# Patient Record
Sex: FEMALE | Race: WHITE | URBAN - METROPOLITAN AREA
[De-identification: names, ages, dates, MRNs, and addresses within clinical notes are randomized per-mention and may not be internally consistent; named-entity substitution may affect disease eponyms.]

---

## 2018-08-04 ENCOUNTER — EMERGENCY (EMERGENCY)
Facility: HOSPITAL | Age: 73
LOS: 0 days | Discharge: HOME | End: 2018-08-04
Attending: EMERGENCY MEDICINE | Admitting: EMERGENCY MEDICINE
Payer: MEDICARE

## 2018-08-04 VITALS — HEIGHT: 64 IN | WEIGHT: 199.96 LBS

## 2018-08-04 VITALS
OXYGEN SATURATION: 100 % | RESPIRATION RATE: 17 BRPM | DIASTOLIC BLOOD PRESSURE: 76 MMHG | SYSTOLIC BLOOD PRESSURE: 149 MMHG | HEART RATE: 82 BPM

## 2018-08-04 DIAGNOSIS — M17.0 BILATERAL PRIMARY OSTEOARTHRITIS OF KNEE: ICD-10-CM

## 2018-08-04 DIAGNOSIS — Z79.899 OTHER LONG TERM (CURRENT) DRUG THERAPY: ICD-10-CM

## 2018-08-04 DIAGNOSIS — R53.1 WEAKNESS: ICD-10-CM

## 2018-08-04 DIAGNOSIS — I10 ESSENTIAL (PRIMARY) HYPERTENSION: ICD-10-CM

## 2018-08-04 DIAGNOSIS — Z79.84 LONG TERM (CURRENT) USE OF ORAL HYPOGLYCEMIC DRUGS: ICD-10-CM

## 2018-08-04 DIAGNOSIS — E11.9 TYPE 2 DIABETES MELLITUS WITHOUT COMPLICATIONS: ICD-10-CM

## 2018-08-04 DIAGNOSIS — R29.6 REPEATED FALLS: ICD-10-CM

## 2018-08-04 DIAGNOSIS — R42 DIZZINESS AND GIDDINESS: ICD-10-CM

## 2018-08-04 LAB
ALBUMIN SERPL ELPH-MCNC: 3.3 G/DL — SIGNIFICANT CHANGE UP (ref 3.3–5)
ALP SERPL-CCNC: 103 U/L — SIGNIFICANT CHANGE UP (ref 40–120)
ALT FLD-CCNC: 38 U/L — SIGNIFICANT CHANGE UP (ref 12–78)
ANION GAP SERPL CALC-SCNC: 11 MMOL/L — SIGNIFICANT CHANGE UP (ref 5–17)
AST SERPL-CCNC: 24 U/L — SIGNIFICANT CHANGE UP (ref 15–37)
BASOPHILS # BLD AUTO: 0.05 K/UL — SIGNIFICANT CHANGE UP (ref 0–0.2)
BASOPHILS NFR BLD AUTO: 0.3 % — SIGNIFICANT CHANGE UP (ref 0–2)
BILIRUB SERPL-MCNC: 0.3 MG/DL — SIGNIFICANT CHANGE UP (ref 0.2–1.2)
BUN SERPL-MCNC: 34 MG/DL — HIGH (ref 7–23)
CALCIUM SERPL-MCNC: 9.5 MG/DL — SIGNIFICANT CHANGE UP (ref 8.5–10.1)
CHLORIDE SERPL-SCNC: 99 MMOL/L — SIGNIFICANT CHANGE UP (ref 96–108)
CO2 SERPL-SCNC: 23 MMOL/L — SIGNIFICANT CHANGE UP (ref 22–31)
CREAT SERPL-MCNC: 1.56 MG/DL — HIGH (ref 0.5–1.3)
EOSINOPHIL # BLD AUTO: 0.06 K/UL — SIGNIFICANT CHANGE UP (ref 0–0.5)
EOSINOPHIL NFR BLD AUTO: 0.4 % — SIGNIFICANT CHANGE UP (ref 0–6)
GLUCOSE SERPL-MCNC: 448 MG/DL — HIGH (ref 70–99)
HCT VFR BLD CALC: 41 % — SIGNIFICANT CHANGE UP (ref 34.5–45)
HGB BLD-MCNC: 13.3 G/DL — SIGNIFICANT CHANGE UP (ref 11.5–15.5)
IMM GRANULOCYTES NFR BLD AUTO: 0.5 % — SIGNIFICANT CHANGE UP (ref 0–1.5)
LYMPHOCYTES # BLD AUTO: 1.58 K/UL — SIGNIFICANT CHANGE UP (ref 1–3.3)
LYMPHOCYTES # BLD AUTO: 10.6 % — LOW (ref 13–44)
MCHC RBC-ENTMCNC: 28.5 PG — SIGNIFICANT CHANGE UP (ref 27–34)
MCHC RBC-ENTMCNC: 32.4 GM/DL — SIGNIFICANT CHANGE UP (ref 32–36)
MCV RBC AUTO: 87.8 FL — SIGNIFICANT CHANGE UP (ref 80–100)
MONOCYTES # BLD AUTO: 0.58 K/UL — SIGNIFICANT CHANGE UP (ref 0–0.9)
MONOCYTES NFR BLD AUTO: 3.9 % — SIGNIFICANT CHANGE UP (ref 2–14)
NEUTROPHILS # BLD AUTO: 12.53 K/UL — HIGH (ref 1.8–7.4)
NEUTROPHILS NFR BLD AUTO: 84.3 % — HIGH (ref 43–77)
NRBC # BLD: 0 /100 WBCS — SIGNIFICANT CHANGE UP (ref 0–0)
PLATELET # BLD AUTO: 354 K/UL — SIGNIFICANT CHANGE UP (ref 150–400)
POTASSIUM SERPL-MCNC: 3.9 MMOL/L — SIGNIFICANT CHANGE UP (ref 3.5–5.3)
POTASSIUM SERPL-SCNC: 3.9 MMOL/L — SIGNIFICANT CHANGE UP (ref 3.5–5.3)
PROT SERPL-MCNC: 8.2 GM/DL — SIGNIFICANT CHANGE UP (ref 6–8.3)
RBC # BLD: 4.67 M/UL — SIGNIFICANT CHANGE UP (ref 3.8–5.2)
RBC # FLD: 13.8 % — SIGNIFICANT CHANGE UP (ref 10.3–14.5)
SODIUM SERPL-SCNC: 133 MMOL/L — LOW (ref 135–145)
TROPONIN I SERPL-MCNC: <0.015 NG/ML — SIGNIFICANT CHANGE UP (ref 0.01–0.04)
WBC # BLD: 14.87 K/UL — HIGH (ref 3.8–10.5)
WBC # FLD AUTO: 14.87 K/UL — HIGH (ref 3.8–10.5)

## 2018-08-04 PROCEDURE — 93010 ELECTROCARDIOGRAM REPORT: CPT

## 2018-08-04 PROCEDURE — 99285 EMERGENCY DEPT VISIT HI MDM: CPT | Mod: 25

## 2018-08-04 PROCEDURE — 73564 X-RAY EXAM KNEE 4 OR MORE: CPT | Mod: 26,RT

## 2018-08-04 PROCEDURE — 71045 X-RAY EXAM CHEST 1 VIEW: CPT | Mod: 26

## 2018-08-04 PROCEDURE — 70450 CT HEAD/BRAIN W/O DYE: CPT | Mod: 26

## 2018-08-04 RX ORDER — MECLIZINE HCL 12.5 MG
25 TABLET ORAL ONCE
Qty: 0 | Refills: 0 | Status: COMPLETED | OUTPATIENT
Start: 2018-08-04 | End: 2018-08-04

## 2018-08-04 RX ORDER — SODIUM CHLORIDE 9 MG/ML
1000 INJECTION INTRAMUSCULAR; INTRAVENOUS; SUBCUTANEOUS ONCE
Qty: 0 | Refills: 0 | Status: COMPLETED | OUTPATIENT
Start: 2018-08-04 | End: 2018-08-04

## 2018-08-04 RX ADMIN — Medication 25 MILLIGRAM(S): at 17:43

## 2018-08-04 RX ADMIN — SODIUM CHLORIDE 1000 MILLILITER(S): 9 INJECTION INTRAMUSCULAR; INTRAVENOUS; SUBCUTANEOUS at 17:42

## 2018-08-04 NOTE — ED PROVIDER NOTE - PROGRESS NOTE DETAILS
JW PT now asymptomatic, feels better, has no symptoms.  No sign of Knee fx.  Repeat Physical Exam: General: alert and oriented x3 in no acute distress.  Cardiovascular: Regular rate and rhythm, S1 and S2 normal.  No murmurs, rubs, or gallops.  Pulses equal bilaterally.  No carotid bruits.  No peripheral edema or JVD.  Respiratory: No respiratory distress.  Lungs clear to auscultation bilaterally without adventitial breath sounds.  Abdominal: Non-distended, normal bowel sounds in all four quadrants, non tender to percussion and palpation in all four quadrants.  No mass, rigidity, or guarding.  Extremities: No sign of trauma, inflammation, or effusion.  Full range of motion in the cervical, lumbar, and thoracic spine and all four extremities without limitation.  No peripheral edema.  Neurological: AOx3 NAD.  Cranial nerves I-XII intact.  Normal gross motor and sensory function. Pt ambulatory. 5/5 muscular strength in all four extremities with normal bulk and tone.  DTRs 2+ bilaterally.  No dysmetria or dysdiadochokinesia.  No focal neurological deficit.  Mild JASPREET and non-specific leukocytosis.  Pt referred to [facility]IA clinic for 24 hoour follow up and orthopedic surgery for repeat evaluation of her knee, MRI and possible replacement. I reviewed the alarm symptoms of this patient's diagnosis and discussed criteria for their return to the emergency department.  I instructed the patient to return to the emergency department with any alarm symptoms for their specific diagnosis including dizziness, nausea, vomiting, any worsening symptoms, and any other concerns.  I instructed this patient to call their primary doctor today, to inform them of their visit to the emergency department, and to obtain a repeat evaluation in the next 24 hours.  This patient understood and agreed with our plan for follow up.  At the time of discharge this patient remained in stable condition, in no acute distress, with stable vital signs. JW PT now asymptomatic, feels better, has no symptoms off dizziness.  No sign of Knee fx.  Repeat Physical Exam: General: alert and oriented x3 in no acute distress.  Cardiovascular: Regular rate and rhythm, S1 and S2 normal.  No murmurs, rubs, or gallops.  Pulses equal bilaterally.  No carotid bruits.  No peripheral edema or JVD.  Respiratory: No respiratory distress.  Lungs clear to auscultation bilaterally without adventitial breath sounds.  Abdominal: Non-distended, normal bowel sounds in all four quadrants, non tender to percussion and palpation in all four quadrants.  No mass, rigidity, or guarding.  Extremities: No sign of trauma, inflammation, or effusion.  Full range of motion in the cervical, lumbar, and thoracic spine and all four extremities without limitation.  No peripheral edema.  Neurological: AOx3 NAD.  Cranial nerves I-XII intact.  Normal gross motor and sensory function. Pt ambulatory. 5/5 muscular strength in all four extremities with normal bulk and tone.  DTRs 2+ bilaterally.  No dysmetria or dysdiadochokinesia.  No focal neurological deficit.  Mild JASPREET and non-specific leukocytosis.  Pt referred to TIA clinic for 24 hoour follow up and orthopedic surgery for repeat evaluation of her knee, MRI and possible replacement. JW PT now asymptomatic, feels better, has no symptoms off dizziness.  No sign of Knee fx.  XR reveals arthritis. Repeat Physical Exam: General: alert and oriented x3 in no acute distress.  Cardiovascular: Regular rate and rhythm, S1 and S2 normal.  No murmurs, rubs, or gallops.  Pulses equal bilaterally.  No carotid bruits.  No peripheral edema or JVD.  Respiratory: No respiratory distress.  Lungs clear to auscultation bilaterally without adventitial breath sounds.  Abdominal: Non-distended, normal bowel sounds in all four quadrants, non tender to percussion and palpation in all four quadrants.  No mass, rigidity, or guarding.  Extremities: No sign of trauma, inflammation, or effusion.  Full range of motion in the cervical, lumbar, and thoracic spine and all four extremities without limitation.  No peripheral edema.  Neurological: AOx3 NAD.  Cranial nerves I-XII intact.  Normal gross motor and sensory function. Pt ambulatory. 5/5 muscular strength in all four extremities with normal bulk and tone.  DTRs 2+ bilaterally.  No dysmetria or dysdiadochokinesia.  No focal neurological deficit.  Mild JASPREET and non-specific leukocytosis.  Pt referred to [facility]IA clinic for 24 hoour follow up and orthopedic surgery for repeat evaluation of her knee, MRI and possible replacement.

## 2018-08-04 NOTE — ED ADULT NURSE NOTE - NSIMPLEMENTINTERV_GEN_ALL_ED
Implemented All Fall Risk Interventions:  [place] to call system. Call bell, personal items and telephone within reach. Instruct patient to call for assistance. Room bathroom lighting operational. Non-slip footwear when patient is off stretcher. Physically safe environment: no spills, clutter or unnecessary equipment. Stretcher in lowest position, wheels locked, appropriate side rails in place. Provide visual cue, wrist band, yellow gown, etc. Monitor gait and stability. Monitor for mental status changes and reorient to person, place, and time. Review medications for side effects contributing to fall risk. Reinforce activity limits and safety measures with patient and family.

## 2018-08-04 NOTE — ED PROVIDER NOTE - NS_ ATTENDINGSCRIBEDETAILS _ED_A_ED_FT
The scribe's documentation has been prepared under my direction and personally reviewed by me in its entirety.  I confirm that the note above accurately reflects all my work, treatment, procedures, and decision making except where otherwise noted or amended by me.  Tonio Mejia M.D.

## 2018-08-04 NOTE — ED STATDOCS - PROGRESS NOTE DETAILS
Diandra BARKER for ED attending, Dr. Orozco: 71 y/o F with PMHx of Vertigo and HTN presenting to the ED c/o weakness and difficulty with ambulation. Reports that she has not had an episode of vertigo in a long time and when she woke up yesterday, she thought she experienced an episode when she felt weak and could not walk. However, it has not fully resolved since yesterday and pt now believes that sx may be secondary to right knee weakness. +right knee swelling. Denies room-spinning sensation. No N/V, CP, SOB, or numbness. Notes hx of bilateral knee issues. In compliance with Losartan. No anticoagulants. PMD: Dr. Franco. Due to unilateral weakness without pain, will send patient to main ED for further evaluation.

## 2018-08-04 NOTE — ED PROVIDER NOTE - MEDICAL DECISION MAKING DETAILS
73 y/o female with a PMHx of chronic Vertigo, Arthritidis, HTN, DM presents to the ED c/o has a problem with walking that started yesterday.  VSS WNL. 71 y/o female with a PMHx of chronic Vertigo, arthritis HTN, DM presents to the ED  with difficulty walking since yesterday.  Pt states her knee gave out.  No dizziness or vertigo today.  No leg weakness 5/5 strength bilaterally.  VSS WNL.  HTN.  Given findings CT head screen for intracranial abnormality.  Screen for ACS, EKG, troponin.  CBC to screen for anemia, hematologic dyscrasia assess platelet count, evaluate for signs of infection, and screen for signs of hematologic malignancy. CMP to screen for electrolyte abnormality, assess renal function, liver function, biliary function, acid base status, fluid balance, and blood glucose. Treat symptomatically and reassess.

## 2018-08-04 NOTE — ED PROVIDER NOTE - OBJECTIVE STATEMENT
73 y/o female with a PMHx of Vertigo, Arthritidis, HTN, DM presents to the ED c/o has a problem with walking that started yesterday. Pt thinks this was an episode of vertigo. Last episode happened a year ago. This episode was similar to the No room spinning this time.  no cp, sob, dizziness, N/V. PCP gave pt anti vertigo medication. Feels fine now. No ETOH abuse or smoking. Sx of cataracts' and bovartum sx. 71 y/o female with a PMHx of Vertigo, Arthritidis, HTN, DM presents to the ED c/o has a problem with walking that started yesterday. Pt thinks this was an episode of vertigo. Last episode happened a year ago. This episode was similar to the last on except that the room was not spinning this time. Denies CP, SOB, dizziness, N/V. PCP gave pt anti vertigo medication. Feels fine now. No ETOH abuse or smoking. Sx of cataracts' and bovartum sx. 71 y/o female with a PMHx of Vertigo, Arthritidis, HTN, DM presents to the ED c/o has a problem with walking that started yesterday. Pt thinks this was an episode of vertigo. Last episode happened a year ago. This episode was similar to the last on except that the room was not spinning this time. Denies CP, SOB, dizziness, N/V. PCP gave pt anti vertigo medication. Feels fine now. No ETOH abuse or smoking. 73 y/o female with a PMHx of chronic Vertigo, Arthritidis, HTN, DM presents to the ED c/o has a problem with walking that started yesterday. Pt states that when she walks her knee gives out.  Pt states she was dizzy yesterday and it felt like a past episode of vertigo. Last episode happened a year ago. This episode was similar to the last on except that the room was not spinning this time. Denies CP, SOB, N/V. PCP gave pt anti vertigo medication. Feels fine now. No ETOH abuse or smoking. 73 y/o female with a PMHx of chronic Vertigo, arthritis, HTN, DM presents to the ED c/o has a problem with walking that started yesterday. Pt states that when she walks her knee gives out.  No leg weakness as described previously. Pt states she was dizzy yesterday and it felt like a past episode of vertigo. Last episode happened a year ago. This episode was similar to the last on except that the room was not spinning this time. Denies CP, SOB, N/V.   Pt asymptomatic at this time.  No leg weakness, no dizziness, no vertigo.  No ETOH abuse or smoking.  Pt prescribed meclizine by PCP. 73 y/o female with a PMHx of chronic vertigo on meclizine,, arthritis, HTN, DM states that her knee gave out more than usual yesterday.  PT has chronic bilateral arthritis of the knees and has received multiple injections including stem cell injections with little relief..  Yesterday her right knee "gave out" 3 times while ambulating.  No fall, head strike, LOC.  Associated knee swelling.  No fevers, chills, sweats, weight loss, fatigue, or malaise. No redness, discoloration or traumatic injury.  No leg weakness as described previously. Pt states she was also dizzy yesterday and it felt like a past episode of her chronic vertigo.  Associated nausea and vomiting.  Sx sudden onset worse with head movement and position change.  Last episode happened a year ago. She took meclizine prescribed by her PCP and her symptoms resolved yesterday.  She presented to the ED today without dizziness, nausea, vomiting.  No diplopia, dysarthria, dysphagia, dysmetria, dysdiadochokinesia.  No apraxia, aphasia, agnosia, dysphonia, dysarthria, dysphagia, paresthesia, paralysis, unilateral weakness, sensory deficits in the face or extremities, vertigo, headache, nausea, vomiting, or loss of consciousness today.  No chest pain or SOB.  Pt asymptomatic in the ED at this time. No ETOH abuse or smoking. 73 y/o female with a PMHx of chronic vertigo on meclizine,, arthritis, HTN, DM states that her knee gave out more than usual yesterday.  PT has chronic bilateral arthritis of the knees and has received multiple injections including stem cell injections with little relief..  Yesterday her right knee "gave out" 3 times while ambulating.  No fall, head strike, LOC.  Associated knee swelling.  No fevers, chills, sweats, weight loss, fatigue, or malaise. No redness, discoloration or traumatic injury.  No leg weakness as described previously. Pt states she was also dizzy yesterday and it felt like a past episode of her chronic vertigo.  Associated nausea and vomiting.  Sx sudden onset worse with head movement and position change.  Last episode happened a year ago. She took meclizine prescribed by her PCP and her symptoms resolved yesterday.  She presented to the ED today without dizziness, nausea, vomiting.  No diplopia, dysarthria, dysphagia, dysmetria, dysdiadochokinesia.  No apraxia, aphasia, agnosia, dysphonia, dysarthria, dysphagia, paresthesia, paralysis, unilateral weakness, sensory deficits in the face or extremities, vertigo, headache, nausea, vomiting, or loss of consciousness today.  No chest pain or SOB.  Pt asymptomatic in the ED at this time. No ETOH abuse or smoking.  No urinary urgency, urinary retention, urinary frequency, hematuria, dysuria, dark urine, flank pain, groin pain, or urethral pain.

## 2018-08-04 NOTE — ED ADULT NURSE NOTE - OBJECTIVE STATEMENT
Patient comes to ED for right leg weakness. pt reports since yesterday having right leg give out. pt denies any chest pain, SOB, dizziness or slurred speech. pt reports needing a knee replacement in right knee. Pt denies any swelling, redness or discomfort.

## 2018-08-04 NOTE — ED PROVIDER NOTE - PHYSICAL EXAMINATION
Cranial nerves I-XII intact.  Normal gross motor and sensory function. Pt ambulatory. 5/5 muscular strength in all four extremities with normal bulk and tone.  DTRs 2+ bilaterally.  No dysmetria or dysdiadochokinesia.  No focal neurological deficit.  No neck stiffness, photophobia, rash, or meningismus. No diplopia, dysarthria, dysphagia, dysmetria, dysdiadochokinesia or other findings consistent with vertebral/basilar insufficiency.  HINTS Exam: No skew, no vertical nystagmus, horizontal head jolt test reveals corrective saccades: exam suggestive of peripheral vertigo.

## 2018-08-05 NOTE — ED POST DISCHARGE NOTE - ADDITIONAL DOCUMENTATION
Wellness check.  Pt states she has no neurological symptoms. No apraxia, aphasia, agnosia, dysphonia, dysarthria, dysphagia, dizziness, paresthesia, paralysis, unilateral weakness, sensory deficits in the face or extremities, vertigo, headache, nausea, vomiting, or loss of consciousness.  Pt continues to complain of her knee giving out.  Will follow up with Dr. Maldonado in the AM.  Discussed same day follow up at the [facility] neurology clinic, discussed contact information and reiterated return precautions. Pt understands feels better will follow up Monday.

## 2019-02-04 ENCOUNTER — EMERGENCY (EMERGENCY)
Facility: HOSPITAL | Age: 74
LOS: 0 days | Discharge: HOME | End: 2019-02-04
Attending: EMERGENCY MEDICINE | Admitting: EMERGENCY MEDICINE
Payer: MEDICARE

## 2019-02-04 VITALS
DIASTOLIC BLOOD PRESSURE: 65 MMHG | OXYGEN SATURATION: 100 % | RESPIRATION RATE: 18 BRPM | HEART RATE: 78 BPM | SYSTOLIC BLOOD PRESSURE: 147 MMHG

## 2019-02-04 VITALS — WEIGHT: 177.91 LBS | HEIGHT: 63 IN

## 2019-02-04 DIAGNOSIS — E78.5 HYPERLIPIDEMIA, UNSPECIFIED: ICD-10-CM

## 2019-02-04 DIAGNOSIS — R53.1 WEAKNESS: ICD-10-CM

## 2019-02-04 DIAGNOSIS — E11.65 TYPE 2 DIABETES MELLITUS WITH HYPERGLYCEMIA: ICD-10-CM

## 2019-02-04 DIAGNOSIS — I10 ESSENTIAL (PRIMARY) HYPERTENSION: ICD-10-CM

## 2019-02-04 PROBLEM — R42 DIZZINESS AND GIDDINESS: Chronic | Status: ACTIVE | Noted: 2018-08-05

## 2019-02-04 LAB
ALBUMIN SERPL ELPH-MCNC: 3.2 G/DL — LOW (ref 3.3–5)
ALP SERPL-CCNC: 96 U/L — SIGNIFICANT CHANGE UP (ref 40–120)
ALT FLD-CCNC: 31 U/L — SIGNIFICANT CHANGE UP (ref 12–78)
ANION GAP SERPL CALC-SCNC: 11 MMOL/L — SIGNIFICANT CHANGE UP (ref 5–17)
APPEARANCE UR: ABNORMAL
AST SERPL-CCNC: 35 U/L — SIGNIFICANT CHANGE UP (ref 15–37)
BACTERIA # UR AUTO: ABNORMAL
BASOPHILS # BLD AUTO: 0.03 K/UL — SIGNIFICANT CHANGE UP (ref 0–0.2)
BASOPHILS NFR BLD AUTO: 0.3 % — SIGNIFICANT CHANGE UP (ref 0–2)
BILIRUB SERPL-MCNC: 0.7 MG/DL — SIGNIFICANT CHANGE UP (ref 0.2–1.2)
BILIRUB UR-MCNC: NEGATIVE — SIGNIFICANT CHANGE UP
BUN SERPL-MCNC: 44 MG/DL — HIGH (ref 7–23)
CALCIUM SERPL-MCNC: 8.9 MG/DL — SIGNIFICANT CHANGE UP (ref 8.5–10.1)
CHLORIDE SERPL-SCNC: 92 MMOL/L — LOW (ref 96–108)
CO2 SERPL-SCNC: 26 MMOL/L — SIGNIFICANT CHANGE UP (ref 22–31)
COLOR SPEC: YELLOW — SIGNIFICANT CHANGE UP
CREAT SERPL-MCNC: 1.87 MG/DL — HIGH (ref 0.5–1.3)
DIFF PNL FLD: ABNORMAL
EOSINOPHIL # BLD AUTO: 0.08 K/UL — SIGNIFICANT CHANGE UP (ref 0–0.5)
EOSINOPHIL NFR BLD AUTO: 0.8 % — SIGNIFICANT CHANGE UP (ref 0–6)
EPI CELLS # UR: SIGNIFICANT CHANGE UP
GLUCOSE BLDC GLUCOMTR-MCNC: 248 MG/DL — HIGH (ref 70–99)
GLUCOSE SERPL-MCNC: 329 MG/DL — HIGH (ref 70–99)
GLUCOSE UR QL: 250 MG/DL
HCT VFR BLD CALC: 36.3 % — SIGNIFICANT CHANGE UP (ref 34.5–45)
HGB BLD-MCNC: 12.2 G/DL — SIGNIFICANT CHANGE UP (ref 11.5–15.5)
IMM GRANULOCYTES NFR BLD AUTO: 0.9 % — SIGNIFICANT CHANGE UP (ref 0–1.5)
KETONES UR-MCNC: ABNORMAL
LEUKOCYTE ESTERASE UR-ACNC: ABNORMAL
LYMPHOCYTES # BLD AUTO: 2.13 K/UL — SIGNIFICANT CHANGE UP (ref 1–3.3)
LYMPHOCYTES # BLD AUTO: 21.6 % — SIGNIFICANT CHANGE UP (ref 13–44)
MCHC RBC-ENTMCNC: 29 PG — SIGNIFICANT CHANGE UP (ref 27–34)
MCHC RBC-ENTMCNC: 33.6 GM/DL — SIGNIFICANT CHANGE UP (ref 32–36)
MCV RBC AUTO: 86.2 FL — SIGNIFICANT CHANGE UP (ref 80–100)
MONOCYTES # BLD AUTO: 1.12 K/UL — HIGH (ref 0–0.9)
MONOCYTES NFR BLD AUTO: 11.4 % — SIGNIFICANT CHANGE UP (ref 2–14)
NEUTROPHILS # BLD AUTO: 6.39 K/UL — SIGNIFICANT CHANGE UP (ref 1.8–7.4)
NEUTROPHILS NFR BLD AUTO: 65 % — SIGNIFICANT CHANGE UP (ref 43–77)
NITRITE UR-MCNC: NEGATIVE — SIGNIFICANT CHANGE UP
NRBC # BLD: 0 /100 WBCS — SIGNIFICANT CHANGE UP (ref 0–0)
PH UR: 5 — SIGNIFICANT CHANGE UP (ref 5–8)
PLATELET # BLD AUTO: 258 K/UL — SIGNIFICANT CHANGE UP (ref 150–400)
POTASSIUM SERPL-MCNC: 3.6 MMOL/L — SIGNIFICANT CHANGE UP (ref 3.5–5.3)
POTASSIUM SERPL-SCNC: 3.6 MMOL/L — SIGNIFICANT CHANGE UP (ref 3.5–5.3)
PROT SERPL-MCNC: 7.9 GM/DL — SIGNIFICANT CHANGE UP (ref 6–8.3)
PROT UR-MCNC: 30 MG/DL
RBC # BLD: 4.21 M/UL — SIGNIFICANT CHANGE UP (ref 3.8–5.2)
RBC # FLD: 13.2 % — SIGNIFICANT CHANGE UP (ref 10.3–14.5)
RBC CASTS # UR COMP ASSIST: ABNORMAL /HPF (ref 0–4)
SODIUM SERPL-SCNC: 129 MMOL/L — LOW (ref 135–145)
SP GR SPEC: 1.02 — SIGNIFICANT CHANGE UP (ref 1.01–1.02)
UROBILINOGEN FLD QL: NEGATIVE MG/DL — SIGNIFICANT CHANGE UP
WBC # BLD: 9.84 K/UL — SIGNIFICANT CHANGE UP (ref 3.8–10.5)
WBC # FLD AUTO: 9.84 K/UL — SIGNIFICANT CHANGE UP (ref 3.8–10.5)
WBC UR QL: >50

## 2019-02-04 PROCEDURE — 99285 EMERGENCY DEPT VISIT HI MDM: CPT | Mod: 25

## 2019-02-04 RX ORDER — INSULIN GLARGINE 100 [IU]/ML
10 INJECTION, SOLUTION SUBCUTANEOUS
Qty: 5 | Refills: 0
Start: 2019-02-04

## 2019-02-04 RX ORDER — SODIUM CHLORIDE 9 MG/ML
1000 INJECTION INTRAMUSCULAR; INTRAVENOUS; SUBCUTANEOUS ONCE
Qty: 0 | Refills: 0 | Status: COMPLETED | OUTPATIENT
Start: 2019-02-04 | End: 2019-02-04

## 2019-02-04 RX ORDER — INSULIN GLARGINE 100 [IU]/ML
10 INJECTION, SOLUTION SUBCUTANEOUS ONCE
Qty: 0 | Refills: 0 | Status: COMPLETED | OUTPATIENT
Start: 2019-02-04 | End: 2019-02-04

## 2019-02-04 RX ORDER — SODIUM CHLORIDE 9 MG/ML
3 INJECTION INTRAMUSCULAR; INTRAVENOUS; SUBCUTANEOUS ONCE
Qty: 0 | Refills: 0 | Status: COMPLETED | OUTPATIENT
Start: 2019-02-04 | End: 2019-02-04

## 2019-02-04 RX ADMIN — SODIUM CHLORIDE 1000 MILLILITER(S): 9 INJECTION INTRAMUSCULAR; INTRAVENOUS; SUBCUTANEOUS at 17:34

## 2019-02-04 RX ADMIN — SODIUM CHLORIDE 1000 MILLILITER(S): 9 INJECTION INTRAMUSCULAR; INTRAVENOUS; SUBCUTANEOUS at 15:30

## 2019-02-04 RX ADMIN — SODIUM CHLORIDE 1000 MILLILITER(S): 9 INJECTION INTRAMUSCULAR; INTRAVENOUS; SUBCUTANEOUS at 16:30

## 2019-02-04 RX ADMIN — SODIUM CHLORIDE 3 MILLILITER(S): 9 INJECTION INTRAMUSCULAR; INTRAVENOUS; SUBCUTANEOUS at 16:15

## 2019-02-04 RX ADMIN — INSULIN GLARGINE 10 UNIT(S): 100 INJECTION, SOLUTION SUBCUTANEOUS at 19:13

## 2019-02-04 NOTE — ED ADULT NURSE NOTE - OBJECTIVE STATEMENT
patient sitting up in stretcher in no acute distress. states she was pre-diabetic and felt more weak and had mouth dryness. patient hyperglycemic on arrival. color good. skin warm and dry. respirations even and unlabored. patient was told by MD that she has the new diagnosis of diabetes. education to be provided to patient prior to discharge. follow up visit with patient's primary doctor already arranged.

## 2019-02-04 NOTE — ED ADULT TRIAGE NOTE - CHIEF COMPLAINT QUOTE
patient sent by dr kellogg for blood glucose on venous draw greater than 500.  patient has been taking OHA patient sent by dr kellogg for blood glucose on venous draw greater than 500.  patient has been taking OHA.  bgm in triage [id number]

## 2019-02-04 NOTE — ED STATDOCS - PROGRESS NOTE DETAILS
72 y/o F with PMH of HTN, HLD, DM presents with hyperglycemia, generalized weakness x 2 days.  notes pt had recent FS of >500 today. Seen by PCP, Dr. Franco today and advised to go to ED. FS in . Pt believes she is on metformin, but is not certain. Denies associated fever, chills, nausea, vomiting, abdominal pain. PE: Well appearing. Cardiac: s1/s2, RRR. Lungs: CTAB. Abdomen: NBS x4, soft, nontender. A/P: Hyperglycemia, R/o DKA. Plan for labs, insulin, IVF. Reassess. - Jhonny Almaguer PA-C Labs reviewed, will provide additional litre of fluids while in ED. Will administer 10 units of lantus now. Pt has glucometer, will educate on use prior to disposition. Pt has outpatient follow up with PCP Dr. Franco 2/7/19 at 10:30AM with visiting nurse service coming tomorrow. DM educating performed in ED. Pt to be discharged at this time with VNS tomorrow at home. PCP follow up on 2/7/19. Advised to follow up with PCP or return to ED as needed. - Jhonny Almaguer PA-C Discharge instructions discussed with patient. Plan for d/c at this time. - Jhonny Almaguer PA-C

## 2019-02-04 NOTE — ED ADULT NURSE NOTE - CHIEF COMPLAINT QUOTE
patient sent by dr kellogg for blood glucose on venous draw greater than 500.  patient has been taking OHA.  bgm in triage [id number]

## 2019-02-04 NOTE — ED ADULT NURSE NOTE - NSIMPLEMENTINTERV_GEN_ALL_ED
Implemented All Universal Safety Interventions:  [place] to call system. Call bell, personal items and telephone within reach. Instruct patient to call for assistance. Room bathroom lighting operational. Non-slip footwear when patient is off stretcher. Physically safe environment: no spills, clutter or unnecessary equipment. Stretcher in lowest position, wheels locked, appropriate side rails in place.

## 2019-02-04 NOTE — ED ADULT NURSE NOTE - PAIN: PRESENCE, MLM
Quality 110: Preventive Care And Screening: Influenza Immunization: Influenza Immunization previously received during influenza season Detail Level: Detailed Quality 111:Pneumonia Vaccination Status For Older Adults: Pneumococcal Vaccination Previously Received Quality 431: Preventive Care And Screening: Unhealthy Alcohol Use - Screening: Patient screened for unhealthy alcohol use using a single question and scores less than 2 times per year Quality 47: Advance Care Plan: Advance Care Planning discussed and documented in the medical record; patient did not wish or was not able to name a surrogate decision maker or provide an advance care plan. Quality 130: Documentation Of Current Medications In The Medical Record: Current Medications Documented Quality 226: Preventive Care And Screening: Tobacco Use: Screening And Cessation Intervention: Patient screened for tobacco and never smoked denies pain/discomfort

## 2019-02-04 NOTE — ED STATDOCS - MEDICAL DECISION MAKING DETAILS
72 y/o F with hyperglycemia, generalized weakness. Plan: labs, insulin, contact diabetic teaching, reassess.

## 2019-02-04 NOTE — ED STATDOCS - NSFOLLOWUPINSTRUCTIONS_ED_ALL_ED_FT
Hyperglycemia    Hyperglycemia occurs when the glucose (sugar) level in your blood is too high. Symptoms include increased urination, increased thirst, a dry mouth, or changes in appetite. If started on a medication, take exactly as prescribed by your health care professional. Maintain a healthy lifestyle and follow up with your primary care physician.    SEEK IMMEDIATE MEDICAL CARE IF YOU HAVE ANY OF THE FOLLOWING SYMPTOMS: shortness of breath, change in mental status, nausea or vomiting, fruity smell to your breath, or any signs of dehydration.    Use insulin as instructed. Home nurse to come tomorrow. Follow up with PCP on 2/7/19. return to ED with any complications.

## 2019-02-04 NOTE — ED ADULT NURSE REASSESSMENT NOTE - NS ED NURSE REASSESS COMMENT FT1
Diabetic teaching preformed with patient and her . Pt taught about glucometer and how to preform a finger stick and check her blood sugar. Pt taught about diet, foods to eat, foods to avoid, eating out with diabetes. Pt provided written information on foods to eat and eating out with diabetes. Pt preformed her own finger stick. Pt and  provided answers to all questions. Pt to  lancets, insulin, needles and syringes on way home from ED. Visiting nurse will be arriving at their home tomorrow am to preform further education and teaching in regards to diabetes and insulin administration. Pt has a follow up appointment with PMD on Thursday. Pt informed that if she or her  have any further questions tonight, they can call the ED. Pt aware that she will be able to ask questions tomorrow of visiting nurse and PMD as well, as they will be following her.  Pt comfortable with teaching provided tonight and will follow up with visiting nurse tomorrow.

## 2019-02-04 NOTE — ED ADULT NURSE REASSESSMENT NOTE - NS ED NURSE REASSESS COMMENT FT1
Pt is currently set up with visiting nurse to come tomorrow am. Pt has follow up appointment at PMD on Thursday. Spoke with nursing education who will come to assist with diabetic teaching. IVF infusing as per orders. Comfort and safety maintained.

## 2019-02-04 NOTE — ED STATDOCS - OBJECTIVE STATEMENT
72 y/o F with PMHx of DM, HLD, and HTN presenting to the ED with  c/o hyperglycemia, generalized weakness. Pt has been experiencing generalized weakness x2 days. Seen by PMD Dr. Franco today.  at this time, sent into ED for evaluation  in triage. Denies any CP, SOB, abd pain, N/V/D, fevers, or chills. Allergic to Cipro and Barbiturates.

## 2019-02-04 NOTE — ED STATDOCS - CARE PROVIDER_API CALL
Tonio Franco (MD)  Medicine  [address] [place] [address]  Phone: [phone]  Fax: [phone]

## 2020-01-01 ENCOUNTER — APPOINTMENT (OUTPATIENT)
Dept: NUCLEAR MEDICINE | Facility: CLINIC | Age: 75
End: 2020-01-01
Payer: MEDICARE

## 2020-01-01 ENCOUNTER — INPATIENT (INPATIENT)
Facility: HOSPITAL | Age: 75
LOS: 4 days | Discharge: HOME | DRG: 375 | End: 2020-11-06
Attending: INTERNAL MEDICINE | Admitting: STUDENT IN AN ORGANIZED HEALTH CARE EDUCATION/TRAINING PROGRAM
Payer: MEDICARE

## 2020-01-01 ENCOUNTER — RESULT REVIEW (OUTPATIENT)
Age: 75
End: 2020-01-01

## 2020-01-01 ENCOUNTER — TRANSCRIPTION ENCOUNTER (OUTPATIENT)
Age: 75
End: 2020-01-01

## 2020-01-01 ENCOUNTER — OUTPATIENT (OUTPATIENT)
Dept: OUTPATIENT SERVICES | Facility: HOSPITAL | Age: 75
LOS: 1 days | End: 2020-01-01
Payer: MEDICARE

## 2020-01-01 ENCOUNTER — APPOINTMENT (OUTPATIENT)
Dept: COLORECTAL SURGERY | Facility: CLINIC | Age: 75
End: 2020-01-01
Payer: MEDICARE

## 2020-01-01 ENCOUNTER — APPOINTMENT (OUTPATIENT)
Dept: COLORECTAL SURGERY | Facility: HOSPITAL | Age: 75
End: 2020-01-01

## 2020-01-01 VITALS — WEIGHT: 169.98 LBS | HEIGHT: 63 IN

## 2020-01-01 VITALS
TEMPERATURE: 99 F | DIASTOLIC BLOOD PRESSURE: 63 MMHG | SYSTOLIC BLOOD PRESSURE: 142 MMHG | OXYGEN SATURATION: 100 % | RESPIRATION RATE: 18 BRPM | HEART RATE: 90 BPM

## 2020-01-01 DIAGNOSIS — C18.0 MALIGNANT NEOPLASM OF CECUM: ICD-10-CM

## 2020-01-01 DIAGNOSIS — Z86.39 PERSONAL HISTORY OF OTHER ENDOCRINE, NUTRITIONAL AND METABOLIC DISEASE: ICD-10-CM

## 2020-01-01 DIAGNOSIS — D62 ACUTE POSTHEMORRHAGIC ANEMIA: ICD-10-CM

## 2020-01-01 DIAGNOSIS — Z80.0 FAMILY HISTORY OF MALIGNANT NEOPLASM OF DIGESTIVE ORGANS: ICD-10-CM

## 2020-01-01 DIAGNOSIS — I12.9 HYPERTENSIVE CHRONIC KIDNEY DISEASE WITH STAGE 1 THROUGH STAGE 4 CHRONIC KIDNEY DISEASE, OR UNSPECIFIED CHRONIC KIDNEY DISEASE: ICD-10-CM

## 2020-01-01 DIAGNOSIS — E87.6 HYPOKALEMIA: ICD-10-CM

## 2020-01-01 DIAGNOSIS — Z79.4 LONG TERM (CURRENT) USE OF INSULIN: ICD-10-CM

## 2020-01-01 DIAGNOSIS — E11.22 TYPE 2 DIABETES MELLITUS WITH DIABETIC CHRONIC KIDNEY DISEASE: ICD-10-CM

## 2020-01-01 DIAGNOSIS — N39.0 URINARY TRACT INFECTION, SITE NOT SPECIFIED: ICD-10-CM

## 2020-01-01 DIAGNOSIS — B96.4 PROTEUS (MIRABILIS) (MORGANII) AS THE CAUSE OF DISEASES CLASSIFIED ELSEWHERE: ICD-10-CM

## 2020-01-01 DIAGNOSIS — Z86.2 PERSONAL HISTORY OF DISEASES OF THE BLOOD AND BLOOD-FORMING ORGANS AND CERTAIN DISORDERS INVOLVING THE IMMUNE MECHANISM: ICD-10-CM

## 2020-01-01 DIAGNOSIS — Z00.8 ENCOUNTER FOR OTHER GENERAL EXAMINATION: ICD-10-CM

## 2020-01-01 DIAGNOSIS — K57.30 DIVERTICULOSIS OF LARGE INTESTINE WITHOUT PERFORATION OR ABSCESS WITHOUT BLEEDING: ICD-10-CM

## 2020-01-01 DIAGNOSIS — Z78.9 OTHER SPECIFIED HEALTH STATUS: ICD-10-CM

## 2020-01-01 DIAGNOSIS — N18.30 CHRONIC KIDNEY DISEASE, STAGE 3 UNSPECIFIED: ICD-10-CM

## 2020-01-01 DIAGNOSIS — N17.9 ACUTE KIDNEY FAILURE, UNSPECIFIED: ICD-10-CM

## 2020-01-01 DIAGNOSIS — R55 SYNCOPE AND COLLAPSE: ICD-10-CM

## 2020-01-01 DIAGNOSIS — Z86.79 PERSONAL HISTORY OF OTHER DISEASES OF THE CIRCULATORY SYSTEM: ICD-10-CM

## 2020-01-01 DIAGNOSIS — Z86.69 PERSONAL HISTORY OF OTHER DISEASES OF THE NERVOUS SYSTEM AND SENSE ORGANS: ICD-10-CM

## 2020-01-01 DIAGNOSIS — C21.0 MALIGNANT NEOPLASM OF ANUS, UNSPECIFIED: ICD-10-CM

## 2020-01-01 DIAGNOSIS — Z88.9 ALLERGY STATUS TO UNSPECIFIED DRUGS, MEDICAMENTS AND BIOLOGICAL SUBSTANCES: ICD-10-CM

## 2020-01-01 DIAGNOSIS — Z79.899 OTHER LONG TERM (CURRENT) DRUG THERAPY: ICD-10-CM

## 2020-01-01 DIAGNOSIS — I95.1 ORTHOSTATIC HYPOTENSION: ICD-10-CM

## 2020-01-01 DIAGNOSIS — E86.0 DEHYDRATION: ICD-10-CM

## 2020-01-01 DIAGNOSIS — Z88.1 ALLERGY STATUS TO OTHER ANTIBIOTIC AGENTS STATUS: ICD-10-CM

## 2020-01-01 DIAGNOSIS — D72.829 ELEVATED WHITE BLOOD CELL COUNT, UNSPECIFIED: ICD-10-CM

## 2020-01-01 LAB
-  AMIKACIN: SIGNIFICANT CHANGE UP
-  AMOXICILLIN/CLAVULANIC ACID: SIGNIFICANT CHANGE UP
-  AMPICILLIN/SULBACTAM: SIGNIFICANT CHANGE UP
-  AMPICILLIN: SIGNIFICANT CHANGE UP
-  AZTREONAM: SIGNIFICANT CHANGE UP
-  CEFAZOLIN: SIGNIFICANT CHANGE UP
-  CEFEPIME: SIGNIFICANT CHANGE UP
-  CEFOXITIN: SIGNIFICANT CHANGE UP
-  CEFTRIAXONE: SIGNIFICANT CHANGE UP
-  CIPROFLOXACIN: SIGNIFICANT CHANGE UP
-  ERTAPENEM: SIGNIFICANT CHANGE UP
-  GENTAMICIN: SIGNIFICANT CHANGE UP
-  LEVOFLOXACIN: SIGNIFICANT CHANGE UP
-  MEROPENEM: SIGNIFICANT CHANGE UP
-  NITROFURANTOIN: SIGNIFICANT CHANGE UP
-  PIPERACILLIN/TAZOBACTAM: SIGNIFICANT CHANGE UP
-  TOBRAMYCIN: SIGNIFICANT CHANGE UP
-  TRIMETHOPRIM/SULFAMETHOXAZOLE: SIGNIFICANT CHANGE UP
A1C WITH ESTIMATED AVERAGE GLUCOSE RESULT: 6.9 % — HIGH (ref 4–5.6)
ABO RH CONFIRMATION: SIGNIFICANT CHANGE UP
ADD ON TEST-SPECIMEN IN LAB: SIGNIFICANT CHANGE UP
ALBUMIN SERPL ELPH-MCNC: 2.8 G/DL — LOW (ref 3.3–5)
ALBUMIN SERPL ELPH-MCNC: 3.3 G/DL — SIGNIFICANT CHANGE UP (ref 3.3–5)
ALP SERPL-CCNC: 74 U/L — SIGNIFICANT CHANGE UP (ref 40–120)
ALP SERPL-CCNC: 84 U/L — SIGNIFICANT CHANGE UP (ref 40–120)
ALT FLD-CCNC: 14 U/L — SIGNIFICANT CHANGE UP (ref 12–78)
ALT FLD-CCNC: 15 U/L — SIGNIFICANT CHANGE UP (ref 12–78)
ANION GAP SERPL CALC-SCNC: 10 MMOL/L — SIGNIFICANT CHANGE UP (ref 5–17)
ANION GAP SERPL CALC-SCNC: 10 MMOL/L — SIGNIFICANT CHANGE UP (ref 5–17)
ANION GAP SERPL CALC-SCNC: 6 MMOL/L — SIGNIFICANT CHANGE UP (ref 5–17)
ANION GAP SERPL CALC-SCNC: 7 MMOL/L — SIGNIFICANT CHANGE UP (ref 5–17)
ANION GAP SERPL CALC-SCNC: 8 MMOL/L — SIGNIFICANT CHANGE UP (ref 5–17)
APPEARANCE UR: ABNORMAL
APTT BLD: 35.2 SEC — SIGNIFICANT CHANGE UP (ref 27.5–35.5)
AST SERPL-CCNC: 11 U/L — LOW (ref 15–37)
AST SERPL-CCNC: 12 U/L — LOW (ref 15–37)
BASOPHILS # BLD AUTO: 0.05 K/UL — SIGNIFICANT CHANGE UP (ref 0–0.2)
BASOPHILS # BLD AUTO: 0.07 K/UL — SIGNIFICANT CHANGE UP (ref 0–0.2)
BASOPHILS NFR BLD AUTO: 0.4 % — SIGNIFICANT CHANGE UP (ref 0–2)
BASOPHILS NFR BLD AUTO: 0.6 % — SIGNIFICANT CHANGE UP (ref 0–2)
BILIRUB SERPL-MCNC: 0.2 MG/DL — SIGNIFICANT CHANGE UP (ref 0.2–1.2)
BILIRUB SERPL-MCNC: 0.3 MG/DL — SIGNIFICANT CHANGE UP (ref 0.2–1.2)
BILIRUB UR-MCNC: NEGATIVE — SIGNIFICANT CHANGE UP
BLD GP AB SCN SERPL QL: SIGNIFICANT CHANGE UP
BUN SERPL-MCNC: 10 MG/DL — SIGNIFICANT CHANGE UP (ref 7–23)
BUN SERPL-MCNC: 17 MG/DL — SIGNIFICANT CHANGE UP (ref 7–23)
BUN SERPL-MCNC: 26 MG/DL — HIGH (ref 7–23)
BUN SERPL-MCNC: 29 MG/DL — HIGH (ref 7–23)
BUN SERPL-MCNC: 32 MG/DL — HIGH (ref 7–23)
CALCIUM SERPL-MCNC: 8.2 MG/DL — LOW (ref 8.5–10.1)
CALCIUM SERPL-MCNC: 8.5 MG/DL — SIGNIFICANT CHANGE UP (ref 8.5–10.1)
CALCIUM SERPL-MCNC: 8.6 MG/DL — SIGNIFICANT CHANGE UP (ref 8.5–10.1)
CALCIUM SERPL-MCNC: 8.8 MG/DL — SIGNIFICANT CHANGE UP (ref 8.5–10.1)
CALCIUM SERPL-MCNC: 9.3 MG/DL — SIGNIFICANT CHANGE UP (ref 8.5–10.1)
CHLORIDE SERPL-SCNC: 103 MMOL/L — SIGNIFICANT CHANGE UP (ref 96–108)
CHLORIDE SERPL-SCNC: 111 MMOL/L — HIGH (ref 96–108)
CHLORIDE SERPL-SCNC: 112 MMOL/L — HIGH (ref 96–108)
CHLORIDE SERPL-SCNC: 116 MMOL/L — HIGH (ref 96–108)
CHLORIDE SERPL-SCNC: 116 MMOL/L — HIGH (ref 96–108)
CO2 SERPL-SCNC: 21 MMOL/L — LOW (ref 22–31)
CO2 SERPL-SCNC: 21 MMOL/L — LOW (ref 22–31)
CO2 SERPL-SCNC: 22 MMOL/L — SIGNIFICANT CHANGE UP (ref 22–31)
CO2 SERPL-SCNC: 22 MMOL/L — SIGNIFICANT CHANGE UP (ref 22–31)
CO2 SERPL-SCNC: 25 MMOL/L — SIGNIFICANT CHANGE UP (ref 22–31)
COLOR SPEC: YELLOW — SIGNIFICANT CHANGE UP
CREAT SERPL-MCNC: 1.17 MG/DL — SIGNIFICANT CHANGE UP (ref 0.5–1.3)
CREAT SERPL-MCNC: 1.23 MG/DL — SIGNIFICANT CHANGE UP (ref 0.5–1.3)
CREAT SERPL-MCNC: 1.33 MG/DL — HIGH (ref 0.5–1.3)
CREAT SERPL-MCNC: 1.49 MG/DL — HIGH (ref 0.5–1.3)
CREAT SERPL-MCNC: 1.6 MG/DL — HIGH (ref 0.5–1.3)
CULTURE RESULTS: SIGNIFICANT CHANGE UP
DIFF PNL FLD: ABNORMAL
EOSINOPHIL # BLD AUTO: 0.07 K/UL — SIGNIFICANT CHANGE UP (ref 0–0.5)
EOSINOPHIL # BLD AUTO: 0.32 K/UL — SIGNIFICANT CHANGE UP (ref 0–0.5)
EOSINOPHIL NFR BLD AUTO: 0.4 % — SIGNIFICANT CHANGE UP (ref 0–6)
EOSINOPHIL NFR BLD AUTO: 3.7 % — SIGNIFICANT CHANGE UP (ref 0–6)
ESTIMATED AVERAGE GLUCOSE: 151 MG/DL — HIGH (ref 68–114)
FERRITIN SERPL-MCNC: 38 NG/ML — SIGNIFICANT CHANGE UP (ref 15–150)
FOLATE SERPL-MCNC: 7.7 NG/ML — SIGNIFICANT CHANGE UP
GLUCOSE BLDC GLUCOMTR-MCNC: 127 MG/DL — HIGH (ref 70–99)
GLUCOSE BLDC GLUCOMTR-MCNC: 162 MG/DL — HIGH (ref 70–99)
GLUCOSE SERPL-MCNC: 106 MG/DL — HIGH (ref 70–99)
GLUCOSE SERPL-MCNC: 137 MG/DL — HIGH (ref 70–99)
GLUCOSE SERPL-MCNC: 151 MG/DL — HIGH (ref 70–99)
GLUCOSE SERPL-MCNC: 86 MG/DL — SIGNIFICANT CHANGE UP (ref 70–99)
GLUCOSE SERPL-MCNC: 93 MG/DL — SIGNIFICANT CHANGE UP (ref 70–99)
GLUCOSE UR QL: NEGATIVE MG/DL — SIGNIFICANT CHANGE UP
HCT VFR BLD CALC: 25.5 % — LOW (ref 34.5–45)
HCT VFR BLD CALC: 26.1 % — LOW (ref 34.5–45)
HCT VFR BLD CALC: 27.3 % — LOW (ref 34.5–45)
HCT VFR BLD CALC: 28.2 % — LOW (ref 34.5–45)
HCT VFR BLD CALC: 29.3 % — LOW (ref 34.5–45)
HGB BLD-MCNC: 7.6 G/DL — LOW (ref 11.5–15.5)
HGB BLD-MCNC: 7.9 G/DL — LOW (ref 11.5–15.5)
HGB BLD-MCNC: 8.6 G/DL — LOW (ref 11.5–15.5)
HGB BLD-MCNC: 8.8 G/DL — LOW (ref 11.5–15.5)
HGB BLD-MCNC: 9 G/DL — LOW (ref 11.5–15.5)
IMM GRANULOCYTES NFR BLD AUTO: 0.1 % — SIGNIFICANT CHANGE UP (ref 0–1.5)
IMM GRANULOCYTES NFR BLD AUTO: 0.6 % — SIGNIFICANT CHANGE UP (ref 0–1.5)
INR BLD: 1.14 RATIO — SIGNIFICANT CHANGE UP (ref 0.88–1.16)
INR BLD: 1.23 RATIO — HIGH (ref 0.88–1.16)
IRON SATN MFR SERPL: 21 UG/DL — LOW (ref 30–160)
IRON SATN MFR SERPL: 27 UG/DL — LOW (ref 30–160)
IRON SATN MFR SERPL: 9 % — LOW (ref 14–50)
KETONES UR-MCNC: NEGATIVE — SIGNIFICANT CHANGE UP
LACTATE SERPL-SCNC: 1.8 MMOL/L — SIGNIFICANT CHANGE UP (ref 0.7–2)
LEUKOCYTE ESTERASE UR-ACNC: ABNORMAL
LYMPHOCYTES # BLD AUTO: 1.72 K/UL — SIGNIFICANT CHANGE UP (ref 1–3.3)
LYMPHOCYTES # BLD AUTO: 1.85 K/UL — SIGNIFICANT CHANGE UP (ref 1–3.3)
LYMPHOCYTES # BLD AUTO: 10 % — LOW (ref 13–44)
LYMPHOCYTES # BLD AUTO: 21.5 % — SIGNIFICANT CHANGE UP (ref 13–44)
MAGNESIUM SERPL-MCNC: 2 MG/DL — SIGNIFICANT CHANGE UP (ref 1.6–2.6)
MCHC RBC-ENTMCNC: 25.5 PG — LOW (ref 27–34)
MCHC RBC-ENTMCNC: 25.5 PG — LOW (ref 27–34)
MCHC RBC-ENTMCNC: 25.9 PG — LOW (ref 27–34)
MCHC RBC-ENTMCNC: 26.5 PG — LOW (ref 27–34)
MCHC RBC-ENTMCNC: 26.7 PG — LOW (ref 27–34)
MCHC RBC-ENTMCNC: 29.8 GM/DL — LOW (ref 32–36)
MCHC RBC-ENTMCNC: 30.3 GM/DL — LOW (ref 32–36)
MCHC RBC-ENTMCNC: 30.7 GM/DL — LOW (ref 32–36)
MCHC RBC-ENTMCNC: 31.2 GM/DL — LOW (ref 32–36)
MCHC RBC-ENTMCNC: 31.5 GM/DL — LOW (ref 32–36)
MCV RBC AUTO: 84.2 FL — SIGNIFICANT CHANGE UP (ref 80–100)
MCV RBC AUTO: 84.4 FL — SIGNIFICANT CHANGE UP (ref 80–100)
MCV RBC AUTO: 84.8 FL — SIGNIFICANT CHANGE UP (ref 80–100)
MCV RBC AUTO: 84.9 FL — SIGNIFICANT CHANGE UP (ref 80–100)
MCV RBC AUTO: 85.6 FL — SIGNIFICANT CHANGE UP (ref 80–100)
METHOD TYPE: SIGNIFICANT CHANGE UP
MONOCYTES # BLD AUTO: 0.53 K/UL — SIGNIFICANT CHANGE UP (ref 0–0.9)
MONOCYTES # BLD AUTO: 0.7 K/UL — SIGNIFICANT CHANGE UP (ref 0–0.9)
MONOCYTES NFR BLD AUTO: 4.1 % — SIGNIFICANT CHANGE UP (ref 2–14)
MONOCYTES NFR BLD AUTO: 6.2 % — SIGNIFICANT CHANGE UP (ref 2–14)
NEUTROPHILS # BLD AUTO: 14.61 K/UL — HIGH (ref 1.8–7.4)
NEUTROPHILS # BLD AUTO: 5.84 K/UL — SIGNIFICANT CHANGE UP (ref 1.8–7.4)
NEUTROPHILS NFR BLD AUTO: 67.9 % — SIGNIFICANT CHANGE UP (ref 43–77)
NEUTROPHILS NFR BLD AUTO: 84.5 % — HIGH (ref 43–77)
NITRITE UR-MCNC: NEGATIVE — SIGNIFICANT CHANGE UP
ORGANISM # SPEC MICROSCOPIC CNT: SIGNIFICANT CHANGE UP
ORGANISM # SPEC MICROSCOPIC CNT: SIGNIFICANT CHANGE UP
PH UR: 6 — SIGNIFICANT CHANGE UP (ref 5–8)
PHOSPHATE SERPL-MCNC: 2.7 MG/DL — SIGNIFICANT CHANGE UP (ref 2.5–4.5)
PLATELET # BLD AUTO: 388 K/UL — SIGNIFICANT CHANGE UP (ref 150–400)
PLATELET # BLD AUTO: 407 K/UL — HIGH (ref 150–400)
PLATELET # BLD AUTO: 407 K/UL — HIGH (ref 150–400)
PLATELET # BLD AUTO: 439 K/UL — HIGH (ref 150–400)
PLATELET # BLD AUTO: 472 K/UL — HIGH (ref 150–400)
POTASSIUM SERPL-MCNC: 3.2 MMOL/L — LOW (ref 3.5–5.3)
POTASSIUM SERPL-MCNC: 3.5 MMOL/L — SIGNIFICANT CHANGE UP (ref 3.5–5.3)
POTASSIUM SERPL-MCNC: 3.6 MMOL/L — SIGNIFICANT CHANGE UP (ref 3.5–5.3)
POTASSIUM SERPL-SCNC: 3.2 MMOL/L — LOW (ref 3.5–5.3)
POTASSIUM SERPL-SCNC: 3.5 MMOL/L — SIGNIFICANT CHANGE UP (ref 3.5–5.3)
POTASSIUM SERPL-SCNC: 3.6 MMOL/L — SIGNIFICANT CHANGE UP (ref 3.5–5.3)
PROT SERPL-MCNC: 7.1 GM/DL — SIGNIFICANT CHANGE UP (ref 6–8.3)
PROT SERPL-MCNC: 7.9 GM/DL — SIGNIFICANT CHANGE UP (ref 6–8.3)
PROT UR-MCNC: 30 MG/DL
PROTHROM AB SERPL-ACNC: 13.2 SEC — SIGNIFICANT CHANGE UP (ref 10.6–13.6)
PROTHROM AB SERPL-ACNC: 14.2 SEC — HIGH (ref 10.6–13.6)
RBC # BLD: 2.98 M/UL — LOW (ref 3.8–5.2)
RBC # BLD: 3.1 M/UL — LOW (ref 3.8–5.2)
RBC # BLD: 3.22 M/UL — LOW (ref 3.8–5.2)
RBC # BLD: 3.32 M/UL — LOW (ref 3.8–5.2)
RBC # BLD: 3.47 M/UL — LOW (ref 3.8–5.2)
RBC # FLD: 15.4 % — HIGH (ref 10.3–14.5)
RBC # FLD: 15.4 % — HIGH (ref 10.3–14.5)
RBC # FLD: 15.6 % — HIGH (ref 10.3–14.5)
RBC # FLD: 15.6 % — HIGH (ref 10.3–14.5)
RBC # FLD: 15.9 % — HIGH (ref 10.3–14.5)
SARS-COV-2 IGG SERPL QL IA: NEGATIVE — SIGNIFICANT CHANGE UP
SARS-COV-2 IGM SERPL IA-ACNC: <3.8 AU/ML — SIGNIFICANT CHANGE UP
SARS-COV-2 RNA SPEC QL NAA+PROBE: SIGNIFICANT CHANGE UP
SODIUM SERPL-SCNC: 135 MMOL/L — SIGNIFICANT CHANGE UP (ref 135–145)
SODIUM SERPL-SCNC: 142 MMOL/L — SIGNIFICANT CHANGE UP (ref 135–145)
SODIUM SERPL-SCNC: 143 MMOL/L — SIGNIFICANT CHANGE UP (ref 135–145)
SODIUM SERPL-SCNC: 144 MMOL/L — SIGNIFICANT CHANGE UP (ref 135–145)
SODIUM SERPL-SCNC: 146 MMOL/L — HIGH (ref 135–145)
SP GR SPEC: 1.01 — SIGNIFICANT CHANGE UP (ref 1.01–1.02)
SPECIMEN SOURCE: SIGNIFICANT CHANGE UP
TIBC SERPL-MCNC: 241 UG/DL — SIGNIFICANT CHANGE UP (ref 220–430)
TROPONIN I SERPL-MCNC: <0.015 NG/ML — SIGNIFICANT CHANGE UP (ref 0.01–0.04)
UIBC SERPL-MCNC: 220 UG/DL — SIGNIFICANT CHANGE UP (ref 110–370)
UROBILINOGEN FLD QL: NEGATIVE MG/DL — SIGNIFICANT CHANGE UP
VIT B12 SERPL-MCNC: 538 PG/ML — SIGNIFICANT CHANGE UP (ref 232–1245)
WBC # BLD: 10.13 K/UL — SIGNIFICANT CHANGE UP (ref 3.8–10.5)
WBC # BLD: 10.29 K/UL — SIGNIFICANT CHANGE UP (ref 3.8–10.5)
WBC # BLD: 17.27 K/UL — HIGH (ref 3.8–10.5)
WBC # BLD: 8.6 K/UL — SIGNIFICANT CHANGE UP (ref 3.8–10.5)
WBC # BLD: 9.17 K/UL — SIGNIFICANT CHANGE UP (ref 3.8–10.5)
WBC # FLD AUTO: 10.13 K/UL — SIGNIFICANT CHANGE UP (ref 3.8–10.5)
WBC # FLD AUTO: 10.29 K/UL — SIGNIFICANT CHANGE UP (ref 3.8–10.5)
WBC # FLD AUTO: 17.27 K/UL — HIGH (ref 3.8–10.5)
WBC # FLD AUTO: 8.6 K/UL — SIGNIFICANT CHANGE UP (ref 3.8–10.5)
WBC # FLD AUTO: 9.17 K/UL — SIGNIFICANT CHANGE UP (ref 3.8–10.5)

## 2020-01-01 PROCEDURE — 99222 1ST HOSP IP/OBS MODERATE 55: CPT

## 2020-01-01 PROCEDURE — 36415 COLL VENOUS BLD VENIPUNCTURE: CPT

## 2020-01-01 PROCEDURE — 99233 SBSQ HOSP IP/OBS HIGH 50: CPT

## 2020-01-01 PROCEDURE — 83735 ASSAY OF MAGNESIUM: CPT

## 2020-01-01 PROCEDURE — 83605 ASSAY OF LACTIC ACID: CPT

## 2020-01-01 PROCEDURE — 93306 TTE W/DOPPLER COMPLETE: CPT

## 2020-01-01 PROCEDURE — 83036 HEMOGLOBIN GLYCOSYLATED A1C: CPT

## 2020-01-01 PROCEDURE — 78816 PET IMAGE W/CT FULL BODY: CPT | Mod: 26,PI

## 2020-01-01 PROCEDURE — 83540 ASSAY OF IRON: CPT

## 2020-01-01 PROCEDURE — 82607 VITAMIN B-12: CPT

## 2020-01-01 PROCEDURE — 88307 TISSUE EXAM BY PATHOLOGIST: CPT

## 2020-01-01 PROCEDURE — 88331 PATH CONSLTJ SURG 1 BLK 1SPC: CPT | Mod: 26

## 2020-01-01 PROCEDURE — 80048 BASIC METABOLIC PNL TOTAL CA: CPT

## 2020-01-01 PROCEDURE — 99239 HOSP IP/OBS DSCHRG MGMT >30: CPT

## 2020-01-01 PROCEDURE — 99232 SBSQ HOSP IP/OBS MODERATE 35: CPT

## 2020-01-01 PROCEDURE — 85610 PROTHROMBIN TIME: CPT

## 2020-01-01 PROCEDURE — 85730 THROMBOPLASTIN TIME PARTIAL: CPT

## 2020-01-01 PROCEDURE — 78816 PET IMAGE W/CT FULL BODY: CPT

## 2020-01-01 PROCEDURE — 84100 ASSAY OF PHOSPHORUS: CPT

## 2020-01-01 PROCEDURE — 86850 RBC ANTIBODY SCREEN: CPT

## 2020-01-01 PROCEDURE — 88341 IMHCHEM/IMCYTCHM EA ADD ANTB: CPT | Mod: 26

## 2020-01-01 PROCEDURE — 88305 TISSUE EXAM BY PATHOLOGIST: CPT

## 2020-01-01 PROCEDURE — 86901 BLOOD TYPING SEROLOGIC RH(D): CPT

## 2020-01-01 PROCEDURE — 93306 TTE W/DOPPLER COMPLETE: CPT | Mod: 26

## 2020-01-01 PROCEDURE — 84484 ASSAY OF TROPONIN QUANT: CPT

## 2020-01-01 PROCEDURE — 86900 BLOOD TYPING SEROLOGIC ABO: CPT

## 2020-01-01 PROCEDURE — 93010 ELECTROCARDIOGRAM REPORT: CPT

## 2020-01-01 PROCEDURE — 88305 TISSUE EXAM BY PATHOLOGIST: CPT | Mod: 26

## 2020-01-01 PROCEDURE — 88331 PATH CONSLTJ SURG 1 BLK 1SPC: CPT

## 2020-01-01 PROCEDURE — 85027 COMPLETE CBC AUTOMATED: CPT

## 2020-01-01 PROCEDURE — P9016: CPT

## 2020-01-01 PROCEDURE — A9552: CPT

## 2020-01-01 PROCEDURE — 71045 X-RAY EXAM CHEST 1 VIEW: CPT | Mod: 26

## 2020-01-01 PROCEDURE — C1889: CPT

## 2020-01-01 PROCEDURE — 85025 COMPLETE CBC W/AUTO DIFF WBC: CPT

## 2020-01-01 PROCEDURE — 99215 OFFICE O/P EST HI 40 MIN: CPT

## 2020-01-01 PROCEDURE — 88341 IMHCHEM/IMCYTCHM EA ADD ANTB: CPT

## 2020-01-01 PROCEDURE — 93005 ELECTROCARDIOGRAM TRACING: CPT

## 2020-01-01 PROCEDURE — 99223 1ST HOSP IP/OBS HIGH 75: CPT

## 2020-01-01 PROCEDURE — 82746 ASSAY OF FOLIC ACID SERUM: CPT

## 2020-01-01 PROCEDURE — 86920 COMPATIBILITY TEST SPIN: CPT

## 2020-01-01 PROCEDURE — 82962 GLUCOSE BLOOD TEST: CPT

## 2020-01-01 PROCEDURE — 83550 IRON BINDING TEST: CPT

## 2020-01-01 PROCEDURE — 36430 TRANSFUSION BLD/BLD COMPNT: CPT

## 2020-01-01 PROCEDURE — 80053 COMPREHEN METABOLIC PANEL: CPT

## 2020-01-01 PROCEDURE — 88307 TISSUE EXAM BY PATHOLOGIST: CPT | Mod: 26

## 2020-01-01 PROCEDURE — 70450 CT HEAD/BRAIN W/O DYE: CPT | Mod: 26

## 2020-01-01 PROCEDURE — 82728 ASSAY OF FERRITIN: CPT

## 2020-01-01 RX ORDER — GLUCAGON INJECTION, SOLUTION 0.5 MG/.1ML
1 INJECTION, SOLUTION SUBCUTANEOUS ONCE
Refills: 0 | Status: DISCONTINUED | OUTPATIENT
Start: 2020-01-01 | End: 2020-01-01

## 2020-01-01 RX ORDER — ATORVASTATIN CALCIUM 80 MG/1
40 TABLET, FILM COATED ORAL AT BEDTIME
Refills: 0 | Status: DISCONTINUED | OUTPATIENT
Start: 2020-01-01 | End: 2020-01-01

## 2020-01-01 RX ORDER — SODIUM CHLORIDE 9 MG/ML
1000 INJECTION, SOLUTION INTRAVENOUS
Refills: 0 | Status: DISCONTINUED | OUTPATIENT
Start: 2020-01-01 | End: 2020-01-01

## 2020-01-01 RX ORDER — SOD SULF/SODIUM/NAHCO3/KCL/PEG
2000 SOLUTION, RECONSTITUTED, ORAL ORAL ONCE
Refills: 0 | Status: COMPLETED | OUTPATIENT
Start: 2020-01-01 | End: 2020-01-01

## 2020-01-01 RX ORDER — CHOLECALCIFEROL (VITAMIN D3) 125 MCG
2000 CAPSULE ORAL DAILY
Refills: 0 | Status: DISCONTINUED | OUTPATIENT
Start: 2020-01-01 | End: 2020-01-01

## 2020-01-01 RX ORDER — POTASSIUM CHLORIDE 20 MEQ
40 PACKET (EA) ORAL EVERY 4 HOURS
Refills: 0 | Status: COMPLETED | OUTPATIENT
Start: 2020-01-01 | End: 2020-01-01

## 2020-01-01 RX ORDER — LOSARTAN POTASSIUM 100 MG/1
50 TABLET, FILM COATED ORAL DAILY
Refills: 0 | Status: DISCONTINUED | OUTPATIENT
Start: 2020-01-01 | End: 2020-01-01

## 2020-01-01 RX ORDER — DEXTROSE 50 % IN WATER 50 %
25 SYRINGE (ML) INTRAVENOUS ONCE
Refills: 0 | Status: DISCONTINUED | OUTPATIENT
Start: 2020-01-01 | End: 2020-01-01

## 2020-01-01 RX ORDER — FENTANYL CITRATE 50 UG/ML
50 INJECTION INTRAVENOUS
Refills: 0 | Status: DISCONTINUED | OUTPATIENT
Start: 2020-01-01 | End: 2020-01-01

## 2020-01-01 RX ORDER — BLOOD SUGAR DIAGNOSTIC
STRIP MISCELLANEOUS
Qty: 300 | Refills: 0 | Status: ACTIVE | COMMUNITY
Start: 2020-01-01

## 2020-01-01 RX ORDER — DEXTROSE 50 % IN WATER 50 %
12.5 SYRINGE (ML) INTRAVENOUS ONCE
Refills: 0 | Status: DISCONTINUED | OUTPATIENT
Start: 2020-01-01 | End: 2020-01-01

## 2020-01-01 RX ORDER — INSULIN GLARGINE 100 [IU]/ML
10 INJECTION, SOLUTION SUBCUTANEOUS AT BEDTIME
Refills: 0 | Status: DISCONTINUED | OUTPATIENT
Start: 2020-01-01 | End: 2020-01-01

## 2020-01-01 RX ORDER — LANCETS
EACH MISCELLANEOUS
Qty: 300 | Refills: 0 | Status: ACTIVE | COMMUNITY
Start: 2020-07-20

## 2020-01-01 RX ORDER — POTASSIUM CHLORIDE 20 MEQ
10 PACKET (EA) ORAL DAILY
Refills: 0 | Status: DISCONTINUED | OUTPATIENT
Start: 2020-01-01 | End: 2020-01-01

## 2020-01-01 RX ORDER — ONDANSETRON 8 MG/1
4 TABLET, FILM COATED ORAL ONCE
Refills: 0 | Status: DISCONTINUED | OUTPATIENT
Start: 2020-01-01 | End: 2020-01-01

## 2020-01-01 RX ORDER — LOSARTAN POTASSIUM 100 MG/1
1 TABLET, FILM COATED ORAL
Qty: 0 | Refills: 0 | DISCHARGE

## 2020-01-01 RX ORDER — SODIUM FERRIC GLUCONAT/SUCROSE 62.5MG/5ML
125 AMPUL (ML) INTRAVENOUS
Refills: 0 | Status: DISCONTINUED | OUTPATIENT
Start: 2020-01-01 | End: 2020-01-01

## 2020-01-01 RX ORDER — CEFTRIAXONE 500 MG/1
1000 INJECTION, POWDER, FOR SOLUTION INTRAMUSCULAR; INTRAVENOUS ONCE
Refills: 0 | Status: COMPLETED | OUTPATIENT
Start: 2020-01-01 | End: 2020-01-01

## 2020-01-01 RX ORDER — POTASSIUM CHLORIDE 20 MEQ
10 PACKET (EA) ORAL
Refills: 0 | Status: COMPLETED | OUTPATIENT
Start: 2020-01-01 | End: 2020-01-01

## 2020-01-01 RX ORDER — INSULIN LISPRO 100/ML
VIAL (ML) SUBCUTANEOUS
Refills: 0 | Status: DISCONTINUED | OUTPATIENT
Start: 2020-01-01 | End: 2020-01-01

## 2020-01-01 RX ORDER — POTASSIUM CHLORIDE 20 MEQ
1 PACKET (EA) ORAL
Qty: 0 | Refills: 0 | DISCHARGE

## 2020-01-01 RX ORDER — ACETAMINOPHEN 500 MG
650 TABLET ORAL ONCE
Refills: 0 | Status: DISCONTINUED | OUTPATIENT
Start: 2020-01-01 | End: 2020-01-01

## 2020-01-01 RX ORDER — SODIUM CHLORIDE 9 MG/ML
1000 INJECTION INTRAMUSCULAR; INTRAVENOUS; SUBCUTANEOUS
Refills: 0 | Status: DISCONTINUED | OUTPATIENT
Start: 2020-01-01 | End: 2020-01-01

## 2020-01-01 RX ORDER — OXYCODONE HYDROCHLORIDE 5 MG/1
10 TABLET ORAL ONCE
Refills: 0 | Status: DISCONTINUED | OUTPATIENT
Start: 2020-01-01 | End: 2020-01-01

## 2020-01-01 RX ORDER — DEXTROSE 50 % IN WATER 50 %
15 SYRINGE (ML) INTRAVENOUS ONCE
Refills: 0 | Status: DISCONTINUED | OUTPATIENT
Start: 2020-01-01 | End: 2020-01-01

## 2020-01-01 RX ORDER — INSULIN GLARGINE 100 [IU]/ML
100 INJECTION, SOLUTION SUBCUTANEOUS
Qty: 30 | Refills: 0 | Status: ACTIVE | COMMUNITY
Start: 2020-01-01

## 2020-01-01 RX ORDER — SENNA PLUS 8.6 MG/1
1 TABLET ORAL ONCE
Refills: 0 | Status: DISCONTINUED | OUTPATIENT
Start: 2020-01-01 | End: 2020-01-01

## 2020-01-01 RX ORDER — HYDRALAZINE HCL 50 MG
5 TABLET ORAL ONCE
Refills: 0 | Status: DISCONTINUED | OUTPATIENT
Start: 2020-01-01 | End: 2020-01-01

## 2020-01-01 RX ORDER — CEFTRIAXONE 500 MG/1
1000 INJECTION, POWDER, FOR SOLUTION INTRAMUSCULAR; INTRAVENOUS EVERY 24 HOURS
Refills: 0 | Status: DISCONTINUED | OUTPATIENT
Start: 2020-01-01 | End: 2020-01-01

## 2020-01-01 RX ORDER — TRIAMTERENE/HYDROCHLOROTHIAZID 75 MG-50MG
1 TABLET ORAL
Qty: 0 | Refills: 0 | DISCHARGE

## 2020-01-01 RX ORDER — INSULIN GLARGINE 100 [IU]/ML
5 INJECTION, SOLUTION SUBCUTANEOUS AT BEDTIME
Refills: 0 | Status: DISCONTINUED | OUTPATIENT
Start: 2020-01-01 | End: 2020-01-01

## 2020-01-01 RX ORDER — TRIAMTERENE/HYDROCHLOROTHIAZID 75 MG-50MG
1 TABLET ORAL DAILY
Refills: 0 | Status: DISCONTINUED | OUTPATIENT
Start: 2020-01-01 | End: 2020-01-01

## 2020-01-01 RX ORDER — LOSARTAN POTASSIUM 100 MG/1
100 TABLET, FILM COATED ORAL DAILY
Refills: 0 | Status: DISCONTINUED | OUTPATIENT
Start: 2020-01-01 | End: 2020-01-01

## 2020-01-01 RX ORDER — PEN NEEDLE, DIABETIC 29 G X1/2"
31G X 8 MM NEEDLE, DISPOSABLE MISCELLANEOUS
Qty: 100 | Refills: 0 | Status: ACTIVE | COMMUNITY
Start: 2020-01-01

## 2020-01-01 RX ORDER — CEFTRIAXONE 500 MG/1
INJECTION, POWDER, FOR SOLUTION INTRAMUSCULAR; INTRAVENOUS
Refills: 0 | Status: DISCONTINUED | OUTPATIENT
Start: 2020-01-01 | End: 2020-01-01

## 2020-01-01 RX ADMIN — CEFTRIAXONE 1000 MILLIGRAM(S): 500 INJECTION, POWDER, FOR SOLUTION INTRAMUSCULAR; INTRAVENOUS at 09:44

## 2020-01-01 RX ADMIN — SODIUM CHLORIDE 100 MILLILITER(S): 9 INJECTION INTRAMUSCULAR; INTRAVENOUS; SUBCUTANEOUS at 15:17

## 2020-01-01 RX ADMIN — FENTANYL CITRATE 50 MICROGRAM(S): 50 INJECTION INTRAVENOUS at 15:25

## 2020-01-01 RX ADMIN — SODIUM CHLORIDE 100 MILLILITER(S): 9 INJECTION INTRAMUSCULAR; INTRAVENOUS; SUBCUTANEOUS at 01:06

## 2020-01-01 RX ADMIN — LOSARTAN POTASSIUM 50 MILLIGRAM(S): 100 TABLET, FILM COATED ORAL at 09:47

## 2020-01-01 RX ADMIN — INSULIN GLARGINE 10 UNIT(S): 100 INJECTION, SOLUTION SUBCUTANEOUS at 21:29

## 2020-01-01 RX ADMIN — LOSARTAN POTASSIUM 50 MILLIGRAM(S): 100 TABLET, FILM COATED ORAL at 10:14

## 2020-01-01 RX ADMIN — FENTANYL CITRATE 50 MICROGRAM(S): 50 INJECTION INTRAVENOUS at 15:06

## 2020-01-01 RX ADMIN — CEFTRIAXONE 1000 MILLIGRAM(S): 500 INJECTION, POWDER, FOR SOLUTION INTRAMUSCULAR; INTRAVENOUS at 13:30

## 2020-01-01 RX ADMIN — INSULIN GLARGINE 10 UNIT(S): 100 INJECTION, SOLUTION SUBCUTANEOUS at 22:29

## 2020-01-01 RX ADMIN — ATORVASTATIN CALCIUM 40 MILLIGRAM(S): 80 TABLET, FILM COATED ORAL at 22:01

## 2020-01-01 RX ADMIN — ATORVASTATIN CALCIUM 40 MILLIGRAM(S): 80 TABLET, FILM COATED ORAL at 22:29

## 2020-01-01 RX ADMIN — Medication 110 MILLIGRAM(S): at 20:26

## 2020-01-01 RX ADMIN — Medication 2000 UNIT(S): at 10:14

## 2020-01-01 RX ADMIN — ATORVASTATIN CALCIUM 40 MILLIGRAM(S): 80 TABLET, FILM COATED ORAL at 21:28

## 2020-01-01 RX ADMIN — Medication 40 MILLIEQUIVALENT(S): at 23:48

## 2020-01-01 RX ADMIN — LOSARTAN POTASSIUM 50 MILLIGRAM(S): 100 TABLET, FILM COATED ORAL at 09:45

## 2020-01-01 RX ADMIN — FENTANYL CITRATE 50 MICROGRAM(S): 50 INJECTION INTRAVENOUS at 15:45

## 2020-01-01 RX ADMIN — Medication 110 MILLIGRAM(S): at 20:34

## 2020-01-01 RX ADMIN — INSULIN GLARGINE 10 UNIT(S): 100 INJECTION, SOLUTION SUBCUTANEOUS at 21:59

## 2020-01-01 RX ADMIN — INSULIN GLARGINE 5 UNIT(S): 100 INJECTION, SOLUTION SUBCUTANEOUS at 22:01

## 2020-01-01 RX ADMIN — ATORVASTATIN CALCIUM 40 MILLIGRAM(S): 80 TABLET, FILM COATED ORAL at 18:24

## 2020-01-01 RX ADMIN — LOSARTAN POTASSIUM 50 MILLIGRAM(S): 100 TABLET, FILM COATED ORAL at 13:30

## 2020-01-01 RX ADMIN — Medication 110 MILLIGRAM(S): at 21:13

## 2020-01-01 RX ADMIN — INSULIN GLARGINE 10 UNIT(S): 100 INJECTION, SOLUTION SUBCUTANEOUS at 22:40

## 2020-01-01 RX ADMIN — ATORVASTATIN CALCIUM 40 MILLIGRAM(S): 80 TABLET, FILM COATED ORAL at 21:12

## 2020-01-01 RX ADMIN — SODIUM CHLORIDE 110 MILLILITER(S): 9 INJECTION, SOLUTION INTRAVENOUS at 00:18

## 2020-01-01 RX ADMIN — Medication 2000 MILLILITER(S): at 20:06

## 2020-01-01 RX ADMIN — FENTANYL CITRATE 50 MICROGRAM(S): 50 INJECTION INTRAVENOUS at 15:20

## 2020-01-01 RX ADMIN — Medication 2000 UNIT(S): at 09:45

## 2020-01-01 RX ADMIN — Medication 2000 UNIT(S): at 13:30

## 2020-01-01 RX ADMIN — Medication 2000 UNIT(S): at 17:42

## 2020-11-01 NOTE — ED ADULT TRIAGE NOTE - CHIEF COMPLAINT QUOTE
c/o syncopal episodes x 2 this morning, pt has history of diabetes, states glucose was 124 this morning, and 150 before coming to ER, pt states he did not eat again

## 2020-11-01 NOTE — H&P ADULT - HISTORY OF PRESENT ILLNESS
75 year old female patient presented to the ED after experiencing one syncopal episode and one near syncopal episode. Patient states she got up and went about her regular routine but sudden passed out suddenly after having breakfast, shortly after rising from a seated position. States her son her a thud and awoke on the floor with her son over her. Patient had the urge to move her bowels shortly after and felt as though she would pass out. Denies any preceding dizziness, headache, visual changes, chest pain, palpitations or auras.      In the ED patient without EKG changes or  telemetry events. Patient with H&H of 9/29. last known H&H of 12.2/36.3. Patient with history of internal and external hemorrhoids. Denied any melena, fatigue, weakness or abdominal pain. Occasionally sees blood after wiping on some occasions.

## 2020-11-01 NOTE — ED STATDOCS - ATTENDING CONTRIBUTION TO CARE
I, Mary Lemus MD, performed the initial face to face bedside interview with this patient regarding history of present illness, review of symptoms and relevant past medical, social and family history.  I completed an independent physical examination.  I was the initial provider who evaluated this patient. I have signed out the follow up of any pending tests (i.e. labs, radiological studies) to the ACP.  I have communicated the patient’s plan of care and disposition with the ACP.  The history, relevant review of systems, past medical and surgical history, medical decision making, and physical examination was documented by the scribe in my presence and I attest to the accuracy of the documentation.

## 2020-11-01 NOTE — ED ADULT NURSE NOTE - OBJECTIVE STATEMENT
patient presents to ER s/p two syncopal episodes this mornig.  She states she woke up and was preparing breakfast, woke up to her son calling her name.  Second episode happened when she went to the bathroom, near syncope f/b episode of large volume diarrhea.  She denies fever/chills, abdominal pain.  jShe states she is a diabetic, her blood sugar at home was 150.  She had a syncopal episode years ago with dx of diabetes, her blood glucose at that time was greater than 500.  She denies dizziness.  Feeling shaky, otherwise at baseline

## 2020-11-01 NOTE — H&P ADULT - NSHPPHYSICALEXAM_GEN_ALL_CORE
ICU Vital Signs Last 24 Hrs  T(C): 37.1 (01 Nov 2020 18:53), Max: 37.1 (01 Nov 2020 18:53)  T(F): 98.8 (01 Nov 2020 18:53), Max: 98.8 (01 Nov 2020 18:53)  HR: 89 (01 Nov 2020 18:53) (78 - 89)  BP: 152/49 (01 Nov 2020 18:53) (152/49 - 176/73)  BP(mean): 103 (01 Nov 2020 12:57) (103 - 103)  ABP: --  ABP(mean): --  RR: 18 (01 Nov 2020 18:53) (18 - 19)  SpO2: 100% (01 Nov 2020 18:53) (100% - 100%)

## 2020-11-01 NOTE — ED ADULT NURSE REASSESSMENT NOTE - NS ED NURSE REASSESS COMMENT FT1
pt received from [facility]. pt ibis, a&ox4 pt states that she has known hemorrhoids and has not had them taken care of yet due to covid. pt had one syncopal episode at home, pt is unsure how long she was out for but she was found on the floor by her son. The pt then went into the bathroom where she became very weak and was lowered to the floor by her son, when she went to stand up the pt lost control of her bowel. pt stated she had a soft bm but not lose. pt also states that every time she has a bm she notes bright red blood due to an internal hemorrhoid.

## 2020-11-01 NOTE — ED STATDOCS - OBJECTIVE STATEMENT
76 y/o female jaimie pmhx of DM, HTN, vertigo presents to the ED s/p passing out twice this morning. Pt was in the process of eating something when she passed out. Pt is diabetic. Blood sugar in the room is 130. Denies chest pain, SOB. Pt thinks her blood sugar was too high this morning because she took too much insulin last night. Son is in the room to provide some hx. No other complaints at this time.

## 2020-11-01 NOTE — ED STATDOCS - CLINICAL SUMMARY MEDICAL DECISION MAKING FREE TEXT BOX
74 y/o female presents to the ED s/p passing out twice this morning. Blood sugar was high, pt is diabetic. Will obtain blood work, head CT and likely admission.

## 2020-11-01 NOTE — H&P ADULT - ASSESSMENT
75 year old female syncopal episode found to be anemic in the setting of hemorrhoids        -Admit to Tele    #Syncope  -DDX: vasovagal, orthostatic, structural heart disease, arrhythmias, neurogenic, electrolyte abnormality, PE  -monitor on tele  -CT head negative for acute pathology  -IVF hydration  -check orthostatic vitals   -get morning echo    # Anemia  -pt with history of hemorrhoids  -denies melena, fatigue, hematemesis  -pt on the fence about in-patient GI evaluation  -trend CBC  -if further drops in Hb noted then consider GI evaluation    # Leukocytosis  -no focal signs of infection  -possibly reaction to syncope/fall  -trend cbc  -f/u urine cx    # CKD  -pt with stage 3 CKD per chart review  -trend kidney function    #DM2  -hold metformin  -ISS  -on lantus    #HTN  -resume home anti-hypertensives    # DVT ppx  -encourage ambulation  -scds

## 2020-11-01 NOTE — ED STATDOCS - PROGRESS NOTE DETAILS
signed Blanca Manning PA-C Pt seen and evaluated in intake by Dr Lemus.   75F c/o syncope this morning while making breakfast after having a bowel movement. pt notes she felt well this morning but "woke up on the floor with my son yelling my name" while preparing cereal. Pt then had to have a bowel movement and son helped her to the bathroom when she said "I'm going down again" and had near syncope. Pt then had a loose bowel movement which also had blood in it, but pt notes she has large internal and external hemorrhoids which sometimes bleed. She was going to see GI Dr Moe but has not yet due to COVID-19 this year. No significant findings on xray or CT. No significant findings on EKG. Labs show WBC of 17 and new onset anemia. abdomen soft, non-tender Rectal exam shows large external thrombosed hemorrhoid with some residual blood around the area though no apparent active bleeding. REctal exam shows some helena blood and a tiny amount of soft brown stool. It is not possible to distinguish of the bleeding is from hemorrhoids, GI, or both. Exam chaperoned by VERA Yousif. Recommend admission for syncope, new onset anemia, and possible GI bleed. Pt agrees with admission and plan of care. PMD Chavo TAVARES pending.

## 2020-11-02 NOTE — CONSULT NOTE ADULT - ATTENDING COMMENTS
Pt seen and examined.    Consult for hemorrhoids.  Pt reports issues with hemorrhoid prolapse since at least January.  was going to see a colorectal surgeon for evaluation but delayed due to COVID.  It has slowly enlarged in size and she had been using proctozone for it.  Symptoms include bleeding, wipe type and after BM. She denies pain or significant hemorrhage.  She frequently manually reduces them, but over time, they are are more difficult to reduce.  She denies constipation.  She has never had a colonoscopy.  No family history of colon cancer.  She was admitted this time after syncopal episode.  Initial hgb 9.0 (previously 12.2).  Since then it has trended downward to 7.9 (perhaps dilutional), she has not received transfusion.   No nausea/vomiting, no changes in bowel habits which have been regular.  She denies use of antiplatelet or anticoagulant medications.    PMH, Meds, Allergies, SH, FH, ROS reviewed    AVSS  NAD  nonlabored breathing  RRR  Obese/soft/ND/NT  No palpable inguinal lymph nodes  rectal:  L sided anal mass, small amount of ulceration and drainage.  Nontender to palpation. CHUN shows some internal extension and possible fixation to anal wall.  Nontender to palpation.      labs reviewed    imp: Anal mass  --suspicious for anal cancer.  Will need exam under anesthesia with biopsy.  Furthermore, given patient has never had a colonoscopy before, would also need one to evaluate for synchronous lesions.  GI already consulted per hospitalist service, will try to coordinate.  --medical clearance for procedure later this week.  Discussed with primary service

## 2020-11-02 NOTE — CONSULT NOTE ADULT - SUBJECTIVE AND OBJECTIVE BOX
Surgery consulted to evaluate this 75F admitted for syncope and anemia with past medical history of hemorrhoids.         Vital Signs Last 24 Hrs  T(C): 36.8 (02 Nov 2020 13:43), Max: 37.4 (01 Nov 2020 21:23)  T(F): 98.2 (02 Nov 2020 13:43), Max: 99.3 (01 Nov 2020 21:23)  HR: 88 (02 Nov 2020 13:43) (80 - 91)  BP: 152/61 (02 Nov 2020 13:43) (142/58 - 176/73)  BP(mean): --  RR: 18 (02 Nov 2020 13:43) (18 - 19)  SpO2: 100% (02 Nov 2020 13:43) (99% - 100%)      Physical Exam:   Gen:  Cardiac:   Respiratory:   Abdomen:   Rectal:                               7.9<L>                143  | 21<L>| 29<H>        10.13 >-----------< 439<H>   ------------------------< 106<H>                 26.1<L>                3.6  | 112<H>| 1.49<H>                                                                     Ca 8.8   Mg x     Ph x        ,             9.0<L>                135  | 22   | 32<H>        17.27<H> >-----------< 472<H>   ------------------------< 137<H>                 29.3<L>                3.5  | 103  | 1.60<H>                                                                     Ca 9.3   Mg x     Ph x           Surgery consulted to evaluate this 75F admitted for syncope and anemia with past medical history of hemorrhoids. Pt  states her blood glucose was 130 just before her syncopal and denies feeling dizzy or lightheaded before the syncopal episode.  Pt states shes had external and internal hemorrhoids for about 6 months and has been treated with proctozole as per her PCP.  Hemorrhoids would recede then once she stopped using the medicine they would come back larger.  She states she always seen bright red blood in the toilet and on the toilet paper while having a BM, but the bleeding always stops and otherwise the hemorrhoids do not bleed.  Patient states she has had large palpable hemorrhoids for the past 2 months which did not get smaller with the prescribed medication.   Patient denies hx of constipation past diagnoses of anemia and has never had a colonoscopy. Denies fever, chills, chest pain, sob, abdominal pain, n/v/d.           Vital Signs Last 24 Hrs  T(C): 36.8 (02 Nov 2020 13:43), Max: 37.4 (01 Nov 2020 21:23)  T(F): 98.2 (02 Nov 2020 13:43), Max: 99.3 (01 Nov 2020 21:23)  HR: 88 (02 Nov 2020 13:43) (80 - 91)  BP: 152/61 (02 Nov 2020 13:43) (142/58 - 176/73)  BP(mean): --  RR: 18 (02 Nov 2020 13:43) (18 - 19)  SpO2: 100% (02 Nov 2020 13:43) (99% - 100%)      Physical Exam:   Gen: AAOx3, NAD  Cardiac: RRR  Respiratory: equal chest rise b/l  Abdomen: soft, NT, ND  Rectal: Large bloody, thrombosed hemorrhoids at right anterior and right posterior aspects of anus. Denies tenderness during CHUN.                              7.9<L>                143  | 21<L>| 29<H>        10.13 >-----------< 439<H>   ------------------------< 106<H>                 26.1<L>                3.6  | 112<H>| 1.49<H>                                                                     Ca 8.8   Mg x     Ph x        ,             9.0<L>                135  | 22   | 32<H>        17.27<H> >-----------< 472<H>   ------------------------< 137<H>                 29.3<L>                3.5  | 103  | 1.60<H>                                                                     Ca 9.3   Mg x     Ph x

## 2020-11-02 NOTE — CONSULT NOTE ADULT - ASSESSMENT
76 y/o female with painless thrombosed hemorrhoids    Recommendations:  sitz baths   psyllium   proctozole cream   Follow up with Dr. Bertin Sumner as an outpatient upon discharge    Case discussed with Dr. Sumner, colorectal surgery attending 74 y/o female with painless thrombosed hemorrhoids vs possible rectal cancer    Recommendations:  - Patient will need rectal mass biopsy in OR to r/o rectal cancer   - Medical clearance prior to OR   - Consult GI for evaluation possible Cscope   - Colorectal surgery will continue to follow   - Rest of medical management as per primary team    Case discussed with Dr. Sumner

## 2020-11-02 NOTE — PROGRESS NOTE ADULT - SUBJECTIVE AND OBJECTIVE BOX
Reason for Admission: Syncope  Anemia  History of Present Illness:   75 year old female patient presented to the ED after experiencing one syncopal episode and one near syncopal episode. Patient states she got up and went about her regular routine but sudden passed out suddenly after having breakfast, shortly after rising from a seated position. States her son her a thud and awoke on the floor with her son over her. Patient had the urge to move her bowels shortly after and felt as though she would pass out. Denies any preceding dizziness, headache, visual changes, chest pain, palpitations or auras.      In the ED patient without EKG changes or  telemetry events. Patient with H&H of . last known H&H of 12.2/36.3. Patient with history of internal and external hemorrhoids. Denied any melena, fatigue, weakness or abdominal pain. Occasionally sees blood after wiping on some occasions.      REVIEW OF SYSTEMS:  General: NAD, hemodynamically stable, (-)  fever, (-) chills, (-) weakness  HEENT:  Eyes:  No visual loss, blurred vision, double vision or yellow sclerae. Ears, Nose, Throat:  No hearing loss, sneezing, congestion, runny nose or sore throat.  SKIN:  No rash or itching.  CARDIOVASCULAR:  No chest pain, chest pressure or chest discomfort. No palpitations or edema.  RESPIRATORY:  No shortness of breath, cough or sputum.  GASTROINTESTINAL:  No anorexia, nausea, vomiting or diarrhea. No abdominal pain or blood.  NEUROLOGICAL:  No headache, dizziness, syncope, paralysis, ataxia, numbness or tingling in the extremities. No change in bowel or bladder control.  MUSCULOSKELETAL:  No muscle, back pain, joint pain or stiffness.  HEMATOLOGIC:  No anemia, bleeding or bruising.  LYMPHATICS:  No enlarged nodes. No history of splenectomy.  ENDOCRINOLOGIC:  No reports of sweating, cold or heat intolerance. No polyuria or polydipsia.  ALLERGIES:  No history of asthma, hives, eczema or rhinitis.    Physical Exam:   GENERAL APPEARANCE:  NAD, hemodynamically stable  T(C): 36.6 (20 @ 09:40), Max: 37.4 (20 @ 21:23)  HR: 85 (20 @ 09:40) (78 - 91)  BP: 144/50 (20 @ 09:40) (142/58 - 176/73)  RR: 18 (20 @ 09:40) (18 - 19)  SpO2: 99% (20 @ 09:40) (99% - 100%)  Wt(kg): --  HEENT:  Head is normocephalic    Skin:  Warm and dry without any rash   NECK:  Supple without lymphadenopathy.   HEART:  Regular rate and rhythm. normal S1 and S2, No M/R/G  LUNGS:  Good ins/exp effort, no W/R/R/C  ABDOMEN:  Soft, nontender, nondistended with good bowel sounds heard  EXTREMITIES:  Without cyanosis, clubbing or edema.   NEUROLOGICAL:  Gross nonfocal       CBC Full  -  ( 2020 07:09 )  WBC Count : 10.13 K/uL  RBC Count : 3.10 M/uL  Hemoglobin : 7.9 g/dL  Hematocrit : 26.1 %  Platelet Count - Automated : 439 K/uL  Mean Cell Volume : 84.2 fl  Mean Cell Hemoglobin : 25.5 pg  Mean Cell Hemoglobin Concentration : 30.3 gm/dL  Auto Neutrophil # : x  Auto Lymphocyte # : x  Auto Monocyte # : x  Auto Eosinophil # : x  Auto Basophil # : x  Auto Neutrophil % : x  Auto Lymphocyte % : x  Auto Monocyte % : x  Auto Eosinophil % : x  Auto Basophil % : x    PT/INR - ( 2020 13:29 )   PT: 13.2 sec;   INR: 1.14 ratio           Urinalysis Basic - ( 2020 15:33 )    Color: Yellow / Appearance: very cloudy / S.010 / pH: x  Gluc: x / Ketone: Negative  / Bili: Negative / Urobili: Negative mg/dL   Blood: x / Protein: 30 mg/dL / Nitrite: Negative   Leuk Esterase: Moderate / RBC: 6-10 /HPF / WBC >50   Sq Epi: x / Non Sq Epi: Moderate / Bacteria: Many          143  |  112<H>  |  29<H>  ----------------------------<  106<H>  3.6   |  21<L>  |  1.49<H>    Ca    8.8      2020 07:09    TPro  7.1  /  Alb  2.8<L>  /  TBili  0.2  /  DBili  x   /  AST  12<L>  /  ALT  14  /  AlkPhos  74  -      #Syncope SECONDARY TO dehydration /  acute on chronic anemia suspected GI bleed  - monitor on tele  - CT head negative for acute pathology  - IVF hydration  - check orthostatic vitals   - get morning echo    # Anemia  -pt with history of hemorrhoids  -denies melena, fatigue, hematemesis  -pt on the fence about in-patient GI evaluation  -trend CBC  -if further drops in Hb noted then consider GI evaluation    # Leukocytosis  -no focal signs of infection  -possibly reaction to syncope/fall  -trend cbc  -f/u urine cx    # CKD  -pt with stage 3 CKD per chart review  -trend kidney function    #DM2  -hold metformin  -ISS  -on lantus    #HTN  -resume home anti-hypertensives    # DVT ppx  -encourage ambulation  -scds

## 2020-11-03 NOTE — PROGRESS NOTE ADULT - ASSESSMENT
Anal Mass      Needs C scope tomorrow  CLD, bowel prep  EUA on thursday with biopsy  GI consult appreciated      d/w Dr Sumner

## 2020-11-03 NOTE — PROGRESS NOTE ADULT - SUBJECTIVE AND OBJECTIVE BOX
Pt was seen and examined at bedside this morning with surgery team. No acute overnight events reported by nursing staff. Pt offers no new complaints at this time. Denies fever/cp/sob/n/v/d/c. Vitals stable.     T(C): 36.8 (11-03-20 @ 07:38), Max: 37.1 (11-02-20 @ 22:48)  HR: 75 (11-03-20 @ 07:38) (75 - 88)  BP: 143/59 (11-03-20 @ 07:38) (143/59 - 153/73)  RR: 18 (11-03-20 @ 07:38) (18 - 18)  SpO2: 100% (11-03-20 @ 07:38) (97% - 100%)    PHYSICAL EXAM:  Constitutional: NAD, GCS: 15/15  AOX3  Eyes:  WNL  ENMT:  WNL  Neck:  WNL, non tender  Back: Non tender  Respiratory: CTABL  Cardiovascular:  S1+S2+0  Gastrointestinal: Soft, ND , NT  Genitourinary:  WNL  Extremities: NV intact  Vascular:  Intact  Neurological: No focal neurological deficit,  CN, motor and sensory system grossly intact.  Skin: WNL  Musculoskeletal: WNL  Psychiatric: Grossly WNL                          7.6    8.60  )-----------( 407      ( 03 Nov 2020 09:45 )             25.5     11-03    142  |  111<H>  |  26<H>  ----------------------------<  151<H>  3.5   |  25  |  1.33<H>    Ca    8.6      03 Nov 2020 09:45    TPro  7.1  /  Alb  2.8<L>  /  TBili  0.2  /  DBili  x   /  AST  12<L>  /  ALT  14  /  AlkPhos  74  11-02

## 2020-11-03 NOTE — CONSULT NOTE ADULT - ASSESSMENT
75F with anemia and chronic rectal bleeding. Possible mass on CHUN. History of prolapsing hemorrhoids.    Recommend:  -colonoscopy tomorrow in afternoon  -bowel prep written  -clears tonight and tomorrow AM, NPO except meds after breakfast clears tomorrow AM (pt concerned with DM and fasting all day)  -monitor blood sugar  -the risks, benefits, and alternatives of colonoscopy were discussed with the patient. We specifically discussed the risk of bleeding, infection, perforation, and the risk of missed lesions. All questions were answered and the patient/proxy agree to proceed with the procedure.  -d/w pt and dtr in detail

## 2020-11-03 NOTE — PROGRESS NOTE ADULT - SUBJECTIVE AND OBJECTIVE BOX
76 y/o female with PMHx of HTN, DM, vertigo patiented to the ED after experiencing one syncopal episode and one near syncopal episode. Patient states she got up and went about her regular routine but sudden passed out suddenly after having breakfast, shortly after rising from a seated position. States her son her a thud and awoke on the floor with her son over her. Patient had the urge to move her bowels shortly after and felt as though she would pass out.  Denies any preceding dizziness, headache, visual changes, chest pain, palpitations or auras.  In the ED patient without EKG changes or  telemetry events. Patient with H&H of 9/29.  Last known H&H of 12.2/36.3. Patient with history of internal and external hemorrhoids. Denied any melena, fatigue, weakness or abdominal pain. Occasionally sees blood after wiping on some occasions.  Also found to have orthostatic hypotension.      11/3:  Pt seen.  States feeling great.  No lightheadedness.  Had some pain and bleeding after rectal exam this morning by colorectal surgery.  Otherwise well.  Drinking clears.      Vital Signs Last 24 Hrs  T(C): 36.8 (03 Nov 2020 07:38), Max: 37.1 (02 Nov 2020 22:48)  T(F): 98.3 (03 Nov 2020 07:38), Max: 98.8 (02 Nov 2020 22:48)  HR: 75 (03 Nov 2020 07:38) (75 - 81)  BP: 143/59 (03 Nov 2020 07:38) (143/59 - 153/73)  BP(mean): --  RR: 18 (03 Nov 2020 07:38) (18 - 18)  SpO2: 100% (03 Nov 2020 07:38) (97% - 100%)    EXAM:    HEENT:  Head is normocephalic    Skin:  Warm and dry without any rash   NECK:  Supple without lymphadenopathy.   HEART:  Regular rate and rhythm. normal S1 and S2, No M/R/G  LUNGS:  Good ins/exp effort, no W/R/R/C  ABDOMEN:  Soft, nontender, nondistended with good bowel sounds heard  EXTREMITIES:  Without cyanosis, clubbing or edema.   NEUROLOGICAL:  Gross nonfocal     11-03    142  |  111<H>  |  26<H>  ----------------------------<  151<H>  3.5   |  25  |  1.33<H>    Ca    8.6      03 Nov 2020 09:45    TPro  7.1  /  Alb  2.8<L>  /  TBili  0.2  /  DBili  x   /  AST  12<L>  /  ALT  14  /  AlkPhos  74  11-02                            7.6    8.60  )-----------( 407      ( 03 Nov 2020 09:45 )             25.5       CARDIAC MARKERS ( 02 Nov 2020 07:09 )  <0.015 ng/mL / x     / x     / x     / x      CARDIAC MARKERS ( 01 Nov 2020 20:20 )  <0.015 ng/mL / x     / x     / x     / x      CARDIAC MARKERS ( 01 Nov 2020 15:44 )  <0.015 ng/mL / x     / x     / x     / x            LIVER FUNCTIONS - ( 02 Nov 2020 07:09 )  Alb: 2.8 g/dL / Pro: 7.1 gm/dL / ALK PHOS: 74 U/L / ALT: 14 U/L / AST: 12 U/L / GGT: x           Ferritin, Serum (11.02.20 @ 16:53)   Ferritin, Serum: 38 ng/mL     Culture - Urine (11.01.20 @ 15:33)   Specimen Source: .Urine Clean Catch (Midstream)   Culture Results:   >100,000 CFU/ml Proteus mirabilis   <10,000 CFU/ml Normal Urogenital yue present   MEDICATIONS  (STANDING):  atorvastatin 40 milliGRAM(s) Oral at bedtime  cefTRIAXone Injectable.      cholecalciferol 2000 Unit(s) Oral daily  dextrose 5%. 1000 milliLiter(s) (50 mL/Hr) IV Continuous <Continuous>  dextrose 5%. 1000 milliLiter(s) (50 mL/Hr) IV Continuous <Continuous>  dextrose 50% Injectable 12.5 Gram(s) IV Push once  dextrose 50% Injectable 25 Gram(s) IV Push once  dextrose 50% Injectable 25 Gram(s) IV Push once  dextrose 50% Injectable 12.5 Gram(s) IV Push once  dextrose 50% Injectable 25 Gram(s) IV Push once  dextrose 50% Injectable 25 Gram(s) IV Push once  hydrALAZINE Injectable 5 milliGRAM(s) IV Push once  insulin glargine Injectable (LANTUS) 10 Unit(s) SubCutaneous at bedtime  insulin lispro (ADMELOG) corrective regimen sliding scale   SubCutaneous three times a day before meals  insulin lispro (ADMELOG) corrective regimen sliding scale   SubCutaneous three times a day before meals  losartan 50 milliGRAM(s) Oral daily  potassium chloride    Tablet ER 10 milliEquivalent(s) Oral daily  sodium chloride 0.9%. 1000 milliLiter(s) (100 mL/Hr) IV Continuous <Continuous>  sodium ferric gluconate complex IVPB 125 milliGRAM(s) IV Intermittent <User Schedule>    MEDICATIONS  (PRN):  acetaminophen   Tablet .. 650 milliGRAM(s) Oral once PRN Temp greater or equal to 38C (100.4F), Mild Pain (1 - 3)  dextrose 40% Gel 15 Gram(s) Oral once PRN Blood Glucose LESS THAN 70 milliGRAM(s)/deciliter  dextrose 40% Gel 15 Gram(s) Oral once PRN Blood Glucose LESS THAN 70 milliGRAM(s)/deciliter  glucagon  Injectable 1 milliGRAM(s) IntraMuscular once PRN Glucose LESS THAN 70 milligrams/deciliter  glucagon  Injectable 1 milliGRAM(s) IntraMuscular once PRN Glucose LESS THAN 70 milligrams/deciliter

## 2020-11-03 NOTE — PROGRESS NOTE ADULT - ASSESSMENT
74 y/o female with PMHx of HTN, DM, vertigo presented to the ED after syncopal episode found to have Anemia, and UTI.      #Syncope:    Likely orthostatic/ anemia related.    No events on tele.   ECHO reviewed.    CT head negative for acute pathology.  PT eval.      # Anemia:    Secondary to acute on chronic GI blood loss.    Hgb down to 7.6-- recommended 1 unit transfusion.  Patient agrees to transfusion.    Repeat H&H in am.    Start IV iron with low ferritin/ iron levels.      #GI Bleed:    As above.  Concerning for hemorrhoids versus rectal mass.    Awaiting GI eval.    Appreciate colorectal eval.    Possible COLO 11/4.    Possible rectal mass bx 11/5.      # UTI:    Urine cx with >100,000 proteus.    Start IV rocephin.      # CKD III:    Baseline appears ~1.5.       #DM2  -hold metformin  -ISS  -on lantus    #HTN:    Cont home ARB.      # DVT ppx  -encourage ambulation  -scds

## 2020-11-03 NOTE — CONSULT NOTE ADULT - SUBJECTIVE AND OBJECTIVE BOX
GI consult    HPI:  75 year old female patient presented to the ED after experiencing one syncopal episode and one near syncopal episode. Patient states she got up and went about her regular routine but sudden passed out suddenly after having breakfast, shortly after rising from a seated position. States her son her a thud and awoke on the floor with her son over her. Patient had the urge to move her bowels shortly after and felt as though she would pass out. Denies any preceding dizziness, headache, visual changes, chest pain, palpitations or auras.  In the ED patient without EKG changes or  telemetry events. Patient with H&H of 9/29. last known H&H of 12.2/36.3. Patient with history of internal and external hemorrhoids. Denied any melena, fatigue, weakness or abdominal pain. Occasionally sees blood after wiping on some occasions.  Pt had CHUN with CRS concerned for rectal mass vs. benign anorectal source of bleeding. Has never had colonoscopy. Asked to perform colonoscopy.      PAST MEDICAL & SURGICAL HISTORY:  DM (diabetes mellitus)    HTN (hypertension)    Vertigo    No significant past surgical history        Home Medications:  Amaryl 2 mg oral tablet: 1 tab(s) orally once a day (03 Nov 2020 10:00)  metFORMIN 500 mg oral tablet, extended release: 1 tab(s) orally once a day (03 Nov 2020 10:00)  olmesartan 20 mg oral tablet: 1 tab(s) orally once a day (03 Nov 2020 10:02)  potassium chloride 10 mEq oral tablet, extended release: 1 tab(s) orally once a day (03 Nov 2020 10:02)  rosuvastatin 10 mg oral tablet: 1 tab(s) orally once a day (at bedtime) (03 Nov 2020 10:02)  Vitamin D3 2000 intl units (50 mcg) oral tablet: 1 tab(s) orally once a day (03 Nov 2020 10:02)      MEDICATIONS  (STANDING):  atorvastatin 40 milliGRAM(s) Oral at bedtime  cefTRIAXone Injectable.      cholecalciferol 2000 Unit(s) Oral daily  dextrose 5%. 1000 milliLiter(s) (50 mL/Hr) IV Continuous <Continuous>  dextrose 5%. 1000 milliLiter(s) (50 mL/Hr) IV Continuous <Continuous>  dextrose 50% Injectable 12.5 Gram(s) IV Push once  dextrose 50% Injectable 25 Gram(s) IV Push once  dextrose 50% Injectable 25 Gram(s) IV Push once  dextrose 50% Injectable 12.5 Gram(s) IV Push once  dextrose 50% Injectable 25 Gram(s) IV Push once  dextrose 50% Injectable 25 Gram(s) IV Push once  hydrALAZINE Injectable 5 milliGRAM(s) IV Push once  insulin glargine Injectable (LANTUS) 10 Unit(s) SubCutaneous at bedtime  insulin lispro (ADMELOG) corrective regimen sliding scale   SubCutaneous three times a day before meals  insulin lispro (ADMELOG) corrective regimen sliding scale   SubCutaneous three times a day before meals  losartan 50 milliGRAM(s) Oral daily  polyethylene glycol/electrolyte Solution 2000 milliLiter(s) Oral once  sodium ferric gluconate complex IVPB 125 milliGRAM(s) IV Intermittent <User Schedule>    MEDICATIONS  (PRN):  acetaminophen   Tablet .. 650 milliGRAM(s) Oral once PRN Temp greater or equal to 38C (100.4F), Mild Pain (1 - 3)  dextrose 40% Gel 15 Gram(s) Oral once PRN Blood Glucose LESS THAN 70 milliGRAM(s)/deciliter  dextrose 40% Gel 15 Gram(s) Oral once PRN Blood Glucose LESS THAN 70 milliGRAM(s)/deciliter  glucagon  Injectable 1 milliGRAM(s) IntraMuscular once PRN Glucose LESS THAN 70 milligrams/deciliter  glucagon  Injectable 1 milliGRAM(s) IntraMuscular once PRN Glucose LESS THAN 70 milligrams/deciliter      Allergies    barbiturates (Unknown)  Cipro (Other)    Intolerances        SOCIAL HISTORY: NC    FAMILY HISTORY:  No pertinent family history in first degree relatives        ROS  As above  Otherwise unremarkable, all systems reviewed    PE:  Vital Signs Last 24 Hrs  T(C): 36.4 (03 Nov 2020 15:00), Max: 37.1 (02 Nov 2020 22:48)  T(F): 97.5 (03 Nov 2020 15:00), Max: 98.8 (02 Nov 2020 22:48)  HR: 84 (03 Nov 2020 15:00) (75 - 84)  BP: 170/113 (03 Nov 2020 15:00) (143/59 - 170/113)  BP(mean): --  RR: 18 (03 Nov 2020 15:00) (18 - 18)  SpO2: 99% (03 Nov 2020 15:00) (97% - 100%)    Constitutional: NAD, well-developed, A+Ox3  daughter at bedside  Anicteric   Respiratory: CTABL, breathing comfortably  Cardiovascular: S1 and S2, RRR  Gastrointestinal: +BS, soft, non tender, non distended, no mass, obese abd  Extremities: warm, well perfused, no edema  Psychiatric: Normal mood, normal affect  Neuro: moves all extremities, grossly intact  Skin: No rashes or lesions    LABS:                        7.6    8.60  )-----------( 407      ( 03 Nov 2020 09:45 )             25.5     11-03    142  |  111<H>  |  26<H>  ----------------------------<  151<H>  3.5   |  25  |  1.33<H>    Ca    8.6      03 Nov 2020 09:45    TPro  7.1  /  Alb  2.8<L>  /  TBili  0.2  /  DBili  x   /  AST  12<L>  /  ALT  14  /  AlkPhos  74  11-02      LIVER FUNCTIONS - ( 02 Nov 2020 07:09 )  Alb: 2.8 g/dL / Pro: 7.1 gm/dL / ALK PHOS: 74 U/L / ALT: 14 U/L / AST: 12 U/L / GGT: x             RADIOLOGY & ADDITIONAL STUDIES:

## 2020-11-04 NOTE — PROGRESS NOTE ADULT - SUBJECTIVE AND OBJECTIVE BOX
Pt was seen and examined at bedside this morning with surgery team. No acute overnight events reported by nursing staff. Pt offers no new complaints at this time. Denies fever/cp/sob/n/v/d/c. Vitals stable.     Vital Signs Last 24 Hrs  T(C): 37.1 (04 Nov 2020 07:47), Max: 37.1 (04 Nov 2020 07:47)  T(F): 98.7 (04 Nov 2020 07:47), Max: 98.7 (04 Nov 2020 07:47)  HR: 82 (04 Nov 2020 07:47) (80 - 90)  BP: 143/56 (04 Nov 2020 07:47) (143/56 - 170/113)  RR: 18 (04 Nov 2020 07:47) (18 - 18)  SpO2: 100% (04 Nov 2020 07:47) (99% - 100%)    PHYSICAL EXAM:  Constitutional: NAD, GCS: 15/15  AOX3  Eyes:  WNL  ENMT:  WNL  Neck:  WNL, non tender  Back: Non tender  Respiratory: CTABL  Cardiovascular:  S1+S2+0  Gastrointestinal: Soft, ND , NT  Genitourinary:  WNL  Extremities: NV intact  Vascular:  Intact  Neurological: No focal neurological deficit,  CN, motor and sensory system grossly intact.  Skin: WNL  Musculoskeletal: WNL  Psychiatric: Grossly WNL                          7.6    8.60  )-----------( 407      ( 03 Nov 2020 09:45 )             25.5     11-03    142  |  111<H>  |  26<H>  ----------------------------<  151<H>  3.5   |  25  |  1.33<H>    Ca    8.6      03 Nov 2020 09:45    TPro  7.1  /  Alb  2.8<L>  /  TBili  0.2  /  DBili  x   /  AST  12<L>  /  ALT  14  /  AlkPhos  74  11-02

## 2020-11-04 NOTE — PROGRESS NOTE ADULT - SUBJECTIVE AND OBJECTIVE BOX
Colonoscopy performed-see note for details  large malignant lesion found at anal verge  d/w Liban Sumner and Eunice

## 2020-11-04 NOTE — PROGRESS NOTE ADULT - SUBJECTIVE AND OBJECTIVE BOX
76 y/o female with PMHx of HTN, DM, vertigo patiented to the ED after experiencing one syncopal episode and one near syncopal episode. Patient states she got up and went about her regular routine but sudden passed out suddenly after having breakfast, shortly after rising from a seated position. States her son her a thud and awoke on the floor with her son over her. Patient had the urge to move her bowels shortly after and felt as though she would pass out.  Denies any preceding dizziness, headache, visual changes, chest pain, palpitations or auras.  In the ED patient without EKG changes or  telemetry events. Patient with H&H of 9/29.  Last known H&H of 12.2/36.3. Patient with history of internal and external hemorrhoids. Denied any melena, fatigue, weakness or abdominal pain. Occasionally sees blood after wiping on some occasions.  Also found to have orthostatic hypotension.      11/3:  Pt seen.  States feeling great.  No lightheadedness.  Had some pain and bleeding after rectal exam this morning by colorectal surgery.  Otherwise well.  Drinking clears.    11/4:  Pr seen.  Completing colo prep.  Still slight bleeding.  COLO today.      Vital Signs Last 24 Hrs  T(C): 37.1 (04 Nov 2020 07:47), Max: 37.1 (04 Nov 2020 07:47)  T(F): 98.7 (04 Nov 2020 07:47), Max: 98.7 (04 Nov 2020 07:47)  HR: 82 (04 Nov 2020 07:47) (80 - 90)  BP: 143/56 (04 Nov 2020 07:47) (143/56 - 170/113)  BP(mean): --  RR: 18 (04 Nov 2020 07:47) (18 - 18)  SpO2: 100% (04 Nov 2020 07:47) (99% - 100%)    EXAM:    HEENT:  Head is normocephalic    Skin:  Warm and dry without any rash   NECK:  Supple without lymphadenopathy.   HEART:  Regular rate and rhythm. normal S1 and S2, No M/R/G  LUNGS:  Good ins/exp effort, no W/R/R/C  ABDOMEN:  Soft, nontender, nondistended with good bowel sounds heard  EXTREMITIES:  Without cyanosis, clubbing or edema.   NEUROLOGICAL:  Gross nonfocal     11-04    146<H>  |  116<H>  |  17  ----------------------------<  93  3.2<L>   |  22  |  1.23    Ca    8.2<L>      04 Nov 2020 07:38                          8.8    10.29 )-----------( 407      ( 04 Nov 2020 07:38 )             28.2     Iron Total, Serum: 27 ug/dL (11.03.20 @ 06:51) Iron - Total Binding Capacity.: 241 ug/dL (11.02.20 @ 16:53) Ferritin, Serum (11.02.20 @ 16:53)   Ferritin, Serum: 38 ng/mL     Culture - Urine (11.01.20 @ 15:33)   Specimen Source: .Urine Clean Catch (Midstream)   Culture Results:   >100,000 CFU/ml Proteus mirabilis   <10,000 CFU/ml Normal Urogenital yue present     MEDICATIONS  (STANDING):  atorvastatin 40 milliGRAM(s) Oral at bedtime  cefTRIAXone Injectable.      cefTRIAXone Injectable. 1000 milliGRAM(s) IV Push every 24 hours  cholecalciferol 2000 Unit(s) Oral daily  dextrose 5%. 1000 milliLiter(s) (50 mL/Hr) IV Continuous <Continuous>  dextrose 5%. 1000 milliLiter(s) (50 mL/Hr) IV Continuous <Continuous>  dextrose 50% Injectable 12.5 Gram(s) IV Push once  dextrose 50% Injectable 25 Gram(s) IV Push once  dextrose 50% Injectable 25 Gram(s) IV Push once  dextrose 50% Injectable 12.5 Gram(s) IV Push once  dextrose 50% Injectable 25 Gram(s) IV Push once  dextrose 50% Injectable 25 Gram(s) IV Push once  hydrALAZINE Injectable 5 milliGRAM(s) IV Push once  insulin glargine Injectable (LANTUS) 10 Unit(s) SubCutaneous at bedtime  insulin lispro (ADMELOG) corrective regimen sliding scale   SubCutaneous three times a day before meals  insulin lispro (ADMELOG) corrective regimen sliding scale   SubCutaneous three times a day before meals  losartan 50 milliGRAM(s) Oral daily  potassium chloride  10 mEq/100 mL IVPB 10 milliEquivalent(s) IV Intermittent every 1 hour  sodium ferric gluconate complex IVPB 125 milliGRAM(s) IV Intermittent <User Schedule>    MEDICATIONS  (PRN):  acetaminophen   Tablet .. 650 milliGRAM(s) Oral once PRN Temp greater or equal to 38C (100.4F), Mild Pain (1 - 3)  dextrose 40% Gel 15 Gram(s) Oral once PRN Blood Glucose LESS THAN 70 milliGRAM(s)/deciliter  dextrose 40% Gel 15 Gram(s) Oral once PRN Blood Glucose LESS THAN 70 milliGRAM(s)/deciliter  glucagon  Injectable 1 milliGRAM(s) IntraMuscular once PRN Glucose LESS THAN 70 milligrams/deciliter  glucagon  Injectable 1 milliGRAM(s) IntraMuscular once PRN Glucose LESS THAN 70 milligrams/deciliter

## 2020-11-04 NOTE — PROGRESS NOTE ADULT - ASSESSMENT
76 y/o female with PMHx of HTN, DM, vertigo presented to the ED after syncopal episode found to have JASPREET, Anemia, GI bleed, and UTI.      #Syncope:    Multifactorial.  Orthostatic/ anemia/ UTI related.    No events on tele.   ECHO reviewed.    CT head negative for acute pathology.  PT eval.      # Anemia:    Secondary to acute on chronic GI blood loss.    Hgb improved to 8.8 after 1 unit pRBC on 11/3.      Start IV iron with low ferritin/ iron levels.    Follow Hgb daily.      #GI Bleed:    As above.  Concerning for hemorrhoids versus rectal mass.    Appreciate GI eval.      Appreciate colorectal eval.    COLO 11/4.    Possible rectal mass bx 11/5.      # UTI:    Urine cx with >100,000 proteus.    Start IV rocephin.  Day 2/3.      # JASPREET on CKD III:    Cr improved from 1.6 to 1.2.  Prerenal related to dehydration.      #Hypokalemia:    Replete and recheck.    #DM2  -hold metformin  -ISS  -on lantus    #HTN:    Cont home ARB.      # DVT ppx  -encourage ambulation  -scds     74 y/o female with PMHx of HTN, DM, vertigo presented to the ED after syncopal episode found to have JASPREET, Anemia, GI bleed, and UTI.      #Syncope:    Multifactorial.  Orthostatic/ anemia/ UTI related.    No events on tele.   ECHO reviewed.    CT head negative for acute pathology.  PT eval.      # Anemia:    Secondary to acute on chronic GI blood loss.    Hgb improved to 8.8 after 1 unit pRBC on 11/3.      Start IV iron with low ferritin/ iron levels.    Follow Hgb daily.      #GI Bleed:    As above.  Concerning for hemorrhoids versus rectal mass.    Appreciate GI eval.      Appreciate colorectal eval.    COLO 11/4.    Possible rectal mass bx 11/5.    Patient is medically optimized for all procedures.      # UTI:    Urine cx with >100,000 proteus.    Start IV rocephin.  Day 2/3.      # JASPREET on CKD III:    Cr improved from 1.6 to 1.2.  Prerenal related to dehydration.      #Hypokalemia:    Replete and recheck.    #DM2  -hold metformin  -ISS  -on lantus    #HTN:    Cont home ARB.      # DVT ppx  -encourage ambulation  -scds

## 2020-11-04 NOTE — PROGRESS NOTE ADULT - ASSESSMENT
Prolapsed Anal Mass, rectal prolapsed vs prolapsed thrombosed hemorrhoid vs rectal cancer      Will follow C scope findings today  CLD, bowel prep  Plan EUA on thursday with biopsy  GI consult appreciated  Rest of care per primary medical team      d/w colorectal surgery group, and Dr Sumner

## 2020-11-05 NOTE — PROGRESS NOTE ADULT - SUBJECTIVE AND OBJECTIVE BOX
74 y/o female with PMHx of HTN, DM, vertigo patiented to the ED after experiencing one syncopal episode and one near syncopal episode. Patient states she got up and went about her regular routine but sudden passed out suddenly after having breakfast, shortly after rising from a seated position. States her son her a thud and awoke on the floor with her son over her. Patient had the urge to move her bowels shortly after and felt as though she would pass out.  Denies any preceding dizziness, headache, visual changes, chest pain, palpitations or auras.  In the ED patient without EKG changes or  telemetry events. Patient with H&H of 9/29.  Last known H&H of 12.2/36.3. Patient with history of internal and external hemorrhoids. Denied any melena, fatigue, weakness or abdominal pain. Occasionally sees blood after wiping on some occasions.  Also found to have orthostatic hypotension.      11/3:  Pt seen.  States feeling great.  No lightheadedness.  Had some pain and bleeding after rectal exam this morning by colorectal surgery.  Otherwise well.  Drinking clears.    11/4:  Pt seen.  Completing colo prep.  Still slight bleeding.  COLO today.    11/5:  Pt seen.  Feeling okay.  NPO for OR.  C/o dry mouth.      Vital Signs Last 24 Hrs  T(C): 36.9 (05 Nov 2020 07:56), Max: 36.9 (05 Nov 2020 07:56)  T(F): 98.4 (05 Nov 2020 07:56), Max: 98.4 (05 Nov 2020 07:56)  HR: 75 (05 Nov 2020 07:56) (75 - 95)  BP: 151/61 (05 Nov 2020 07:56) (151/61 - 154/75)  RR: 18 (05 Nov 2020 07:56) (18 - 18)  SpO2: 97% (05 Nov 2020 07:56) (97% - 100%)    EXAM:    HEENT:  Head is normocephalic    Skin:  Warm and dry without any rash   NECK:  Supple without lymphadenopathy.   HEART:  Regular rate and rhythm. normal S1 and S2, No M/R/G  LUNGS:  Good ins/exp effort, no W/R/R/C  ABDOMEN:  Soft, nontender, nondistended with good bowel sounds heard  EXTREMITIES:  Without cyanosis, clubbing or edema.   NEUROLOGICAL:  Gross nonfocal     11-05    144  |  116<H>  |  10  ----------------------------<  86  3.5   |  21<L>  |  1.17    Ca    8.5      05 Nov 2020 07:47  Phos  2.7     11-05  Mg     2.0     11-05                        8.6    9.17  )-----------( 388      ( 05 Nov 2020 07:47 )             27.3     PT/INR - ( 05 Nov 2020 07:47 )   PT: 14.2 sec;   INR: 1.23 ratio    PTT - ( 05 Nov 2020 07:47 )  PTT:35.2 sec    Iron Total, Serum:7 ug/dL (11.03.20 @ 06:51) Iron - Total Binding Capacity.: 241 ug/dL (11.02.20 @ 16:53) Ferritin, Serum (11.02.20 @ 16:53)   Ferritin, Srm: 38 ng/mL   Culture - Urine (11.01.20 @ 15:33)   Specimen Source: .Urine Clean Catch (Midstream)   Culture Results:   >100,000 CFU/ml Proteus mirabilis   <10,000 CFU/ml Normal Urogenital yue present   MEDICATIONS  (STANDING):  atorvastatin 40 milliGRAM(s) Oral at bedtime  cholecalciferol 2000 Unit(s) Oral daily  dextrose 5%. 1000 milliLiter(s) (50 mL/Hr) IV Continuous <Continuous>  dextrose 5%. 1000 milliLiter(s) (50 mL/Hr) IV Continuous <Continuous>  dextrose 50% Injectable 12.5 Gram(s) IV Push once  dextrose 50% Injectable 25 Gram(s) IV Push once  dextrose 50% Injectable 25 Gram(s) IV Push once  dextrose 50% Injectable 12.5 Gram(s) IV Push once  dextrose 50% Injectable 25 Gram(s) IV Push once  dextrose 50% Injectable 25 Gram(s) IV Push once  hydrALAZINE Injectable 5 milliGRAM(s) IV Push once  insulin glargine Injectable (LANTUS) 5 Unit(s) SubCutaneous at bedtime  insulin lispro (ADMELOG) corrective regimen sliding scale   SubCutaneous three times a day before meals  insulin lispro (ADMELOG) corrective regimen sliding scale   SubCutaneous three times a day before meals  lactated ringers. 1000 milliLiter(s) (110 mL/Hr) IV Continuous <Continuous>  losartan 50 milliGRAM(s) Oral daily  sodium ferric gluconate complex IVPB 125 milliGRAM(s) IV Intermittent <User Schedule>    MEDICATIONS  (PRN):  acetaminophen   Tablet .. 650 milliGRAM(s) Oral once PRN Temp greater or equal to 38C (100.4F), Mild Pain (1 - 3)  dextrose 40% Gel 15 Gram(s) Oral once PRN Blood Glucose LESS THAN 70 milliGRAM(s)/deciliter  dextrose 40% Gel 15 Gram(s) Oral once PRN Blood Glucose LESS THAN 70 milliGRAM(s)/deciliter  glucagon  Injectable 1 milliGRAM(s) IntraMuscular once PRN Glucose LESS THAN 70 milligrams/deciliter  glucagon  Injectable 1 milliGRAM(s) IntraMuscular once PRN Glucose LESS THAN 70 milligrams/deciliter

## 2020-11-05 NOTE — PROGRESS NOTE ADULT - ASSESSMENT
74 y/o female with PMHx of HTN, DM, vertigo presented to the ED after syncopal episode found to have JASPREET, Anemia, GI bleed, and UTI.  Now s/p COLO revealing anal mass concerning for malignancy.      #Syncope:    Multifactorial.  Orthostatic/ anemia/ UTI related.    No events on tele.   ECHO WNL.    CT head negative for acute pathology.    # Anemia:    Secondary to acute on chronic GI blood loss.    Hgb improved to 8.8 after 1 unit pRBC on 11/3.      Cont IV iron with low ferritin/ iron levels for 5 doses.    Follow Hgb daily.      #GI Bleed secondary to anal mass:    Appreciate GI eval.      Appreciate colorectal eval.    [place] with anal mass.    NPO today for anal mass bx in OR.   Patient is medically optimized for all procedures.      # UTI:    Urine cx with >100,000 proteus.    S/p 3 days rocephin.      # JASPREET on CKD III:    Cr improved from 1.6 to 1.1.  Prerenal related to dehydration.      #Hypokalemia:    Improved.      #DM2:   Lantus/ sliding scale.      #HTN:    Cont home ARB.      # DVT ppx  SCDs with rectal bleeding.

## 2020-11-05 NOTE — PROGRESS NOTE ADULT - ASSESSMENT
74 y/o F  presents with prolapsed Anal Mass, rectal prolapsed vs prolapsed thrombosed hemorrhoid vs rectal cancer      C-scope results reviewed  NPO s/p bowel prep  EUA today with biopsy, iif patient agreeable  GI consult appreciated  Rest of care per primary medical team      Plan discussed with colorectal surgery group, and Dr. Sumner

## 2020-11-05 NOTE — PROGRESS NOTE ADULT - SUBJECTIVE AND OBJECTIVE BOX
Patient was seen and examined at bedside this morning with surgery team. No acute overnight events reported by nursing staff. Patient concerned about necessity of EUA with biopsy. Denies fever/cp/sob/n/v/d/c. Vitals stable.     Vital Signs Last 24 Hrs  T(C): 36.9 (05 Nov 2020 07:56), Max: 36.9 (05 Nov 2020 07:56)  T(F): 98.4 (05 Nov 2020 07:56), Max: 98.4 (05 Nov 2020 07:56)  HR: 75 (05 Nov 2020 07:56) (75 - 95)  BP: 151/61 (05 Nov 2020 07:56) (151/61 - 154/75)  RR: 18 (05 Nov 2020 07:56) (18 - 18)  SpO2: 97% (05 Nov 2020 07:56) (97% - 100%)    PHYSICAL EXAM:  Constitutional: NAD, GCS: 15/15  AOX3  Eyes:  WNL  ENMT:  WNL  Neck:  WNL, non tender  Back: Non tender  Respiratory: CTABL  Cardiovascular:  S1+S2+0  Gastrointestinal: Soft, ND , NT  Genitourinary:  WNL  Extremities: NV intact  Vascular:  Intact  Neurological: No focal neurological deficit,  CN, motor and sensory system grossly intact.  Skin: WNL  Musculoskeletal: WNL  Psychiatric: Grossly WNL               LABS:                        8.6    9.17  )-----------( 388      ( 05 Nov 2020 07:47 )             27.3   11-04    146<H>  |  116<H>  |  17  ----------------------------<  93  3.2<L>   |  22  |  1.23    Ca    8.2<L>      04 Nov 2020 07:38

## 2020-11-05 NOTE — BRIEF OPERATIVE NOTE - NSICDXBRIEFPROCEDURE_GEN_ALL_CORE_FT
PROCEDURES:  Anal surgical biopsy 05-Nov-2020 15:01:20  Dat Son  Rectal examination under anesthesia 05-Nov-2020 15:00:33  Dat Son

## 2020-11-05 NOTE — BRIEF OPERATIVE NOTE - OPERATION/FINDINGS
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

## 2020-11-06 NOTE — PROGRESS NOTE ADULT - REASON FOR ADMISSION
Syncope  Anemia

## 2020-11-06 NOTE — DISCHARGE NOTE PROVIDER - NSDCMRMEDTOKEN_GEN_ALL_CORE_FT
Amaryl 2 mg oral tablet: 1 tab(s) orally once a day  insulin glargine 100 units/mL subcutaneous solution: 10 unit(s) subcutaneous once a day (at bedtime)   metFORMIN 500 mg oral tablet, extended release: 1 tab(s) orally once a day  olmesartan 20 mg oral tablet: 1 tab(s) orally once a day  rosuvastatin 10 mg oral tablet: 1 tab(s) orally once a day (at bedtime)  Vitamin D3 2000 intl units (50 mcg) oral tablet: 1 tab(s) orally once a day

## 2020-11-06 NOTE — PROGRESS NOTE ADULT - ATTENDING COMMENTS
pt seen and examined.    colonsocopy yesterday showed anal mass and colon polyp.  pt reports that the hemorrhoid is smaller today.  exam shows that it appears similar to yesterday.      imp: anal mass  --recommend exam under anesthesia, biopsy of anal mass.  Risks include bleeding, infection, fistula, delayed wound healing, pain discussed.  Pt consents.  will proceed.
Pt seen and examined    No signficant changes.  Still no pain, minimal bleeding.    AVSS  Rectal:  no active bleeding noted from anal mass    Hgb 7.6 from 7.9    imp: Anal mass  --plan exam under anesthesia with biopsy of anal mass.  Tentatively scheduled for 11/5 @ 1:30PM.  will need medical clearance.  --planning colonoscopy with GI tomorrow (although cannot confirm, awaiting their consult note)
pt seen and examined.    colonsocopy yesterday showed anal mass and colon polyp.  pt reports that the hemorrhoid is smaller today.  exam shows that it appears similar to yesterday.      imp: anal mass  --recommend exam under anesthesia, biopsy of anal mass.  Risks include bleeding, infection, fistula, delayed wound healing, pain discussed.  Pt consents.  will proceed.

## 2020-11-06 NOTE — DISCHARGE NOTE PROVIDER - HOSPITAL COURSE
74 y/o female with PMHx of HTN, DM, vertigo who presented to the ED after experiencing one syncopal episode and one near syncopal episode. Patient states she got up and went about her regular routine but sudden passed out suddenly after having breakfast, shortly after rising from a seated position. States her son her a thud and awoke on the floor with her son over her. Patient had the urge to move her bowels shortly after and felt as though she would pass out.  Denies any preceding dizziness, headache, visual changes, chest pain, palpitations or auras.  In the ED patient without EKG changes or  telemetry events. Patient with H&H of 9/29.  Last known H&H of 12.2/36.3. Patient with history of internal and external hemorrhoids. Denied any melena, fatigue, weakness or abdominal pain. Occasionally sees blood after wiping on some occasions.  Also found to have orthostatic hypotension.      Hospital course:  Patient had cardiac work up for syncope which was unrevealing.  ECHO WNL.  Syncope thought related to anemia/ dehydration.  Patient with JASPREET which improved with IVF.  Patient did have progressive anemia with Hgb drop to less than 8.  Patient was transfused 1 unit and received IV iron while admitted.  COLO performed which demonstrated large anal mass.  Patient taken to OR 11/5 for official bx and initial pathology concerning for melanoma.  Patient to have f/u and evaluation @ [facility] (her choice).  Will also f/u with Dr. Bertin Sumner.      Vital Signs Last 24 Hrs  T(C): 37.3 (06 Nov 2020 09:22), Max: 37.3 (06 Nov 2020 09:22)  T(F): 99.1 (06 Nov 2020 09:22), Max: 99.1 (06 Nov 2020 09:22)  HR: 90 (06 Nov 2020 09:22) (70 - 90)  BP: 142/63 (06 Nov 2020 09:22) (119/41 - 148/49)  BP(mean): --  RR: 18 (06 Nov 2020 09:22) (12 - 20)  SpO2: 100% (06 Nov 2020 09:22) (97% - 100%)    EXAM:    HEENT:  Head is normocephalic    Skin:  Warm and dry without any rash   NECK:  Supple without lymphadenopathy.   HEART:  Regular rate and rhythm. normal S1 and S2, No M/R/G  LUNGS:  Good ins/exp effort, no W/R/R/C  ABDOMEN:  Soft, nontender, nondistended with good bowel sounds heard  EXTREMITIES:  Without cyanosis, clubbing or edema.   NEUROLOGICAL:  Gross nonfocal     11-05    144  |  116<H>  |  10  ----------------------------<  86  3.5   |  21<L>  |  1.17    Ca    8.5      05 Nov 2020 07:47  Phos  2.7     11-05  Mg     2.0     11-05                        8.6    9.17  )-----------( 388      ( 05 Nov 2020 07:47 )             27.3     PT/INR - ( 05 Nov 2020 07:47 )   PT: 14.2 sec;   INR: 1.23 ratio    PTT - ( 05 Nov 2020 07:47 )  PTT:35.2 sec    PLAN:  As above  #Syncope:    Multifactorial.  Orthostatic/ anemia/ UTI related.    No events on tele.   ECHO WNL.    CT head negative for acute pathology.    # Anemia:    Secondary to acute on chronic GI blood loss.    Hgb improved to 8.6 after 1 unit pRBC on 11/3.      S/p IV iron.    F/u outpt.  Repeat CBC in 1 week.      #GI Bleed secondary to anal mass:    S/p bx 11/5.    Initial pathology concerning for melanoma.    Patient wishes to go to [facility] for eval.    F/u Dr. Bertin Sumner outpatient.      # UTI:    Urine cx with >100,000 proteus.    S/p 3 days rocephin.      # JASPREET on CKD III:    Cr improved from 1.6 to 1.1.  Prerenal related to dehydration.      #Hypokalemia:    Improved.      #DM2:   Lantus/ sliding scale.      #HTN:    Home meds.        Patient stable for d/c home.    Total time spent 45 min.    D/w Dr. Sumner/ Dr. Martinez.

## 2020-11-06 NOTE — DISCHARGE NOTE PROVIDER - NSDCCPCAREPLAN_GEN_ALL_CORE_FT
PRINCIPAL DISCHARGE DIAGNOSIS  Diagnosis: Syncope and collapse  Assessment and Plan of Treatment: secondary to dehydration/ anemia      SECONDARY DISCHARGE DIAGNOSES  Diagnosis: Anemia  Assessment and Plan of Treatment: repeat blood count in 1 week to ensure stable.  Hgb @ discharge 8.6.    Diagnosis: GI bleed  Assessment and Plan of Treatment: secondary to anal mass.  follow up for final biopsy results

## 2020-11-06 NOTE — PROVIDER CONTACT NOTE (OTHER) - SITUATION
Dr. Dawson doesn't want pt. seen by hem/onc before d/c today 11/6. Cancelled consult.
Spoke with Dr. Son. MD aware of consult.
Spoke with Lucrecia from MD's office. Will notify MD.
spoke to surgical resident. as per surgical resident will see patient tonight and refer case to Dr. Higgins

## 2020-11-06 NOTE — PROGRESS NOTE ADULT - SUBJECTIVE AND OBJECTIVE BOX
Patient was seen and examined at bedside this morning with surgery team. Discussed preliminary frozen section results from EUA biopsy with patient. Patient plans to transfer her care to [facility] [place] [place] after work-up complete. Denies fever/cp/sob/n/v/d/c. Vitals signs reviewed and AVSS .     Vital Signs Last 24 Hrs  T(C): 36.9 (05 Nov 2020 07:56), Max: 36.9 (05 Nov 2020 07:56)  T(F): 98.4 (05 Nov 2020 07:56), Max: 98.4 (05 Nov 2020 07:56)  HR: 75 (05 Nov 2020 07:56) (75 - 95)  BP: 151/61 (05 Nov 2020 07:56) (151/61 - 154/75)  RR: 18 (05 Nov 2020 07:56) (18 - 18)  SpO2: 97% (05 Nov 2020 07:56) (97% - 100%)    PHYSICAL EXAM:  Constitutional: NAD, GCS: 15/15  AOX3  Eyes:  WNL  ENMT:  WNL  Neck:  WNL, non tender  Back: Non tender  Respiratory: CTABL  Cardiovascular:  S1+S2+0  Gastrointestinal: Soft, ND , NT  Rectum/Anus: Necrotic 5CM mass with dried blood at the apex  Extremities: NV intact  Vascular:  Intact  Neurological: No focal neurological deficit,  CN, motor and sensory system grossly intact.  Skin: WNL  Musculoskeletal: WNL  Psychiatric: Grossly WNL               LABS:                        8.6    9.17  )-----------( 388      ( 05 Nov 2020 07:47 )             27.3   11-04    146<H>  |  116<H>  |  17  ----------------------------<  93  3.2<L>   |  22  |  1.23    Ca    8.2<L>      04 Nov 2020 07:38

## 2020-11-06 NOTE — DISCHARGE NOTE PROVIDER - PROVIDER TOKENS
PROVIDER:[TOKEN:[[id number]],FOLLOWUP:[1 week]],PROVIDER:[TOKEN:[[id number]],FOLLOWUP:[1 week]]

## 2020-11-06 NOTE — DISCHARGE NOTE PROVIDER - CARE PROVIDER_API CALL
ELIZABETH MYRICK  [id number]  [address]  [place] [address]  Phone: ([phone]  Fax: ([phone]  Follow Up Time: 1 week    Bertin Sumner)  ColonRectal Surgery; Surgery  [facility] [place] [address]  Phone: [phone]  Fax: [phone]  Follow Up Time: 1 week

## 2020-11-06 NOTE — DISCHARGE NOTE NURSING/CASE MANAGEMENT/SOCIAL WORK - PATIENT PORTAL LINK FT
You can access the FollowMyHealth Patient Portal offered by [facility] by registering at the following website: [email or web address]. By joining [facility]’s FollowMyHealth portal, you will also be able to view your health information using other applications (apps) compatible with our system.

## 2020-11-06 NOTE — PROGRESS NOTE ADULT - ASSESSMENT
74 y/o F  presents with prolapsed Anal Mass, poorly differentiated rectal cancer possible melanoma    Bowel regimen  Will obtain heme/Onc consult today  Plan for outpatient PET-CT  Patient H/H and vitals stable  Will plan for possible discharge today    Plan discussed with colorectal surgery group, and Dr. Sumner

## 2020-11-23 PROBLEM — Z80.0 FAMILY HISTORY OF PANCREATIC CANCER: Status: ACTIVE | Noted: 2020-01-01

## 2020-11-23 PROBLEM — Z86.2 HISTORY OF ANEMIA: Status: RESOLVED | Noted: 2020-01-01 | Resolved: 2020-01-01

## 2020-11-23 PROBLEM — Z86.69 HISTORY OF CATARACT: Status: RESOLVED | Noted: 2020-01-01 | Resolved: 2020-01-01

## 2020-11-23 PROBLEM — Z78.9 NON-SMOKER: Status: ACTIVE | Noted: 2020-01-01

## 2020-11-23 PROBLEM — C21.0: Status: ACTIVE | Noted: 2020-01-01

## 2020-11-23 PROBLEM — Z86.39 HISTORY OF DIABETES MELLITUS: Status: RESOLVED | Noted: 2020-01-01 | Resolved: 2020-01-01

## 2020-11-23 PROBLEM — Z86.79 HISTORY OF HYPERTENSION: Status: RESOLVED | Noted: 2020-01-01 | Resolved: 2020-01-01

## 2020-11-23 NOTE — PHYSICAL EXAM
[Exam Deferred] : exam was deferred [JVD] : no jugular venous distention  [Respiratory Effort] : normal respiratory effort [Normal Rate and Rhythm] : normal rate and rhythm [Alert] : alert [de-identified] : ND soft NT [de-identified] : NAD [de-identified] : NO obvious skin lesions on legs or feet bilaterally

## 2020-11-23 NOTE — HISTORY OF PRESENT ILLNESS
[FreeTextEntry1] : Recent diagnosis of anal melanoma.  Pt was admitted with rectal bleeding.  Findings included "hemorrhoids."  However there was suspicion that this represented a potential malignancy.  She underwent exam under anesthesia with biopsy of anal lesion.  Final pathology showed anal melanoma.  PET scan was performed for metastatic workup.  There were several enlarged hypermetabolic pelvic lymph nodes (R internal iliac (SUV 14), L mesorectal (SUV 12.9), L inguinal).   Colonoscopy just showed hyperplastic polyp in the ileocecal valve with no other abnormalities (except for anal mass).  Final pathology on the anal lesion was a malignant melanoma invasive to dept of at least 12mm with invasion of rectal mucosa.  There are areas of necrosis and ulceration and > 30 mitoses per HPF.\par \par Pt otherwise denies any symptoms.  She has noticed a mass/"hemorrhoid" at the anal area since January.  Denies pain or bleeding or change in bowel habits.  No bleeding/drainage since biopsy procedure.

## 2020-11-23 NOTE — ASSESSMENT
[FreeTextEntry1] : Anal melanoma\par --PET scan suggests metastasis to inguinal lymph nodes and  and does not suggest another primary\par --will present case at General Cancer Conference tumor board @ [facility].  Pt informs me that she will also be getting a second opinion @ [facility] and would also like an opinion from [facility].\par --Pt also would like to go see her dermatologist as well.\par --Will inform patient of recommendations from the Cancer Conference meeting.

## 2021-01-01 ENCOUNTER — TRANSCRIPTION ENCOUNTER (OUTPATIENT)
Age: 76
End: 2021-01-01

## 2021-01-01 ENCOUNTER — INPATIENT (INPATIENT)
Facility: HOSPITAL | Age: 76
LOS: 18 days | Discharge: NURSING, REHAB OR LONG-TERM CARE | DRG: 981 | End: 2021-03-01
Attending: INTERNAL MEDICINE | Admitting: INTERNAL MEDICINE
Payer: MEDICARE

## 2021-01-01 ENCOUNTER — NON-APPOINTMENT (OUTPATIENT)
Age: 76
End: 2021-01-01

## 2021-01-01 ENCOUNTER — INPATIENT (INPATIENT)
Facility: HOSPITAL | Age: 76
LOS: 3 days | Discharge: HOSPICE | DRG: 64 | End: 2021-08-24
Attending: NEUROLOGICAL SURGERY | Admitting: NEUROLOGICAL SURGERY
Payer: MEDICARE

## 2021-01-01 ENCOUNTER — INPATIENT (INPATIENT)
Facility: HOSPITAL | Age: 76
LOS: 5 days | Discharge: HOME | DRG: 871 | End: 2021-01-25
Attending: HOSPITALIST | Admitting: INTERNAL MEDICINE
Payer: MEDICARE

## 2021-01-01 ENCOUNTER — APPOINTMENT (OUTPATIENT)
Dept: CARDIOLOGY | Facility: CLINIC | Age: 76
End: 2021-01-01
Payer: MEDICARE

## 2021-01-01 VITALS
HEIGHT: 62 IN | SYSTOLIC BLOOD PRESSURE: 131 MMHG | DIASTOLIC BLOOD PRESSURE: 74 MMHG | HEART RATE: 65 BPM | OXYGEN SATURATION: 100 % | BODY MASS INDEX: 28.52 KG/M2 | WEIGHT: 155 LBS

## 2021-01-01 VITALS
HEIGHT: 63 IN | OXYGEN SATURATION: 80 % | SYSTOLIC BLOOD PRESSURE: 117 MMHG | TEMPERATURE: 99 F | RESPIRATION RATE: 16 BRPM | WEIGHT: 176.37 LBS | DIASTOLIC BLOOD PRESSURE: 62 MMHG | HEART RATE: 85 BPM

## 2021-01-01 VITALS
OXYGEN SATURATION: 90 % | SYSTOLIC BLOOD PRESSURE: 121 MMHG | HEART RATE: 128 BPM | DIASTOLIC BLOOD PRESSURE: 60 MMHG | RESPIRATION RATE: 7 BRPM

## 2021-01-01 VITALS
HEIGHT: 63 IN | OXYGEN SATURATION: 99 % | SYSTOLIC BLOOD PRESSURE: 168 MMHG | HEART RATE: 97 BPM | TEMPERATURE: 99 F | RESPIRATION RATE: 19 BRPM | DIASTOLIC BLOOD PRESSURE: 114 MMHG

## 2021-01-01 VITALS
TEMPERATURE: 98 F | SYSTOLIC BLOOD PRESSURE: 139 MMHG | HEART RATE: 83 BPM | DIASTOLIC BLOOD PRESSURE: 60 MMHG | OXYGEN SATURATION: 96 % | RESPIRATION RATE: 16 BRPM

## 2021-01-01 VITALS
OXYGEN SATURATION: 93 % | TEMPERATURE: 98 F | HEART RATE: 89 BPM | SYSTOLIC BLOOD PRESSURE: 109 MMHG | DIASTOLIC BLOOD PRESSURE: 58 MMHG | RESPIRATION RATE: 18 BRPM

## 2021-01-01 VITALS
DIASTOLIC BLOOD PRESSURE: 69 MMHG | WEIGHT: 155 LBS | OXYGEN SATURATION: 100 % | HEART RATE: 74 BPM | BODY MASS INDEX: 28.52 KG/M2 | SYSTOLIC BLOOD PRESSURE: 116 MMHG | HEIGHT: 62 IN

## 2021-01-01 VITALS
HEART RATE: 112 BPM | WEIGHT: 151.9 LBS | DIASTOLIC BLOOD PRESSURE: 43 MMHG | OXYGEN SATURATION: 99 % | SYSTOLIC BLOOD PRESSURE: 142 MMHG | HEIGHT: 63 IN | TEMPERATURE: 101 F | RESPIRATION RATE: 16 BRPM

## 2021-01-01 DIAGNOSIS — R77.8 OTHER SPECIFIED ABNORMALITIES OF PLASMA PROTEINS: ICD-10-CM

## 2021-01-01 DIAGNOSIS — E43 UNSPECIFIED SEVERE PROTEIN-CALORIE MALNUTRITION: ICD-10-CM

## 2021-01-01 DIAGNOSIS — D64.9 ANEMIA, UNSPECIFIED: ICD-10-CM

## 2021-01-01 DIAGNOSIS — E83.39 OTHER DISORDERS OF PHOSPHORUS METABOLISM: ICD-10-CM

## 2021-01-01 DIAGNOSIS — I62.9 NONTRAUMATIC INTRACRANIAL HEMORRHAGE, UNSPECIFIED: ICD-10-CM

## 2021-01-01 DIAGNOSIS — R42 DIZZINESS AND GIDDINESS: ICD-10-CM

## 2021-01-01 DIAGNOSIS — Z88.1 ALLERGY STATUS TO OTHER ANTIBIOTIC AGENTS STATUS: ICD-10-CM

## 2021-01-01 DIAGNOSIS — I48.19 OTHER PERSISTENT ATRIAL FIBRILLATION: ICD-10-CM

## 2021-01-01 DIAGNOSIS — I48.0 PAROXYSMAL ATRIAL FIBRILLATION: ICD-10-CM

## 2021-01-01 DIAGNOSIS — R04.0 EPISTAXIS: ICD-10-CM

## 2021-01-01 DIAGNOSIS — Z87.01 PERSONAL HISTORY OF PNEUMONIA (RECURRENT): ICD-10-CM

## 2021-01-01 DIAGNOSIS — A41.9 SEPSIS, UNSPECIFIED ORGANISM: ICD-10-CM

## 2021-01-01 DIAGNOSIS — R00.0 TACHYCARDIA, UNSPECIFIED: ICD-10-CM

## 2021-01-01 DIAGNOSIS — I10 ESSENTIAL (PRIMARY) HYPERTENSION: ICD-10-CM

## 2021-01-01 DIAGNOSIS — K52.1 TOXIC GASTROENTERITIS AND COLITIS: ICD-10-CM

## 2021-01-01 DIAGNOSIS — N17.0 ACUTE KIDNEY FAILURE WITH TUBULAR NECROSIS: ICD-10-CM

## 2021-01-01 DIAGNOSIS — Z79.4 LONG TERM (CURRENT) USE OF INSULIN: ICD-10-CM

## 2021-01-01 DIAGNOSIS — I25.2 OLD MYOCARDIAL INFARCTION: ICD-10-CM

## 2021-01-01 DIAGNOSIS — J18.1 LOBAR PNEUMONIA, UNSPECIFIED ORGANISM: ICD-10-CM

## 2021-01-01 DIAGNOSIS — E87.6 HYPOKALEMIA: ICD-10-CM

## 2021-01-01 DIAGNOSIS — I48.91 UNSPECIFIED ATRIAL FIBRILLATION: ICD-10-CM

## 2021-01-01 DIAGNOSIS — K62.5 HEMORRHAGE OF ANUS AND RECTUM: ICD-10-CM

## 2021-01-01 DIAGNOSIS — N18.9 CHRONIC KIDNEY DISEASE, UNSPECIFIED: ICD-10-CM
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
## 2021-01-01 DIAGNOSIS — I25.10 ATHEROSCLEROTIC HEART DISEASE OF NATIVE CORONARY ARTERY WITHOUT ANGINA PECTORIS: ICD-10-CM

## 2021-01-01 DIAGNOSIS — C21.1 MALIGNANT NEOPLASM OF ANAL CANAL: ICD-10-CM

## 2021-01-01 DIAGNOSIS — N18.30 CHRONIC KIDNEY DISEASE, STAGE 3 UNSPECIFIED: ICD-10-CM

## 2021-01-01 DIAGNOSIS — I12.9 HYPERTENSIVE CHRONIC KIDNEY DISEASE WITH STAGE 1 THROUGH STAGE 4 CHRONIC KIDNEY DISEASE, OR UNSPECIFIED CHRONIC KIDNEY DISEASE: ICD-10-CM

## 2021-01-01 DIAGNOSIS — E03.9 HYPOTHYROIDISM, UNSPECIFIED: ICD-10-CM

## 2021-01-01 DIAGNOSIS — N13.6 PYONEPHROSIS: ICD-10-CM

## 2021-01-01 DIAGNOSIS — U07.1 COVID-19: ICD-10-CM

## 2021-01-01 DIAGNOSIS — D50.9 IRON DEFICIENCY ANEMIA, UNSPECIFIED: ICD-10-CM

## 2021-01-01 DIAGNOSIS — C21.0 MALIGNANT NEOPLASM OF ANUS, UNSPECIFIED: ICD-10-CM

## 2021-01-01 DIAGNOSIS — J96.01 ACUTE RESPIRATORY FAILURE WITH HYPOXIA: ICD-10-CM

## 2021-01-01 DIAGNOSIS — K92.1 MELENA: ICD-10-CM

## 2021-01-01 DIAGNOSIS — Z79.899 OTHER LONG TERM (CURRENT) DRUG THERAPY: ICD-10-CM

## 2021-01-01 DIAGNOSIS — J12.82 PNEUMONIA DUE TO CORONAVIRUS DISEASE 2019: ICD-10-CM

## 2021-01-01 DIAGNOSIS — I21.A1 MYOCARDIAL INFARCTION TYPE 2: ICD-10-CM

## 2021-01-01 DIAGNOSIS — I21.3 ST ELEVATION (STEMI) MYOCARDIAL INFARCTION OF UNSPECIFIED SITE: ICD-10-CM

## 2021-01-01 DIAGNOSIS — F41.9 ANXIETY DISORDER, UNSPECIFIED: ICD-10-CM

## 2021-01-01 DIAGNOSIS — E06.9 THYROIDITIS, UNSPECIFIED: ICD-10-CM

## 2021-01-01 DIAGNOSIS — Z88.8 ALLERGY STATUS TO OTHER DRUGS, MEDICAMENTS AND BIOLOGICAL SUBSTANCES: ICD-10-CM

## 2021-01-01 DIAGNOSIS — N20.0 CALCULUS OF KIDNEY: ICD-10-CM

## 2021-01-01 DIAGNOSIS — Z86.16 PERSONAL HISTORY OF COVID-19: ICD-10-CM

## 2021-01-01 DIAGNOSIS — Z87.09 PERSONAL HISTORY OF OTHER DISEASES OF THE RESPIRATORY SYSTEM: ICD-10-CM

## 2021-01-01 DIAGNOSIS — N17.9 ACUTE KIDNEY FAILURE, UNSPECIFIED: ICD-10-CM

## 2021-01-01 DIAGNOSIS — E78.5 HYPERLIPIDEMIA, UNSPECIFIED: ICD-10-CM

## 2021-01-01 DIAGNOSIS — I21.11 ST ELEVATION (STEMI) MYOCARDIAL INFARCTION INVOLVING RIGHT CORONARY ARTERY: ICD-10-CM

## 2021-01-01 DIAGNOSIS — I25.10 ATHEROSCLEROTIC HEART DISEASE OF NATIVE CORONARY ARTERY W/OUT ANGINA PECTORIS: ICD-10-CM

## 2021-01-01 DIAGNOSIS — E11.22 TYPE 2 DIABETES MELLITUS WITH DIABETIC CHRONIC KIDNEY DISEASE: ICD-10-CM

## 2021-01-01 DIAGNOSIS — E11.9 TYPE 2 DIABETES MELLITUS W/OUT COMPLICATIONS: ICD-10-CM

## 2021-01-01 DIAGNOSIS — T45.1X5A ADVERSE EFFECT OF ANTINEOPLASTIC AND IMMUNOSUPPRESSIVE DRUGS, INITIAL ENCOUNTER: ICD-10-CM

## 2021-01-01 LAB
A1C WITH ESTIMATED AVERAGE GLUCOSE RESULT: 6.6 % — HIGH (ref 4–5.6)
A1C WITH ESTIMATED AVERAGE GLUCOSE RESULT: 7.6 % — HIGH (ref 4–5.6)
A1C WITH ESTIMATED AVERAGE GLUCOSE RESULT: 7.8 % — HIGH (ref 4–5.6)
A1C WITH ESTIMATED AVERAGE GLUCOSE RESULT: 7.8 % — HIGH (ref 4–5.6)
ALBUMIN SERPL ELPH-MCNC: 1.7 G/DL — LOW (ref 3.3–5)
ALBUMIN SERPL ELPH-MCNC: 1.8 G/DL — LOW (ref 3.3–5)
ALBUMIN SERPL ELPH-MCNC: 1.9 G/DL — LOW (ref 3.3–5)
ALBUMIN SERPL ELPH-MCNC: 2.2 G/DL — LOW (ref 3.3–5)
ALBUMIN SERPL ELPH-MCNC: 2.4 G/DL — LOW (ref 3.3–5)
ALBUMIN SERPL ELPH-MCNC: 2.5 G/DL — LOW (ref 3.3–5)
ALBUMIN SERPL ELPH-MCNC: 3.7 G/DL — SIGNIFICANT CHANGE UP (ref 3.3–5)
ALBUMIN SERPL ELPH-MCNC: 3.7 G/DL — SIGNIFICANT CHANGE UP (ref 3.3–5)
ALP SERPL-CCNC: 101 U/L — SIGNIFICANT CHANGE UP (ref 40–120)
ALP SERPL-CCNC: 115 U/L — SIGNIFICANT CHANGE UP (ref 40–120)
ALP SERPL-CCNC: 159 U/L — HIGH (ref 40–120)
ALP SERPL-CCNC: 203 U/L — HIGH (ref 40–120)
ALP SERPL-CCNC: 206 U/L — HIGH (ref 40–120)
ALP SERPL-CCNC: 209 U/L — HIGH (ref 40–120)
ALP SERPL-CCNC: 218 U/L — HIGH (ref 40–120)
ALP SERPL-CCNC: 69 U/L — SIGNIFICANT CHANGE UP (ref 40–120)
ALP SERPL-CCNC: 78 U/L — SIGNIFICANT CHANGE UP (ref 40–120)
ALP SERPL-CCNC: 78 U/L — SIGNIFICANT CHANGE UP (ref 40–120)
ALP SERPL-CCNC: 83 U/L — SIGNIFICANT CHANGE UP (ref 40–120)
ALT FLD-CCNC: 16 U/L — SIGNIFICANT CHANGE UP (ref 12–78)
ALT FLD-CCNC: 18 U/L — SIGNIFICANT CHANGE UP (ref 12–78)
ALT FLD-CCNC: 19 U/L — SIGNIFICANT CHANGE UP (ref 12–78)
ALT FLD-CCNC: 22 U/L — SIGNIFICANT CHANGE UP (ref 12–78)
ALT FLD-CCNC: 50 U/L — SIGNIFICANT CHANGE UP (ref 12–78)
ALT FLD-CCNC: 52 U/L — SIGNIFICANT CHANGE UP (ref 12–78)
ALT FLD-CCNC: 54 U/L — SIGNIFICANT CHANGE UP (ref 12–78)
ALT FLD-CCNC: 54 U/L — SIGNIFICANT CHANGE UP (ref 12–78)
ALT FLD-CCNC: 59 U/L — SIGNIFICANT CHANGE UP (ref 12–78)
ALT FLD-CCNC: 61 U/L — SIGNIFICANT CHANGE UP (ref 12–78)
ALT FLD-CCNC: 67 U/L — SIGNIFICANT CHANGE UP (ref 12–78)
ANION GAP SERPL CALC-SCNC: 10 MMOL/L — SIGNIFICANT CHANGE UP (ref 5–17)
ANION GAP SERPL CALC-SCNC: 11 MMOL/L — SIGNIFICANT CHANGE UP (ref 5–17)
ANION GAP SERPL CALC-SCNC: 11 MMOL/L — SIGNIFICANT CHANGE UP (ref 5–17)
ANION GAP SERPL CALC-SCNC: 4 MMOL/L — LOW (ref 5–17)
ANION GAP SERPL CALC-SCNC: 5 MMOL/L — SIGNIFICANT CHANGE UP (ref 5–17)
ANION GAP SERPL CALC-SCNC: 5 MMOL/L — SIGNIFICANT CHANGE UP (ref 5–17)
ANION GAP SERPL CALC-SCNC: 6 MMOL/L — SIGNIFICANT CHANGE UP (ref 5–17)
ANION GAP SERPL CALC-SCNC: 7 MMOL/L — SIGNIFICANT CHANGE UP (ref 5–17)
ANION GAP SERPL CALC-SCNC: 8 MMOL/L — SIGNIFICANT CHANGE UP (ref 5–17)
ANION GAP SERPL CALC-SCNC: 9 MMOL/L — SIGNIFICANT CHANGE UP (ref 5–17)
APAP SERPL-MCNC: < 2 UG/ML (ref 10–30)
APPEARANCE UR: ABNORMAL
APPEARANCE UR: CLEAR — SIGNIFICANT CHANGE UP
APPEARANCE UR: CLEAR — SIGNIFICANT CHANGE UP
APTT BLD: 20.7 SEC — LOW (ref 27.5–35.5)
APTT BLD: 29.2 SEC — SIGNIFICANT CHANGE UP (ref 27.5–35.5)
APTT BLD: 29.6 SEC — SIGNIFICANT CHANGE UP (ref 27.5–35.5)
APTT BLD: 31.5 SEC — SIGNIFICANT CHANGE UP (ref 27.5–35.5)
APTT BLD: 80.2 SEC — HIGH (ref 27.5–35.5)
APTT BLD: 91.8 SEC — HIGH (ref 27.5–35.5)
AST SERPL-CCNC: 13 U/L — LOW (ref 15–37)
AST SERPL-CCNC: 13 U/L — LOW (ref 15–37)
AST SERPL-CCNC: 18 U/L — SIGNIFICANT CHANGE UP (ref 15–37)
AST SERPL-CCNC: 29 U/L — SIGNIFICANT CHANGE UP (ref 15–37)
AST SERPL-CCNC: 29 U/L — SIGNIFICANT CHANGE UP (ref 15–37)
AST SERPL-CCNC: 31 U/L — SIGNIFICANT CHANGE UP (ref 15–37)
AST SERPL-CCNC: 33 U/L — SIGNIFICANT CHANGE UP (ref 15–37)
AST SERPL-CCNC: 43 U/L — HIGH (ref 15–37)
AST SERPL-CCNC: 44 U/L — HIGH (ref 15–37)
AST SERPL-CCNC: 48 U/L — HIGH (ref 15–37)
AST SERPL-CCNC: 64 U/L — HIGH (ref 15–37)
BASE EXCESS BLDA CALC-SCNC: -2.5 MMOL/L — LOW (ref -2–2)
BASE EXCESS BLDA CALC-SCNC: -5.5 MMOL/L — LOW (ref -2–2)
BASE EXCESS BLDA CALC-SCNC: 0.1 MMOL/L — SIGNIFICANT CHANGE UP (ref -2–2)
BASOPHILS # BLD AUTO: 0 K/UL — SIGNIFICANT CHANGE UP (ref 0–0.2)
BASOPHILS # BLD AUTO: 0.02 K/UL — SIGNIFICANT CHANGE UP (ref 0–0.2)
BASOPHILS # BLD AUTO: 0.02 K/UL — SIGNIFICANT CHANGE UP (ref 0–0.2)
BASOPHILS # BLD AUTO: 0.03 K/UL — SIGNIFICANT CHANGE UP (ref 0–0.2)
BASOPHILS # BLD AUTO: 0.03 K/UL — SIGNIFICANT CHANGE UP (ref 0–0.2)
BASOPHILS # BLD AUTO: 0.04 K/UL — SIGNIFICANT CHANGE UP (ref 0–0.2)
BASOPHILS # BLD AUTO: 0.1 K/UL — SIGNIFICANT CHANGE UP (ref 0–0.2)
BASOPHILS NFR BLD AUTO: 0 % — SIGNIFICANT CHANGE UP (ref 0–2)
BASOPHILS NFR BLD AUTO: 0.1 % — SIGNIFICANT CHANGE UP (ref 0–2)
BASOPHILS NFR BLD AUTO: 0.1 % — SIGNIFICANT CHANGE UP (ref 0–2)
BASOPHILS NFR BLD AUTO: 0.2 % — SIGNIFICANT CHANGE UP (ref 0–2)
BASOPHILS NFR BLD AUTO: 0.2 % — SIGNIFICANT CHANGE UP (ref 0–2)
BASOPHILS NFR BLD AUTO: 0.3 % — SIGNIFICANT CHANGE UP (ref 0–2)
BASOPHILS NFR BLD AUTO: 0.6 % — SIGNIFICANT CHANGE UP (ref 0–2)
BILIRUB SERPL-MCNC: 0.2 MG/DL — SIGNIFICANT CHANGE UP (ref 0.2–1.2)
BILIRUB SERPL-MCNC: 0.2 MG/DL — SIGNIFICANT CHANGE UP (ref 0.2–1.2)
BILIRUB SERPL-MCNC: 0.3 MG/DL — SIGNIFICANT CHANGE UP (ref 0.2–1.2)
BILIRUB SERPL-MCNC: 0.4 MG/DL — SIGNIFICANT CHANGE UP (ref 0.2–1.2)
BILIRUB SERPL-MCNC: 0.5 MG/DL — SIGNIFICANT CHANGE UP (ref 0.2–1.2)
BILIRUB UR-MCNC: NEGATIVE — SIGNIFICANT CHANGE UP
BLOOD GAS COMMENTS ARTERIAL: SIGNIFICANT CHANGE UP
BUN SERPL-MCNC: 21 MG/DL — SIGNIFICANT CHANGE UP (ref 7–23)
BUN SERPL-MCNC: 26 MG/DL — HIGH (ref 7–23)
BUN SERPL-MCNC: 27 MG/DL — HIGH (ref 7–23)
BUN SERPL-MCNC: 30 MG/DL — HIGH (ref 7–23)
BUN SERPL-MCNC: 31 MG/DL — HIGH (ref 7–23)
BUN SERPL-MCNC: 32 MG/DL — HIGH (ref 7–23)
BUN SERPL-MCNC: 36 MG/DL — HIGH (ref 7–23)
BUN SERPL-MCNC: 37 MG/DL — HIGH (ref 7–23)
BUN SERPL-MCNC: 40 MG/DL — HIGH (ref 7–23)
BUN SERPL-MCNC: 43 MG/DL — HIGH (ref 7–23)
BUN SERPL-MCNC: 47 MG/DL — HIGH (ref 7–23)
BUN SERPL-MCNC: 48 MG/DL — HIGH (ref 7–23)
BUN SERPL-MCNC: 48 MG/DL — HIGH (ref 7–23)
BUN SERPL-MCNC: 49 MG/DL — HIGH (ref 7–23)
BUN SERPL-MCNC: 53 MG/DL — HIGH (ref 7–23)
BUN SERPL-MCNC: 57 MG/DL — HIGH (ref 7–23)
BUN SERPL-MCNC: 58 MG/DL — HIGH (ref 7–23)
BUN SERPL-MCNC: 59 MG/DL — HIGH (ref 7–23)
BUN SERPL-MCNC: 61 MG/DL — HIGH (ref 7–23)
C DIFF BY PCR RESULT: SIGNIFICANT CHANGE UP
C DIFF TOX GENS STL QL NAA+PROBE: SIGNIFICANT CHANGE UP
CALCIUM SERPL-MCNC: 8.2 MG/DL — LOW (ref 8.5–10.1)
CALCIUM SERPL-MCNC: 8.2 MG/DL — LOW (ref 8.5–10.1)
CALCIUM SERPL-MCNC: 8.3 MG/DL — LOW (ref 8.5–10.1)
CALCIUM SERPL-MCNC: 8.4 MG/DL — LOW (ref 8.5–10.1)
CALCIUM SERPL-MCNC: 8.5 MG/DL — SIGNIFICANT CHANGE UP (ref 8.5–10.1)
CALCIUM SERPL-MCNC: 8.5 MG/DL — SIGNIFICANT CHANGE UP (ref 8.5–10.1)
CALCIUM SERPL-MCNC: 8.6 MG/DL — SIGNIFICANT CHANGE UP (ref 8.5–10.1)
CALCIUM SERPL-MCNC: 8.6 MG/DL — SIGNIFICANT CHANGE UP (ref 8.5–10.1)
CALCIUM SERPL-MCNC: 8.7 MG/DL — SIGNIFICANT CHANGE UP (ref 8.5–10.1)
CALCIUM SERPL-MCNC: 8.8 MG/DL — SIGNIFICANT CHANGE UP (ref 8.5–10.1)
CALCIUM SERPL-MCNC: 8.9 MG/DL — SIGNIFICANT CHANGE UP (ref 8.5–10.1)
CALCIUM SERPL-MCNC: 8.9 MG/DL — SIGNIFICANT CHANGE UP (ref 8.5–10.1)
CALCIUM SERPL-MCNC: 9 MG/DL — SIGNIFICANT CHANGE UP (ref 8.5–10.1)
CALCIUM SERPL-MCNC: 9.1 MG/DL — SIGNIFICANT CHANGE UP (ref 8.5–10.1)
CALCIUM SERPL-MCNC: 9.5 MG/DL — SIGNIFICANT CHANGE UP (ref 8.5–10.1)
CALCIUM SERPL-MCNC: 9.7 MG/DL — SIGNIFICANT CHANGE UP (ref 8.5–10.1)
CHLORIDE SERPL-SCNC: 102 MMOL/L — SIGNIFICANT CHANGE UP (ref 96–108)
CHLORIDE SERPL-SCNC: 104 MMOL/L — SIGNIFICANT CHANGE UP (ref 96–108)
CHLORIDE SERPL-SCNC: 104 MMOL/L — SIGNIFICANT CHANGE UP (ref 96–108)
CHLORIDE SERPL-SCNC: 106 MMOL/L — SIGNIFICANT CHANGE UP (ref 96–108)
CHLORIDE SERPL-SCNC: 107 MMOL/L — SIGNIFICANT CHANGE UP (ref 96–108)
CHLORIDE SERPL-SCNC: 108 MMOL/L — SIGNIFICANT CHANGE UP (ref 96–108)
CHLORIDE SERPL-SCNC: 109 MMOL/L — HIGH (ref 96–108)
CHLORIDE SERPL-SCNC: 110 MMOL/L — HIGH (ref 96–108)
CHLORIDE SERPL-SCNC: 110 MMOL/L — HIGH (ref 96–108)
CHLORIDE SERPL-SCNC: 112 MMOL/L — HIGH (ref 96–108)
CHLORIDE SERPL-SCNC: 113 MMOL/L — HIGH (ref 96–108)
CHLORIDE SERPL-SCNC: 113 MMOL/L — HIGH (ref 96–108)
CHLORIDE SERPL-SCNC: 114 MMOL/L — HIGH (ref 96–108)
CHLORIDE SERPL-SCNC: 114 MMOL/L — HIGH (ref 96–108)
CHLORIDE SERPL-SCNC: 115 MMOL/L — HIGH (ref 96–108)
CHLORIDE SERPL-SCNC: 116 MMOL/L — HIGH (ref 96–108)
CHLORIDE SERPL-SCNC: 118 MMOL/L — HIGH (ref 96–108)
CO2 SERPL-SCNC: 18 MMOL/L — LOW (ref 22–31)
CO2 SERPL-SCNC: 20 MMOL/L — LOW (ref 22–31)
CO2 SERPL-SCNC: 20 MMOL/L — LOW (ref 22–31)
CO2 SERPL-SCNC: 21 MMOL/L — LOW (ref 22–31)
CO2 SERPL-SCNC: 22 MMOL/L — SIGNIFICANT CHANGE UP (ref 22–31)
CO2 SERPL-SCNC: 23 MMOL/L — SIGNIFICANT CHANGE UP (ref 22–31)
CO2 SERPL-SCNC: 24 MMOL/L — SIGNIFICANT CHANGE UP (ref 22–31)
CO2 SERPL-SCNC: 25 MMOL/L — SIGNIFICANT CHANGE UP (ref 22–31)
CO2 SERPL-SCNC: 26 MMOL/L — SIGNIFICANT CHANGE UP (ref 22–31)
COLOR SPEC: YELLOW — SIGNIFICANT CHANGE UP
CORTIS AM PEAK SERPL-MCNC: 3.9 UG/DL — LOW (ref 6–18.4)
COVID-19 SPIKE DOMAIN AB INTERP: POSITIVE
COVID-19 SPIKE DOMAIN ANTIBODY RESULT: >250 U/ML — HIGH
CREAT SERPL-MCNC: 0.86 MG/DL — SIGNIFICANT CHANGE UP (ref 0.5–1.3)
CREAT SERPL-MCNC: 0.87 MG/DL — SIGNIFICANT CHANGE UP (ref 0.5–1.3)
CREAT SERPL-MCNC: 0.93 MG/DL — SIGNIFICANT CHANGE UP (ref 0.5–1.3)
CREAT SERPL-MCNC: 0.97 MG/DL — SIGNIFICANT CHANGE UP (ref 0.5–1.3)
CREAT SERPL-MCNC: 0.99 MG/DL — SIGNIFICANT CHANGE UP (ref 0.5–1.3)
CREAT SERPL-MCNC: 0.99 MG/DL — SIGNIFICANT CHANGE UP (ref 0.5–1.3)
CREAT SERPL-MCNC: 1.03 MG/DL — SIGNIFICANT CHANGE UP (ref 0.5–1.3)
CREAT SERPL-MCNC: 1.04 MG/DL — SIGNIFICANT CHANGE UP (ref 0.5–1.3)
CREAT SERPL-MCNC: 1.06 MG/DL — SIGNIFICANT CHANGE UP (ref 0.5–1.3)
CREAT SERPL-MCNC: 1.1 MG/DL — SIGNIFICANT CHANGE UP (ref 0.5–1.3)
CREAT SERPL-MCNC: 1.11 MG/DL — SIGNIFICANT CHANGE UP (ref 0.5–1.3)
CREAT SERPL-MCNC: 1.18 MG/DL — SIGNIFICANT CHANGE UP (ref 0.5–1.3)
CREAT SERPL-MCNC: 1.19 MG/DL — SIGNIFICANT CHANGE UP (ref 0.5–1.3)
CREAT SERPL-MCNC: 1.2 MG/DL — SIGNIFICANT CHANGE UP (ref 0.5–1.3)
CREAT SERPL-MCNC: 1.2 MG/DL — SIGNIFICANT CHANGE UP (ref 0.5–1.3)
CREAT SERPL-MCNC: 1.25 MG/DL — SIGNIFICANT CHANGE UP (ref 0.5–1.3)
CREAT SERPL-MCNC: 1.33 MG/DL — HIGH (ref 0.5–1.3)
CREAT SERPL-MCNC: 1.34 MG/DL — HIGH (ref 0.5–1.3)
CREAT SERPL-MCNC: 1.38 MG/DL — HIGH (ref 0.5–1.3)
CREAT SERPL-MCNC: 1.5 MG/DL — HIGH (ref 0.5–1.3)
CREAT SERPL-MCNC: 1.52 MG/DL — HIGH (ref 0.5–1.3)
CREAT SERPL-MCNC: 1.57 MG/DL — HIGH (ref 0.5–1.3)
CREAT SERPL-MCNC: 1.62 MG/DL — HIGH (ref 0.5–1.3)
CREAT SERPL-MCNC: 1.79 MG/DL — HIGH (ref 0.5–1.3)
CREAT SERPL-MCNC: 1.91 MG/DL — HIGH (ref 0.5–1.3)
CRP SERPL-MCNC: 12.1 MG/DL — HIGH (ref 0–0.4)
CRP SERPL-MCNC: 3.72 MG/DL — HIGH (ref 0–0.4)
CRP SERPL-MCNC: 4.46 MG/DL — HIGH (ref 0–0.4)
CULTURE RESULTS: SIGNIFICANT CHANGE UP
D DIMER BLD IA.RAPID-MCNC: 2331 NG/ML DDU — HIGH
D DIMER BLD IA.RAPID-MCNC: 373 NG/ML DDU — HIGH
DIFF PNL FLD: ABNORMAL
EOSINOPHIL # BLD AUTO: 0 K/UL — SIGNIFICANT CHANGE UP (ref 0–0.5)
EOSINOPHIL # BLD AUTO: 0.01 K/UL — SIGNIFICANT CHANGE UP (ref 0–0.5)
EOSINOPHIL # BLD AUTO: 0.12 K/UL — SIGNIFICANT CHANGE UP (ref 0–0.5)
EOSINOPHIL # BLD AUTO: 0.44 K/UL — SIGNIFICANT CHANGE UP (ref 0–0.5)
EOSINOPHIL # BLD AUTO: 0.7 K/UL — HIGH (ref 0–0.5)
EOSINOPHIL # BLD AUTO: 0.78 K/UL — HIGH (ref 0–0.5)
EOSINOPHIL # BLD AUTO: 1.05 K/UL — HIGH (ref 0–0.5)
EOSINOPHIL # BLD AUTO: 1.17 K/UL — HIGH (ref 0–0.5)
EOSINOPHIL # BLD AUTO: 1.26 K/UL — HIGH (ref 0–0.5)
EOSINOPHIL # BLD AUTO: 1.26 K/UL — HIGH (ref 0–0.5)
EOSINOPHIL # BLD AUTO: 1.41 K/UL — HIGH (ref 0–0.5)
EOSINOPHIL NFR BLD AUTO: 0 % — SIGNIFICANT CHANGE UP (ref 0–6)
EOSINOPHIL NFR BLD AUTO: 0.1 % — SIGNIFICANT CHANGE UP (ref 0–6)
EOSINOPHIL NFR BLD AUTO: 0.7 % — SIGNIFICANT CHANGE UP (ref 0–6)
EOSINOPHIL NFR BLD AUTO: 3.6 % — SIGNIFICANT CHANGE UP (ref 0–6)
EOSINOPHIL NFR BLD AUTO: 4.2 % — SIGNIFICANT CHANGE UP (ref 0–6)
EOSINOPHIL NFR BLD AUTO: 5.6 % — SIGNIFICANT CHANGE UP (ref 0–6)
EOSINOPHIL NFR BLD AUTO: 7 % — HIGH (ref 0–6)
EOSINOPHIL NFR BLD AUTO: 7.8 % — HIGH (ref 0–6)
EOSINOPHIL NFR BLD AUTO: 8.3 % — HIGH (ref 0–6)
EOSINOPHIL NFR BLD AUTO: 8.9 % — HIGH (ref 0–6)
EOSINOPHIL NFR BLD AUTO: 9.4 % — HIGH (ref 0–6)
ESTIMATED AVERAGE GLUCOSE: 143 MG/DL — HIGH (ref 68–114)
ESTIMATED AVERAGE GLUCOSE: 171 MG/DL — HIGH (ref 68–114)
ESTIMATED AVERAGE GLUCOSE: 177 MG/DL — HIGH (ref 68–114)
ESTIMATED AVERAGE GLUCOSE: 177 MG/DL — HIGH (ref 68–114)
ETHANOL SERPL-MCNC: <10 MG/DL — SIGNIFICANT CHANGE UP (ref 0–10)
FERRITIN SERPL-MCNC: 356 NG/ML — HIGH (ref 15–150)
FERRITIN SERPL-MCNC: 392 NG/ML — HIGH (ref 15–150)
FERRITIN SERPL-MCNC: 414 NG/ML — HIGH (ref 15–150)
FERRITIN SERPL-MCNC: 576 NG/ML — HIGH (ref 15–150)
GAS PNL BLDA: SIGNIFICANT CHANGE UP
GAS PNL BLDA: SIGNIFICANT CHANGE UP
GLUCOSE BLDC GLUCOMTR-MCNC: 107 MG/DL — HIGH (ref 70–99)
GLUCOSE BLDC GLUCOMTR-MCNC: 115 MG/DL — HIGH (ref 70–99)
GLUCOSE BLDC GLUCOMTR-MCNC: 129 MG/DL — HIGH (ref 70–99)
GLUCOSE BLDC GLUCOMTR-MCNC: 153 MG/DL — HIGH (ref 70–99)
GLUCOSE BLDC GLUCOMTR-MCNC: 167 MG/DL — HIGH (ref 70–99)
GLUCOSE BLDC GLUCOMTR-MCNC: 174 MG/DL — HIGH (ref 70–99)
GLUCOSE BLDC GLUCOMTR-MCNC: 184 MG/DL — HIGH (ref 70–99)
GLUCOSE BLDC GLUCOMTR-MCNC: 188 MG/DL — HIGH (ref 70–99)
GLUCOSE BLDC GLUCOMTR-MCNC: 196 MG/DL — HIGH (ref 70–99)
GLUCOSE BLDC GLUCOMTR-MCNC: 207 MG/DL — HIGH (ref 70–99)
GLUCOSE BLDC GLUCOMTR-MCNC: 220 MG/DL — HIGH (ref 70–99)
GLUCOSE BLDC GLUCOMTR-MCNC: 225 MG/DL — HIGH (ref 70–99)
GLUCOSE BLDC GLUCOMTR-MCNC: 229 MG/DL — HIGH (ref 70–99)
GLUCOSE BLDC GLUCOMTR-MCNC: 230 MG/DL — HIGH (ref 70–99)
GLUCOSE BLDC GLUCOMTR-MCNC: 240 MG/DL — HIGH (ref 70–99)
GLUCOSE BLDC GLUCOMTR-MCNC: 241 MG/DL — HIGH (ref 70–99)
GLUCOSE BLDC GLUCOMTR-MCNC: 258 MG/DL — HIGH (ref 70–99)
GLUCOSE BLDC GLUCOMTR-MCNC: 279 MG/DL — HIGH (ref 70–99)
GLUCOSE BLDC GLUCOMTR-MCNC: 311 MG/DL — HIGH (ref 70–99)
GLUCOSE BLDC GLUCOMTR-MCNC: 92 MG/DL — SIGNIFICANT CHANGE UP (ref 70–99)
GLUCOSE BLDC GLUCOMTR-MCNC: 96 MG/DL — SIGNIFICANT CHANGE UP (ref 70–99)
GLUCOSE SERPL-MCNC: 100 MG/DL — HIGH (ref 70–99)
GLUCOSE SERPL-MCNC: 102 MG/DL — HIGH (ref 70–99)
GLUCOSE SERPL-MCNC: 104 MG/DL — HIGH (ref 70–99)
GLUCOSE SERPL-MCNC: 105 MG/DL — HIGH (ref 70–99)
GLUCOSE SERPL-MCNC: 107 MG/DL — HIGH (ref 70–99)
GLUCOSE SERPL-MCNC: 115 MG/DL — HIGH (ref 70–99)
GLUCOSE SERPL-MCNC: 119 MG/DL — HIGH (ref 70–99)
GLUCOSE SERPL-MCNC: 122 MG/DL — HIGH (ref 70–99)
GLUCOSE SERPL-MCNC: 124 MG/DL — HIGH (ref 70–99)
GLUCOSE SERPL-MCNC: 138 MG/DL — HIGH (ref 70–99)
GLUCOSE SERPL-MCNC: 139 MG/DL — HIGH (ref 70–99)
GLUCOSE SERPL-MCNC: 148 MG/DL — HIGH (ref 70–99)
GLUCOSE SERPL-MCNC: 173 MG/DL — HIGH (ref 70–99)
GLUCOSE SERPL-MCNC: 196 MG/DL — HIGH (ref 70–99)
GLUCOSE SERPL-MCNC: 197 MG/DL — HIGH (ref 70–99)
GLUCOSE SERPL-MCNC: 210 MG/DL — HIGH (ref 70–99)
GLUCOSE SERPL-MCNC: 228 MG/DL — HIGH (ref 70–99)
GLUCOSE SERPL-MCNC: 271 MG/DL — HIGH (ref 70–99)
GLUCOSE SERPL-MCNC: 484 MG/DL — CRITICAL HIGH (ref 70–99)
GLUCOSE SERPL-MCNC: 74 MG/DL — SIGNIFICANT CHANGE UP (ref 70–99)
GLUCOSE SERPL-MCNC: 75 MG/DL — SIGNIFICANT CHANGE UP (ref 70–99)
GLUCOSE SERPL-MCNC: 84 MG/DL — SIGNIFICANT CHANGE UP (ref 70–99)
GLUCOSE SERPL-MCNC: 92 MG/DL — SIGNIFICANT CHANGE UP (ref 70–99)
GLUCOSE SERPL-MCNC: 95 MG/DL — SIGNIFICANT CHANGE UP (ref 70–99)
GLUCOSE SERPL-MCNC: 98 MG/DL — SIGNIFICANT CHANGE UP (ref 70–99)
GLUCOSE UR QL: 250 MG/DL
GLUCOSE UR QL: NEGATIVE MG/DL — SIGNIFICANT CHANGE UP
GLUCOSE UR QL: NEGATIVE MG/DL — SIGNIFICANT CHANGE UP
HCO3 BLDA-SCNC: 18 MMOL/L — LOW (ref 21–29)
HCO3 BLDA-SCNC: 19 MMOL/L — LOW (ref 21–29)
HCO3 BLDA-SCNC: 24 MMOL/L — SIGNIFICANT CHANGE UP (ref 21–29)
HCT VFR BLD CALC: 24.3 % — LOW (ref 34.5–45)
HCT VFR BLD CALC: 24.9 % — LOW (ref 34.5–45)
HCT VFR BLD CALC: 25.2 % — LOW (ref 34.5–45)
HCT VFR BLD CALC: 25.4 % — LOW (ref 34.5–45)
HCT VFR BLD CALC: 25.7 % — LOW (ref 34.5–45)
HCT VFR BLD CALC: 26.4 % — LOW (ref 34.5–45)
HCT VFR BLD CALC: 26.4 % — LOW (ref 34.5–45)
HCT VFR BLD CALC: 26.7 % — LOW (ref 34.5–45)
HCT VFR BLD CALC: 27.7 % — LOW (ref 34.5–45)
HCT VFR BLD CALC: 27.8 % — LOW (ref 34.5–45)
HCT VFR BLD CALC: 28.1 % — LOW (ref 34.5–45)
HCT VFR BLD CALC: 28.2 % — LOW (ref 34.5–45)
HCT VFR BLD CALC: 28.6 % — LOW (ref 34.5–45)
HCT VFR BLD CALC: 28.7 % — LOW (ref 34.5–45)
HCT VFR BLD CALC: 29.1 % — LOW (ref 34.5–45)
HCT VFR BLD CALC: 29.1 % — LOW (ref 34.5–45)
HCT VFR BLD CALC: 29.6 % — LOW (ref 34.5–45)
HCT VFR BLD CALC: 29.8 % — LOW (ref 34.5–45)
HCT VFR BLD CALC: 30.1 % — LOW (ref 34.5–45)
HCT VFR BLD CALC: 30.2 % — LOW (ref 34.5–45)
HCT VFR BLD CALC: 30.3 % — LOW (ref 34.5–45)
HCT VFR BLD CALC: 30.4 % — LOW (ref 34.5–45)
HCT VFR BLD CALC: 30.5 % — LOW (ref 34.5–45)
HCT VFR BLD CALC: 30.6 % — LOW (ref 34.5–45)
HCT VFR BLD CALC: 30.7 % — LOW (ref 34.5–45)
HCT VFR BLD CALC: 30.7 % — LOW (ref 34.5–45)
HCT VFR BLD CALC: 31.1 % — LOW (ref 34.5–45)
HGB BLD-MCNC: 7.3 G/DL — LOW (ref 11.5–15.5)
HGB BLD-MCNC: 7.5 G/DL — LOW (ref 11.5–15.5)
HGB BLD-MCNC: 7.6 G/DL — LOW (ref 11.5–15.5)
HGB BLD-MCNC: 7.6 G/DL — LOW (ref 11.5–15.5)
HGB BLD-MCNC: 8 G/DL — LOW (ref 11.5–15.5)
HGB BLD-MCNC: 8.1 G/DL — LOW (ref 11.5–15.5)
HGB BLD-MCNC: 8.4 G/DL — LOW (ref 11.5–15.5)
HGB BLD-MCNC: 8.6 G/DL — LOW (ref 11.5–15.5)
HGB BLD-MCNC: 8.8 G/DL — LOW (ref 11.5–15.5)
HGB BLD-MCNC: 9.1 G/DL — LOW (ref 11.5–15.5)
HGB BLD-MCNC: 9.4 G/DL — LOW (ref 11.5–15.5)
HGB BLD-MCNC: 9.5 G/DL — LOW (ref 11.5–15.5)
HGB BLD-MCNC: 9.5 G/DL — LOW (ref 11.5–15.5)
HGB BLD-MCNC: 9.6 G/DL — LOW (ref 11.5–15.5)
HGB BLD-MCNC: 9.6 G/DL — LOW (ref 11.5–15.5)
HGB BLD-MCNC: 9.7 G/DL — LOW (ref 11.5–15.5)
HGB BLD-MCNC: 9.8 G/DL — LOW (ref 11.5–15.5)
HGB BLD-MCNC: 9.8 G/DL — LOW (ref 11.5–15.5)
HGB BLD-MCNC: 9.9 G/DL — LOW (ref 11.5–15.5)
HGB BLD-MCNC: 9.9 G/DL — LOW (ref 11.5–15.5)
IMM GRANULOCYTES NFR BLD AUTO: 0.4 % — SIGNIFICANT CHANGE UP (ref 0–1.5)
IMM GRANULOCYTES NFR BLD AUTO: 0.5 % — SIGNIFICANT CHANGE UP (ref 0–1.5)
IMM GRANULOCYTES NFR BLD AUTO: 0.7 % — SIGNIFICANT CHANGE UP (ref 0–1.5)
IMM GRANULOCYTES NFR BLD AUTO: 0.7 % — SIGNIFICANT CHANGE UP (ref 0–1.5)
IMM GRANULOCYTES NFR BLD AUTO: 0.8 % — SIGNIFICANT CHANGE UP (ref 0–1.5)
IMM GRANULOCYTES NFR BLD AUTO: 0.9 % — SIGNIFICANT CHANGE UP (ref 0–1.5)
IMM GRANULOCYTES NFR BLD AUTO: 1 % — SIGNIFICANT CHANGE UP (ref 0–1.5)
IMM GRANULOCYTES NFR BLD AUTO: 1.3 % — SIGNIFICANT CHANGE UP (ref 0–1.5)
IMM GRANULOCYTES NFR BLD AUTO: 1.3 % — SIGNIFICANT CHANGE UP (ref 0–1.5)
IMM GRANULOCYTES NFR BLD AUTO: 1.4 % — SIGNIFICANT CHANGE UP (ref 0–1.5)
INR BLD: 1.28 RATIO — HIGH (ref 0.88–1.16)
INR BLD: 1.43 RATIO — HIGH (ref 0.88–1.16)
INR BLD: 1.67 RATIO — HIGH (ref 0.88–1.16)
IRON SATN MFR SERPL: 15 % — SIGNIFICANT CHANGE UP (ref 14–50)
IRON SATN MFR SERPL: 22 UG/DL — LOW (ref 30–160)
KETONES UR-MCNC: ABNORMAL
KETONES UR-MCNC: NEGATIVE — SIGNIFICANT CHANGE UP
KETONES UR-MCNC: NEGATIVE — SIGNIFICANT CHANGE UP
LACTATE SERPL-SCNC: 1.3 MMOL/L — SIGNIFICANT CHANGE UP (ref 0.7–2)
LACTATE SERPL-SCNC: 3.2 MMOL/L — HIGH (ref 0.7–2)
LACTATE SERPL-SCNC: 5.4 MMOL/L — CRITICAL HIGH (ref 0.7–2)
LACTATE SERPL-SCNC: 5.5 MMOL/L — CRITICAL HIGH (ref 0.7–2)
LEGIONELLA AG UR QL: NEGATIVE — SIGNIFICANT CHANGE UP
LEUKOCYTE ESTERASE UR-ACNC: ABNORMAL
LIDOCAIN IGE QN: 535 U/L — HIGH (ref 73–393)
LYMPHOCYTES # BLD AUTO: 0.21 K/UL — LOW (ref 1–3.3)
LYMPHOCYTES # BLD AUTO: 0.92 K/UL — LOW (ref 1–3.3)
LYMPHOCYTES # BLD AUTO: 1 % — LOW (ref 13–44)
LYMPHOCYTES # BLD AUTO: 1.12 K/UL — SIGNIFICANT CHANGE UP (ref 1–3.3)
LYMPHOCYTES # BLD AUTO: 1.29 K/UL — SIGNIFICANT CHANGE UP (ref 1–3.3)
LYMPHOCYTES # BLD AUTO: 1.32 K/UL — SIGNIFICANT CHANGE UP (ref 1–3.3)
LYMPHOCYTES # BLD AUTO: 1.55 K/UL — SIGNIFICANT CHANGE UP (ref 1–3.3)
LYMPHOCYTES # BLD AUTO: 1.98 K/UL — SIGNIFICANT CHANGE UP (ref 1–3.3)
LYMPHOCYTES # BLD AUTO: 11 % — LOW (ref 13–44)
LYMPHOCYTES # BLD AUTO: 13.9 % — SIGNIFICANT CHANGE UP (ref 13–44)
LYMPHOCYTES # BLD AUTO: 14 % — SIGNIFICANT CHANGE UP (ref 13–44)
LYMPHOCYTES # BLD AUTO: 14.3 % — SIGNIFICANT CHANGE UP (ref 13–44)
LYMPHOCYTES # BLD AUTO: 14.8 % — SIGNIFICANT CHANGE UP (ref 13–44)
LYMPHOCYTES # BLD AUTO: 15.1 % — SIGNIFICANT CHANGE UP (ref 13–44)
LYMPHOCYTES # BLD AUTO: 16.1 % — SIGNIFICANT CHANGE UP (ref 13–44)
LYMPHOCYTES # BLD AUTO: 2.04 K/UL — SIGNIFICANT CHANGE UP (ref 1–3.3)
LYMPHOCYTES # BLD AUTO: 2.08 K/UL — SIGNIFICANT CHANGE UP (ref 1–3.3)
LYMPHOCYTES # BLD AUTO: 2.1 K/UL — SIGNIFICANT CHANGE UP (ref 1–3.3)
LYMPHOCYTES # BLD AUTO: 2.32 K/UL — SIGNIFICANT CHANGE UP (ref 1–3.3)
LYMPHOCYTES # BLD AUTO: 2.44 K/UL — SIGNIFICANT CHANGE UP (ref 1–3.3)
LYMPHOCYTES # BLD AUTO: 21.3 % — SIGNIFICANT CHANGE UP (ref 13–44)
LYMPHOCYTES # BLD AUTO: 3 K/UL — SIGNIFICANT CHANGE UP (ref 1–3.3)
LYMPHOCYTES # BLD AUTO: 4 % — LOW (ref 13–44)
LYMPHOCYTES # BLD AUTO: 7.6 % — LOW (ref 13–44)
LYMPHOCYTES # BLD AUTO: 7.7 % — LOW (ref 13–44)
LYMPHOCYTES # BLD AUTO: 8.9 % — LOW (ref 13–44)
MAGNESIUM SERPL-MCNC: 1.7 MG/DL — SIGNIFICANT CHANGE UP (ref 1.6–2.6)
MAGNESIUM SERPL-MCNC: 1.9 MG/DL — SIGNIFICANT CHANGE UP (ref 1.6–2.6)
MAGNESIUM SERPL-MCNC: 2 MG/DL — SIGNIFICANT CHANGE UP (ref 1.6–2.6)
MAGNESIUM SERPL-MCNC: 2.1 MG/DL — SIGNIFICANT CHANGE UP (ref 1.6–2.6)
MAGNESIUM SERPL-MCNC: 2.2 MG/DL — SIGNIFICANT CHANGE UP (ref 1.6–2.6)
MAGNESIUM SERPL-MCNC: 2.2 MG/DL — SIGNIFICANT CHANGE UP (ref 1.6–2.6)
MAGNESIUM SERPL-MCNC: 2.3 MG/DL — SIGNIFICANT CHANGE UP (ref 1.6–2.6)
MAGNESIUM SERPL-MCNC: 2.4 MG/DL — SIGNIFICANT CHANGE UP (ref 1.6–2.6)
MCHC RBC-ENTMCNC: 24.7 PG — LOW (ref 27–34)
MCHC RBC-ENTMCNC: 24.8 PG — LOW (ref 27–34)
MCHC RBC-ENTMCNC: 24.8 PG — LOW (ref 27–34)
MCHC RBC-ENTMCNC: 24.9 PG — LOW (ref 27–34)
MCHC RBC-ENTMCNC: 25.1 PG — LOW (ref 27–34)
MCHC RBC-ENTMCNC: 25.4 PG — LOW (ref 27–34)
MCHC RBC-ENTMCNC: 25.6 PG — LOW (ref 27–34)
MCHC RBC-ENTMCNC: 25.6 PG — LOW (ref 27–34)
MCHC RBC-ENTMCNC: 25.7 PG — LOW (ref 27–34)
MCHC RBC-ENTMCNC: 25.8 PG — LOW (ref 27–34)
MCHC RBC-ENTMCNC: 25.9 PG — LOW (ref 27–34)
MCHC RBC-ENTMCNC: 26.1 PG — LOW (ref 27–34)
MCHC RBC-ENTMCNC: 26.3 PG — LOW (ref 27–34)
MCHC RBC-ENTMCNC: 26.4 PG — LOW (ref 27–34)
MCHC RBC-ENTMCNC: 26.7 PG — LOW (ref 27–34)
MCHC RBC-ENTMCNC: 26.7 PG — LOW (ref 27–34)
MCHC RBC-ENTMCNC: 26.9 PG — LOW (ref 27–34)
MCHC RBC-ENTMCNC: 27 PG — SIGNIFICANT CHANGE UP (ref 27–34)
MCHC RBC-ENTMCNC: 27.1 PG — SIGNIFICANT CHANGE UP (ref 27–34)
MCHC RBC-ENTMCNC: 28 PG — SIGNIFICANT CHANGE UP (ref 27–34)
MCHC RBC-ENTMCNC: 28.1 PG — SIGNIFICANT CHANGE UP (ref 27–34)
MCHC RBC-ENTMCNC: 28.2 PG — SIGNIFICANT CHANGE UP (ref 27–34)
MCHC RBC-ENTMCNC: 28.7 PG — SIGNIFICANT CHANGE UP (ref 27–34)
MCHC RBC-ENTMCNC: 29.8 GM/DL — LOW (ref 32–36)
MCHC RBC-ENTMCNC: 29.9 GM/DL — LOW (ref 32–36)
MCHC RBC-ENTMCNC: 30 GM/DL — LOW (ref 32–36)
MCHC RBC-ENTMCNC: 30 GM/DL — LOW (ref 32–36)
MCHC RBC-ENTMCNC: 30.1 GM/DL — LOW (ref 32–36)
MCHC RBC-ENTMCNC: 30.2 GM/DL — LOW (ref 32–36)
MCHC RBC-ENTMCNC: 30.5 GM/DL — LOW (ref 32–36)
MCHC RBC-ENTMCNC: 30.5 GM/DL — LOW (ref 32–36)
MCHC RBC-ENTMCNC: 30.7 GM/DL — LOW (ref 32–36)
MCHC RBC-ENTMCNC: 30.7 GM/DL — LOW (ref 32–36)
MCHC RBC-ENTMCNC: 30.9 GM/DL — LOW (ref 32–36)
MCHC RBC-ENTMCNC: 31 GM/DL — LOW (ref 32–36)
MCHC RBC-ENTMCNC: 31.1 GM/DL — LOW (ref 32–36)
MCHC RBC-ENTMCNC: 31.1 GM/DL — LOW (ref 32–36)
MCHC RBC-ENTMCNC: 31.3 GM/DL — LOW (ref 32–36)
MCHC RBC-ENTMCNC: 31.3 GM/DL — LOW (ref 32–36)
MCHC RBC-ENTMCNC: 31.5 GM/DL — LOW (ref 32–36)
MCHC RBC-ENTMCNC: 31.6 GM/DL — LOW (ref 32–36)
MCHC RBC-ENTMCNC: 31.6 GM/DL — LOW (ref 32–36)
MCHC RBC-ENTMCNC: 31.7 GM/DL — LOW (ref 32–36)
MCHC RBC-ENTMCNC: 31.8 GM/DL — LOW (ref 32–36)
MCHC RBC-ENTMCNC: 31.9 GM/DL — LOW (ref 32–36)
MCHC RBC-ENTMCNC: 32.3 GM/DL — SIGNIFICANT CHANGE UP (ref 32–36)
MCHC RBC-ENTMCNC: 32.4 GM/DL — SIGNIFICANT CHANGE UP (ref 32–36)
MCHC RBC-ENTMCNC: 32.6 GM/DL — SIGNIFICANT CHANGE UP (ref 32–36)
MCHC RBC-ENTMCNC: 32.8 GM/DL — SIGNIFICANT CHANGE UP (ref 32–36)
MCHC RBC-ENTMCNC: 32.9 GM/DL — SIGNIFICANT CHANGE UP (ref 32–36)
MCV RBC AUTO: 81.6 FL — SIGNIFICANT CHANGE UP (ref 80–100)
MCV RBC AUTO: 81.7 FL — SIGNIFICANT CHANGE UP (ref 80–100)
MCV RBC AUTO: 82.4 FL — SIGNIFICANT CHANGE UP (ref 80–100)
MCV RBC AUTO: 82.7 FL — SIGNIFICANT CHANGE UP (ref 80–100)
MCV RBC AUTO: 82.9 FL — SIGNIFICANT CHANGE UP (ref 80–100)
MCV RBC AUTO: 83.1 FL — SIGNIFICANT CHANGE UP (ref 80–100)
MCV RBC AUTO: 83.2 FL — SIGNIFICANT CHANGE UP (ref 80–100)
MCV RBC AUTO: 83.3 FL — SIGNIFICANT CHANGE UP (ref 80–100)
MCV RBC AUTO: 83.4 FL — SIGNIFICANT CHANGE UP (ref 80–100)
MCV RBC AUTO: 83.4 FL — SIGNIFICANT CHANGE UP (ref 80–100)
MCV RBC AUTO: 83.7 FL — SIGNIFICANT CHANGE UP (ref 80–100)
MCV RBC AUTO: 83.8 FL — SIGNIFICANT CHANGE UP (ref 80–100)
MCV RBC AUTO: 84.6 FL — SIGNIFICANT CHANGE UP (ref 80–100)
MCV RBC AUTO: 84.8 FL — SIGNIFICANT CHANGE UP (ref 80–100)
MCV RBC AUTO: 85.1 FL — SIGNIFICANT CHANGE UP (ref 80–100)
MCV RBC AUTO: 85.1 FL — SIGNIFICANT CHANGE UP (ref 80–100)
MCV RBC AUTO: 85.3 FL — SIGNIFICANT CHANGE UP (ref 80–100)
MCV RBC AUTO: 85.3 FL — SIGNIFICANT CHANGE UP (ref 80–100)
MCV RBC AUTO: 85.8 FL — SIGNIFICANT CHANGE UP (ref 80–100)
MCV RBC AUTO: 86.4 FL — SIGNIFICANT CHANGE UP (ref 80–100)
MCV RBC AUTO: 87.4 FL — SIGNIFICANT CHANGE UP (ref 80–100)
MCV RBC AUTO: 87.5 FL — SIGNIFICANT CHANGE UP (ref 80–100)
MCV RBC AUTO: 87.6 FL — SIGNIFICANT CHANGE UP (ref 80–100)
MCV RBC AUTO: 87.8 FL — SIGNIFICANT CHANGE UP (ref 80–100)
MCV RBC AUTO: 90.1 FL — SIGNIFICANT CHANGE UP (ref 80–100)
MONOCYTES # BLD AUTO: 0.38 K/UL — SIGNIFICANT CHANGE UP (ref 0–0.9)
MONOCYTES # BLD AUTO: 0.39 K/UL — SIGNIFICANT CHANGE UP (ref 0–0.9)
MONOCYTES # BLD AUTO: 0.52 K/UL — SIGNIFICANT CHANGE UP (ref 0–0.9)
MONOCYTES # BLD AUTO: 0.59 K/UL — SIGNIFICANT CHANGE UP (ref 0–0.9)
MONOCYTES # BLD AUTO: 0.6 K/UL — SIGNIFICANT CHANGE UP (ref 0–0.9)
MONOCYTES # BLD AUTO: 0.64 K/UL — SIGNIFICANT CHANGE UP (ref 0–0.9)
MONOCYTES # BLD AUTO: 0.67 K/UL — SIGNIFICANT CHANGE UP (ref 0–0.9)
MONOCYTES # BLD AUTO: 0.92 K/UL — HIGH (ref 0–0.9)
MONOCYTES # BLD AUTO: 1.13 K/UL — HIGH (ref 0–0.9)
MONOCYTES # BLD AUTO: 1.15 K/UL — HIGH (ref 0–0.9)
MONOCYTES # BLD AUTO: 1.4 K/UL — HIGH (ref 0–0.9)
MONOCYTES # BLD AUTO: 1.62 K/UL — HIGH (ref 0–0.9)
MONOCYTES # BLD AUTO: 1.8 K/UL — HIGH (ref 0–0.9)
MONOCYTES NFR BLD AUTO: 11.5 % — SIGNIFICANT CHANGE UP (ref 2–14)
MONOCYTES NFR BLD AUTO: 12 % — SIGNIFICANT CHANGE UP (ref 2–14)
MONOCYTES NFR BLD AUTO: 2.3 % — SIGNIFICANT CHANGE UP (ref 2–14)
MONOCYTES NFR BLD AUTO: 3 % — SIGNIFICANT CHANGE UP (ref 2–14)
MONOCYTES NFR BLD AUTO: 3.7 % — SIGNIFICANT CHANGE UP (ref 2–14)
MONOCYTES NFR BLD AUTO: 4 % — SIGNIFICANT CHANGE UP (ref 2–14)
MONOCYTES NFR BLD AUTO: 4.1 % — SIGNIFICANT CHANGE UP (ref 2–14)
MONOCYTES NFR BLD AUTO: 5 % — SIGNIFICANT CHANGE UP (ref 2–14)
MONOCYTES NFR BLD AUTO: 6.5 % — SIGNIFICANT CHANGE UP (ref 2–14)
MONOCYTES NFR BLD AUTO: 6.7 % — SIGNIFICANT CHANGE UP (ref 2–14)
MONOCYTES NFR BLD AUTO: 9.3 % — SIGNIFICANT CHANGE UP (ref 2–14)
NEUTROPHILS # BLD AUTO: 10.61 K/UL — HIGH (ref 1.8–7.4)
NEUTROPHILS # BLD AUTO: 10.66 K/UL — HIGH (ref 1.8–7.4)
NEUTROPHILS # BLD AUTO: 11.65 K/UL — HIGH (ref 1.8–7.4)
NEUTROPHILS # BLD AUTO: 12.85 K/UL — HIGH (ref 1.8–7.4)
NEUTROPHILS # BLD AUTO: 14.02 K/UL — HIGH (ref 1.8–7.4)
NEUTROPHILS # BLD AUTO: 14.83 K/UL — HIGH (ref 1.8–7.4)
NEUTROPHILS # BLD AUTO: 20.39 K/UL — HIGH (ref 1.8–7.4)
NEUTROPHILS # BLD AUTO: 25.56 K/UL — HIGH (ref 1.8–7.4)
NEUTROPHILS # BLD AUTO: 8.08 K/UL — HIGH (ref 1.8–7.4)
NEUTROPHILS # BLD AUTO: 8.64 K/UL — HIGH (ref 1.8–7.4)
NEUTROPHILS # BLD AUTO: 8.97 K/UL — HIGH (ref 1.8–7.4)
NEUTROPHILS # BLD AUTO: 9.45 K/UL — HIGH (ref 1.8–7.4)
NEUTROPHILS # BLD AUTO: 9.98 K/UL — HIGH (ref 1.8–7.4)
NEUTROPHILS NFR BLD AUTO: 57.2 % — SIGNIFICANT CHANGE UP (ref 43–77)
NEUTROPHILS NFR BLD AUTO: 62 % — SIGNIFICANT CHANGE UP (ref 43–77)
NEUTROPHILS NFR BLD AUTO: 70.2 % — SIGNIFICANT CHANGE UP (ref 43–77)
NEUTROPHILS NFR BLD AUTO: 71.2 % — SIGNIFICANT CHANGE UP (ref 43–77)
NEUTROPHILS NFR BLD AUTO: 72.2 % — SIGNIFICANT CHANGE UP (ref 43–77)
NEUTROPHILS NFR BLD AUTO: 72.3 % — SIGNIFICANT CHANGE UP (ref 43–77)
NEUTROPHILS NFR BLD AUTO: 75.4 % — SIGNIFICANT CHANGE UP (ref 43–77)
NEUTROPHILS NFR BLD AUTO: 79.6 % — HIGH (ref 43–77)
NEUTROPHILS NFR BLD AUTO: 84.3 % — HIGH (ref 43–77)
NEUTROPHILS NFR BLD AUTO: 85 % — HIGH (ref 43–77)
NEUTROPHILS NFR BLD AUTO: 86.4 % — HIGH (ref 43–77)
NEUTROPHILS NFR BLD AUTO: 91 % — HIGH (ref 43–77)
NEUTROPHILS NFR BLD AUTO: 96 % — HIGH (ref 43–77)
NITRITE UR-MCNC: NEGATIVE — SIGNIFICANT CHANGE UP
NRBC # BLD: SIGNIFICANT CHANGE UP /100 WBCS (ref 0–0)
NT-PROBNP SERPL-SCNC: 1091 PG/ML — HIGH (ref 0–450)
OB PNL STL: NEGATIVE — SIGNIFICANT CHANGE UP
PCO2 BLDA: 19 MMHG — LOW (ref 32–46)
PCO2 BLDA: 36 MMHG — SIGNIFICANT CHANGE UP (ref 32–46)
PCO2 BLDA: 36 MMHG — SIGNIFICANT CHANGE UP (ref 32–46)
PCP SPEC-MCNC: SIGNIFICANT CHANGE UP
PH BLDA: 7.34 — LOW (ref 7.35–7.45)
PH BLDA: 7.43 — SIGNIFICANT CHANGE UP (ref 7.35–7.45)
PH BLDA: 7.59 — HIGH (ref 7.35–7.45)
PH UR: 5 — SIGNIFICANT CHANGE UP (ref 5–8)
PH UR: 6 — SIGNIFICANT CHANGE UP (ref 5–8)
PH UR: 6.5 — SIGNIFICANT CHANGE UP (ref 5–8)
PHOSPHATE SERPL-MCNC: 2.1 MG/DL — LOW (ref 2.5–4.5)
PHOSPHATE SERPL-MCNC: 2.2 MG/DL — LOW (ref 2.5–4.5)
PHOSPHATE SERPL-MCNC: 2.5 MG/DL — SIGNIFICANT CHANGE UP (ref 2.5–4.5)
PHOSPHATE SERPL-MCNC: 2.6 MG/DL — SIGNIFICANT CHANGE UP (ref 2.5–4.5)
PHOSPHATE SERPL-MCNC: 2.8 MG/DL — SIGNIFICANT CHANGE UP (ref 2.5–4.5)
PHOSPHATE SERPL-MCNC: 2.9 MG/DL — SIGNIFICANT CHANGE UP (ref 2.5–4.5)
PHOSPHATE SERPL-MCNC: 2.9 MG/DL — SIGNIFICANT CHANGE UP (ref 2.5–4.5)
PHOSPHATE SERPL-MCNC: 3 MG/DL — SIGNIFICANT CHANGE UP (ref 2.5–4.5)
PHOSPHATE SERPL-MCNC: 3.4 MG/DL — SIGNIFICANT CHANGE UP (ref 2.5–4.5)
PHOSPHATE SERPL-MCNC: 3.4 MG/DL — SIGNIFICANT CHANGE UP (ref 2.5–4.5)
PHOSPHATE SERPL-MCNC: 3.5 MG/DL — SIGNIFICANT CHANGE UP (ref 2.5–4.5)
PLATELET # BLD AUTO: 241 K/UL — SIGNIFICANT CHANGE UP (ref 150–400)
PLATELET # BLD AUTO: 250 K/UL — SIGNIFICANT CHANGE UP (ref 150–400)
PLATELET # BLD AUTO: 267 K/UL — SIGNIFICANT CHANGE UP (ref 150–400)
PLATELET # BLD AUTO: 276 K/UL — SIGNIFICANT CHANGE UP (ref 150–400)
PLATELET # BLD AUTO: 276 K/UL — SIGNIFICANT CHANGE UP (ref 150–400)
PLATELET # BLD AUTO: 277 K/UL — SIGNIFICANT CHANGE UP (ref 150–400)
PLATELET # BLD AUTO: 293 K/UL — SIGNIFICANT CHANGE UP (ref 150–400)
PLATELET # BLD AUTO: 301 K/UL — SIGNIFICANT CHANGE UP (ref 150–400)
PLATELET # BLD AUTO: 313 K/UL — SIGNIFICANT CHANGE UP (ref 150–400)
PLATELET # BLD AUTO: 315 K/UL — SIGNIFICANT CHANGE UP (ref 150–400)
PLATELET # BLD AUTO: 334 K/UL — SIGNIFICANT CHANGE UP (ref 150–400)
PLATELET # BLD AUTO: 342 K/UL — SIGNIFICANT CHANGE UP (ref 150–400)
PLATELET # BLD AUTO: 344 K/UL — SIGNIFICANT CHANGE UP (ref 150–400)
PLATELET # BLD AUTO: 348 K/UL — SIGNIFICANT CHANGE UP (ref 150–400)
PLATELET # BLD AUTO: 358 K/UL — SIGNIFICANT CHANGE UP (ref 150–400)
PLATELET # BLD AUTO: 394 K/UL — SIGNIFICANT CHANGE UP (ref 150–400)
PLATELET # BLD AUTO: 398 K/UL — SIGNIFICANT CHANGE UP (ref 150–400)
PLATELET # BLD AUTO: 399 K/UL — SIGNIFICANT CHANGE UP (ref 150–400)
PLATELET # BLD AUTO: 400 K/UL — SIGNIFICANT CHANGE UP (ref 150–400)
PLATELET # BLD AUTO: 403 K/UL — HIGH (ref 150–400)
PLATELET # BLD AUTO: 415 K/UL — HIGH (ref 150–400)
PLATELET # BLD AUTO: 417 K/UL — HIGH (ref 150–400)
PLATELET # BLD AUTO: 419 K/UL — HIGH (ref 150–400)
PLATELET # BLD AUTO: 420 K/UL — HIGH (ref 150–400)
PLATELET # BLD AUTO: 440 K/UL — HIGH (ref 150–400)
PLATELET # BLD AUTO: 468 K/UL — HIGH (ref 150–400)
PLATELET # BLD AUTO: 488 K/UL — HIGH (ref 150–400)
PO2 BLDA: 144 MMHG — HIGH (ref 74–108)
PO2 BLDA: 54 MMHG — LOW (ref 74–108)
PO2 BLDA: 57 MMHG — LOW (ref 74–108)
POTASSIUM SERPL-MCNC: 3.1 MMOL/L — LOW (ref 3.5–5.3)
POTASSIUM SERPL-MCNC: 3.3 MMOL/L — LOW (ref 3.5–5.3)
POTASSIUM SERPL-MCNC: 3.4 MMOL/L — LOW (ref 3.5–5.3)
POTASSIUM SERPL-MCNC: 3.5 MMOL/L — SIGNIFICANT CHANGE UP (ref 3.5–5.3)
POTASSIUM SERPL-MCNC: 3.7 MMOL/L — SIGNIFICANT CHANGE UP (ref 3.5–5.3)
POTASSIUM SERPL-MCNC: 3.8 MMOL/L — SIGNIFICANT CHANGE UP (ref 3.5–5.3)
POTASSIUM SERPL-MCNC: 3.9 MMOL/L — SIGNIFICANT CHANGE UP (ref 3.5–5.3)
POTASSIUM SERPL-MCNC: 4.1 MMOL/L — SIGNIFICANT CHANGE UP (ref 3.5–5.3)
POTASSIUM SERPL-MCNC: 4.1 MMOL/L — SIGNIFICANT CHANGE UP (ref 3.5–5.3)
POTASSIUM SERPL-MCNC: 4.2 MMOL/L — SIGNIFICANT CHANGE UP (ref 3.5–5.3)
POTASSIUM SERPL-MCNC: 4.3 MMOL/L — SIGNIFICANT CHANGE UP (ref 3.5–5.3)
POTASSIUM SERPL-MCNC: 4.4 MMOL/L — SIGNIFICANT CHANGE UP (ref 3.5–5.3)
POTASSIUM SERPL-MCNC: 4.4 MMOL/L — SIGNIFICANT CHANGE UP (ref 3.5–5.3)
POTASSIUM SERPL-MCNC: 4.5 MMOL/L — SIGNIFICANT CHANGE UP (ref 3.5–5.3)
POTASSIUM SERPL-MCNC: 4.6 MMOL/L — SIGNIFICANT CHANGE UP (ref 3.5–5.3)
POTASSIUM SERPL-MCNC: 4.6 MMOL/L — SIGNIFICANT CHANGE UP (ref 3.5–5.3)
POTASSIUM SERPL-MCNC: 4.9 MMOL/L — SIGNIFICANT CHANGE UP (ref 3.5–5.3)
POTASSIUM SERPL-SCNC: 3.1 MMOL/L — LOW (ref 3.5–5.3)
POTASSIUM SERPL-SCNC: 3.3 MMOL/L — LOW (ref 3.5–5.3)
POTASSIUM SERPL-SCNC: 3.4 MMOL/L — LOW (ref 3.5–5.3)
POTASSIUM SERPL-SCNC: 3.5 MMOL/L — SIGNIFICANT CHANGE UP (ref 3.5–5.3)
POTASSIUM SERPL-SCNC: 3.7 MMOL/L — SIGNIFICANT CHANGE UP (ref 3.5–5.3)
POTASSIUM SERPL-SCNC: 3.8 MMOL/L — SIGNIFICANT CHANGE UP (ref 3.5–5.3)
POTASSIUM SERPL-SCNC: 3.9 MMOL/L — SIGNIFICANT CHANGE UP (ref 3.5–5.3)
POTASSIUM SERPL-SCNC: 4.1 MMOL/L — SIGNIFICANT CHANGE UP (ref 3.5–5.3)
POTASSIUM SERPL-SCNC: 4.1 MMOL/L — SIGNIFICANT CHANGE UP (ref 3.5–5.3)
POTASSIUM SERPL-SCNC: 4.2 MMOL/L — SIGNIFICANT CHANGE UP (ref 3.5–5.3)
POTASSIUM SERPL-SCNC: 4.3 MMOL/L — SIGNIFICANT CHANGE UP (ref 3.5–5.3)
POTASSIUM SERPL-SCNC: 4.4 MMOL/L — SIGNIFICANT CHANGE UP (ref 3.5–5.3)
POTASSIUM SERPL-SCNC: 4.4 MMOL/L — SIGNIFICANT CHANGE UP (ref 3.5–5.3)
POTASSIUM SERPL-SCNC: 4.5 MMOL/L — SIGNIFICANT CHANGE UP (ref 3.5–5.3)
POTASSIUM SERPL-SCNC: 4.6 MMOL/L — SIGNIFICANT CHANGE UP (ref 3.5–5.3)
POTASSIUM SERPL-SCNC: 4.6 MMOL/L — SIGNIFICANT CHANGE UP (ref 3.5–5.3)
POTASSIUM SERPL-SCNC: 4.9 MMOL/L — SIGNIFICANT CHANGE UP (ref 3.5–5.3)
PROCALCITONIN SERPL-MCNC: 8.18 NG/ML — HIGH (ref 0.02–0.1)
PROT SERPL-MCNC: 5.6 GM/DL — LOW (ref 6–8.3)
PROT SERPL-MCNC: 5.6 GM/DL — LOW (ref 6–8.3)
PROT SERPL-MCNC: 5.7 GM/DL — LOW (ref 6–8.3)
PROT SERPL-MCNC: 5.8 GM/DL — LOW (ref 6–8.3)
PROT SERPL-MCNC: 5.9 GM/DL — LOW (ref 6–8.3)
PROT SERPL-MCNC: 6.1 GM/DL — SIGNIFICANT CHANGE UP (ref 6–8.3)
PROT SERPL-MCNC: 6.1 GM/DL — SIGNIFICANT CHANGE UP (ref 6–8.3)
PROT SERPL-MCNC: 6.4 GM/DL — SIGNIFICANT CHANGE UP (ref 6–8.3)
PROT SERPL-MCNC: 7.4 GM/DL — SIGNIFICANT CHANGE UP (ref 6–8.3)
PROT SERPL-MCNC: 7.6 GM/DL — SIGNIFICANT CHANGE UP (ref 6–8.3)
PROT SERPL-MCNC: 7.8 GM/DL — SIGNIFICANT CHANGE UP (ref 6–8.3)
PROT UR-MCNC: 100 MG/DL
PROT UR-MCNC: 30 MG/DL
PROT UR-MCNC: 30 MG/DL
PROTHROM AB SERPL-ACNC: 14.7 SEC — HIGH (ref 10.6–13.6)
PROTHROM AB SERPL-ACNC: 16.4 SEC — HIGH (ref 10.6–13.6)
PROTHROM AB SERPL-ACNC: 18.9 SEC — HIGH (ref 10.6–13.6)
RAPID RVP RESULT: DETECTED
RBC # BLD: 2.94 M/UL — LOW (ref 3.8–5.2)
RBC # BLD: 2.99 M/UL — LOW (ref 3.8–5.2)
RBC # BLD: 3.03 M/UL — LOW (ref 3.8–5.2)
RBC # BLD: 3.05 M/UL — LOW (ref 3.8–5.2)
RBC # BLD: 3.07 M/UL — LOW (ref 3.8–5.2)
RBC # BLD: 3.12 M/UL — LOW (ref 3.8–5.2)
RBC # BLD: 3.17 M/UL — LOW (ref 3.8–5.2)
RBC # BLD: 3.17 M/UL — LOW (ref 3.8–5.2)
RBC # BLD: 3.22 M/UL — LOW (ref 3.8–5.2)
RBC # BLD: 3.23 M/UL — LOW (ref 3.8–5.2)
RBC # BLD: 3.24 M/UL — LOW (ref 3.8–5.2)
RBC # BLD: 3.26 M/UL — LOW (ref 3.8–5.2)
RBC # BLD: 3.27 M/UL — LOW (ref 3.8–5.2)
RBC # BLD: 3.27 M/UL — LOW (ref 3.8–5.2)
RBC # BLD: 3.49 M/UL — LOW (ref 3.8–5.2)
RBC # BLD: 3.49 M/UL — LOW (ref 3.8–5.2)
RBC # BLD: 3.5 M/UL — LOW (ref 3.8–5.2)
RBC # BLD: 3.5 M/UL — LOW (ref 3.8–5.2)
RBC # BLD: 3.52 M/UL — LOW (ref 3.8–5.2)
RBC # BLD: 3.52 M/UL — LOW (ref 3.8–5.2)
RBC # BLD: 3.56 M/UL — LOW (ref 3.8–5.2)
RBC # BLD: 3.6 M/UL — LOW (ref 3.8–5.2)
RBC # BLD: 3.62 M/UL — LOW (ref 3.8–5.2)
RBC # BLD: 3.65 M/UL — LOW (ref 3.8–5.2)
RBC # BLD: 3.67 M/UL — LOW (ref 3.8–5.2)
RBC # BLD: 3.69 M/UL — LOW (ref 3.8–5.2)
RBC # BLD: 3.75 M/UL — LOW (ref 3.8–5.2)
RBC # FLD: 16.5 % — HIGH (ref 10.3–14.5)
RBC # FLD: 16.7 % — HIGH (ref 10.3–14.5)
RBC # FLD: 16.8 % — HIGH (ref 10.3–14.5)
RBC # FLD: 16.9 % — HIGH (ref 10.3–14.5)
RBC # FLD: 16.9 % — HIGH (ref 10.3–14.5)
RBC # FLD: 17.1 % — HIGH (ref 10.3–14.5)
RBC # FLD: 17.2 % — HIGH (ref 10.3–14.5)
RBC # FLD: 17.2 % — HIGH (ref 10.3–14.5)
RBC # FLD: 17.5 % — HIGH (ref 10.3–14.5)
RBC # FLD: 18 % — HIGH (ref 10.3–14.5)
RBC # FLD: 18 % — HIGH (ref 10.3–14.5)
RBC # FLD: 18.1 % — HIGH (ref 10.3–14.5)
RBC # FLD: 18.9 % — HIGH (ref 10.3–14.5)
RBC # FLD: 19.1 % — HIGH (ref 10.3–14.5)
RBC # FLD: 19.3 % — HIGH (ref 10.3–14.5)
RBC # FLD: 19.5 % — HIGH (ref 10.3–14.5)
RBC # FLD: 19.7 % — HIGH (ref 10.3–14.5)
RBC # FLD: 19.7 % — HIGH (ref 10.3–14.5)
RBC # FLD: 19.8 % — HIGH (ref 10.3–14.5)
RBC # FLD: 19.8 % — HIGH (ref 10.3–14.5)
RBC # FLD: 20.6 % — HIGH (ref 10.3–14.5)
RBC # FLD: 20.6 % — HIGH (ref 10.3–14.5)
RBC # FLD: 21.1 % — HIGH (ref 10.3–14.5)
RBC # FLD: 21.4 % — HIGH (ref 10.3–14.5)
RBC # FLD: 21.8 % — HIGH (ref 10.3–14.5)
S PNEUM AG UR QL: NEGATIVE — SIGNIFICANT CHANGE UP
SALICYLATES SERPL-MCNC: <1.7 MG/DL — LOW (ref 2.8–20)
SAO2 % BLDA: 100 % — HIGH (ref 92–96)
SAO2 % BLDA: 86 % — LOW (ref 92–96)
SAO2 % BLDA: 86 % — LOW (ref 92–96)
SARS-COV-2 IGG SERPL QL IA: NEGATIVE — SIGNIFICANT CHANGE UP
SARS-COV-2 IGG SERPL QL IA: POSITIVE
SARS-COV-2 IGG+IGM SERPL QL IA: >250 U/ML — HIGH
SARS-COV-2 IGG+IGM SERPL QL IA: POSITIVE
SARS-COV-2 IGM SERPL IA-ACNC: 0.08 INDEX — SIGNIFICANT CHANGE UP
SARS-COV-2 IGM SERPL IA-ACNC: 3.29 INDEX — HIGH
SARS-COV-2 RNA SPEC QL NAA+PROBE: DETECTED
SARS-COV-2 RNA SPEC QL NAA+PROBE: SIGNIFICANT CHANGE UP
SODIUM SERPL-SCNC: 136 MMOL/L — SIGNIFICANT CHANGE UP (ref 135–145)
SODIUM SERPL-SCNC: 137 MMOL/L — SIGNIFICANT CHANGE UP (ref 135–145)
SODIUM SERPL-SCNC: 137 MMOL/L — SIGNIFICANT CHANGE UP (ref 135–145)
SODIUM SERPL-SCNC: 138 MMOL/L — SIGNIFICANT CHANGE UP (ref 135–145)
SODIUM SERPL-SCNC: 139 MMOL/L — SIGNIFICANT CHANGE UP (ref 135–145)
SODIUM SERPL-SCNC: 139 MMOL/L — SIGNIFICANT CHANGE UP (ref 135–145)
SODIUM SERPL-SCNC: 140 MMOL/L — SIGNIFICANT CHANGE UP (ref 135–145)
SODIUM SERPL-SCNC: 140 MMOL/L — SIGNIFICANT CHANGE UP (ref 135–145)
SODIUM SERPL-SCNC: 141 MMOL/L — SIGNIFICANT CHANGE UP (ref 135–145)
SODIUM SERPL-SCNC: 142 MMOL/L — SIGNIFICANT CHANGE UP (ref 135–145)
SODIUM SERPL-SCNC: 143 MMOL/L — SIGNIFICANT CHANGE UP (ref 135–145)
SODIUM SERPL-SCNC: 143 MMOL/L — SIGNIFICANT CHANGE UP (ref 135–145)
SODIUM SERPL-SCNC: 145 MMOL/L — SIGNIFICANT CHANGE UP (ref 135–145)
SODIUM SERPL-SCNC: 147 MMOL/L — HIGH (ref 135–145)
SP GR SPEC: 1.01 — SIGNIFICANT CHANGE UP (ref 1.01–1.02)
SPECIMEN SOURCE: SIGNIFICANT CHANGE UP
T4 AB SER-ACNC: 14.9 UG/DL — HIGH (ref 4.6–12)
TIBC SERPL-MCNC: 141 UG/DL — LOW (ref 220–430)
TROPONIN I SERPL-MCNC: 0.07 NG/ML — HIGH (ref 0.01–0.04)
TROPONIN I SERPL-MCNC: 0.1 NG/ML — HIGH (ref 0.01–0.04)
TROPONIN I SERPL-MCNC: 0.1 NG/ML — HIGH (ref 0.01–0.04)
TROPONIN I SERPL-MCNC: 0.51 NG/ML — HIGH (ref 0.01–0.04)
TROPONIN I SERPL-MCNC: 0.82 NG/ML — HIGH (ref 0.01–0.04)
TROPONIN I SERPL-MCNC: 1.03 NG/ML — HIGH (ref 0.01–0.04)
TROPONIN I SERPL-MCNC: 1.11 NG/ML — HIGH (ref 0.01–0.04)
TROPONIN I SERPL-MCNC: 1.22 NG/ML — HIGH (ref 0.01–0.04)
TROPONIN I SERPL-MCNC: 18.9 NG/ML — HIGH (ref 0.01–0.04)
TROPONIN I SERPL-MCNC: 60.4 NG/ML — HIGH (ref 0.01–0.04)
TROPONIN I SERPL-MCNC: <0.015 NG/ML — SIGNIFICANT CHANGE UP (ref 0.01–0.04)
TROPONIN I SERPL-MCNC: <0.015 NG/ML — SIGNIFICANT CHANGE UP (ref 0.01–0.04)
TSH SERPL-MCNC: 0.01 UU/ML — LOW (ref 0.34–4.82)
TSH SERPL-MCNC: 0.01 UU/ML — LOW (ref 0.34–4.82)
UIBC SERPL-MCNC: 120 UG/DL — SIGNIFICANT CHANGE UP (ref 110–370)
UROBILINOGEN FLD QL: NEGATIVE MG/DL — SIGNIFICANT CHANGE UP
WBC # BLD: 10.37 K/UL — SIGNIFICANT CHANGE UP (ref 3.8–10.5)
WBC # BLD: 12.31 K/UL — HIGH (ref 3.8–10.5)
WBC # BLD: 12.42 K/UL — HIGH (ref 3.8–10.5)
WBC # BLD: 12.55 K/UL — HIGH (ref 3.8–10.5)
WBC # BLD: 12.99 K/UL — HIGH (ref 3.8–10.5)
WBC # BLD: 13.37 K/UL — HIGH (ref 3.8–10.5)
WBC # BLD: 13.65 K/UL — HIGH (ref 3.8–10.5)
WBC # BLD: 13.81 K/UL — HIGH (ref 3.8–10.5)
WBC # BLD: 14.11 K/UL — HIGH (ref 3.8–10.5)
WBC # BLD: 14.12 K/UL — HIGH (ref 3.8–10.5)
WBC # BLD: 14.93 K/UL — HIGH (ref 3.8–10.5)
WBC # BLD: 15 K/UL — HIGH (ref 3.8–10.5)
WBC # BLD: 15.17 K/UL — HIGH (ref 3.8–10.5)
WBC # BLD: 15.38 K/UL — HIGH (ref 3.8–10.5)
WBC # BLD: 15.43 K/UL — HIGH (ref 3.8–10.5)
WBC # BLD: 16.12 K/UL — HIGH (ref 3.8–10.5)
WBC # BLD: 16.14 K/UL — HIGH (ref 3.8–10.5)
WBC # BLD: 16.18 K/UL — HIGH (ref 3.8–10.5)
WBC # BLD: 16.52 K/UL — HIGH (ref 3.8–10.5)
WBC # BLD: 16.62 K/UL — HIGH (ref 3.8–10.5)
WBC # BLD: 16.66 K/UL — HIGH (ref 3.8–10.5)
WBC # BLD: 17.44 K/UL — HIGH (ref 3.8–10.5)
WBC # BLD: 21.24 K/UL — HIGH (ref 3.8–10.5)
WBC # BLD: 28.09 K/UL — HIGH (ref 3.8–10.5)
WBC # BLD: 29.77 K/UL — HIGH (ref 3.8–10.5)
WBC # BLD: 35.9 K/UL — HIGH (ref 3.8–10.5)
WBC # BLD: 9.24 K/UL — SIGNIFICANT CHANGE UP (ref 3.8–10.5)
WBC # FLD AUTO: 10.37 K/UL — SIGNIFICANT CHANGE UP (ref 3.8–10.5)
WBC # FLD AUTO: 12.31 K/UL — HIGH (ref 3.8–10.5)
WBC # FLD AUTO: 12.42 K/UL — HIGH (ref 3.8–10.5)
WBC # FLD AUTO: 12.55 K/UL — HIGH (ref 3.8–10.5)
WBC # FLD AUTO: 12.99 K/UL — HIGH (ref 3.8–10.5)
WBC # FLD AUTO: 13.37 K/UL — HIGH (ref 3.8–10.5)
WBC # FLD AUTO: 13.65 K/UL — HIGH (ref 3.8–10.5)
WBC # FLD AUTO: 13.81 K/UL — HIGH (ref 3.8–10.5)
WBC # FLD AUTO: 14.11 K/UL — HIGH (ref 3.8–10.5)
WBC # FLD AUTO: 14.12 K/UL — HIGH (ref 3.8–10.5)
WBC # FLD AUTO: 14.93 K/UL — HIGH (ref 3.8–10.5)
WBC # FLD AUTO: 15 K/UL — HIGH (ref 3.8–10.5)
WBC # FLD AUTO: 15.17 K/UL — HIGH (ref 3.8–10.5)
WBC # FLD AUTO: 15.38 K/UL — HIGH (ref 3.8–10.5)
WBC # FLD AUTO: 15.43 K/UL — HIGH (ref 3.8–10.5)
WBC # FLD AUTO: 16.12 K/UL — HIGH (ref 3.8–10.5)
WBC # FLD AUTO: 16.14 K/UL — HIGH (ref 3.8–10.5)
WBC # FLD AUTO: 16.18 K/UL — HIGH (ref 3.8–10.5)
WBC # FLD AUTO: 16.52 K/UL — HIGH (ref 3.8–10.5)
WBC # FLD AUTO: 16.62 K/UL — HIGH (ref 3.8–10.5)
WBC # FLD AUTO: 16.66 K/UL — HIGH (ref 3.8–10.5)
WBC # FLD AUTO: 17.44 K/UL — HIGH (ref 3.8–10.5)
WBC # FLD AUTO: 21.24 K/UL — HIGH (ref 3.8–10.5)
WBC # FLD AUTO: 28.09 K/UL — HIGH (ref 3.8–10.5)
WBC # FLD AUTO: 29.77 K/UL — HIGH (ref 3.8–10.5)
WBC # FLD AUTO: 35.9 K/UL — HIGH (ref 3.8–10.5)
WBC # FLD AUTO: 9.24 K/UL — SIGNIFICANT CHANGE UP (ref 3.8–10.5)

## 2021-01-01 PROCEDURE — 93308 TTE F-UP OR LMTD: CPT | Mod: 26

## 2021-01-01 PROCEDURE — 80053 COMPREHEN METABOLIC PANEL: CPT

## 2021-01-01 PROCEDURE — 99233 SBSQ HOSP IP/OBS HIGH 50: CPT | Mod: CS

## 2021-01-01 PROCEDURE — 99233 SBSQ HOSP IP/OBS HIGH 50: CPT

## 2021-01-01 PROCEDURE — 85027 COMPLETE CBC AUTOMATED: CPT

## 2021-01-01 PROCEDURE — 99291 CRITICAL CARE FIRST HOUR: CPT | Mod: CS

## 2021-01-01 PROCEDURE — 0225U NFCT DS DNA&RNA 21 SARSCOV2: CPT

## 2021-01-01 PROCEDURE — 86850 RBC ANTIBODY SCREEN: CPT

## 2021-01-01 PROCEDURE — 70450 CT HEAD/BRAIN W/O DYE: CPT

## 2021-01-01 PROCEDURE — 36415 COLL VENOUS BLD VENIPUNCTURE: CPT

## 2021-01-01 PROCEDURE — 71045 X-RAY EXAM CHEST 1 VIEW: CPT | Mod: 26

## 2021-01-01 PROCEDURE — 93010 ELECTROCARDIOGRAM REPORT: CPT | Mod: 76

## 2021-01-01 PROCEDURE — 85025 COMPLETE CBC W/AUTO DIFF WBC: CPT

## 2021-01-01 PROCEDURE — 93010 ELECTROCARDIOGRAM REPORT: CPT

## 2021-01-01 PROCEDURE — 93000 ELECTROCARDIOGRAM COMPLETE: CPT

## 2021-01-01 PROCEDURE — 80048 BASIC METABOLIC PNL TOTAL CA: CPT

## 2021-01-01 PROCEDURE — 93308 TTE F-UP OR LMTD: CPT

## 2021-01-01 PROCEDURE — 82728 ASSAY OF FERRITIN: CPT

## 2021-01-01 PROCEDURE — 97530 THERAPEUTIC ACTIVITIES: CPT | Mod: GP

## 2021-01-01 PROCEDURE — 84100 ASSAY OF PHOSPHORUS: CPT

## 2021-01-01 PROCEDURE — 85610 PROTHROMBIN TIME: CPT

## 2021-01-01 PROCEDURE — 92941 PRQ TRLML REVSC TOT OCCL AMI: CPT | Mod: RC

## 2021-01-01 PROCEDURE — 99232 SBSQ HOSP IP/OBS MODERATE 35: CPT | Mod: CS

## 2021-01-01 PROCEDURE — 99215 OFFICE O/P EST HI 40 MIN: CPT | Mod: CS

## 2021-01-01 PROCEDURE — 83735 ASSAY OF MAGNESIUM: CPT

## 2021-01-01 PROCEDURE — 87635 SARS-COV-2 COVID-19 AMP PRB: CPT

## 2021-01-01 PROCEDURE — 84436 ASSAY OF TOTAL THYROXINE: CPT

## 2021-01-01 PROCEDURE — 83550 IRON BINDING TEST: CPT

## 2021-01-01 PROCEDURE — 36430 TRANSFUSION BLD/BLD COMPNT: CPT

## 2021-01-01 PROCEDURE — 86769 SARS-COV-2 COVID-19 ANTIBODY: CPT

## 2021-01-01 PROCEDURE — 99231 SBSQ HOSP IP/OBS SF/LOW 25: CPT | Mod: CS

## 2021-01-01 PROCEDURE — 99285 EMERGENCY DEPT VISIT HI MDM: CPT

## 2021-01-01 PROCEDURE — 86901 BLOOD TYPING SEROLOGIC RH(D): CPT

## 2021-01-01 PROCEDURE — 71045 X-RAY EXAM CHEST 1 VIEW: CPT

## 2021-01-01 PROCEDURE — 99222 1ST HOSP IP/OBS MODERATE 55: CPT

## 2021-01-01 PROCEDURE — 71250 CT THORAX DX C-: CPT

## 2021-01-01 PROCEDURE — 99239 HOSP IP/OBS DSCHRG MGMT >30: CPT

## 2021-01-01 PROCEDURE — 86900 BLOOD TYPING SEROLOGIC ABO: CPT

## 2021-01-01 PROCEDURE — 87086 URINE CULTURE/COLONY COUNT: CPT

## 2021-01-01 PROCEDURE — 93005 ELECTROCARDIOGRAM TRACING: CPT

## 2021-01-01 PROCEDURE — P9016: CPT

## 2021-01-01 PROCEDURE — C1894: CPT

## 2021-01-01 PROCEDURE — 97110 THERAPEUTIC EXERCISES: CPT | Mod: GP

## 2021-01-01 PROCEDURE — 86140 C-REACTIVE PROTEIN: CPT

## 2021-01-01 PROCEDURE — 76770 US EXAM ABDO BACK WALL COMP: CPT

## 2021-01-01 PROCEDURE — C9113: CPT

## 2021-01-01 PROCEDURE — A9540: CPT

## 2021-01-01 PROCEDURE — 99291 CRITICAL CARE FIRST HOUR: CPT

## 2021-01-01 PROCEDURE — P9040: CPT

## 2021-01-01 PROCEDURE — U0003: CPT

## 2021-01-01 PROCEDURE — 82272 OCCULT BLD FECES 1-3 TESTS: CPT

## 2021-01-01 PROCEDURE — 99223 1ST HOSP IP/OBS HIGH 75: CPT | Mod: CS

## 2021-01-01 PROCEDURE — 87507 IADNA-DNA/RNA PROBE TQ 12-25: CPT

## 2021-01-01 PROCEDURE — 84443 ASSAY THYROID STIM HORMONE: CPT

## 2021-01-01 PROCEDURE — 82962 GLUCOSE BLOOD TEST: CPT

## 2021-01-01 PROCEDURE — C9600: CPT | Mod: RC

## 2021-01-01 PROCEDURE — 97161 PT EVAL LOW COMPLEX 20 MIN: CPT | Mod: GP

## 2021-01-01 PROCEDURE — 81001 URINALYSIS AUTO W/SCOPE: CPT

## 2021-01-01 PROCEDURE — 87493 C DIFF AMPLIFIED PROBE: CPT

## 2021-01-01 PROCEDURE — 82803 BLOOD GASES ANY COMBINATION: CPT

## 2021-01-01 PROCEDURE — 74176 CT ABD & PELVIS W/O CONTRAST: CPT

## 2021-01-01 PROCEDURE — 86923 COMPATIBILITY TEST ELECTRIC: CPT

## 2021-01-01 PROCEDURE — C1769: CPT

## 2021-01-01 PROCEDURE — 82533 TOTAL CORTISOL: CPT

## 2021-01-01 PROCEDURE — 97116 GAIT TRAINING THERAPY: CPT | Mod: GP

## 2021-01-01 PROCEDURE — 78580 LUNG PERFUSION IMAGING: CPT | Mod: 26

## 2021-01-01 PROCEDURE — C1725: CPT

## 2021-01-01 PROCEDURE — 83540 ASSAY OF IRON: CPT

## 2021-01-01 PROCEDURE — 83605 ASSAY OF LACTIC ACID: CPT

## 2021-01-01 PROCEDURE — 87899 AGENT NOS ASSAY W/OPTIC: CPT

## 2021-01-01 PROCEDURE — C1874: CPT

## 2021-01-01 PROCEDURE — 76770 US EXAM ABDO BACK WALL COMP: CPT | Mod: 26

## 2021-01-01 PROCEDURE — 99231 SBSQ HOSP IP/OBS SF/LOW 25: CPT

## 2021-01-01 PROCEDURE — 36600 WITHDRAWAL OF ARTERIAL BLOOD: CPT

## 2021-01-01 PROCEDURE — 74176 CT ABD & PELVIS W/O CONTRAST: CPT | Mod: 26

## 2021-01-01 PROCEDURE — 78580 LUNG PERFUSION IMAGING: CPT

## 2021-01-01 PROCEDURE — 83036 HEMOGLOBIN GLYCOSYLATED A1C: CPT

## 2021-01-01 PROCEDURE — 87040 BLOOD CULTURE FOR BACTERIA: CPT

## 2021-01-01 PROCEDURE — 71250 CT THORAX DX C-: CPT | Mod: 26

## 2021-01-01 PROCEDURE — 85730 THROMBOPLASTIN TIME PARTIAL: CPT

## 2021-01-01 PROCEDURE — 99214 OFFICE O/P EST MOD 30 MIN: CPT

## 2021-01-01 PROCEDURE — 84484 ASSAY OF TROPONIN QUANT: CPT

## 2021-01-01 PROCEDURE — 70450 CT HEAD/BRAIN W/O DYE: CPT | Mod: 26

## 2021-01-01 PROCEDURE — 70450 CT HEAD/BRAIN W/O DYE: CPT | Mod: 26,MB

## 2021-01-01 PROCEDURE — 99232 SBSQ HOSP IP/OBS MODERATE 35: CPT

## 2021-01-01 PROCEDURE — 87449 NOS EACH ORGANISM AG IA: CPT

## 2021-01-01 PROCEDURE — 99223 1ST HOSP IP/OBS HIGH 75: CPT

## 2021-01-01 PROCEDURE — 85379 FIBRIN DEGRADATION QUANT: CPT

## 2021-01-01 PROCEDURE — 93458 L HRT ARTERY/VENTRICLE ANGIO: CPT | Mod: 59

## 2021-01-01 PROCEDURE — U0005: CPT

## 2021-01-01 PROCEDURE — 94003 VENT MGMT INPAT SUBQ DAY: CPT

## 2021-01-01 PROCEDURE — 99292 CRITICAL CARE ADDL 30 MIN: CPT | Mod: CS

## 2021-01-01 PROCEDURE — 93458 L HRT ARTERY/VENTRICLE ANGIO: CPT | Mod: 26,59

## 2021-01-01 PROCEDURE — C1887: CPT

## 2021-01-01 RX ORDER — AZTREONAM 2 G
1 VIAL (EA) INJECTION
Qty: 0 | Refills: 0 | DISCHARGE

## 2021-01-01 RX ORDER — ASPIRIN/CALCIUM CARB/MAGNESIUM 324 MG
81 TABLET ORAL DAILY
Refills: 0 | Status: DISCONTINUED | OUTPATIENT
Start: 2021-01-01 | End: 2021-01-01

## 2021-01-01 RX ORDER — METOPROLOL TARTRATE 50 MG
5 TABLET ORAL ONCE
Refills: 0 | Status: COMPLETED | OUTPATIENT
Start: 2021-01-01 | End: 2021-01-01

## 2021-01-01 RX ORDER — DILTIAZEM HCL 120 MG
10 CAPSULE, EXT RELEASE 24 HR ORAL ONCE
Refills: 0 | Status: COMPLETED | OUTPATIENT
Start: 2021-01-01 | End: 2021-01-01

## 2021-01-01 RX ORDER — SODIUM,POTASSIUM PHOSPHATES 278-250MG
2 POWDER IN PACKET (EA) ORAL
Refills: 0 | Status: COMPLETED | OUTPATIENT
Start: 2021-01-01 | End: 2021-01-01

## 2021-01-01 RX ORDER — INSULIN GLARGINE 100 [IU]/ML
25 INJECTION, SOLUTION SUBCUTANEOUS EVERY MORNING
Refills: 0 | Status: DISCONTINUED | OUTPATIENT
Start: 2021-01-01 | End: 2021-01-01

## 2021-01-01 RX ORDER — SODIUM CHLORIDE 9 MG/ML
1000 INJECTION INTRAMUSCULAR; INTRAVENOUS; SUBCUTANEOUS
Refills: 0 | Status: DISCONTINUED | OUTPATIENT
Start: 2021-01-01 | End: 2021-01-01

## 2021-01-01 RX ORDER — CLOPIDOGREL BISULFATE 75 MG/1
75 TABLET, FILM COATED ORAL DAILY
Refills: 0 | Status: DISCONTINUED | OUTPATIENT
Start: 2021-01-01 | End: 2021-01-01

## 2021-01-01 RX ORDER — INSULIN LISPRO 100/ML
VIAL (ML) SUBCUTANEOUS
Refills: 0 | Status: DISCONTINUED | OUTPATIENT
Start: 2021-01-01 | End: 2021-01-01

## 2021-01-01 RX ORDER — ALPRAZOLAM 0.25 MG
0.5 TABLET ORAL ONCE
Refills: 0 | Status: DISCONTINUED | OUTPATIENT
Start: 2021-01-01 | End: 2021-01-01

## 2021-01-01 RX ORDER — HEPARIN SODIUM 5000 [USP'U]/ML
3000 INJECTION INTRAVENOUS; SUBCUTANEOUS EVERY 6 HOURS
Refills: 0 | Status: DISCONTINUED | OUTPATIENT
Start: 2021-01-01 | End: 2021-01-01

## 2021-01-01 RX ORDER — CEFEPIME 1 G/1
2000 INJECTION, POWDER, FOR SOLUTION INTRAMUSCULAR; INTRAVENOUS EVERY 12 HOURS
Refills: 0 | Status: DISCONTINUED | OUTPATIENT
Start: 2021-01-01 | End: 2021-01-01

## 2021-01-01 RX ORDER — NICARDIPINE HYDROCHLORIDE 30 MG/1
5 CAPSULE, EXTENDED RELEASE ORAL
Qty: 40 | Refills: 0 | Status: DISCONTINUED | OUTPATIENT
Start: 2021-01-01 | End: 2021-01-01

## 2021-01-01 RX ORDER — SODIUM CHLORIDE 9 MG/ML
1000 INJECTION, SOLUTION INTRAVENOUS
Refills: 0 | Status: DISCONTINUED | OUTPATIENT
Start: 2021-01-01 | End: 2021-01-01

## 2021-01-01 RX ORDER — INSULIN GLARGINE 100 [IU]/ML
25 INJECTION, SOLUTION SUBCUTANEOUS ONCE
Refills: 0 | Status: COMPLETED | OUTPATIENT
Start: 2021-01-01 | End: 2021-01-01

## 2021-01-01 RX ORDER — GLUCAGON INJECTION, SOLUTION 0.5 MG/.1ML
1 INJECTION, SOLUTION SUBCUTANEOUS ONCE
Refills: 0 | Status: DISCONTINUED | OUTPATIENT
Start: 2021-01-01 | End: 2021-01-01

## 2021-01-01 RX ORDER — AZITHROMYCIN 500 MG/1
500 TABLET, FILM COATED ORAL EVERY 24 HOURS
Refills: 0 | Status: DISCONTINUED | OUTPATIENT
Start: 2021-01-01 | End: 2021-01-01

## 2021-01-01 RX ORDER — INSULIN GLARGINE 100 [IU]/ML
15 INJECTION, SOLUTION SUBCUTANEOUS AT BEDTIME
Refills: 0 | Status: DISCONTINUED | OUTPATIENT
Start: 2021-01-01 | End: 2021-01-01

## 2021-01-01 RX ORDER — DEXTROSE 50 % IN WATER 50 %
15 SYRINGE (ML) INTRAVENOUS ONCE
Refills: 0 | Status: DISCONTINUED | OUTPATIENT
Start: 2021-01-01 | End: 2021-01-01

## 2021-01-01 RX ORDER — POTASSIUM CHLORIDE 20 MEQ
40 PACKET (EA) ORAL ONCE
Refills: 0 | Status: COMPLETED | OUTPATIENT
Start: 2021-01-01 | End: 2021-01-01

## 2021-01-01 RX ORDER — SCOPALAMINE 1 MG/3D
1 PATCH, EXTENDED RELEASE TRANSDERMAL
Refills: 0 | Status: DISCONTINUED | OUTPATIENT
Start: 2021-01-01 | End: 2021-01-01

## 2021-01-01 RX ORDER — POLYETHYLENE GLYCOL 3350 17 G/17G
17 POWDER, FOR SOLUTION ORAL DAILY
Refills: 0 | Status: DISCONTINUED | OUTPATIENT
Start: 2021-01-01 | End: 2021-01-01

## 2021-01-01 RX ORDER — OMEPRAZOLE 20 MG/1
20 TABLET, DELAYED RELEASE ORAL
Refills: 0 | Status: ACTIVE | COMMUNITY

## 2021-01-01 RX ORDER — DEXTROSE 50 % IN WATER 50 %
12.5 SYRINGE (ML) INTRAVENOUS ONCE
Refills: 0 | Status: DISCONTINUED | OUTPATIENT
Start: 2021-01-01 | End: 2021-01-01

## 2021-01-01 RX ORDER — DEXTROSE 50 % IN WATER 50 %
25 SYRINGE (ML) INTRAVENOUS ONCE
Refills: 0 | Status: DISCONTINUED | OUTPATIENT
Start: 2021-01-01 | End: 2021-01-01

## 2021-01-01 RX ORDER — LOPERAMIDE HCL 2 MG
2 TABLET ORAL
Refills: 0 | Status: DISCONTINUED | OUTPATIENT
Start: 2021-01-01 | End: 2021-01-01

## 2021-01-01 RX ORDER — METOPROLOL TARTRATE 50 MG
12.5 TABLET ORAL EVERY 8 HOURS
Refills: 0 | Status: DISCONTINUED | OUTPATIENT
Start: 2021-01-01 | End: 2021-01-01

## 2021-01-01 RX ORDER — SACCHAROMYCES BOULARDII 250 MG
250 POWDER IN PACKET (EA) ORAL
Refills: 0 | Status: DISCONTINUED | OUTPATIENT
Start: 2021-01-01 | End: 2021-01-01

## 2021-01-01 RX ORDER — MORPHINE SULFATE 50 MG/1
2 CAPSULE, EXTENDED RELEASE ORAL
Qty: 0 | Refills: 0 | DISCHARGE
Start: 2021-01-01

## 2021-01-01 RX ORDER — ACETAMINOPHEN 500 MG
650 TABLET ORAL ONCE
Refills: 0 | Status: COMPLETED | OUTPATIENT
Start: 2021-01-01 | End: 2021-01-01

## 2021-01-01 RX ORDER — CEFTRIAXONE 500 MG/1
1000 INJECTION, POWDER, FOR SOLUTION INTRAMUSCULAR; INTRAVENOUS ONCE
Refills: 0 | Status: COMPLETED | OUTPATIENT
Start: 2021-01-01 | End: 2021-01-01

## 2021-01-01 RX ORDER — PANTOPRAZOLE SODIUM 20 MG/1
40 TABLET, DELAYED RELEASE ORAL
Refills: 0 | Status: DISCONTINUED | OUTPATIENT
Start: 2021-01-01 | End: 2021-01-01

## 2021-01-01 RX ORDER — AZITHROMYCIN 500 MG/1
TABLET, FILM COATED ORAL
Refills: 0 | Status: DISCONTINUED | OUTPATIENT
Start: 2021-01-01 | End: 2021-01-01

## 2021-01-01 RX ORDER — HEPARIN SODIUM 5000 [USP'U]/ML
5500 INJECTION INTRAVENOUS; SUBCUTANEOUS EVERY 6 HOURS
Refills: 0 | Status: DISCONTINUED | OUTPATIENT
Start: 2021-01-01 | End: 2021-01-01

## 2021-01-01 RX ORDER — SODIUM CHLORIDE 9 MG/ML
500 INJECTION INTRAMUSCULAR; INTRAVENOUS; SUBCUTANEOUS ONCE
Refills: 0 | Status: COMPLETED | OUTPATIENT
Start: 2021-01-01 | End: 2021-01-01

## 2021-01-01 RX ORDER — LEVETIRACETAM 250 MG/1
1000 TABLET, FILM COATED ORAL ONCE
Refills: 0 | Status: COMPLETED | OUTPATIENT
Start: 2021-01-01 | End: 2021-01-01

## 2021-01-01 RX ORDER — PIPERACILLIN AND TAZOBACTAM 4; .5 G/20ML; G/20ML
3.38 INJECTION, POWDER, LYOPHILIZED, FOR SOLUTION INTRAVENOUS ONCE
Refills: 0 | Status: COMPLETED | OUTPATIENT
Start: 2021-01-01 | End: 2021-01-01

## 2021-01-01 RX ORDER — METOPROLOL TARTRATE 50 MG
25 TABLET ORAL
Refills: 0 | Status: DISCONTINUED | OUTPATIENT
Start: 2021-01-01 | End: 2021-01-01

## 2021-01-01 RX ORDER — LOPERAMIDE HCL 2 MG
1 TABLET ORAL
Qty: 60 | Refills: 0
Start: 2021-01-01 | End: 2021-01-01

## 2021-01-01 RX ORDER — ATORVASTATIN CALCIUM 80 MG/1
1 TABLET, FILM COATED ORAL
Qty: 0 | Refills: 0 | DISCHARGE
Start: 2021-01-01

## 2021-01-01 RX ORDER — DILTIAZEM HCL 120 MG
120 CAPSULE, EXT RELEASE 24 HR ORAL DAILY
Refills: 0 | Status: DISCONTINUED | OUTPATIENT
Start: 2021-01-01 | End: 2021-01-01

## 2021-01-01 RX ORDER — METOPROLOL TARTRATE 50 MG
2.5 TABLET ORAL EVERY 6 HOURS
Refills: 0 | Status: DISCONTINUED | OUTPATIENT
Start: 2021-01-01 | End: 2021-01-01

## 2021-01-01 RX ORDER — INSULIN LISPRO 100/ML
VIAL (ML) SUBCUTANEOUS AT BEDTIME
Refills: 0 | Status: DISCONTINUED | OUTPATIENT
Start: 2021-01-01 | End: 2021-01-01

## 2021-01-01 RX ORDER — DEXAMETHASONE 0.5 MG/5ML
4 ELIXIR ORAL EVERY 6 HOURS
Refills: 0 | Status: DISCONTINUED | OUTPATIENT
Start: 2021-01-01 | End: 2021-01-01

## 2021-01-01 RX ORDER — CHLORHEXIDINE GLUCONATE 213 G/1000ML
1 SOLUTION TOPICAL
Refills: 0 | Status: DISCONTINUED | OUTPATIENT
Start: 2021-01-01 | End: 2021-01-01

## 2021-01-01 RX ORDER — INSULIN GLARGINE 100 [IU]/ML
0 INJECTION, SOLUTION SUBCUTANEOUS
Qty: 0 | Refills: 0 | DISCHARGE

## 2021-01-01 RX ORDER — ALPRAZOLAM 0.25 MG
0.25 TABLET ORAL EVERY 8 HOURS
Refills: 0 | Status: DISCONTINUED | OUTPATIENT
Start: 2021-01-01 | End: 2021-01-01

## 2021-01-01 RX ORDER — GLIMEPIRIDE 1 MG
1 TABLET ORAL
Qty: 0 | Refills: 0 | DISCHARGE

## 2021-01-01 RX ORDER — CLOPIDOGREL BISULFATE 75 MG/1
1 TABLET, FILM COATED ORAL
Qty: 0 | Refills: 0 | DISCHARGE

## 2021-01-01 RX ORDER — POTASSIUM CHLORIDE 20 MEQ
1 PACKET (EA) ORAL
Qty: 0 | Refills: 0 | DISCHARGE

## 2021-01-01 RX ORDER — LOSARTAN POTASSIUM 100 MG/1
50 TABLET, FILM COATED ORAL DAILY
Refills: 0 | Status: DISCONTINUED | OUTPATIENT
Start: 2021-01-01 | End: 2021-01-01

## 2021-01-01 RX ORDER — HEPARIN SODIUM 5000 [USP'U]/ML
6500 INJECTION INTRAVENOUS; SUBCUTANEOUS EVERY 6 HOURS
Refills: 0 | Status: DISCONTINUED | OUTPATIENT
Start: 2021-01-01 | End: 2021-01-01

## 2021-01-01 RX ORDER — OMEPRAZOLE 10 MG/1
1 CAPSULE, DELAYED RELEASE ORAL
Qty: 0 | Refills: 0 | DISCHARGE

## 2021-01-01 RX ORDER — LEVOTHYROXINE SODIUM 0.05 MG/1
50 TABLET ORAL
Refills: 0 | Status: DISCONTINUED | COMMUNITY
End: 2021-01-01

## 2021-01-01 RX ORDER — DEXAMETHASONE 0.5 MG/5ML
6 ELIXIR ORAL ONCE
Refills: 0 | Status: COMPLETED | OUTPATIENT
Start: 2021-01-01 | End: 2021-01-01

## 2021-01-01 RX ORDER — SODIUM CHLORIDE 0.65 %
2 AEROSOL, SPRAY (ML) NASAL EVERY 6 HOURS
Refills: 0 | Status: DISCONTINUED | OUTPATIENT
Start: 2021-01-01 | End: 2021-01-01

## 2021-01-01 RX ORDER — INSULIN GLARGINE 100 [IU]/ML
10 INJECTION, SOLUTION SUBCUTANEOUS EVERY MORNING
Refills: 0 | Status: DISCONTINUED | OUTPATIENT
Start: 2021-01-01 | End: 2021-01-01

## 2021-01-01 RX ORDER — ALBUTEROL 90 UG/1
2 AEROSOL, METERED ORAL EVERY 4 HOURS
Refills: 0 | Status: DISCONTINUED | OUTPATIENT
Start: 2021-01-01 | End: 2021-01-01

## 2021-01-01 RX ORDER — METOPROLOL TARTRATE 50 MG
25 TABLET ORAL ONCE
Refills: 0 | Status: COMPLETED | OUTPATIENT
Start: 2021-01-01 | End: 2021-01-01

## 2021-01-01 RX ORDER — OLMESARTAN MEDOXOMIL 20 MG/1
20 TABLET, FILM COATED ORAL
Qty: 90 | Refills: 0 | Status: DISCONTINUED | COMMUNITY
Start: 2020-06-19 | End: 2021-01-01

## 2021-01-01 RX ORDER — ROSUVASTATIN CALCIUM 5 MG/1
1 TABLET ORAL
Qty: 0 | Refills: 0 | DISCHARGE

## 2021-01-01 RX ORDER — HEPARIN SODIUM 5000 [USP'U]/ML
2500 INJECTION INTRAVENOUS; SUBCUTANEOUS EVERY 6 HOURS
Refills: 0 | Status: DISCONTINUED | OUTPATIENT
Start: 2021-01-01 | End: 2021-01-01

## 2021-01-01 RX ORDER — LEVOTHYROXINE SODIUM 125 MCG
1 TABLET ORAL
Qty: 0 | Refills: 0 | DISCHARGE

## 2021-01-01 RX ORDER — CEFTRIAXONE 500 MG/1
1000 INJECTION, POWDER, FOR SOLUTION INTRAMUSCULAR; INTRAVENOUS ONCE
Refills: 0 | Status: DISCONTINUED | OUTPATIENT
Start: 2021-01-01 | End: 2021-01-01

## 2021-01-01 RX ORDER — METOPROLOL TARTRATE 50 MG
1 TABLET ORAL
Qty: 60 | Refills: 3
Start: 2021-01-01 | End: 2021-01-01

## 2021-01-01 RX ORDER — POTASSIUM CHLORIDE 1500 MG/1
20 TABLET, EXTENDED RELEASE ORAL DAILY
Refills: 0 | Status: ACTIVE | COMMUNITY

## 2021-01-01 RX ORDER — PANTOPRAZOLE SODIUM 20 MG/1
40 TABLET, DELAYED RELEASE ORAL DAILY
Refills: 0 | Status: DISCONTINUED | OUTPATIENT
Start: 2021-01-01 | End: 2021-01-01

## 2021-01-01 RX ORDER — SODIUM CHLORIDE 9 MG/ML
2100 INJECTION, SOLUTION INTRAVENOUS ONCE
Refills: 0 | Status: COMPLETED | OUTPATIENT
Start: 2021-01-01 | End: 2021-01-01

## 2021-01-01 RX ORDER — CEFEPIME 1 G/1
2000 INJECTION, POWDER, FOR SOLUTION INTRAMUSCULAR; INTRAVENOUS ONCE
Refills: 0 | Status: COMPLETED | OUTPATIENT
Start: 2021-01-01 | End: 2021-01-01

## 2021-01-01 RX ORDER — AZITHROMYCIN 500 MG/1
500 TABLET, FILM COATED ORAL ONCE
Refills: 0 | Status: COMPLETED | OUTPATIENT
Start: 2021-01-01 | End: 2021-01-01

## 2021-01-01 RX ORDER — SCOPALAMINE 1 MG/3D
1 PATCH, EXTENDED RELEASE TRANSDERMAL
Qty: 0 | Refills: 0 | DISCHARGE
Start: 2021-01-01

## 2021-01-01 RX ORDER — VANCOMYCIN HCL 1 G
1000 VIAL (EA) INTRAVENOUS ONCE
Refills: 0 | Status: COMPLETED | OUTPATIENT
Start: 2021-01-01 | End: 2021-01-01

## 2021-01-01 RX ORDER — SALIVA SUBSTITUTE COMB NO.11 351 MG
15 POWDER IN PACKET (EA) MUCOUS MEMBRANE
Refills: 0 | Status: DISCONTINUED | OUTPATIENT
Start: 2021-01-01 | End: 2021-01-01

## 2021-01-01 RX ORDER — ROSUVASTATIN CALCIUM 10 MG/1
10 TABLET, FILM COATED ORAL
Qty: 90 | Refills: 0 | Status: DISCONTINUED | COMMUNITY
Start: 2020-01-01 | End: 2021-01-01

## 2021-01-01 RX ORDER — METOPROLOL TARTRATE 50 MG
12.5 TABLET ORAL
Refills: 0 | Status: DISCONTINUED | OUTPATIENT
Start: 2021-01-01 | End: 2021-01-01

## 2021-01-01 RX ORDER — TRIAMTERENE/HYDROCHLOROTHIAZID 75 MG-50MG
1 TABLET ORAL
Qty: 0 | Refills: 0 | DISCHARGE

## 2021-01-01 RX ORDER — ALPRAZOLAM 0.25 MG
0.25 TABLET ORAL ONCE
Refills: 0 | Status: DISCONTINUED | OUTPATIENT
Start: 2021-01-01 | End: 2021-01-01

## 2021-01-01 RX ORDER — INSULIN GLARGINE 100 [IU]/ML
25 INJECTION, SOLUTION SUBCUTANEOUS AT BEDTIME
Refills: 0 | Status: DISCONTINUED | OUTPATIENT
Start: 2021-01-01 | End: 2021-01-01

## 2021-01-01 RX ORDER — CLOPIDOGREL BISULFATE 75 MG/1
75 TABLET, FILM COATED ORAL DAILY
Qty: 90 | Refills: 1 | Status: ACTIVE | COMMUNITY
Start: 1900-01-01 | End: 1900-01-01

## 2021-01-01 RX ORDER — ONDANSETRON 8 MG/1
4 TABLET, FILM COATED ORAL EVERY 6 HOURS
Refills: 0 | Status: DISCONTINUED | OUTPATIENT
Start: 2021-01-01 | End: 2021-01-01

## 2021-01-01 RX ORDER — DIPHENHYDRAMINE HCL 50 MG
50 CAPSULE ORAL ONCE
Refills: 0 | Status: COMPLETED | OUTPATIENT
Start: 2021-01-01 | End: 2021-01-01

## 2021-01-01 RX ORDER — INSULIN ASPART 100 [IU]/ML
0 INJECTION, SOLUTION SUBCUTANEOUS
Qty: 0 | Refills: 0 | DISCHARGE

## 2021-01-01 RX ORDER — PROPOFOL 10 MG/ML
30 INJECTION, EMULSION INTRAVENOUS ONCE
Refills: 0 | Status: COMPLETED | OUTPATIENT
Start: 2021-01-01 | End: 2021-01-01

## 2021-01-01 RX ORDER — INSULIN GLARGINE 100 [IU]/ML
10 INJECTION, SOLUTION SUBCUTANEOUS AT BEDTIME
Refills: 0 | Status: DISCONTINUED | OUTPATIENT
Start: 2021-01-01 | End: 2021-01-01

## 2021-01-01 RX ORDER — ACETAMINOPHEN 500 MG
650 TABLET ORAL EVERY 4 HOURS
Refills: 0 | Status: DISCONTINUED | OUTPATIENT
Start: 2021-01-01 | End: 2021-01-01

## 2021-01-01 RX ORDER — DEXAMETHASONE 0.5 MG/5ML
6 ELIXIR ORAL DAILY
Refills: 0 | Status: COMPLETED | OUTPATIENT
Start: 2021-01-01 | End: 2021-01-01

## 2021-01-01 RX ORDER — CHLORHEXIDINE GLUCONATE 213 G/1000ML
15 SOLUTION TOPICAL EVERY 12 HOURS
Refills: 0 | Status: DISCONTINUED | OUTPATIENT
Start: 2021-01-01 | End: 2021-01-01

## 2021-01-01 RX ORDER — MORPHINE SULFATE 50 MG/1
2 CAPSULE, EXTENDED RELEASE ORAL
Qty: 100 | Refills: 0 | Status: DISCONTINUED | OUTPATIENT
Start: 2021-01-01 | End: 2021-01-01

## 2021-01-01 RX ORDER — CLOPIDOGREL BISULFATE 75 MG/1
1 TABLET, FILM COATED ORAL
Qty: 0 | Refills: 0 | DISCHARGE
Start: 2021-01-01

## 2021-01-01 RX ORDER — ATORVASTATIN CALCIUM 80 MG/1
40 TABLET, FILM COATED ORAL AT BEDTIME
Refills: 0 | Status: DISCONTINUED | OUTPATIENT
Start: 2021-01-01 | End: 2021-01-01

## 2021-01-01 RX ORDER — DILTIAZEM HCL 120 MG
10 CAPSULE, EXT RELEASE 24 HR ORAL
Qty: 125 | Refills: 0 | Status: DISCONTINUED | OUTPATIENT
Start: 2021-01-01 | End: 2021-01-01

## 2021-01-01 RX ORDER — APIXABAN 2.5 MG/1
5 TABLET, FILM COATED ORAL EVERY 12 HOURS
Refills: 0 | Status: DISCONTINUED | OUTPATIENT
Start: 2021-01-01 | End: 2021-01-01

## 2021-01-01 RX ORDER — APIXABAN 5 MG/1
5 TABLET, FILM COATED ORAL
Qty: 180 | Refills: 1 | Status: ACTIVE | COMMUNITY
Start: 1900-01-01 | End: 1900-01-01

## 2021-01-01 RX ORDER — LEVETIRACETAM 250 MG/1
500 TABLET, FILM COATED ORAL EVERY 12 HOURS
Refills: 0 | Status: DISCONTINUED | OUTPATIENT
Start: 2021-01-01 | End: 2021-01-01

## 2021-01-01 RX ORDER — MORPHINE SULFATE 50 MG/1
2 CAPSULE, EXTENDED RELEASE ORAL EVERY 4 HOURS
Refills: 0 | Status: DISCONTINUED | OUTPATIENT
Start: 2021-01-01 | End: 2021-01-01

## 2021-01-01 RX ORDER — IRON SUCROSE 20 MG/ML
100 INJECTION, SOLUTION INTRAVENOUS
Refills: 0 | Status: DISCONTINUED | OUTPATIENT
Start: 2021-01-01 | End: 2021-01-01

## 2021-01-01 RX ORDER — LEVOTHYROXINE SODIUM 125 MCG
75 TABLET ORAL DAILY
Refills: 0 | Status: DISCONTINUED | OUTPATIENT
Start: 2021-01-01 | End: 2021-01-01

## 2021-01-01 RX ORDER — SODIUM CHLORIDE 5 G/100ML
1000 INJECTION, SOLUTION INTRAVENOUS
Refills: 0 | Status: DISCONTINUED | OUTPATIENT
Start: 2021-01-01 | End: 2021-01-01

## 2021-01-01 RX ORDER — POTASSIUM CHLORIDE 20 MEQ
40 PACKET (EA) ORAL EVERY 4 HOURS
Refills: 0 | Status: DISCONTINUED | OUTPATIENT
Start: 2021-01-01 | End: 2021-01-01

## 2021-01-01 RX ORDER — POTASSIUM CHLORIDE 20 MEQ
40 PACKET (EA) ORAL EVERY 4 HOURS
Refills: 0 | Status: COMPLETED | OUTPATIENT
Start: 2021-01-01 | End: 2021-01-01

## 2021-01-01 RX ORDER — MORPHINE SULFATE 50 MG/1
2 CAPSULE, EXTENDED RELEASE ORAL ONCE
Refills: 0 | Status: DISCONTINUED | OUTPATIENT
Start: 2021-01-01 | End: 2021-01-01

## 2021-01-01 RX ORDER — HEPARIN SODIUM 5000 [USP'U]/ML
5000 INJECTION INTRAVENOUS; SUBCUTANEOUS EVERY 12 HOURS
Refills: 0 | Status: DISCONTINUED | OUTPATIENT
Start: 2021-01-01 | End: 2021-01-01

## 2021-01-01 RX ORDER — DILTIAZEM HCL 120 MG
1 CAPSULE, EXT RELEASE 24 HR ORAL
Qty: 30 | Refills: 3
Start: 2021-01-01 | End: 2021-01-01

## 2021-01-01 RX ORDER — PROTHROMBIN COMPLEX CONCENTRATE (HUMAN) 25.5; 16.5; 24; 22; 22; 26 [IU]/ML; [IU]/ML; [IU]/ML; [IU]/ML; [IU]/ML; [IU]/ML
1500 POWDER, FOR SOLUTION INTRAVENOUS ONCE
Refills: 0 | Status: DISCONTINUED | OUTPATIENT
Start: 2021-01-01 | End: 2021-01-01

## 2021-01-01 RX ORDER — NITROGLYCERIN 6.5 MG
0.4 CAPSULE, EXTENDED RELEASE ORAL ONCE
Refills: 0 | Status: COMPLETED | OUTPATIENT
Start: 2021-01-01 | End: 2021-01-01

## 2021-01-01 RX ORDER — INSULIN GLARGINE 100 [IU]/ML
22 INJECTION, SOLUTION SUBCUTANEOUS
Qty: 0 | Refills: 0 | DISCHARGE

## 2021-01-01 RX ORDER — CEFTRIAXONE 500 MG/1
1000 INJECTION, POWDER, FOR SOLUTION INTRAMUSCULAR; INTRAVENOUS EVERY 24 HOURS
Refills: 0 | Status: DISCONTINUED | OUTPATIENT
Start: 2021-01-01 | End: 2021-01-01

## 2021-01-01 RX ORDER — ATORVASTATIN CALCIUM 80 MG/1
80 TABLET, FILM COATED ORAL AT BEDTIME
Refills: 0 | Status: DISCONTINUED | OUTPATIENT
Start: 2021-01-01 | End: 2021-01-01

## 2021-01-01 RX ORDER — CEFEPIME 1 G/1
INJECTION, POWDER, FOR SOLUTION INTRAMUSCULAR; INTRAVENOUS
Refills: 0 | Status: DISCONTINUED | OUTPATIENT
Start: 2021-01-01 | End: 2021-01-01

## 2021-01-01 RX ORDER — INSULIN LISPRO 100/ML
VIAL (ML) SUBCUTANEOUS EVERY 6 HOURS
Refills: 0 | Status: DISCONTINUED | OUTPATIENT
Start: 2021-01-01 | End: 2021-01-01

## 2021-01-01 RX ORDER — PROTHROMBIN COMPLEX CONCENTRATE (HUMAN) 25.5; 16.5; 24; 22; 22; 26 [IU]/ML; [IU]/ML; [IU]/ML; [IU]/ML; [IU]/ML; [IU]/ML
2000 POWDER, FOR SOLUTION INTRAVENOUS ONCE
Refills: 0 | Status: COMPLETED | OUTPATIENT
Start: 2021-01-01 | End: 2021-01-01

## 2021-01-01 RX ORDER — APIXABAN 2.5 MG/1
1 TABLET, FILM COATED ORAL
Qty: 60 | Refills: 3
Start: 2021-01-01 | End: 2021-01-01

## 2021-01-01 RX ORDER — CHOLECALCIFEROL (VITAMIN D3) 125 MCG
1 CAPSULE ORAL
Qty: 0 | Refills: 0 | DISCHARGE

## 2021-01-01 RX ORDER — PROPOFOL 10 MG/ML
20 INJECTION, EMULSION INTRAVENOUS
Qty: 1000 | Refills: 0 | Status: DISCONTINUED | OUTPATIENT
Start: 2021-01-01 | End: 2021-01-01

## 2021-01-01 RX ORDER — POTASSIUM CHLORIDE 20 MEQ
40 PACKET (EA) ORAL ONCE
Refills: 0 | Status: DISCONTINUED | OUTPATIENT
Start: 2021-01-01 | End: 2021-01-01

## 2021-01-01 RX ORDER — INSULIN HUMAN 100 [IU]/ML
5 INJECTION, SOLUTION SUBCUTANEOUS
Qty: 100 | Refills: 0 | Status: DISCONTINUED | OUTPATIENT
Start: 2021-01-01 | End: 2021-01-01

## 2021-01-01 RX ORDER — ROBINUL 0.2 MG/ML
0.2 INJECTION INTRAMUSCULAR; INTRAVENOUS EVERY 4 HOURS
Refills: 0 | Status: DISCONTINUED | OUTPATIENT
Start: 2021-01-01 | End: 2021-01-01

## 2021-01-01 RX ORDER — ALBUTEROL 90 UG/1
2 AEROSOL, METERED ORAL
Qty: 0 | Refills: 0 | DISCHARGE
Start: 2021-01-01

## 2021-01-01 RX ORDER — METOPROLOL TARTRATE 25 MG/1
25 TABLET, FILM COATED ORAL
Qty: 180 | Refills: 1 | Status: ACTIVE | COMMUNITY
Start: 1900-01-01 | End: 1900-01-01

## 2021-01-01 RX ORDER — ALBUTEROL 90 UG/1
1 AEROSOL, METERED ORAL ONCE
Refills: 0 | Status: COMPLETED | OUTPATIENT
Start: 2021-01-01 | End: 2021-01-01

## 2021-01-01 RX ORDER — HEPARIN SODIUM 5000 [USP'U]/ML
INJECTION INTRAVENOUS; SUBCUTANEOUS
Qty: 25000 | Refills: 0 | Status: DISCONTINUED | OUTPATIENT
Start: 2021-01-01 | End: 2021-01-01

## 2021-01-01 RX ORDER — FLUOCINONIDE/EMOLLIENT BASE 0.05 %
1 CREAM (GRAM) TOPICAL
Qty: 0 | Refills: 0 | DISCHARGE

## 2021-01-01 RX ORDER — ACETAMINOPHEN 500 MG
650 TABLET ORAL EVERY 6 HOURS
Refills: 0 | Status: DISCONTINUED | OUTPATIENT
Start: 2021-01-01 | End: 2021-01-01

## 2021-01-01 RX ORDER — LOPERAMIDE HCL 2 MG
2 TABLET ORAL EVERY 4 HOURS
Refills: 0 | Status: DISCONTINUED | OUTPATIENT
Start: 2021-01-01 | End: 2021-01-01

## 2021-01-01 RX ORDER — ASPIRIN/CALCIUM CARB/MAGNESIUM 324 MG
325 TABLET ORAL ONCE
Refills: 0 | Status: DISCONTINUED | OUTPATIENT
Start: 2021-01-01 | End: 2021-01-01

## 2021-01-01 RX ORDER — FLUOCINONIDE/EMOLLIENT BASE 0.05 %
1 CREAM (GRAM) TOPICAL
Refills: 0 | Status: DISCONTINUED | OUTPATIENT
Start: 2021-01-01 | End: 2021-01-01

## 2021-01-01 RX ORDER — GUAIFENESIN/DEXTROMETHORPHAN 600MG-30MG
10 TABLET, EXTENDED RELEASE 12 HR ORAL EVERY 4 HOURS
Refills: 0 | Status: DISCONTINUED | OUTPATIENT
Start: 2021-01-01 | End: 2021-01-01

## 2021-01-01 RX ORDER — ALPRAZOLAM 0.25 MG
0.12 TABLET ORAL EVERY 6 HOURS
Refills: 0 | Status: DISCONTINUED | OUTPATIENT
Start: 2021-01-01 | End: 2021-01-01

## 2021-01-01 RX ORDER — METFORMIN HYDROCHLORIDE 850 MG/1
1 TABLET ORAL
Qty: 0 | Refills: 0 | DISCHARGE

## 2021-01-01 RX ORDER — TRIAMTERENE AND HYDROCHLOROTHIAZIDE 25; 37.5 MG/1; MG/1
37.5-25 TABLET ORAL DAILY
Refills: 0 | Status: ACTIVE | COMMUNITY

## 2021-01-01 RX ORDER — ROSUVASTATIN CALCIUM 40 MG/1
40 TABLET, FILM COATED ORAL DAILY
Refills: 0 | Status: ACTIVE | COMMUNITY

## 2021-01-01 RX ORDER — LEVOTHYROXINE SODIUM 125 MCG
25 TABLET ORAL AT BEDTIME
Refills: 0 | Status: DISCONTINUED | OUTPATIENT
Start: 2021-01-01 | End: 2021-01-01

## 2021-01-01 RX ORDER — MORPHINE SULFATE 50 MG/1
2 CAPSULE, EXTENDED RELEASE ORAL
Refills: 0 | Status: COMPLETED | OUTPATIENT
Start: 2021-01-01 | End: 2021-01-01

## 2021-01-01 RX ORDER — LABETALOL HCL 100 MG
10 TABLET ORAL THREE TIMES A DAY
Refills: 0 | Status: DISCONTINUED | OUTPATIENT
Start: 2021-01-01 | End: 2021-01-01

## 2021-01-01 RX ORDER — FERROUS SULFATE 325(65) MG
1 TABLET ORAL
Qty: 0 | Refills: 0 | DISCHARGE

## 2021-01-01 RX ORDER — ONDANSETRON 8 MG/1
4 TABLET, FILM COATED ORAL ONCE
Refills: 0 | Status: COMPLETED | OUTPATIENT
Start: 2021-01-01 | End: 2021-01-01

## 2021-01-01 RX ORDER — ATORVASTATIN CALCIUM 80 MG/1
80 TABLET, FILM COATED ORAL
Refills: 0 | Status: DISCONTINUED | COMMUNITY
End: 2021-01-01

## 2021-01-01 RX ORDER — PANTOPRAZOLE SODIUM 20 MG/1
1 TABLET, DELAYED RELEASE ORAL
Qty: 0 | Refills: 0 | DISCHARGE

## 2021-01-01 RX ORDER — OLMESARTAN MEDOXOMIL 5 MG/1
1 TABLET, FILM COATED ORAL
Qty: 0 | Refills: 0 | DISCHARGE

## 2021-01-01 RX ORDER — DOCUSATE SODIUM 100 MG
1 CAPSULE ORAL
Qty: 0 | Refills: 0 | DISCHARGE

## 2021-01-01 RX ORDER — INSULIN GLARGINE 100 [IU]/ML
18 INJECTION, SOLUTION SUBCUTANEOUS AT BEDTIME
Refills: 0 | Status: DISCONTINUED | OUTPATIENT
Start: 2021-01-01 | End: 2021-01-01

## 2021-01-01 RX ORDER — PIPERACILLIN AND TAZOBACTAM 4; .5 G/20ML; G/20ML
3.38 INJECTION, POWDER, LYOPHILIZED, FOR SOLUTION INTRAVENOUS EVERY 8 HOURS
Refills: 0 | Status: DISCONTINUED | OUTPATIENT
Start: 2021-01-01 | End: 2021-01-01

## 2021-01-01 RX ADMIN — AZITHROMYCIN 500 MILLIGRAM(S): 500 TABLET, FILM COATED ORAL at 12:12

## 2021-01-01 RX ADMIN — Medication 2 SPRAY(S): at 22:40

## 2021-01-01 RX ADMIN — Medication 2: at 12:22

## 2021-01-01 RX ADMIN — PROPOFOL 30 MILLIGRAM(S): 10 INJECTION, EMULSION INTRAVENOUS at 02:22

## 2021-01-01 RX ADMIN — MORPHINE SULFATE 2 MILLIGRAM(S): 50 CAPSULE, EXTENDED RELEASE ORAL at 06:33

## 2021-01-01 RX ADMIN — Medication 5: at 12:46

## 2021-01-01 RX ADMIN — Medication 25 MILLIGRAM(S): at 18:21

## 2021-01-01 RX ADMIN — PROPOFOL 9.6 MICROGRAM(S)/KG/MIN: 10 INJECTION, EMULSION INTRAVENOUS at 03:14

## 2021-01-01 RX ADMIN — Medication 4: at 21:26

## 2021-01-01 RX ADMIN — ATORVASTATIN CALCIUM 80 MILLIGRAM(S): 80 TABLET, FILM COATED ORAL at 22:54

## 2021-01-01 RX ADMIN — Medication 81 MILLIGRAM(S): at 08:20

## 2021-01-01 RX ADMIN — ONDANSETRON 4 MILLIGRAM(S): 8 TABLET, FILM COATED ORAL at 18:15

## 2021-01-01 RX ADMIN — Medication 2: at 09:55

## 2021-01-01 RX ADMIN — Medication 2 SPRAY(S): at 01:10

## 2021-01-01 RX ADMIN — Medication 6: at 12:28

## 2021-01-01 RX ADMIN — Medication 1 TABLET(S): at 08:36

## 2021-01-01 RX ADMIN — Medication 4: at 12:17

## 2021-01-01 RX ADMIN — Medication 2 SPRAY(S): at 18:02

## 2021-01-01 RX ADMIN — Medication 4 MILLIGRAM(S): at 13:07

## 2021-01-01 RX ADMIN — Medication 10: at 22:01

## 2021-01-01 RX ADMIN — Medication 12.5 MILLIGRAM(S): at 05:33

## 2021-01-01 RX ADMIN — APIXABAN 5 MILLIGRAM(S): 2.5 TABLET, FILM COATED ORAL at 10:59

## 2021-01-01 RX ADMIN — PANTOPRAZOLE SODIUM 40 MILLIGRAM(S): 20 TABLET, DELAYED RELEASE ORAL at 05:26

## 2021-01-01 RX ADMIN — Medication 325 MILLIGRAM(S): at 14:55

## 2021-01-01 RX ADMIN — Medication 4: at 12:01

## 2021-01-01 RX ADMIN — Medication 50 MILLIGRAM(S): at 14:55

## 2021-01-01 RX ADMIN — Medication 12.5 MILLIGRAM(S): at 21:33

## 2021-01-01 RX ADMIN — PROPOFOL 9.6 MICROGRAM(S)/KG/MIN: 10 INJECTION, EMULSION INTRAVENOUS at 12:03

## 2021-01-01 RX ADMIN — SODIUM CHLORIDE 30 MILLILITER(S): 5 INJECTION, SOLUTION INTRAVENOUS at 03:13

## 2021-01-01 RX ADMIN — CLOPIDOGREL BISULFATE 75 MILLIGRAM(S): 75 TABLET, FILM COATED ORAL at 10:06

## 2021-01-01 RX ADMIN — Medication 81 MILLIGRAM(S): at 09:04

## 2021-01-01 RX ADMIN — INSULIN GLARGINE 10 UNIT(S): 100 INJECTION, SOLUTION SUBCUTANEOUS at 23:26

## 2021-01-01 RX ADMIN — Medication 81 MILLIGRAM(S): at 10:28

## 2021-01-01 RX ADMIN — CEFEPIME 100 MILLIGRAM(S): 1 INJECTION, POWDER, FOR SOLUTION INTRAMUSCULAR; INTRAVENOUS at 16:05

## 2021-01-01 RX ADMIN — Medication 6 MILLIGRAM(S): at 09:51

## 2021-01-01 RX ADMIN — INSULIN GLARGINE 15 UNIT(S): 100 INJECTION, SOLUTION SUBCUTANEOUS at 22:12

## 2021-01-01 RX ADMIN — Medication 6 MILLIGRAM(S): at 08:21

## 2021-01-01 RX ADMIN — Medication 4: at 18:19

## 2021-01-01 RX ADMIN — Medication 4: at 22:15

## 2021-01-01 RX ADMIN — Medication 2.5 MILLIGRAM(S): at 22:54

## 2021-01-01 RX ADMIN — Medication 81 MILLIGRAM(S): at 09:53

## 2021-01-01 RX ADMIN — Medication 75 MICROGRAM(S): at 05:43

## 2021-01-01 RX ADMIN — LEVETIRACETAM 400 MILLIGRAM(S): 250 TABLET, FILM COATED ORAL at 22:35

## 2021-01-01 RX ADMIN — SODIUM CHLORIDE 75 MILLILITER(S): 9 INJECTION, SOLUTION INTRAVENOUS at 13:10

## 2021-01-01 RX ADMIN — Medication 120 MILLIGRAM(S): at 10:38

## 2021-01-01 RX ADMIN — ONDANSETRON 4 MILLIGRAM(S): 8 TABLET, FILM COATED ORAL at 21:40

## 2021-01-01 RX ADMIN — Medication 6 MILLIGRAM(S): at 10:27

## 2021-01-01 RX ADMIN — ROBINUL 0.2 MILLIGRAM(S): 0.2 INJECTION INTRAMUSCULAR; INTRAVENOUS at 15:04

## 2021-01-01 RX ADMIN — Medication 12.5 MILLIGRAM(S): at 09:46

## 2021-01-01 RX ADMIN — Medication 120 MILLIGRAM(S): at 08:57

## 2021-01-01 RX ADMIN — APIXABAN 5 MILLIGRAM(S): 2.5 TABLET, FILM COATED ORAL at 09:04

## 2021-01-01 RX ADMIN — CHLORHEXIDINE GLUCONATE 1 APPLICATION(S): 213 SOLUTION TOPICAL at 06:45

## 2021-01-01 RX ADMIN — PIPERACILLIN AND TAZOBACTAM 200 GRAM(S): 4; .5 INJECTION, POWDER, LYOPHILIZED, FOR SOLUTION INTRAVENOUS at 01:30

## 2021-01-01 RX ADMIN — INSULIN GLARGINE 25 UNIT(S): 100 INJECTION, SOLUTION SUBCUTANEOUS at 08:04

## 2021-01-01 RX ADMIN — CEFEPIME 100 MILLIGRAM(S): 1 INJECTION, POWDER, FOR SOLUTION INTRAMUSCULAR; INTRAVENOUS at 06:59

## 2021-01-01 RX ADMIN — Medication 4 MILLIGRAM(S): at 05:04

## 2021-01-01 RX ADMIN — PROPOFOL 9.6 MICROGRAM(S)/KG/MIN: 10 INJECTION, EMULSION INTRAVENOUS at 23:39

## 2021-01-01 RX ADMIN — Medication 75 MICROGRAM(S): at 06:31

## 2021-01-01 RX ADMIN — Medication 12.5 MILLIGRAM(S): at 08:57

## 2021-01-01 RX ADMIN — Medication 6 MILLIGRAM(S): at 14:34

## 2021-01-01 RX ADMIN — Medication 250 MILLIGRAM(S): at 00:21

## 2021-01-01 RX ADMIN — INSULIN GLARGINE 25 UNIT(S): 100 INJECTION, SOLUTION SUBCUTANEOUS at 21:02

## 2021-01-01 RX ADMIN — ATORVASTATIN CALCIUM 40 MILLIGRAM(S): 80 TABLET, FILM COATED ORAL at 22:25

## 2021-01-01 RX ADMIN — Medication 120 MILLIGRAM(S): at 09:20

## 2021-01-01 RX ADMIN — POLYETHYLENE GLYCOL 3350 17 GRAM(S): 17 POWDER, FOR SOLUTION ORAL at 10:04

## 2021-01-01 RX ADMIN — PANTOPRAZOLE SODIUM 40 MILLIGRAM(S): 20 TABLET, DELAYED RELEASE ORAL at 06:40

## 2021-01-01 RX ADMIN — APIXABAN 5 MILLIGRAM(S): 2.5 TABLET, FILM COATED ORAL at 20:16

## 2021-01-01 RX ADMIN — APIXABAN 5 MILLIGRAM(S): 2.5 TABLET, FILM COATED ORAL at 22:54

## 2021-01-01 RX ADMIN — Medication 25 MILLIGRAM(S): at 20:13

## 2021-01-01 RX ADMIN — PROPOFOL 9.6 MICROGRAM(S)/KG/MIN: 10 INJECTION, EMULSION INTRAVENOUS at 19:49

## 2021-01-01 RX ADMIN — Medication 1 TABLET(S): at 10:38

## 2021-01-01 RX ADMIN — Medication 12.5 MILLIGRAM(S): at 21:18

## 2021-01-01 RX ADMIN — Medication 4: at 16:59

## 2021-01-01 RX ADMIN — Medication 0.12 MILLIGRAM(S): at 20:13

## 2021-01-01 RX ADMIN — Medication 4: at 12:00

## 2021-01-01 RX ADMIN — SODIUM CHLORIDE 75 MILLILITER(S): 9 INJECTION, SOLUTION INTRAVENOUS at 03:01

## 2021-01-01 RX ADMIN — Medication 8: at 22:18

## 2021-01-01 RX ADMIN — Medication 1 TABLET(S): at 10:27

## 2021-01-01 RX ADMIN — APIXABAN 5 MILLIGRAM(S): 2.5 TABLET, FILM COATED ORAL at 10:07

## 2021-01-01 RX ADMIN — LOSARTAN POTASSIUM 50 MILLIGRAM(S): 100 TABLET, FILM COATED ORAL at 10:52

## 2021-01-01 RX ADMIN — INSULIN GLARGINE 25 UNIT(S): 100 INJECTION, SOLUTION SUBCUTANEOUS at 09:08

## 2021-01-01 RX ADMIN — SODIUM CHLORIDE 75 MILLILITER(S): 9 INJECTION INTRAMUSCULAR; INTRAVENOUS; SUBCUTANEOUS at 16:12

## 2021-01-01 RX ADMIN — Medication 75 MICROGRAM(S): at 05:49

## 2021-01-01 RX ADMIN — Medication 12.5 MILLIGRAM(S): at 21:04

## 2021-01-01 RX ADMIN — ATORVASTATIN CALCIUM 80 MILLIGRAM(S): 80 TABLET, FILM COATED ORAL at 21:20

## 2021-01-01 RX ADMIN — SODIUM CHLORIDE 100 MILLILITER(S): 9 INJECTION INTRAMUSCULAR; INTRAVENOUS; SUBCUTANEOUS at 18:32

## 2021-01-01 RX ADMIN — INSULIN GLARGINE 25 UNIT(S): 100 INJECTION, SOLUTION SUBCUTANEOUS at 08:46

## 2021-01-01 RX ADMIN — APIXABAN 5 MILLIGRAM(S): 2.5 TABLET, FILM COATED ORAL at 08:36

## 2021-01-01 RX ADMIN — CHLORHEXIDINE GLUCONATE 1 APPLICATION(S): 213 SOLUTION TOPICAL at 05:05

## 2021-01-01 RX ADMIN — Medication 0.4 MILLIGRAM(S): at 15:27

## 2021-01-01 RX ADMIN — CLOPIDOGREL BISULFATE 75 MILLIGRAM(S): 75 TABLET, FILM COATED ORAL at 10:07

## 2021-01-01 RX ADMIN — Medication 10 MILLIGRAM(S): at 15:12

## 2021-01-01 RX ADMIN — Medication 12.5 MILLIGRAM(S): at 14:12

## 2021-01-01 RX ADMIN — SODIUM CHLORIDE 75 MILLILITER(S): 9 INJECTION INTRAMUSCULAR; INTRAVENOUS; SUBCUTANEOUS at 06:26

## 2021-01-01 RX ADMIN — Medication 2 SPRAY(S): at 00:05

## 2021-01-01 RX ADMIN — PANTOPRAZOLE SODIUM 40 MILLIGRAM(S): 20 TABLET, DELAYED RELEASE ORAL at 06:30

## 2021-01-01 RX ADMIN — ATORVASTATIN CALCIUM 80 MILLIGRAM(S): 80 TABLET, FILM COATED ORAL at 21:02

## 2021-01-01 RX ADMIN — CEFTRIAXONE 1000 MILLIGRAM(S): 500 INJECTION, POWDER, FOR SOLUTION INTRAMUSCULAR; INTRAVENOUS at 04:28

## 2021-01-01 RX ADMIN — Medication 75 MICROGRAM(S): at 05:33

## 2021-01-01 RX ADMIN — Medication 1 APPLICATION(S): at 09:20

## 2021-01-01 RX ADMIN — Medication 0.12 MILLIGRAM(S): at 12:21

## 2021-01-01 RX ADMIN — Medication 12.5 MILLIGRAM(S): at 05:14

## 2021-01-01 RX ADMIN — INSULIN GLARGINE 25 UNIT(S): 100 INJECTION, SOLUTION SUBCUTANEOUS at 08:29

## 2021-01-01 RX ADMIN — PANTOPRAZOLE SODIUM 40 MILLIGRAM(S): 20 TABLET, DELAYED RELEASE ORAL at 12:02

## 2021-01-01 RX ADMIN — ATORVASTATIN CALCIUM 80 MILLIGRAM(S): 80 TABLET, FILM COATED ORAL at 21:17

## 2021-01-01 RX ADMIN — LEVETIRACETAM 400 MILLIGRAM(S): 250 TABLET, FILM COATED ORAL at 21:59

## 2021-01-01 RX ADMIN — Medication 12.5 MILLIGRAM(S): at 07:58

## 2021-01-01 RX ADMIN — INSULIN GLARGINE 25 UNIT(S): 100 INJECTION, SOLUTION SUBCUTANEOUS at 08:31

## 2021-01-01 RX ADMIN — ROBINUL 0.2 MILLIGRAM(S): 0.2 INJECTION INTRAMUSCULAR; INTRAVENOUS at 12:00

## 2021-01-01 RX ADMIN — Medication 4: at 17:53

## 2021-01-01 RX ADMIN — ATORVASTATIN CALCIUM 80 MILLIGRAM(S): 80 TABLET, FILM COATED ORAL at 20:30

## 2021-01-01 RX ADMIN — Medication 0.12 MILLIGRAM(S): at 21:20

## 2021-01-01 RX ADMIN — Medication 6 MILLIGRAM(S): at 09:54

## 2021-01-01 RX ADMIN — CLOPIDOGREL BISULFATE 75 MILLIGRAM(S): 75 TABLET, FILM COATED ORAL at 10:33

## 2021-01-01 RX ADMIN — Medication 2: at 21:18

## 2021-01-01 RX ADMIN — Medication 1 APPLICATION(S): at 22:12

## 2021-01-01 RX ADMIN — APIXABAN 5 MILLIGRAM(S): 2.5 TABLET, FILM COATED ORAL at 21:13

## 2021-01-01 RX ADMIN — Medication 0.12 MILLIGRAM(S): at 21:18

## 2021-01-01 RX ADMIN — Medication 2 SPRAY(S): at 05:26

## 2021-01-01 RX ADMIN — CLOPIDOGREL BISULFATE 75 MILLIGRAM(S): 75 TABLET, FILM COATED ORAL at 09:04

## 2021-01-01 RX ADMIN — CLOPIDOGREL BISULFATE 75 MILLIGRAM(S): 75 TABLET, FILM COATED ORAL at 10:05

## 2021-01-01 RX ADMIN — MORPHINE SULFATE 2 MILLIGRAM(S): 50 CAPSULE, EXTENDED RELEASE ORAL at 15:04

## 2021-01-01 RX ADMIN — CEFEPIME 100 MILLIGRAM(S): 1 INJECTION, POWDER, FOR SOLUTION INTRAMUSCULAR; INTRAVENOUS at 05:20

## 2021-01-01 RX ADMIN — ATORVASTATIN CALCIUM 40 MILLIGRAM(S): 80 TABLET, FILM COATED ORAL at 20:14

## 2021-01-01 RX ADMIN — Medication 12.5 MILLIGRAM(S): at 14:13

## 2021-01-01 RX ADMIN — APIXABAN 5 MILLIGRAM(S): 2.5 TABLET, FILM COATED ORAL at 21:02

## 2021-01-01 RX ADMIN — ATORVASTATIN CALCIUM 40 MILLIGRAM(S): 80 TABLET, FILM COATED ORAL at 21:40

## 2021-01-01 RX ADMIN — CEFEPIME 100 MILLIGRAM(S): 1 INJECTION, POWDER, FOR SOLUTION INTRAMUSCULAR; INTRAVENOUS at 16:14

## 2021-01-01 RX ADMIN — Medication 12.5 MILLIGRAM(S): at 14:25

## 2021-01-01 RX ADMIN — CEFEPIME 100 MILLIGRAM(S): 1 INJECTION, POWDER, FOR SOLUTION INTRAMUSCULAR; INTRAVENOUS at 00:21

## 2021-01-01 RX ADMIN — NICARDIPINE HYDROCHLORIDE 25 MG/HR: 30 CAPSULE, EXTENDED RELEASE ORAL at 00:15

## 2021-01-01 RX ADMIN — MORPHINE SULFATE 2 MILLIGRAM(S): 50 CAPSULE, EXTENDED RELEASE ORAL at 14:55

## 2021-01-01 RX ADMIN — Medication 250 MILLIGRAM(S): at 11:58

## 2021-01-01 RX ADMIN — Medication 75 MICROGRAM(S): at 06:09

## 2021-01-01 RX ADMIN — ATORVASTATIN CALCIUM 80 MILLIGRAM(S): 80 TABLET, FILM COATED ORAL at 21:18

## 2021-01-01 RX ADMIN — INSULIN GLARGINE 25 UNIT(S): 100 INJECTION, SOLUTION SUBCUTANEOUS at 12:21

## 2021-01-01 RX ADMIN — Medication 25 MILLIGRAM(S): at 22:25

## 2021-01-01 RX ADMIN — Medication 40 MILLIEQUIVALENT(S): at 16:43

## 2021-01-01 RX ADMIN — Medication 6 MILLIGRAM(S): at 09:05

## 2021-01-01 RX ADMIN — CEFEPIME 100 MILLIGRAM(S): 1 INJECTION, POWDER, FOR SOLUTION INTRAMUSCULAR; INTRAVENOUS at 17:57

## 2021-01-01 RX ADMIN — LEVETIRACETAM 400 MILLIGRAM(S): 250 TABLET, FILM COATED ORAL at 12:37

## 2021-01-01 RX ADMIN — Medication 120 MILLIGRAM(S): at 09:06

## 2021-01-01 RX ADMIN — Medication 75 MICROGRAM(S): at 06:01

## 2021-01-01 RX ADMIN — MORPHINE SULFATE 2 MILLIGRAM(S): 50 CAPSULE, EXTENDED RELEASE ORAL at 06:18

## 2021-01-01 RX ADMIN — Medication 2 SPRAY(S): at 05:06

## 2021-01-01 RX ADMIN — CEFEPIME 100 MILLIGRAM(S): 1 INJECTION, POWDER, FOR SOLUTION INTRAMUSCULAR; INTRAVENOUS at 05:39

## 2021-01-01 RX ADMIN — PANTOPRAZOLE SODIUM 40 MILLIGRAM(S): 20 TABLET, DELAYED RELEASE ORAL at 05:14

## 2021-01-01 RX ADMIN — APIXABAN 5 MILLIGRAM(S): 2.5 TABLET, FILM COATED ORAL at 21:36

## 2021-01-01 RX ADMIN — APIXABAN 5 MILLIGRAM(S): 2.5 TABLET, FILM COATED ORAL at 22:25

## 2021-01-01 RX ADMIN — POLYETHYLENE GLYCOL 3350 17 GRAM(S): 17 POWDER, FOR SOLUTION ORAL at 10:38

## 2021-01-01 RX ADMIN — PANTOPRAZOLE SODIUM 40 MILLIGRAM(S): 20 TABLET, DELAYED RELEASE ORAL at 05:33

## 2021-01-01 RX ADMIN — Medication 120 MILLIGRAM(S): at 11:58

## 2021-01-01 RX ADMIN — Medication 75 MICROGRAM(S): at 06:59

## 2021-01-01 RX ADMIN — Medication 10 MILLIGRAM(S): at 08:09

## 2021-01-01 RX ADMIN — Medication 4 MILLIGRAM(S): at 06:26

## 2021-01-01 RX ADMIN — APIXABAN 5 MILLIGRAM(S): 2.5 TABLET, FILM COATED ORAL at 10:05

## 2021-01-01 RX ADMIN — Medication 1 TABLET(S): at 09:40

## 2021-01-01 RX ADMIN — Medication 12.5 MILLIGRAM(S): at 09:39

## 2021-01-01 RX ADMIN — CHLORHEXIDINE GLUCONATE 15 MILLILITER(S): 213 SOLUTION TOPICAL at 05:04

## 2021-01-01 RX ADMIN — INSULIN GLARGINE 25 UNIT(S): 100 INJECTION, SOLUTION SUBCUTANEOUS at 08:51

## 2021-01-01 RX ADMIN — Medication 4 MILLIGRAM(S): at 12:37

## 2021-01-01 RX ADMIN — CEFEPIME 100 MILLIGRAM(S): 1 INJECTION, POWDER, FOR SOLUTION INTRAMUSCULAR; INTRAVENOUS at 05:34

## 2021-01-01 RX ADMIN — Medication 2 SPRAY(S): at 17:07

## 2021-01-01 RX ADMIN — CEFTRIAXONE 1000 MILLIGRAM(S): 500 INJECTION, POWDER, FOR SOLUTION INTRAMUSCULAR; INTRAVENOUS at 04:47

## 2021-01-01 RX ADMIN — Medication 2 SPRAY(S): at 12:08

## 2021-01-01 RX ADMIN — CLOPIDOGREL BISULFATE 75 MILLIGRAM(S): 75 TABLET, FILM COATED ORAL at 09:05

## 2021-01-01 RX ADMIN — Medication 8: at 23:52

## 2021-01-01 RX ADMIN — Medication 75 MICROGRAM(S): at 06:40

## 2021-01-01 RX ADMIN — Medication 120 MILLIGRAM(S): at 10:28

## 2021-01-01 RX ADMIN — Medication 40 MILLIEQUIVALENT(S): at 18:22

## 2021-01-01 RX ADMIN — Medication 2 SPRAY(S): at 17:48

## 2021-01-01 RX ADMIN — Medication 1: at 16:22

## 2021-01-01 RX ADMIN — Medication 0: at 00:00

## 2021-01-01 RX ADMIN — Medication 1 TABLET(S): at 10:05

## 2021-01-01 RX ADMIN — Medication 40 MILLIEQUIVALENT(S): at 13:27

## 2021-01-01 RX ADMIN — CHLORHEXIDINE GLUCONATE 15 MILLILITER(S): 213 SOLUTION TOPICAL at 06:45

## 2021-01-01 RX ADMIN — Medication 120 MILLIGRAM(S): at 10:06

## 2021-01-01 RX ADMIN — Medication 10: at 17:25

## 2021-01-01 RX ADMIN — Medication 6: at 12:27

## 2021-01-01 RX ADMIN — Medication 12: at 12:02

## 2021-01-01 RX ADMIN — Medication 6: at 21:15

## 2021-01-01 RX ADMIN — Medication 6 MILLIGRAM(S): at 14:55

## 2021-01-01 RX ADMIN — PANTOPRAZOLE SODIUM 40 MILLIGRAM(S): 20 TABLET, DELAYED RELEASE ORAL at 05:06

## 2021-01-01 RX ADMIN — Medication 1 TABLET(S): at 09:54

## 2021-01-01 RX ADMIN — APIXABAN 5 MILLIGRAM(S): 2.5 TABLET, FILM COATED ORAL at 09:54

## 2021-01-01 RX ADMIN — Medication 2 SPRAY(S): at 21:56

## 2021-01-01 RX ADMIN — CLOPIDOGREL BISULFATE 75 MILLIGRAM(S): 75 TABLET, FILM COATED ORAL at 10:00

## 2021-01-01 RX ADMIN — AZITHROMYCIN 255 MILLIGRAM(S): 500 TABLET, FILM COATED ORAL at 09:53

## 2021-01-01 RX ADMIN — APIXABAN 5 MILLIGRAM(S): 2.5 TABLET, FILM COATED ORAL at 21:16

## 2021-01-01 RX ADMIN — Medication 8: at 11:58

## 2021-01-01 RX ADMIN — Medication 75 MICROGRAM(S): at 06:00

## 2021-01-01 RX ADMIN — Medication 4 MILLIGRAM(S): at 23:47

## 2021-01-01 RX ADMIN — POLYETHYLENE GLYCOL 3350 17 GRAM(S): 17 POWDER, FOR SOLUTION ORAL at 09:04

## 2021-01-01 RX ADMIN — Medication 4: at 17:16

## 2021-01-01 RX ADMIN — CEFTRIAXONE 1000 MILLIGRAM(S): 500 INJECTION, POWDER, FOR SOLUTION INTRAMUSCULAR; INTRAVENOUS at 03:31

## 2021-01-01 RX ADMIN — Medication 8: at 17:23

## 2021-01-01 RX ADMIN — Medication 12.5 MILLIGRAM(S): at 10:38

## 2021-01-01 RX ADMIN — Medication 4 MILLIGRAM(S): at 18:48

## 2021-01-01 RX ADMIN — Medication 81 MILLIGRAM(S): at 17:06

## 2021-01-01 RX ADMIN — ATORVASTATIN CALCIUM 80 MILLIGRAM(S): 80 TABLET, FILM COATED ORAL at 21:13

## 2021-01-01 RX ADMIN — APIXABAN 5 MILLIGRAM(S): 2.5 TABLET, FILM COATED ORAL at 21:06

## 2021-01-01 RX ADMIN — PANTOPRAZOLE SODIUM 40 MILLIGRAM(S): 20 TABLET, DELAYED RELEASE ORAL at 05:18

## 2021-01-01 RX ADMIN — Medication 120 MILLIGRAM(S): at 11:16

## 2021-01-01 RX ADMIN — Medication 10 MILLIGRAM(S): at 19:44

## 2021-01-01 RX ADMIN — Medication 1 APPLICATION(S): at 22:15

## 2021-01-01 RX ADMIN — INSULIN GLARGINE 15 UNIT(S): 100 INJECTION, SOLUTION SUBCUTANEOUS at 00:20

## 2021-01-01 RX ADMIN — Medication 2: at 22:08

## 2021-01-01 RX ADMIN — PANTOPRAZOLE SODIUM 40 MILLIGRAM(S): 20 TABLET, DELAYED RELEASE ORAL at 07:29

## 2021-01-01 RX ADMIN — CLOPIDOGREL BISULFATE 75 MILLIGRAM(S): 75 TABLET, FILM COATED ORAL at 09:52

## 2021-01-01 RX ADMIN — CEFTRIAXONE 1000 MILLIGRAM(S): 500 INJECTION, POWDER, FOR SOLUTION INTRAMUSCULAR; INTRAVENOUS at 04:02

## 2021-01-01 RX ADMIN — Medication 6: at 16:45

## 2021-01-01 RX ADMIN — Medication 10: at 18:42

## 2021-01-01 RX ADMIN — Medication 75 MICROGRAM(S): at 05:18

## 2021-01-01 RX ADMIN — LEVETIRACETAM 400 MILLIGRAM(S): 250 TABLET, FILM COATED ORAL at 09:37

## 2021-01-01 RX ADMIN — POLYETHYLENE GLYCOL 3350 17 GRAM(S): 17 POWDER, FOR SOLUTION ORAL at 09:46

## 2021-01-01 RX ADMIN — Medication 8: at 16:47

## 2021-01-01 RX ADMIN — APIXABAN 5 MILLIGRAM(S): 2.5 TABLET, FILM COATED ORAL at 21:15

## 2021-01-01 RX ADMIN — INSULIN GLARGINE 25 UNIT(S): 100 INJECTION, SOLUTION SUBCUTANEOUS at 22:15

## 2021-01-01 RX ADMIN — Medication 1 APPLICATION(S): at 10:34

## 2021-01-01 RX ADMIN — Medication 2 SPRAY(S): at 17:49

## 2021-01-01 RX ADMIN — Medication 0.12 MILLIGRAM(S): at 10:58

## 2021-01-01 RX ADMIN — CEFEPIME 100 MILLIGRAM(S): 1 INJECTION, POWDER, FOR SOLUTION INTRAMUSCULAR; INTRAVENOUS at 05:15

## 2021-01-01 RX ADMIN — APIXABAN 5 MILLIGRAM(S): 2.5 TABLET, FILM COATED ORAL at 10:38

## 2021-01-01 RX ADMIN — Medication 12.5 MILLIGRAM(S): at 21:16

## 2021-01-01 RX ADMIN — APIXABAN 5 MILLIGRAM(S): 2.5 TABLET, FILM COATED ORAL at 11:08

## 2021-01-01 RX ADMIN — Medication 2: at 17:28

## 2021-01-01 RX ADMIN — Medication 1 APPLICATION(S): at 21:36

## 2021-01-01 RX ADMIN — PROTHROMBIN COMPLEX CONCENTRATE (HUMAN) 400 INTERNATIONAL UNIT(S): 25.5; 16.5; 24; 22; 22; 26 POWDER, FOR SOLUTION INTRAVENOUS at 00:00

## 2021-01-01 RX ADMIN — APIXABAN 5 MILLIGRAM(S): 2.5 TABLET, FILM COATED ORAL at 22:39

## 2021-01-01 RX ADMIN — APIXABAN 5 MILLIGRAM(S): 2.5 TABLET, FILM COATED ORAL at 09:20

## 2021-01-01 RX ADMIN — APIXABAN 5 MILLIGRAM(S): 2.5 TABLET, FILM COATED ORAL at 22:16

## 2021-01-01 RX ADMIN — APIXABAN 5 MILLIGRAM(S): 2.5 TABLET, FILM COATED ORAL at 21:18

## 2021-01-01 RX ADMIN — Medication 1 APPLICATION(S): at 21:39

## 2021-01-01 RX ADMIN — Medication 2 SPRAY(S): at 06:02

## 2021-01-01 RX ADMIN — HEPARIN SODIUM 5000 UNIT(S): 5000 INJECTION INTRAVENOUS; SUBCUTANEOUS at 22:18

## 2021-01-01 RX ADMIN — Medication 0.25 MILLIGRAM(S): at 00:27

## 2021-01-01 RX ADMIN — POLYETHYLENE GLYCOL 3350 17 GRAM(S): 17 POWDER, FOR SOLUTION ORAL at 10:00

## 2021-01-01 RX ADMIN — Medication 1 APPLICATION(S): at 22:19

## 2021-01-01 RX ADMIN — MORPHINE SULFATE 2 MILLIGRAM(S): 50 CAPSULE, EXTENDED RELEASE ORAL at 16:11

## 2021-01-01 RX ADMIN — ATORVASTATIN CALCIUM 80 MILLIGRAM(S): 80 TABLET, FILM COATED ORAL at 21:51

## 2021-01-01 RX ADMIN — ATORVASTATIN CALCIUM 40 MILLIGRAM(S): 80 TABLET, FILM COATED ORAL at 22:18

## 2021-01-01 RX ADMIN — Medication 4 MILLIGRAM(S): at 06:44

## 2021-01-01 RX ADMIN — Medication 12.5 MILLIGRAM(S): at 13:09

## 2021-01-01 RX ADMIN — PANTOPRAZOLE SODIUM 40 MILLIGRAM(S): 20 TABLET, DELAYED RELEASE ORAL at 06:01

## 2021-01-01 RX ADMIN — SODIUM CHLORIDE 2100 MILLILITER(S): 9 INJECTION, SOLUTION INTRAVENOUS at 00:45

## 2021-01-01 RX ADMIN — POLYETHYLENE GLYCOL 3350 17 GRAM(S): 17 POWDER, FOR SOLUTION ORAL at 09:40

## 2021-01-01 RX ADMIN — LEVETIRACETAM 400 MILLIGRAM(S): 250 TABLET, FILM COATED ORAL at 22:39

## 2021-01-01 RX ADMIN — Medication 40 MILLIEQUIVALENT(S): at 11:23

## 2021-01-01 RX ADMIN — AZITHROMYCIN 255 MILLIGRAM(S): 500 TABLET, FILM COATED ORAL at 10:06

## 2021-01-01 RX ADMIN — ATORVASTATIN CALCIUM 80 MILLIGRAM(S): 80 TABLET, FILM COATED ORAL at 22:08

## 2021-01-01 RX ADMIN — Medication 0.5 MILLIGRAM(S): at 11:23

## 2021-01-01 RX ADMIN — INSULIN GLARGINE 25 UNIT(S): 100 INJECTION, SOLUTION SUBCUTANEOUS at 21:14

## 2021-01-01 RX ADMIN — Medication 10 MG/HR: at 12:57

## 2021-01-01 RX ADMIN — APIXABAN 5 MILLIGRAM(S): 2.5 TABLET, FILM COATED ORAL at 21:19

## 2021-01-01 RX ADMIN — CEFTRIAXONE 1000 MILLIGRAM(S): 500 INJECTION, POWDER, FOR SOLUTION INTRAMUSCULAR; INTRAVENOUS at 04:00

## 2021-01-01 RX ADMIN — Medication 12.5 MILLIGRAM(S): at 22:39

## 2021-01-01 RX ADMIN — Medication 25 MILLIGRAM(S): at 11:59

## 2021-01-01 RX ADMIN — Medication 120 MILLIGRAM(S): at 10:00

## 2021-01-01 RX ADMIN — Medication 40 MILLIEQUIVALENT(S): at 12:02

## 2021-01-01 RX ADMIN — PANTOPRAZOLE SODIUM 40 MILLIGRAM(S): 20 TABLET, DELAYED RELEASE ORAL at 05:43

## 2021-01-01 RX ADMIN — Medication 40 MILLIEQUIVALENT(S): at 04:46

## 2021-01-01 RX ADMIN — Medication 12.5 MILLIGRAM(S): at 21:51

## 2021-01-01 RX ADMIN — APIXABAN 5 MILLIGRAM(S): 2.5 TABLET, FILM COATED ORAL at 21:51

## 2021-01-01 RX ADMIN — Medication 0.12 MILLIGRAM(S): at 06:31

## 2021-01-01 RX ADMIN — INSULIN GLARGINE 25 UNIT(S): 100 INJECTION, SOLUTION SUBCUTANEOUS at 09:39

## 2021-01-01 RX ADMIN — ATORVASTATIN CALCIUM 40 MILLIGRAM(S): 80 TABLET, FILM COATED ORAL at 22:13

## 2021-01-01 RX ADMIN — Medication 4: at 22:45

## 2021-01-01 RX ADMIN — Medication 75 MICROGRAM(S): at 06:03

## 2021-01-01 RX ADMIN — Medication 120 MILLIGRAM(S): at 16:19

## 2021-01-01 RX ADMIN — ATORVASTATIN CALCIUM 80 MILLIGRAM(S): 80 TABLET, FILM COATED ORAL at 21:15

## 2021-01-01 RX ADMIN — APIXABAN 5 MILLIGRAM(S): 2.5 TABLET, FILM COATED ORAL at 10:51

## 2021-01-01 RX ADMIN — Medication 12.5 MILLIGRAM(S): at 16:06

## 2021-01-01 RX ADMIN — Medication 81 MILLIGRAM(S): at 09:59

## 2021-01-01 RX ADMIN — Medication 40 MILLIEQUIVALENT(S): at 17:58

## 2021-01-01 RX ADMIN — SODIUM CHLORIDE 2100 MILLILITER(S): 9 INJECTION, SOLUTION INTRAVENOUS at 01:45

## 2021-01-01 RX ADMIN — Medication 8: at 17:19

## 2021-01-01 RX ADMIN — Medication 6: at 22:13

## 2021-01-01 RX ADMIN — Medication 12.5 MILLIGRAM(S): at 10:06

## 2021-01-01 RX ADMIN — PANTOPRAZOLE SODIUM 40 MILLIGRAM(S): 20 TABLET, DELAYED RELEASE ORAL at 06:00

## 2021-01-01 RX ADMIN — PROPOFOL 9.6 MICROGRAM(S)/KG/MIN: 10 INJECTION, EMULSION INTRAVENOUS at 08:04

## 2021-01-01 RX ADMIN — PROPOFOL 9.6 MICROGRAM(S)/KG/MIN: 10 INJECTION, EMULSION INTRAVENOUS at 23:47

## 2021-01-01 RX ADMIN — INSULIN GLARGINE 10 UNIT(S): 100 INJECTION, SOLUTION SUBCUTANEOUS at 22:44

## 2021-01-01 RX ADMIN — ATORVASTATIN CALCIUM 80 MILLIGRAM(S): 80 TABLET, FILM COATED ORAL at 22:39

## 2021-01-01 RX ADMIN — Medication 6 MILLIGRAM(S): at 09:21

## 2021-01-01 RX ADMIN — PROPOFOL 9.6 MICROGRAM(S)/KG/MIN: 10 INJECTION, EMULSION INTRAVENOUS at 06:44

## 2021-01-01 RX ADMIN — Medication 2 SPRAY(S): at 00:27

## 2021-01-01 RX ADMIN — INSULIN GLARGINE 25 UNIT(S): 100 INJECTION, SOLUTION SUBCUTANEOUS at 08:00

## 2021-01-01 RX ADMIN — Medication 2: at 22:39

## 2021-01-01 RX ADMIN — Medication 12.5 MILLIGRAM(S): at 06:30

## 2021-01-01 RX ADMIN — PANTOPRAZOLE SODIUM 40 MILLIGRAM(S): 20 TABLET, DELAYED RELEASE ORAL at 05:50

## 2021-01-01 RX ADMIN — Medication 0.12 MILLIGRAM(S): at 03:39

## 2021-01-01 RX ADMIN — APIXABAN 5 MILLIGRAM(S): 2.5 TABLET, FILM COATED ORAL at 09:59

## 2021-01-01 RX ADMIN — Medication 0.12 MILLIGRAM(S): at 21:06

## 2021-01-01 RX ADMIN — APIXABAN 5 MILLIGRAM(S): 2.5 TABLET, FILM COATED ORAL at 21:40

## 2021-01-01 RX ADMIN — Medication 6: at 17:24

## 2021-01-01 RX ADMIN — SCOPALAMINE 1 PATCH: 1 PATCH, EXTENDED RELEASE TRANSDERMAL at 16:57

## 2021-01-01 RX ADMIN — Medication 6: at 22:25

## 2021-01-01 RX ADMIN — Medication 75 MICROGRAM(S): at 05:26

## 2021-01-01 RX ADMIN — CEFEPIME 100 MILLIGRAM(S): 1 INJECTION, POWDER, FOR SOLUTION INTRAMUSCULAR; INTRAVENOUS at 06:03

## 2021-01-01 RX ADMIN — CLOPIDOGREL BISULFATE 75 MILLIGRAM(S): 75 TABLET, FILM COATED ORAL at 10:38

## 2021-01-01 RX ADMIN — Medication 6: at 16:37

## 2021-01-01 RX ADMIN — Medication 81 MILLIGRAM(S): at 08:37

## 2021-01-01 RX ADMIN — Medication 2 SPRAY(S): at 22:16

## 2021-01-01 RX ADMIN — Medication 120 MILLIGRAM(S): at 10:51

## 2021-01-01 RX ADMIN — MORPHINE SULFATE 2 MILLIGRAM(S): 50 CAPSULE, EXTENDED RELEASE ORAL at 04:32

## 2021-01-01 RX ADMIN — CEFTRIAXONE 1000 MILLIGRAM(S): 500 INJECTION, POWDER, FOR SOLUTION INTRAMUSCULAR; INTRAVENOUS at 02:58

## 2021-01-01 RX ADMIN — Medication 120 MILLIGRAM(S): at 10:07

## 2021-01-01 RX ADMIN — Medication 2 MILLIGRAM(S): at 11:59

## 2021-01-01 RX ADMIN — Medication 4: at 12:23

## 2021-01-01 RX ADMIN — HEPARIN SODIUM 1400 UNIT(S)/HR: 5000 INJECTION INTRAVENOUS; SUBCUTANEOUS at 20:19

## 2021-01-01 RX ADMIN — CLOPIDOGREL BISULFATE 75 MILLIGRAM(S): 75 TABLET, FILM COATED ORAL at 10:28

## 2021-01-01 RX ADMIN — CHLORHEXIDINE GLUCONATE 15 MILLILITER(S): 213 SOLUTION TOPICAL at 18:42

## 2021-01-01 RX ADMIN — INSULIN GLARGINE 25 UNIT(S): 100 INJECTION, SOLUTION SUBCUTANEOUS at 09:58

## 2021-01-01 RX ADMIN — Medication 0.12 MILLIGRAM(S): at 05:33

## 2021-01-01 RX ADMIN — Medication 10 MILLIGRAM(S): at 11:20

## 2021-01-01 RX ADMIN — Medication 4: at 06:25

## 2021-01-01 RX ADMIN — CLOPIDOGREL BISULFATE 75 MILLIGRAM(S): 75 TABLET, FILM COATED ORAL at 08:21

## 2021-01-01 RX ADMIN — CLOPIDOGREL BISULFATE 75 MILLIGRAM(S): 75 TABLET, FILM COATED ORAL at 09:39

## 2021-01-01 RX ADMIN — CLOPIDOGREL BISULFATE 75 MILLIGRAM(S): 75 TABLET, FILM COATED ORAL at 09:54

## 2021-01-01 RX ADMIN — ATORVASTATIN CALCIUM 80 MILLIGRAM(S): 80 TABLET, FILM COATED ORAL at 22:16

## 2021-01-01 RX ADMIN — Medication 6: at 11:57

## 2021-01-01 RX ADMIN — APIXABAN 5 MILLIGRAM(S): 2.5 TABLET, FILM COATED ORAL at 20:30

## 2021-01-01 RX ADMIN — APIXABAN 5 MILLIGRAM(S): 2.5 TABLET, FILM COATED ORAL at 09:52

## 2021-01-01 RX ADMIN — CEFEPIME 100 MILLIGRAM(S): 1 INJECTION, POWDER, FOR SOLUTION INTRAMUSCULAR; INTRAVENOUS at 17:06

## 2021-01-01 RX ADMIN — INSULIN GLARGINE 25 UNIT(S): 100 INJECTION, SOLUTION SUBCUTANEOUS at 08:16

## 2021-01-01 RX ADMIN — Medication 2: at 12:37

## 2021-01-01 RX ADMIN — Medication 2 SPRAY(S): at 12:04

## 2021-01-01 RX ADMIN — Medication 2 PACKET(S): at 09:51

## 2021-01-01 RX ADMIN — Medication 8: at 05:05

## 2021-01-01 RX ADMIN — AZITHROMYCIN 255 MILLIGRAM(S): 500 TABLET, FILM COATED ORAL at 04:47

## 2021-01-01 RX ADMIN — Medication 250 MILLIGRAM(S): at 13:10

## 2021-01-01 RX ADMIN — PANTOPRAZOLE SODIUM 40 MILLIGRAM(S): 20 TABLET, DELAYED RELEASE ORAL at 06:09

## 2021-01-01 RX ADMIN — Medication 2 SPRAY(S): at 12:02

## 2021-01-01 RX ADMIN — MORPHINE SULFATE 2 MG/HR: 50 CAPSULE, EXTENDED RELEASE ORAL at 17:32

## 2021-01-01 RX ADMIN — Medication 6 MILLIGRAM(S): at 21:40

## 2021-01-01 RX ADMIN — CEFEPIME 100 MILLIGRAM(S): 1 INJECTION, POWDER, FOR SOLUTION INTRAMUSCULAR; INTRAVENOUS at 05:54

## 2021-01-01 RX ADMIN — Medication 2: at 11:51

## 2021-01-01 RX ADMIN — HEPARIN SODIUM 5000 UNIT(S): 5000 INJECTION INTRAVENOUS; SUBCUTANEOUS at 22:12

## 2021-01-01 RX ADMIN — Medication 120 MILLIGRAM(S): at 08:21

## 2021-01-01 RX ADMIN — ATORVASTATIN CALCIUM 80 MILLIGRAM(S): 80 TABLET, FILM COATED ORAL at 21:06

## 2021-01-01 RX ADMIN — PROPOFOL 9.6 MICROGRAM(S)/KG/MIN: 10 INJECTION, EMULSION INTRAVENOUS at 05:04

## 2021-01-01 RX ADMIN — APIXABAN 5 MILLIGRAM(S): 2.5 TABLET, FILM COATED ORAL at 10:32

## 2021-01-01 RX ADMIN — Medication 2 MILLIGRAM(S): at 13:11

## 2021-01-01 RX ADMIN — Medication 650 MILLIGRAM(S): at 00:43

## 2021-01-01 RX ADMIN — Medication 6 MILLIGRAM(S): at 08:37

## 2021-01-01 RX ADMIN — Medication 1 TABLET(S): at 08:57

## 2021-01-01 RX ADMIN — Medication 2: at 17:45

## 2021-01-01 RX ADMIN — Medication 12.5 MILLIGRAM(S): at 21:13

## 2021-01-01 RX ADMIN — HEPARIN SODIUM 1400 UNIT(S)/HR: 5000 INJECTION INTRAVENOUS; SUBCUTANEOUS at 12:58

## 2021-01-01 RX ADMIN — Medication 10 MILLIGRAM(S): at 10:48

## 2021-01-01 RX ADMIN — Medication 4: at 12:33

## 2021-01-01 RX ADMIN — Medication 0.25 MILLIGRAM(S): at 20:08

## 2021-01-01 RX ADMIN — Medication 2: at 08:58

## 2021-01-01 RX ADMIN — Medication 6 MILLIGRAM(S): at 10:58

## 2021-01-01 RX ADMIN — INSULIN GLARGINE 25 UNIT(S): 100 INJECTION, SOLUTION SUBCUTANEOUS at 08:05

## 2021-01-01 RX ADMIN — Medication 4: at 11:40

## 2021-01-01 RX ADMIN — Medication 12.5 MILLIGRAM(S): at 06:31

## 2021-01-01 RX ADMIN — Medication 0.12 MILLIGRAM(S): at 21:02

## 2021-01-01 RX ADMIN — Medication 4 MILLIGRAM(S): at 12:02

## 2021-01-01 RX ADMIN — Medication 6: at 12:07

## 2021-01-01 RX ADMIN — APIXABAN 5 MILLIGRAM(S): 2.5 TABLET, FILM COATED ORAL at 09:06

## 2021-01-01 RX ADMIN — ATORVASTATIN CALCIUM 80 MILLIGRAM(S): 80 TABLET, FILM COATED ORAL at 21:36

## 2021-01-01 RX ADMIN — Medication 81 MILLIGRAM(S): at 09:51

## 2021-01-01 RX ADMIN — APIXABAN 5 MILLIGRAM(S): 2.5 TABLET, FILM COATED ORAL at 10:28

## 2021-01-01 RX ADMIN — Medication 6: at 13:07

## 2021-01-01 RX ADMIN — Medication 2 PACKET(S): at 21:16

## 2021-01-01 RX ADMIN — CEFTRIAXONE 1000 MILLIGRAM(S): 500 INJECTION, POWDER, FOR SOLUTION INTRAMUSCULAR; INTRAVENOUS at 04:40

## 2021-01-01 RX ADMIN — APIXABAN 5 MILLIGRAM(S): 2.5 TABLET, FILM COATED ORAL at 08:57

## 2021-01-01 RX ADMIN — Medication 120 MILLIGRAM(S): at 11:00

## 2021-01-01 RX ADMIN — Medication 12.5 MILLIGRAM(S): at 10:04

## 2021-01-01 RX ADMIN — PANTOPRAZOLE SODIUM 40 MILLIGRAM(S): 20 TABLET, DELAYED RELEASE ORAL at 09:15

## 2021-01-01 RX ADMIN — LEVETIRACETAM 400 MILLIGRAM(S): 250 TABLET, FILM COATED ORAL at 09:14

## 2021-01-01 RX ADMIN — Medication 25 MILLIGRAM(S): at 09:20

## 2021-01-01 RX ADMIN — INSULIN GLARGINE 25 UNIT(S): 100 INJECTION, SOLUTION SUBCUTANEOUS at 09:00

## 2021-01-01 RX ADMIN — INSULIN GLARGINE 25 UNIT(S): 100 INJECTION, SOLUTION SUBCUTANEOUS at 10:04

## 2021-01-01 RX ADMIN — Medication 12.5 MILLIGRAM(S): at 10:33

## 2021-01-01 RX ADMIN — Medication 6: at 22:14

## 2021-01-01 RX ADMIN — Medication 12.5 MILLIGRAM(S): at 21:15

## 2021-01-01 RX ADMIN — Medication 0.12 MILLIGRAM(S): at 23:26

## 2021-01-01 RX ADMIN — SODIUM CHLORIDE 100 MILLILITER(S): 9 INJECTION INTRAMUSCULAR; INTRAVENOUS; SUBCUTANEOUS at 03:29

## 2021-01-01 RX ADMIN — CEFEPIME 100 MILLIGRAM(S): 1 INJECTION, POWDER, FOR SOLUTION INTRAMUSCULAR; INTRAVENOUS at 17:49

## 2021-01-01 RX ADMIN — Medication 2: at 23:06

## 2021-01-01 RX ADMIN — Medication 2 SPRAY(S): at 12:23

## 2021-01-01 RX ADMIN — Medication 40 MILLIEQUIVALENT(S): at 22:42

## 2021-01-01 RX ADMIN — AZITHROMYCIN 255 MILLIGRAM(S): 500 TABLET, FILM COATED ORAL at 11:35

## 2021-01-01 RX ADMIN — IRON SUCROSE 100 MILLIGRAM(S): 20 INJECTION, SOLUTION INTRAVENOUS at 17:49

## 2021-01-01 RX ADMIN — AZITHROMYCIN 255 MILLIGRAM(S): 500 TABLET, FILM COATED ORAL at 09:57

## 2021-01-01 RX ADMIN — Medication 6 MILLIGRAM(S): at 09:04

## 2021-01-01 RX ADMIN — Medication 4 MILLIGRAM(S): at 23:06

## 2021-01-01 RX ADMIN — HEPARIN SODIUM 5000 UNIT(S): 5000 INJECTION INTRAVENOUS; SUBCUTANEOUS at 10:28

## 2021-01-01 RX ADMIN — APIXABAN 5 MILLIGRAM(S): 2.5 TABLET, FILM COATED ORAL at 09:47

## 2021-01-01 RX ADMIN — Medication 12.5 MILLIGRAM(S): at 11:08

## 2021-01-01 RX ADMIN — CLOPIDOGREL BISULFATE 75 MILLIGRAM(S): 75 TABLET, FILM COATED ORAL at 09:47

## 2021-01-01 RX ADMIN — Medication 1 APPLICATION(S): at 10:52

## 2021-01-01 RX ADMIN — Medication 4: at 17:07

## 2021-01-01 RX ADMIN — INSULIN GLARGINE 25 UNIT(S): 100 INJECTION, SOLUTION SUBCUTANEOUS at 21:05

## 2021-01-01 RX ADMIN — Medication 12.5 MILLIGRAM(S): at 06:40

## 2021-01-01 RX ADMIN — Medication 2 SPRAY(S): at 12:17

## 2021-01-01 RX ADMIN — APIXABAN 5 MILLIGRAM(S): 2.5 TABLET, FILM COATED ORAL at 22:08

## 2021-01-01 RX ADMIN — Medication 120 MILLIGRAM(S): at 10:33

## 2021-01-01 RX ADMIN — PANTOPRAZOLE SODIUM 40 MILLIGRAM(S): 20 TABLET, DELAYED RELEASE ORAL at 07:58

## 2021-01-01 RX ADMIN — SODIUM CHLORIDE 75 MILLILITER(S): 9 INJECTION INTRAMUSCULAR; INTRAVENOUS; SUBCUTANEOUS at 12:03

## 2021-01-01 RX ADMIN — Medication 10: at 21:16

## 2021-01-01 RX ADMIN — AZITHROMYCIN 255 MILLIGRAM(S): 500 TABLET, FILM COATED ORAL at 13:24

## 2021-01-01 RX ADMIN — Medication 75 MICROGRAM(S): at 05:07

## 2021-01-01 RX ADMIN — HEPARIN SODIUM 1400 UNIT(S)/HR: 5000 INJECTION INTRAVENOUS; SUBCUTANEOUS at 04:33

## 2021-01-01 RX ADMIN — PANTOPRAZOLE SODIUM 40 MILLIGRAM(S): 20 TABLET, DELAYED RELEASE ORAL at 06:03

## 2021-01-01 RX ADMIN — Medication 2 SPRAY(S): at 17:20

## 2021-01-01 RX ADMIN — Medication 2 SPRAY(S): at 17:28

## 2021-01-01 RX ADMIN — Medication 120 MILLIGRAM(S): at 09:52

## 2021-01-01 RX ADMIN — Medication 0.12 MILLIGRAM(S): at 05:14

## 2021-01-01 RX ADMIN — Medication 10 MILLIGRAM(S): at 06:26

## 2021-01-01 RX ADMIN — PIPERACILLIN AND TAZOBACTAM 25 GRAM(S): 4; .5 INJECTION, POWDER, LYOPHILIZED, FOR SOLUTION INTRAVENOUS at 06:44

## 2021-01-01 RX ADMIN — APIXABAN 5 MILLIGRAM(S): 2.5 TABLET, FILM COATED ORAL at 10:00

## 2021-01-01 RX ADMIN — Medication 2 SPRAY(S): at 06:09

## 2021-01-01 RX ADMIN — SODIUM CHLORIDE 500 MILLILITER(S): 9 INJECTION INTRAMUSCULAR; INTRAVENOUS; SUBCUTANEOUS at 03:13

## 2021-01-01 RX ADMIN — Medication 25 MICROGRAM(S): at 22:35

## 2021-01-01 RX ADMIN — Medication 2 PACKET(S): at 10:29

## 2021-01-01 RX ADMIN — INSULIN GLARGINE 10 UNIT(S): 100 INJECTION, SOLUTION SUBCUTANEOUS at 22:18

## 2021-01-01 RX ADMIN — Medication 2 SPRAY(S): at 11:07

## 2021-01-01 RX ADMIN — CLOPIDOGREL BISULFATE 75 MILLIGRAM(S): 75 TABLET, FILM COATED ORAL at 08:37

## 2021-01-01 RX ADMIN — Medication 81 MILLIGRAM(S): at 09:06

## 2021-01-01 RX ADMIN — ATORVASTATIN CALCIUM 80 MILLIGRAM(S): 80 TABLET, FILM COATED ORAL at 21:26

## 2021-01-01 RX ADMIN — Medication 2 MILLIGRAM(S): at 22:25

## 2021-01-01 RX ADMIN — ROBINUL 0.2 MILLIGRAM(S): 0.2 INJECTION INTRAMUSCULAR; INTRAVENOUS at 17:00

## 2021-01-01 RX ADMIN — INSULIN GLARGINE 25 UNIT(S): 100 INJECTION, SOLUTION SUBCUTANEOUS at 18:26

## 2021-01-01 RX ADMIN — Medication 8: at 11:27

## 2021-01-01 RX ADMIN — ATORVASTATIN CALCIUM 40 MILLIGRAM(S): 80 TABLET, FILM COATED ORAL at 20:19

## 2021-01-01 RX ADMIN — AZITHROMYCIN 255 MILLIGRAM(S): 500 TABLET, FILM COATED ORAL at 09:06

## 2021-01-01 RX ADMIN — APIXABAN 5 MILLIGRAM(S): 2.5 TABLET, FILM COATED ORAL at 21:33

## 2021-01-01 RX ADMIN — Medication 25 MILLIGRAM(S): at 10:48

## 2021-01-01 RX ADMIN — ATORVASTATIN CALCIUM 80 MILLIGRAM(S): 80 TABLET, FILM COATED ORAL at 21:33

## 2021-01-01 RX ADMIN — Medication 1: at 08:46

## 2021-01-01 RX ADMIN — Medication 1 TABLET(S): at 10:58

## 2021-01-01 RX ADMIN — APIXABAN 5 MILLIGRAM(S): 2.5 TABLET, FILM COATED ORAL at 10:06

## 2021-01-01 RX ADMIN — CHLORHEXIDINE GLUCONATE 1 APPLICATION(S): 213 SOLUTION TOPICAL at 06:26

## 2021-01-01 RX ADMIN — SODIUM CHLORIDE 100 MILLILITER(S): 9 INJECTION INTRAMUSCULAR; INTRAVENOUS; SUBCUTANEOUS at 15:14

## 2021-01-01 RX ADMIN — Medication 12.5 MILLIGRAM(S): at 22:16

## 2021-01-01 RX ADMIN — CLOPIDOGREL BISULFATE 75 MILLIGRAM(S): 75 TABLET, FILM COATED ORAL at 08:57

## 2021-01-01 RX ADMIN — Medication 10 MILLIGRAM(S): at 22:36

## 2021-01-01 RX ADMIN — Medication 0.25 MILLIGRAM(S): at 09:37

## 2021-01-01 RX ADMIN — CEFEPIME 100 MILLIGRAM(S): 1 INJECTION, POWDER, FOR SOLUTION INTRAMUSCULAR; INTRAVENOUS at 17:10

## 2021-01-01 RX ADMIN — CLOPIDOGREL BISULFATE 75 MILLIGRAM(S): 75 TABLET, FILM COATED ORAL at 11:08

## 2021-01-01 RX ADMIN — PROPOFOL 9.6 MICROGRAM(S)/KG/MIN: 10 INJECTION, EMULSION INTRAVENOUS at 17:12

## 2021-01-01 RX ADMIN — ROBINUL 0.2 MILLIGRAM(S): 0.2 INJECTION INTRAMUSCULAR; INTRAVENOUS at 05:14

## 2021-01-01 RX ADMIN — Medication 120 MILLIGRAM(S): at 09:54

## 2021-01-01 RX ADMIN — Medication 12.5 MILLIGRAM(S): at 21:26

## 2021-01-01 RX ADMIN — Medication 25 MICROGRAM(S): at 22:39

## 2021-01-01 RX ADMIN — Medication 75 MICROGRAM(S): at 05:14

## 2021-01-01 RX ADMIN — Medication 2.5 MILLIGRAM(S): at 18:04

## 2021-01-01 RX ADMIN — AZITHROMYCIN 255 MILLIGRAM(S): 500 TABLET, FILM COATED ORAL at 10:58

## 2021-01-01 RX ADMIN — INSULIN GLARGINE 25 UNIT(S): 100 INJECTION, SOLUTION SUBCUTANEOUS at 08:56

## 2021-01-01 RX ADMIN — Medication 2 SPRAY(S): at 06:00

## 2021-01-01 RX ADMIN — Medication 12.5 MILLIGRAM(S): at 21:36

## 2021-01-01 RX ADMIN — SODIUM CHLORIDE 50 MILLILITER(S): 9 INJECTION, SOLUTION INTRAVENOUS at 17:57

## 2021-01-01 RX ADMIN — Medication 4 MILLIGRAM(S): at 17:58

## 2021-01-01 RX ADMIN — HEPARIN SODIUM 5000 UNIT(S): 5000 INJECTION INTRAVENOUS; SUBCUTANEOUS at 12:15

## 2021-01-01 RX ADMIN — APIXABAN 5 MILLIGRAM(S): 2.5 TABLET, FILM COATED ORAL at 11:59

## 2021-01-01 RX ADMIN — Medication 4: at 21:25

## 2021-01-01 RX ADMIN — Medication 2 SPRAY(S): at 10:33

## 2021-01-01 RX ADMIN — Medication 2 PACKET(S): at 16:06

## 2021-01-01 RX ADMIN — Medication 3: at 15:36

## 2021-01-01 RX ADMIN — CHLORHEXIDINE GLUCONATE 15 MILLILITER(S): 213 SOLUTION TOPICAL at 17:57

## 2021-01-01 RX ADMIN — Medication 1 APPLICATION(S): at 12:00

## 2021-01-01 RX ADMIN — Medication 250 MILLIGRAM(S): at 22:25

## 2021-01-01 RX ADMIN — Medication 6 MILLIGRAM(S): at 10:06

## 2021-01-01 RX ADMIN — Medication 12.5 MILLIGRAM(S): at 21:20

## 2021-01-01 RX ADMIN — CHLORHEXIDINE GLUCONATE 15 MILLILITER(S): 213 SOLUTION TOPICAL at 06:26

## 2021-01-01 RX ADMIN — Medication 4: at 22:14

## 2021-01-01 RX ADMIN — Medication 4: at 17:20

## 2021-01-01 RX ADMIN — Medication 2: at 12:48

## 2021-01-01 RX ADMIN — Medication 0.12 MILLIGRAM(S): at 21:13

## 2021-01-01 RX ADMIN — APIXABAN 5 MILLIGRAM(S): 2.5 TABLET, FILM COATED ORAL at 09:40

## 2021-01-01 RX ADMIN — Medication 4 MILLIGRAM(S): at 01:30

## 2021-01-01 RX ADMIN — PANTOPRAZOLE SODIUM 40 MILLIGRAM(S): 20 TABLET, DELAYED RELEASE ORAL at 12:37

## 2021-01-01 RX ADMIN — Medication 12: at 08:42

## 2021-01-01 RX ADMIN — CLOPIDOGREL BISULFATE 75 MILLIGRAM(S): 75 TABLET, FILM COATED ORAL at 15:28

## 2021-01-01 RX ADMIN — Medication 6: at 21:28

## 2021-01-01 RX ADMIN — APIXABAN 5 MILLIGRAM(S): 2.5 TABLET, FILM COATED ORAL at 21:26

## 2021-01-01 RX ADMIN — Medication 1 APPLICATION(S): at 22:26

## 2021-01-01 RX ADMIN — Medication 1 APPLICATION(S): at 11:16

## 2021-01-19 NOTE — PATIENT PROFILE ADULT - HOME ACCESSIBILITY CONCERNS
Obey Saba  : 1967  Primary: [employer]*  Secondary:  [facility] [address] [facility]  [phone]   [phone]    OUTPATIENT PHYSICAL THERAPY: Daily Treatment Note 7/10/2019  Visit Count:  3    ICD-10: Treatment Diagnosis: Pain in joint, lower leg, left M25.561  Precautions/Allergies:   Bee sting [sting, bee] and Pcn [penicillins]   TREATMENT PLAN:  Effective Dates: 2019 TO 2019 (90 days). Frequency/Duration: 2 times a week for 90 Day(s) MEDICAL/REFERRING DIAGNOSIS:  Status post total knee replacement, left [Z96.652]   DATE OF ONSET: Surgery 2019  REFERRING PHYSICIAN: Cee Carmona MD MD Orders: Evaluate and treat  Return MD Appointment:      Pre-treatment Symptoms/Complaints:  Pt stated that she did not sleep well last night and was having some increased pain this morning. Pt planning on getting into the pool this afternoon to exercise. Pain: Initial: Pain Intensity 1: 6  Pain Location 1: Knee  Pain Orientation 1: Left  Post Session:  3/10   Medications Last Reviewed:  7/10/2019  Updated Objective Findings:  Left knee AROM 0-95 degrees, 0-103 PROM  TREATMENT:     Therapeutic Exercise: ( 45 minutes):  Exercises per grid below to improve mobility and strength. Required minimal verbal cues to promote proper body alignment. Progressed complexity of movement as indicated. Date:  7/10/2019   Activity/Exercise Parameters   Bike ; Level 3 10 minutes   LAQ 3x10   Heelslides;supine and seated 3x10   SLR flexion 3x10   Prone knee stretch 4 minutes   Quadruped stretch 5x30''   Calf stretch 3x30''   Hamstring stretch 3x30''   Squats 3x10       Manual Therapy (10 minutes): Manual techniques to facilitate improved motion and decreased pain.  (Used abbreviations: MET - muscle energy technique; PNF - proprioceptive neuromuscular facilitation; NMR - neuromuscular re-education; a/p - anterior to posterior; p/a - posterior to anterior)   Manual stretching to calf and hamstring bilaterally  STM to left knee and calf   PROM into left knee flexion, extension in supine and prone  Mobilizations to patella inferior and superior to promote flexion and extension      Treatment/Session Summary:    · Response to Treatment:  Pt demonstrated significant improvement in both knee flexion and extension today. Pt always expresses eagerness towards therapy. · Communication/Consultation:  None today  · Equipment provided today:  HEP provided today  · Recommendations/Intent for next treatment session: Next visit will focus on improving ROM and strength of left knee. Pt will benefit from an additional session this week as she has made significant gains towards goals and will not be seen otherwise until next Tuesday.      Total Treatment Billable Duration:  55 minutes  PT Patient Time In/Time Out  Time In: 0900  Time Out: [address], PT    Future Appointments   Date Time Provider Usha Ann   7/12/2019  8:30 AM Jamshid GUERRERO [facility]   7/16/2019 10:00 AM Corrine Kendall, PT [facility]   7/19/2019 10:00 AM Corrine Kendall, PT [facility]   7/22/2019 10:00 AM Elesa Kendall, PT [facility]   7/26/2019 10:00 AM Corrine Kendall, PT [facility]   7/30/2019 10:00 AM Corrine Kendall, PT [facility]   8/2/2019 10:00 AM Elesa Kendall, PT [facility] none

## 2021-01-19 NOTE — H&P ADULT - ASSESSMENT
* SWIFT pt with h/o melanoma under immunotherapy  r/o PE  VQ scan ( JASPREET)    * JASPREET  IVF    * Probable UTI, no actual symptoms  s/p abx in ED will continue my suspicion is low though    * T2DM  sliding scale for now  on CHIN at home start with a lower dose of 10 units QHS

## 2021-01-19 NOTE — PROGRESS NOTE ADULT - SUBJECTIVE AND OBJECTIVE BOX
I was contacted by [facility] to evaluate this 76 yo female with anal melanoma under care of Dr. Shearer on combined IO therapy with ipilimumab + Nivolumab (cycle 2 on 1/4/2021) admitted with SWIFT.  FUll consult to follow.  Call with any questions.    Pieter Hager MD  Hematology/Oncology  Cell:  [phone]  Office Phone: [phone]  Office Fax:  [phone] [address] [place] [address]

## 2021-01-19 NOTE — ED ADULT NURSE REASSESSMENT NOTE - NS ED NURSE REASSESS COMMENT FT1
Dr. Pathak called and made aware pt is requesting IVF as she was admitted for "dehydration"-order placed for IVF. Dr. Pathak also made aware pt checks her sugar ONLY at bedtime and in the morning and ONLY takes insulin in the morning. Sliding scale is ordered and per MD pt may refuse coverage-order placed for lantus in the morning.

## 2021-01-19 NOTE — ED PROVIDER NOTE - OBJECTIVE STATEMENT
Pt is a 75 year old female with PMH of HTn, DM, vertigo who comes to the ED complaining of SWIFT. States walked up her steps to go to the bathroom and was short of breath. Pt denies chest pain or fevers. States this never occurred previously. Pt is currently on immunotherapy for melanoma at [facility] and states last dose was 2 weeks ago. Called on call MD byers at [place] who told her to go to the ED. Currently feels well no complaints. No leg edema. Denies fevers but febrile in ED. Pt is a 75 year old female with PMH of HTn, DM, vertigo who comes to the ED complaining of SWIFT. States walked up her steps to go to the bathroom and was short of breath. Pt denies chest pain or fevers. States this never occurred previously. Pt is currently on immunotherapy for melanoma at [facility] and states last dose was 2 weeks ago. Called on call MD byers at [place] who told her to go to the ED. Currently feels well no complaints. No leg edema. Denies fevers but febrile in ED. PMD: Dr. Franco

## 2021-01-19 NOTE — ED ADULT NURSE NOTE - NSIMPLEMENTINTERV_GEN_ALL_ED
Implemented All Universal Safety Interventions:  [place] to call system. Call bell, personal items and telephone within reach. Instruct patient to call for assistance. Room bathroom lighting operational. Non-slip footwear when patient is off stretcher. Physically safe environment: no spills, clutter or unnecessary equipment. Stretcher in lowest position, wheels locked, appropriate side rails in place.

## 2021-01-19 NOTE — ED ADULT NURSE NOTE - OBJECTIVE STATEMENT
pt presents to the ED with SOB that began about 30 min PTA. pt denies known fevers, chills at home. denies associated chest pain. pt is a pt of [facility] on immunotherapy.

## 2021-01-19 NOTE — H&P ADULT - NSICDXPASTMEDICALHX_GEN_ALL_CORE_FT
PAST MEDICAL HISTORY:  DM (diabetes mellitus)     HTN (hypertension)     Vertigo      PAST MEDICAL HISTORY:  DM (diabetes mellitus)     HTN (hypertension)     Melanoma     Vertigo

## 2021-01-19 NOTE — H&P ADULT - NSHPLABSRESULTS_GEN_ALL_CORE
8.1    13.81 )-----------( 400      ( 19 Jan 2021 00:29 )             26.4   01-19    137  |  107  |  40<H>  ----------------------------<  148<H>  3.4<L>   |  22  |  1.91<H>    Ca    8.6      19 Jan 2021 00:29    TPro  7.4  /  Alb  2.5<L>  /  TBili  0.2  /  DBili  x   /  AST  13<L>  /  ALT  19  /  AlkPhos  83  01-19

## 2021-01-19 NOTE — ED ADULT NURSE REASSESSMENT NOTE - NS ED NURSE REASSESS COMMENT FT1
Pt alert and oriented VSS afebrile. Pt resting comfortably denies pain. Pt c/o "itchiness" but says this is a issue due to her chemo treatments and pt uses a cream at home. Dr. Pathak called-cream not available here. Pt is to call daughter to bring her own from home and doctor will right order to use own. Pt oriented to unit-meals provided and call bell in reach.

## 2021-01-19 NOTE — ED PROVIDER NOTE - PROGRESS NOTE DETAILS
Pt informed will be admitted for UTI. HR and fever improved in ED. Pt informed will be admitted for UTI. HR and fever improved in ED. Pt abx delayed as pt did not provide urine sample. Once provided abx ordered immediately.

## 2021-01-19 NOTE — H&P ADULT - HISTORY OF PRESENT ILLNESS
75 year old female with PMH of HTn, DM, vertigo who comes to the ED complaining of SWIFT. States walked up her steps to go to the bathroom and was short of breath. Pt denies chest pain or fevers. States this never occurred previously. Pt is currently on immunotherapy for melanoma at [facility] and states last dose was 2 weeks ago. Called on call MD byers at [place] who told her to go to the ED. Currently feels well no complaints. No leg edema. Denies fevers but febrile in ED. Since she has a h/o melanoma and the chief c/o was SOB PE should be ruled out; D dimer of no value in pt with cancer. JASPREET, check VQ scan

## 2021-01-19 NOTE — H&P ADULT - NSHPPHYSICALEXAM_GEN_ALL_CORE
ICU Vital Signs Last 24 Hrs  T(C): 37.1 (01 Nov 2020 18:53), Max: 37.1 (01 Nov 2020 18:53)  T(F): 98.8 (01 Nov 2020 18:53), Max: 98.8 (01 Nov 2020 18:53)  HR: 89 (01 Nov 2020 18:53) (78 - 89)  BP: 152/49 (01 Nov 2020 18:53) (152/49 - 176/73)  BP(mean): 103 (01 Nov 2020 12:57) (103 - 103)  ABP: --  ABP(mean): --  RR: 18 (01 Nov 2020 18:53) (18 - 19)  SpO2: 100% (01 Nov 2020 18:53) (100% - 100%) Vital Signs Last 24 Hrs  T(C): 36.6 (19 Jan 2021 06:35), Max: 38.1 (19 Jan 2021 00:07)  T(F): 97.9 (19 Jan 2021 06:35), Max: 100.6 (19 Jan 2021 00:07)  HR: 87 (19 Jan 2021 06:35) (87 - 112)  BP: 129/65 (19 Jan 2021 06:35) (129/65 - 142/43)  BP(mean): 83 (19 Jan 2021 06:35) (79 - 83)  RR: 20 (19 Jan 2021 06:35) (16 - 20)  SpO2: 99% (19 Jan 2021 06:35) (98% - 100%)    · CONSTITUTIONAL: Well appearing, awake, alert, oriented to person, place, time/situation and in no apparent distress.  · EYES: Clear bilaterally, pupils equal, round and reactive to light.  · CARDIAC: Normal rate, regular rhythm.  Heart sounds S1, S2.  No murmurs, rubs or gallops.  · RESPIRATORY: Breath sounds clear and equal bilaterally.  · GASTROINTESTINAL: Abdomen soft, non-tender, no guarding.  · MUSCULOSKELETAL: Spine appears normal, range of motion is not limited, no muscle or joint tenderness  · NEUROLOGICAL: Alert and oriented, no focal deficits, no motor or sensory deficits.  · SKIN: Skin normal color for race, warm, dry and intact. No evidence of rash.

## 2021-01-20 NOTE — PROGRESS NOTE ADULT - SUBJECTIVE AND OBJECTIVE BOX
INTERVAL HPI/OVERNIGHT EVENTS:  Patient S&E at bedside. No o/n events,   now with UTI   VITAL SIGNS:  T(F): 98.8 (21 @ 08:59)  HR: 101 (21 @ 08:59)  BP: 120/45 (21 @ 08:59)  RR: 16 (21 @ 08:59)  SpO2: 100% (21 @ 08:59)  Wt(kg): --    PHYSICAL EXAM:    Constitutional: NAD  Eyes: EOMI, sclera non-icteric  Neck: supple, no masses, no JVD  Respiratory: CTA b/l, good air entry b/l  Cardiovascular: RRR, no M/R/G  Gastrointestinal: soft, NTND, no masses palpable, + BS, no hepatosplenomegaly  Extremities: no c/c/e  Neurological: AAOx3      MEDICATIONS  (STANDING):  atorvastatin 40 milliGRAM(s) Oral at bedtime  cefTRIAXone Injectable. 1000 milliGRAM(s) IV Push every 24 hours  dextrose 40% Gel 15 Gram(s) Oral once  dextrose 5%. 1000 milliLiter(s) (50 mL/Hr) IV Continuous <Continuous>  dextrose 5%. 1000 milliLiter(s) (100 mL/Hr) IV Continuous <Continuous>  dextrose 50% Injectable 25 Gram(s) IV Push once  dextrose 50% Injectable 12.5 Gram(s) IV Push once  dextrose 50% Injectable 25 Gram(s) IV Push once  fluocinonide 0.05% Ointment 1 Application(s) Topical two times a day  glucagon  Injectable 1 milliGRAM(s) IntraMuscular once  heparin   Injectable 5000 Unit(s) SubCutaneous every 12 hours  insulin glargine Injectable (LANTUS) 15 Unit(s) SubCutaneous at bedtime  sodium chloride 0.9%. 1000 milliLiter(s) (100 mL/Hr) IV Continuous <Continuous>    MEDICATIONS  (PRN):  acetaminophen   Tablet .. 650 milliGRAM(s) Oral every 6 hours PRN Temp greater or equal to 38.5C (101.3F), Mild Pain (1 - 3)  aluminum hydroxide/magnesium hydroxide/simethicone Suspension 30 milliLiter(s) Oral every 4 hours PRN Dyspepsia  ondansetron Injectable 4 milliGRAM(s) IV Push every 6 hours PRN Nausea      Allergies    barbiturates (Unknown)  Cipro (Other; Swelling)    Intolerances        LABS:                        7.6    12.42 )-----------( 415      ( 2021 07:59 )             25.4     -    142  |  116<H>  |  37<H>  ----------------------------<  122<H>  3.8   |  18<L>  |  1.62<H>    Ca    8.9      2021 07:59    TPro  7.4  /  Alb  2.5<L>  /  TBili  0.2  /  DBili  x   /  AST  13<L>  /  ALT  19  /  AlkPhos  83  -    PT/INR - ( 2021 01:38 )   PT: 15.0 sec;   INR: 1.31 ratio         PTT - ( 2021 01:38 )  PTT:29.0 sec  Urinalysis Basic - ( 2021 03:50 )    Color: Yellow / Appearance: Clear / S.010 / pH: x  Gluc: x / Ketone: Negative  / Bili: Negative / Urobili: Negative mg/dL   Blood: x / Protein: 30 mg/dL / Nitrite: Negative   Leuk Esterase: Moderate / RBC: 6-10 /HPF / WBC 26-50   Sq Epi: x / Non Sq Epi: Few / Bacteria: Moderate        RADIOLOGY & ADDITIONAL TESTS:  Studies reviewed.    ASSESSMENT & PLAN:

## 2021-01-20 NOTE — PROGRESS NOTE ADULT - ASSESSMENT
Assessment: 76 yo female with anal melanoma under care of Dr. Shearer at [facility] [place] on combined IO therapy with ipilimumab + Nivolumab (cycle 2 on 1/4/2021) admitted with SWIFT.      SWIFT pt with h/o melanoma under immunotherapy  -r/o PE  -VQ scan ( JASPREET)  -1/20 breathing better, no longer dyspneic  -Stool OB negative    JASPREET  - s/p IVF resuscitation   -SCr 1.91-->1.62  IVF- NS @ 100 ml/hr  -Kidney and Bladder US to r/o obstruction    Probable UTI, no actual symptoms  -UA likely contaminated, will recollect  -s/p abx in ED will continue my suspicion is low though  -Ceftriaxone 1G daily x 7 days  -trend CBC and temps    -T2DM  -sliding scale for now--> 1/20 refusing sliding scale insulin, will give Lantus 15u at HS.  -on CHIN at home  -A1C 6.6    Rectal melanoma with possible METS to lung under care of Dr. Shearer at [facility] [place]  -on combined IO therapy with ipilimumab + Nivolumab (cycle 2 on 1/4/2021).  -heme/onc following       Assessment: 76 yo female with DM2,  anal melanoma under care of Dr. Shearer at [facility] [place] on combined IO therapy with ipilimumab + Nivolumab (cycle 2 on 1/4/2021) admitted  on 1/19/21 with SWIFT.    SWIFT  ruled out VTE   most likely related to acute on chronic iron deficiency anemia   -VQ scan  - low probability  - start IV iron as per hematology   -1/20 breathing better, no longer dyspneic  -Stool OB negative  JASPREET with baseline Cr 1.1,  nephrolithiasis , mild left hydronephrosis  - s/p IVF resuscitation , SCr 1.91-->1.62  - c/w IVF- NS @ 100 ml/hr  -Kidney and Bladder US - multiple renal stones, thickened endometrium  - urology consult Dr. Marie  - CT abd/pelv to assess the size of the stone and location   - hold Olmesartan   Pyuria suspected UTI, no actual symptoms  -UA likely contaminated, will recollect , f/u repeat urine cx   -s/p abx in ED will continue my suspicion is low though  -Ceftriaxone 1G daily x 7 days  - WBC 13--> 12  Thickened endometrium with anemia  - GYN evaluation to r/o malignancy   Anal melanoma on  immunotherapy ipilimumab + Nivolumab(cycle 2 on 1/4/2021).  under care of Dr. Shearer at [facility]  - oncology consult Dr. Terry  - both medication can cause renal injury   DM type 2 with A1C 6.6, well controlled  - sliding scale  will stop pt refusing short acting insulin   - 1/20 refusing sliding scale insulin, will give Lantus 15u at HS.  -on CHIN at home  takes 25-30 units based on scale   HLD - c/w statins    DVT proph - heparin q12h due to renal failure    Dispo - IV fluids, IV Abx, urology and GYN evaluation

## 2021-01-20 NOTE — PROGRESS NOTE ADULT - SUBJECTIVE AND OBJECTIVE BOX
Subjective:  Patient is a 75y old  Female who presents with a chief complaint of SWIFT (2021 13:29)    CC: dyspnea    HPI: 75 year old female with PMH of HTN, DM2, vertigo, and rectal melanoma who comes to the ED complaining of SWIFT. States walked up her steps to go to the bathroom and was short of breath. Pt denies chest pain or fevers. States this never occurred previously. Pt is currently on immunotherapy for melanoma at [facility] and states last dose was 2 weeks ago. Called on call MD byers at [facility] who told her to go to the ED. Currently feels well no complaints. No leg edema. Denies fevers but febrile in ED. Since she has a h/o melanoma and the chief c/o was SOB PE should be ruled out; D dimer of no value in pt with cancer. JASPREET, check VQ scan (2021 07:44)    - Patient seen and examined at bedside, feels much better, afebrile, denies pain. Dyspnea improved, no longer feel as heart is racing. + BM, no bleeding. denies headache, dizziness, CP, SOB. denies n/v. denies dysuria, hematuria, flank pain. POC discussed.     Review of system- Rest of the review of system are negative except mentioned in HPI    Objective:   Physical exam:   GENERAL: NAD  NERVOUS SYSTEM:  Alert & Oriented X3, non- focal exam, Motor Strength 5/5 B/L upper and lower extremities; DTRs 2+ intact and symmetric  HEAD:  Atraumatic, Normocephalic  EYES: EOMI, PERRLA, conjunctiva and sclera clear  HEENT: Moist mucous membranes  NECK: Supple, No JVD  CHEST/LUNG: Clear to auscultation bilaterally; No rales, no rhonchi, no wheezing  HEART: Regular rate and rhythm; No murmurs, no rubs or gallops  ABDOMEN: Soft, Non-tender, Non-distended; Bowel sounds present  GENITOURINARY- Voiding, no suprapubic tenderness  EXTREMITIES:  2+ Peripheral Pulses, No clubbing, cyanosis, or edema  MUSCULOSKELETAL:- No muscle tenderness, Muscle tone normal, No joint tenderness, no Joint swelling,  Joint ROM –normal   SKIN-diffuse rash 2/2 immunotherapy side effect          142  |  116<H>  |  37<H>  ----------------------------<  122<H>  3.8   |  18<L>  |  1.62<H>    Ca    8.9      2021 07:59    TPro  7.4  /  Alb  2.5<L>  /  TBili  0.2  /  DBili  x   /  AST  13<L>  /  ALT  19  /  AlkPhos  83                              7.6    12.42 )-----------( 415      ( 2021 07:59 )             25.4       CARDIAC MARKERS ( 2021 00:29 )  <0.015 ng/mL / x     / x     / x     / x            LIVER FUNCTIONS - ( 2021 00:29 )  Alb: 2.5 g/dL / Pro: 7.4 gm/dL / ALK PHOS: 83 U/L / ALT: 19 U/L / AST: 13 U/L / GGT: x             PT/INR - ( 2021 01:38 )   PT: 15.0 sec;   INR: 1.31 ratio         PTT - ( 2021 01:38 )  PTT:29.0 sec    POCT Blood Glucose.: 132 mg/dL (2021 08:03)  POCT Blood Glucose.: 155 mg/dL (2021 22:09)  POCT Blood Glucose.: 153 mg/dL (2021 16:46)    Urinalysis Basic - ( 2021 03:50 )    Color: Yellow / Appearance: Clear / S.010 / pH: x  Gluc: x / Ketone: Negative  / Bili: Negative / Urobili: Negative mg/dL   Blood: x / Protein: 30 mg/dL / Nitrite: Negative   Leuk Esterase: Moderate / RBC: 6-10 /HPF / WBC 26-50   Sq Epi: x / Non Sq Epi: Few / Bacteria: Moderate    RECENT CULTURES:   @ 03:50  .Urine None  --  --  --   @ 00:29  .Blood None  --  --  --      RADIOLOGY & ADDITIONAL TESTS: all reviewed EKG reviewed    MEDICATIONS  (STANDING):  atorvastatin 40 milliGRAM(s) Oral at bedtime  cefTRIAXone Injectable. 1000 milliGRAM(s) IV Push every 24 hours  dextrose 40% Gel 15 Gram(s) Oral once  dextrose 5%. 1000 milliLiter(s) (50 mL/Hr) IV Continuous <Continuous>  dextrose 5%. 1000 milliLiter(s) (100 mL/Hr) IV Continuous <Continuous>  dextrose 50% Injectable 25 Gram(s) IV Push once  dextrose 50% Injectable 12.5 Gram(s) IV Push once  dextrose 50% Injectable 25 Gram(s) IV Push once  fluocinonide 0.05% Ointment 1 Application(s) Topical two times a day  glucagon  Injectable 1 milliGRAM(s) IntraMuscular once  heparin   Injectable 5000 Unit(s) SubCutaneous every 12 hours  insulin glargine Injectable (LANTUS) 15 Unit(s) SubCutaneous at bedtime  iron sucrose IVPB 100 milliGRAM(s) IV Intermittent every 7 days  sodium chloride 0.9%. 1000 milliLiter(s) (100 mL/Hr) IV Continuous <Continuous>    MEDICATIONS  (PRN):  acetaminophen   Tablet .. 650 milliGRAM(s) Oral every 6 hours PRN Temp greater or equal to 38.5C (101.3F), Mild Pain (1 - 3)  aluminum hydroxide/magnesium hydroxide/simethicone Suspension 30 milliLiter(s) Oral every 4 hours PRN Dyspepsia  ondansetron Injectable 4 milliGRAM(s) IV Push every 6 hours PRN Nausea               Subjective:  Patient is a 75y old  Female who presents with a chief complaint of SWIFT (2021 13:29)    CC: dyspnea    HPI: 75 year old female with PMH of HTN, DM2, vertigo, and rectal melanoma who comes to the ED complaining of SWIFT. States walked up her steps to go to the bathroom and was short of breath. Pt denies chest pain or fevers. States this never occurred previously. Pt is currently on immunotherapy for melanoma at [facility] and states last dose was 2 weeks ago. Called on call MD byers at [facility] who told her to go to the ED. Currently feels well no complaints. No leg edema. Denies fevers but febrile in ED. Since she has a h/o melanoma and the chief c/o was SOB PE should be ruled out; D dimer of no value in pt with cancer. JASPREET, check VQ scan (2021 07:44)    - Patient seen and examined at bedside, feels much better, afebrile, denies pain. Dyspnea improved, no longer feel as heart is racing. + BM, no bleeding. denies headache, dizziness, CP, SOB. denies n/v. denies dysuria, hematuria, flank pain. POC discussed.     Review of system- Rest of the review of system are negative except mentioned in HPI  T(C): 37.2 (21 @ 16:05), Max: 37.3 (21 @ 20:50)  T(F): 98.9 (21 @ 16:05), Max: 99.1 (21 @ 20:50)  HR: 96 (21 @ 16:05) (96 - 103)  BP: 121/65 (21 @ 16:05) (120/45 - 138/59)  RR: 16 (21 @ 16:05) (16 - 18)  SpO2: 97% (21 @ 16:05) (97% - 100%)  Wt(kg): --  CAPILLARY BLOOD GLUCOSE  POCT Blood Glucose.: 132 mg/dL (2021 08:03)  POCT Blood Glucose.: 155 mg/dL (2021 22:09)    Objective:   Physical exam:   GENERAL: NAD  NERVOUS SYSTEM:  Alert & Oriented X3, non- focal exam, Motor Strength 5/5 B/L upper and lower extremities; DTRs 2+ intact and symmetric  HEAD:  Atraumatic, Normocephalic  EYES: EOMI, PERRLA, conjunctiva and sclera clear  HEENT: Moist mucous membranes  NECK: Supple, No JVD  CHEST/LUNG: Clear to auscultation bilaterally; No rales, no rhonchi, no wheezing  HEART: Regular rate and rhythm; No murmurs, no rubs or gallops  ABDOMEN: Soft, Non-tender, Non-distended; Bowel sounds present  GENITOURINARY- Voiding, no suprapubic tenderness  EXTREMITIES:  2+ Peripheral Pulses, No clubbing, cyanosis, or edema  MUSCULOSKELETAL:- No muscle tenderness, Muscle tone normal, No joint tenderness, no Joint swelling,  Joint ROM –normal   SKIN-diffuse rash 2/2 immunotherapy side effect          142  |  116<H>  |  37<H>  ----------------------------<  122<H>  3.8   |  18<L>  |  1.62<H>    Ca    8.9      2021 07:59    TPro  7.4  /  Alb  2.5<L>  /  TBili  0.2  /  DBili  x   /  AST  13<L>  /  ALT  19  /  AlkPhos  83                         7.6    12.42 )-----------( 415      ( 2021 07:59 )             25.4   CARDIAC MARKERS ( 2021 00:29 )  <0.015 ng/mL / x     / x     / x     / x        LIVER FUNCTIONS - ( 2021 00:29 )  Alb: 2.5 g/dL / Pro: 7.4 gm/dL / ALK PHOS: 83 U/L / ALT: 19 U/L / AST: 13 U/L / GGT: x             PT/INR - ( 2021 01:38 )   PT: 15.0 sec;   INR: 1.31 ratio         PTT - ( 2021 01:38 )  PTT:29.0 sec    Urinalysis Basic - ( 2021 03:50 )    Color: Yellow / Appearance: Clear / S.010 / pH: x  Gluc: x / Ketone: Negative  / Bili: Negative / Urobili: Negative mg/dL   Blood: x / Protein: 30 mg/dL / Nitrite: Negative   Leuk Esterase: Moderate / RBC: 6-10 /HPF / WBC 26-50   Sq Epi: x / Non Sq Epi: Few / Bacteria: Moderate    RECENT CULTURES:  --Culture - Urine (21 @ 03:50)    Specimen Source: .Urine None    Culture Results:   >=3 organisms. Probable collection contamination.  Culture - Blood (21 @ 00:29)    Specimen Source: .Blood None    Culture Results:   No growth to date.      RadiIOLOGY & ADDITIONAL TESTS: all reviewed EKG reviewed  < from: 12 Lead ECG (21 @ 10:45) >    Ventricular Rate 95 BPM    Atrial Rate 95 BPM    P-R Interval 188 ms    QRS Duration 86 ms    Q-T Interval 352 ms    QTC Calculation(Bazett) 442 ms    P Axis 56 degrees    R Axis 50 degrees    T Axis 17 degrees    Diagnosis Line Normal sinus rhythm  Normal ECG  When compared with ECG of 2020 08:08,  No significant change was found    < end of copied text >  < from: US Kidney and Bladder (21 @ 13:29) >  FINDINGS:    Right kidney: 9.6 cm. No renal mass, hydronephrosis or calculi.    Left kidney: 9.1 cm. No renal mass. Mild left hydronephrosis. Multiple stones ranging from 4 mm to 7 mm    Urinary bladder: Underdistended    IMPRESSION:    Multiple stones in the left kidney with mild left hydronephrosis.    21 mm thickened endometrium may represent hyperplasia versus carcinoma. Tissue sampling is advised.      < end of copied text >  < from: NM Pulmonary Perfusion Scan (21 @ 10:00) >  COMPARISON: No previous lung scan for comparison.    FINDINGS: The study demonstrates mildly heterogenous tracer distribution in both lungs. There are no segmental perfusion defects.    IMPRESSION: Very low probability of pulmonary embolus.    < end of copied text >    < from: Xray Chest 1 View-PORTABLE IMMEDIATE (21 @ 06:16) >    Heart magnified by technique.    The lung fields and pleural surfaces are unremarkable.    Chest is similar to 2020.    IMPRESSION: No acute finding or change.    < end of copied text >    MEDICATIONS  (STANDING):  atorvastatin 40 milliGRAM(s) Oral at bedtime  cefTRIAXone Injectable. 1000 milliGRAM(s) IV Push every 24 hours  dextrose 40% Gel 15 Gram(s) Oral once  dextrose 5%. 1000 milliLiter(s) (50 mL/Hr) IV Continuous <Continuous>  dextrose 5%. 1000 milliLiter(s) (100 mL/Hr) IV Continuous <Continuous>  dextrose 50% Injectable 25 Gram(s) IV Push once  dextrose 50% Injectable 12.5 Gram(s) IV Push once  dextrose 50% Injectable 25 Gram(s) IV Push once  fluocinonide 0.05% Ointment 1 Application(s) Topical two times a day  glucagon  Injectable 1 milliGRAM(s) IntraMuscular once  heparin   Injectable 5000 Unit(s) SubCutaneous every 12 hours  insulin glargine Injectable (LANTUS) 15 Unit(s) SubCutaneous at bedtime  iron sucrose IVPB 100 milliGRAM(s) IV Intermittent every 7 days  sodium chloride 0.9%. 1000 milliLiter(s) (100 mL/Hr) IV Continuous <Continuous>    MEDICATIONS  (PRN):  acetaminophen   Tablet .. 650 milliGRAM(s) Oral every 6 hours PRN Temp greater or equal to 38.5C (101.3F), Mild Pain (1 - 3)  aluminum hydroxide/magnesium hydroxide/simethicone Suspension 30 milliLiter(s) Oral every 4 hours PRN Dyspepsia  ondansetron Injectable 4 milliGRAM(s) IV Push every 6 hours PRN Nausea

## 2021-01-20 NOTE — PROGRESS NOTE ADULT - ASSESSMENT
4 yo female with anal melanoma under care of Dr. Shearer on combined IO therapy with ipilimumab + Nivolumab (cycle 2 on 1/4/2021) admitted for UTI   continue with abx     hemachezia   - would recommend transfusion tomorrow as patient has declined transfusion   - will plan for IV iron

## 2021-01-21 NOTE — PROVIDER CONTACT NOTE (MEDICATION) - SITUATION
Pt refusing to comply with hospital's BGM/insulin protocols. Pt has her own BGM machine and insulin pen and has requested to use these. Kwikpen was verified by pharmacy for 15U. Upon administration, pt stated she was going to inject 28U. Patient stated her pen is sliding scale insulin; Kwikpen was removed from patient's possession while RN d/w Pharmacy and Dr. Dawson.

## 2021-01-21 NOTE — CONSULT NOTE ADULT - ATTENDING COMMENTS
Discussed the management options: Ureteroscopy Vs Shock wave lithotripsy Vs Percutaneous nephrolithotomy. Risks and benefits of each modality were discussed.   Recommended Cystoscopy and left ureteral stent placement.   Pt not interested in active intervention at this point.

## 2021-01-21 NOTE — PROVIDER CONTACT NOTE (CHANGE IN STATUS NOTIFICATION) - SITUATION
Patient woke up to ambulate to the bathroom where her oxygen level that dropped to low 80's on nasal cannula 4 L. Patient had HR that was elevated in high 160's.

## 2021-01-21 NOTE — PROGRESS NOTE ADULT - ASSESSMENT
64 yo female with anal melanoma under care of Dr. Shearer on combined IO therapy with ipilimumab + Nivolumab (cycle 2 on 1/4/2021) admitted for UTI   continue with abx     new onset Afib with RVR this am with RRT   continue with tele   cardio evaluation pending   ? A/C     hemachezia   - would recommend transfusion tomorrow as patient has declined transfusion   - will plan for IV iron          66 yo female with anal melanoma under care of Dr. Shearer on combined IO therapy with ipilimumab + Nivolumab (cycle 2 on 1/4/2021) admitted for UTI   continue with abx     new onset Afib with RVR this am with RRT   continue with tele   cardio evaluation pending   ? A/C     hemachezia   - would recommend transfusion tomorrow as patient has declined transfusion   - s/p IV iron     Creatinine   - Hydronephrosis   - uro input appreciated         f

## 2021-01-21 NOTE — CONSULT NOTE ADULT - SUBJECTIVE AND OBJECTIVE BOX
HPI:  75 year old female with PMH of HTn, DM, vertigo who comes to the ED complaining of SWIFT. States walked up her steps to go to the bathroom and was short of breath. Pt denies chest pain or fevers. States this never occurred previously. Pt is currently on immunotherapy for melanoma at [facility] and states last dose was 2 weeks ago. Called on call MD byers at [place] who told her to go to the ED. Currently feels well no complaints. No leg edema. Denies fevers but febrile in ED. Since she has a h/o melanoma and the chief c/o was SOB PE should be ruled out; D dimer of no value in pt with cancer. JASPREET, check VQ scan (2021 07:44)    76 yo female with PMH as above admitted for SWIFT found to have new onset Afib, with incidental finding of 1.6 cm left renal pelvic stone and left mild hydronephrosis, mild leukocytosis and creatinine elevation. Patient was seen and examined at bedside. Patient reports she is undergoing immunotherapy at [facility] for melanoma and has fluctuation creatinine due to that. She states she is completely asymptomatic with the renal stone. She denies any fevers, chills, n/v, abd/flank pain, dysuria, hematuria, urinary frequency or urgency. She states she does not wish to have a ureteral stent placement or any intervention at this time.   PAST MEDICAL & SURGICAL HISTORY:  Melanoma    DM (diabetes mellitus)    HTN (hypertension)    Vertigo    No significant past surgical history        REVIEW OF SYSTEMS       All other review of systems neg, except as noted in HPI    MEDICATIONS  (STANDING):  atorvastatin 40 milliGRAM(s) Oral at bedtime  cefTRIAXone Injectable. 1000 milliGRAM(s) IV Push every 24 hours  dextrose 40% Gel 15 Gram(s) Oral once  dextrose 5%. 1000 milliLiter(s) (50 mL/Hr) IV Continuous <Continuous>  dextrose 5%. 1000 milliLiter(s) (100 mL/Hr) IV Continuous <Continuous>  dextrose 50% Injectable 25 Gram(s) IV Push once  dextrose 50% Injectable 12.5 Gram(s) IV Push once  dextrose 50% Injectable 25 Gram(s) IV Push once  diltiazem Infusion 10 mG/Hr (10 mL/Hr) IV Continuous <Continuous>  fluocinonide 0.05% Ointment 1 Application(s) Topical two times a day  glucagon  Injectable 1 milliGRAM(s) IntraMuscular once  heparin  Infusion.  Unit(s)/Hr (14 mL/Hr) IV Continuous <Continuous>  insulin glargine Injectable (LANTUS) 15 Unit(s) SubCutaneous at bedtime  insulin lispro (ADMELOG) corrective regimen sliding scale   SubCutaneous three times a day before meals  insulin lispro (ADMELOG) corrective regimen sliding scale   SubCutaneous at bedtime  iron sucrose Injectable 100 milliGRAM(s) IV Push every 7 days  metoprolol tartrate Injectable 5 milliGRAM(s) IV Push once  metoprolol tartrate Injectable 5 milliGRAM(s) IV Push once  metoprolol tartrate Injectable 5 milliGRAM(s) IV Push once    MEDICATIONS  (PRN):  acetaminophen   Tablet .. 650 milliGRAM(s) Oral every 6 hours PRN Temp greater or equal to 38.5C (101.3F), Mild Pain (1 - 3)  ALPRAZolam 0.25 milliGRAM(s) Oral every 8 hours PRN anxiety  aluminum hydroxide/magnesium hydroxide/simethicone Suspension 30 milliLiter(s) Oral every 4 hours PRN Dyspepsia  heparin   Injectable 6500 Unit(s) IV Push every 6 hours PRN For aPTT less than 40  heparin   Injectable 3000 Unit(s) IV Push every 6 hours PRN For aPTT between 40 - 57  ondansetron Injectable 4 milliGRAM(s) IV Push every 6 hours PRN Nausea      Allergies    barbiturates (Unknown)  Cipro (Other; Swelling)    Intolerances        SOCIAL HISTORY:    FAMILY HISTORY:  No pertinent family history in first degree relatives        Vital Signs Last 24 Hrs  T(C): 36.6 (2021 08:09), Max: 37.2 (2021 16:05)  T(F): 97.9 (2021 08:09), Max: 98.9 (2021 16:05)  HR: 130 (2021 08:09) (94 - 144)  BP: 125/76 (2021 08:09) (110/75 - 143/67)  BP(mean): 90 (2021 08:09) (90 - 90)  RR: 18 (2021 07:07) (16 - 18)  SpO2: 98% (2021 07:07) (82% - 98%)    PHYSICAL EXAM:    General: No distress, No anxiety  VITALS  T(C): 36.6 (21 @ 08:09), Max: 37.2 (21 @ 16:05)  HR: 130 (21 @ 08:09) (94 - 144)  BP: 125/76 (21 @ 08:09) (110/75 - 143/67)  RR: 18 (21 @ 07:07) (16 - 18)  SpO2: 98% (21 @ 07:07) (82% - 98%)            Skin     : No jaundice, No lesions, No swelling  HEENT: No icterus , EOM full , No epistaxis  Abdo:   : Soft, Non tender, No guarding, No distension    Back    : No CVAT  Extremity: No calf tenderness    Genitalia Female: No Martínez  Neuro   : A&Ox3        LABS:                        8.0    12.31 )-----------( 440      ( 2021 06:58 )             26.7         143  |  116<H>  |  32<H>  ----------------------------<  143<H>  3.5   |  19<L>  |  1.57<H>    Ca    8.8      2021 06:58  Phos  3.4       Mg     1.9         TPro  7.1  /  Alb  2.2<L>  /  TBili  0.2  /  DBili  x   /  AST  12<L>  /  ALT  15  /  AlkPhos  74      PTT - ( 2021 10:27 )  PTT:29.2 sec  Urinalysis Basic - ( 2021 15:35 )    Color: Yellow / Appearance: Clear / S.015 / pH: x  Gluc: x / Ketone: Negative  / Bili: Negative / Urobili: Negative mg/dL   Blood: x / Protein: 30 mg/dL / Nitrite: Negative   Leuk Esterase: Moderate / RBC: 0-2 /HPF / WBC 26-50   Sq Epi: x / Non Sq Epi: Occasional / Bacteria: Occasional        RADIOLOGY & ADDITIONAL STUDIES:    < from: CT Abdomen and Pelvis No Cont (21 @ 18:54) >  EXAM:  CT ABDOMEN AND PELVIS                            PROCEDURE DATE:  2021          INTERPRETATION:  CT ABDOMEN AND PELVIS    CLINICAL INFORMATION: left hydronephrosis and kidney stones on US    COMPARISON: CT abdomen and pelvis 2011,renal ultrasound 2021    PROCEDURE:  CT of the Abdomen and Pelvis was performed without intravenous contrast.  Intravenous contrast: None.  Oral contrast: None.  Sagittal and coronal reformats were performed.    FINDINGS:  LOWER CHEST: Interstitial edema and trace left effusion    LIVER: Normal.  BILE DUCTS: Nondilated.  GALLBLADDER: Prior cholecystectomy.  SPLEEN: Normal.  PANCREAS: Normal.  ADRENALS: Normal.  KIDNEYS/URETERS: 16 mm left renal pelvic calculus causing mild left hydronephrosis. Additional left renal calculi.    BLADDER: Underdistended limiting evaluation.  REPRODUCTIVE ORGANS: Endometrial thickening.    BOWEL: No bowel-related abnormality. Specifically, no evidence of acute diverticulitis. Normal appendix and ileocecal region. No bowel obstruction or bowel inflammation.  PERITONEUM: No free air or ascites.  VESSELS: Aortoiliac atherosclerosis without aneurysm.  RETROPERITONEUM/LYMPH NODES: No adenopathy.  ABDOMINAL WALL: Normal.  BONES: No acute bony abnormality.    IMPRESSION:  *  16 mm left renal pelvic calculus causing mild left hydronephrosis. Additional left renal calculi.            ASHA SOLO MD; Attending Radiologist  This document has been electronically signed. 2021  8:35PM    < end of copied text >  
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 Shearer on combined IO therapy with ipilimumab + Nivolumab (cycle 2 on 1/4/2021) admitted with SWIFT.      States walked up her steps to go to the bathroom and was short of breath. Pt denies chest pain or fevers. States this never occurred previously. johnnyd on call MD covering at [place] who told her to go to the ED. Currently feels well no complaints. No leg edema.     V/q scan performed ruled out PE            PAST MEDICAL HISTORY:  DM (diabetes mellitus)     HTN (hypertension)     Melanoma     Vertigo.    PAST SURGICAL HISTORY:  No significant past surgical history.    FAMILY HISTORY:  No pertinent family history in first degree relatives.    No Pertinent Family History in first degree relatives of: cnacer.    Social History:  Social History (marital status, living situation, occupation, tobacco use, alcohol and drug use, and sexual history): Denies any smoking, alcohol or drug use    Tobacco Screening:  · Core Measure Site	Yes  · Has the patient used tobacco in the past 30 days?	No    acetaminophen   Tablet .. 650 milliGRAM(s) Oral every 6 hours PRN  aluminum hydroxide/magnesium hydroxide/simethicone Suspension 30 milliLiter(s) Oral every 4 hours PRN  atorvastatin 40 milliGRAM(s) Oral at bedtime  cefTRIAXone Injectable. 1000 milliGRAM(s) IV Push every 24 hours  dextrose 40% Gel 15 Gram(s) Oral once  dextrose 5%. 1000 milliLiter(s) IV Continuous <Continuous>  dextrose 5%. 1000 milliLiter(s) IV Continuous <Continuous>  dextrose 50% Injectable 25 Gram(s) IV Push once  dextrose 50% Injectable 12.5 Gram(s) IV Push once  dextrose 50% Injectable 25 Gram(s) IV Push once  fluocinonide 0.05% Ointment 1 Application(s) Topical two times a day  glucagon  Injectable 1 milliGRAM(s) IntraMuscular once  heparin   Injectable 5000 Unit(s) SubCutaneous every 12 hours  insulin glargine Injectable (LANTUS) 10 Unit(s) SubCutaneous every morning  insulin lispro (ADMELOG) corrective regimen sliding scale   SubCutaneous three times a day before meals  ondansetron Injectable 4 milliGRAM(s) IV Push every 6 hours PRN  sodium chloride 0.9%. 1000 milliLiter(s) IV Continuous <Continuous>      barbiturates (Unknown)  Cipro (Other; Swelling)      ROS otherwise negative     T(C): 36.7 (01-19-21 @ 16:48), Max: 38.1 (01-19-21 @ 00:07)  HR: 100 (01-19-21 @ 16:48) (87 - 112)  BP: 120/91 (01-19-21 @ 16:48) (120/91 - 142/43)  RR: 20 (01-19-21 @ 16:48) (16 - 20)  SpO2: 100% (01-19-21 @ 16:48) (98% - 100%)                          8.1    13.81 )-----------( 400      ( 19 Jan 2021 00:29 )             26.4   01-19    137  |  107  |  40<H>  ----------------------------<  148<H>  3.4<L>   |  22  |  1.91<H>    Ca    8.6      19 Jan 2021 00:29    TPro  7.4  /  Alb  2.5<L>  /  TBili  0.2  /  DBili  x   /  AST  13<L>  /  ALT  19  /  AlkPhos  83  01-19    < from: NM Pulmonary Perfusion Scan (01.19.21 @ 10:00) >  IMPRESSION: Very low probability of pulmonary embolus.    < end of copied text >    
  CHIEF COMPLAINT: Patient is a 75y old  Female who presents with a chief complaint of SWIFT (21 Jan 2021 09:43)    HPI:  75 year old woman with a history of HTN, DM, vertigo, and melanoma (receiving immunotherapy at [facility] [place]) who presented t the ED with complaints of dyspnea -- abrupt onset, exacerbated with exertion, no clear alleviating factors; no associated angina.  She was found to be in atrial fibrillation with rapid ventricular response.  She has no past history of heart disease.    PAST MEDICAL & SURGICAL HISTORY:  Melanoma  DM (diabetes mellitus)  HTN (hypertension)  Vertigo  No significant past surgical history    SOCIAL HISTORY:   Alcohol: Denied  Smoking: Nonsmoker    FAMILY HISTORY: No pertinent family history in first degree relatives    MEDICATIONS  (STANDING):  atorvastatin 40 milliGRAM(s) Oral at bedtime  cefTRIAXone Injectable. 1000 milliGRAM(s) IV Push every 24 hours  diltiazem Infusion 10 mG/Hr (10 mL/Hr) IV Continuous <Continuous>  fluocinonide 0.05% Ointment 1 Application(s) Topical two times a day  glucagon  Injectable 1 milliGRAM(s) IntraMuscular once  heparin  Infusion.  Unit(s)/Hr (14 mL/Hr) IV Continuous <Continuous>  insulin glargine Injectable (LANTUS) 15 Unit(s) SubCutaneous at bedtime  insulin lispro (ADMELOG) corrective regimen sliding scale   SubCutaneous three times a day before meals  insulin lispro (ADMELOG) corrective regimen sliding scale   SubCutaneous at bedtime  iron sucrose Injectable 100 milliGRAM(s) IV Push every 7 days  metoprolol tartrate Injectable 5 milliGRAM(s) IV Push once  metoprolol tartrate Injectable 5 milliGRAM(s) IV Push once  metoprolol tartrate Injectable 5 milliGRAM(s) IV Push once    MEDICATIONS  (PRN):  acetaminophen   Tablet .. 650 milliGRAM(s) Oral every 6 hours PRN Temp greater or equal to 38.5C (101.3F), Mild Pain (1 - 3)  ALPRAZolam 0.25 milliGRAM(s) Oral every 8 hours PRN anxiety  aluminum hydroxide/magnesium hydroxide/simethicone Suspension 30 milliLiter(s) Oral every 4 hours PRN Dyspepsia  heparin   Injectable 6500 Unit(s) IV Push every 6 hours PRN For aPTT less than 40  heparin   Injectable 3000 Unit(s) IV Push every 6 hours PRN For aPTT between 40 - 57  ondansetron Injectable 4 milliGRAM(s) IV Push every 6 hours PRN Nausea    Allergies:  barbiturates (Unknown)  Cipro (Other; Swelling)    REVIEW OF SYSTEMS:  CONSTITUTIONAL: No fevers or chills  Eyes: No visual changes  NECK: No pain or stiffness  RESPIRATORY: No cough, wheezing, hemoptysis; + shortness of breath  CARDIOVASCULAR: No chest pain or palpitations  GASTROINTESTINAL: No abdominal pain. No nausea, vomiting, or hematemesis; No diarrhea or constipation. No melena or hematochezia.  GENITOURINARY: No dysuria, frequency or hematuria  NEUROLOGICAL: No numbness.  SKIN: No itching or rash  All other review of systems is negative unless indicated above    VITAL SIGNS:   Vital Signs Last 24 Hrs  T(C): 36.6 (21 Jan 2021 08:09), Max: 37.2 (20 Jan 2021 16:05)  T(F): 97.9 (21 Jan 2021 08:09), Max: 98.9 (20 Jan 2021 16:05)  HR: 130 (21 Jan 2021 08:09) (94 - 144)  BP: 125/76 (21 Jan 2021 08:09) (110/75 - 143/67)  BP(mean): 90 (21 Jan 2021 08:09) (90 - 90)  RR: 18 (21 Jan 2021 07:07) (16 - 18)  SpO2: 98% (21 Jan 2021 07:07) (82% - 98%)    PHYSICAL EXAM:  Constitutional: NAD, awake and alert  HEENT:  EOMI,  Pupils round, No oral cyanosis.  Pulmonary: Non-labored, breath sounds are clear bilaterally, No wheezing, rales or rhonchi  Cardiovascular: Irregular, tachycardic, no Murmurs, gallops or rubs  Gastrointestinal: Bowel Sounds present, soft, nontender.   Lymph: No peripheral edema. No cervical lymphadenopathy.  Neurological: Alert, no focal deficits  Skin: No rashes.  Psych:  Mood & affect appropriate; very anxious    LABS:                     8.0    12.31 )-----------( 440      ( 21 Jan 2021 06:58 )             26.7               143    |  116    |  32     ----------------------------<  143    3.5     |  19     |  1.57     CARDIAC MARKERS ( 21 Jan 2021 10:27 ) 1.030 ng/mL / x     / x     / x     / x      CARDIAC MARKERS ( 21 Jan 2021 06:58 ) 0.682 ng/mL / x     / x     / x     / x        Pro Bnp 1091  TSH: 0.01    Tele: AF with RVR

## 2021-01-21 NOTE — PROGRESS NOTE ADULT - SUBJECTIVE AND OBJECTIVE BOX
INTERVAL HPI/OVERNIGHT EVENTS:  Patient S&E at bedside. yesterday noted to have RAPID AFIB with RVR   the patient has been upgraded to TELE     VITAL SIGNS:  T(F): 97.7 (21 @ 06:30)  HR: 135 (21 @ 07:07)  BP: 110/75 (21 @ 07:07)  RR: 18 (21 @ 07:07)  SpO2: 98% (21 @ 07:07)  Wt(kg): --    PHYSICAL EXAM:    Constitutional: NAD  Eyes: EOMI, sclera non-icteric  Neck: supple, no masses, no JVD  Respiratory: CTA b/l, good air entry b/l  Cardiovascular: RRR, no M/R/G  Gastrointestinal: soft, NTND, no masses palpable, + BS, no hepatosplenomegaly  Extremities: no c/c/e  Neurological: AAOx3      MEDICATIONS  (STANDING):  atorvastatin 40 milliGRAM(s) Oral at bedtime  cefTRIAXone Injectable. 1000 milliGRAM(s) IV Push every 24 hours  dextrose 40% Gel 15 Gram(s) Oral once  dextrose 5%. 1000 milliLiter(s) (50 mL/Hr) IV Continuous <Continuous>  dextrose 5%. 1000 milliLiter(s) (100 mL/Hr) IV Continuous <Continuous>  dextrose 50% Injectable 25 Gram(s) IV Push once  dextrose 50% Injectable 12.5 Gram(s) IV Push once  dextrose 50% Injectable 25 Gram(s) IV Push once  fluocinonide 0.05% Ointment 1 Application(s) Topical two times a day  glucagon  Injectable 1 milliGRAM(s) IntraMuscular once  heparin   Injectable 5000 Unit(s) SubCutaneous every 12 hours  insulin glargine Injectable (LANTUS) 15 Unit(s) SubCutaneous at bedtime  iron sucrose Injectable 100 milliGRAM(s) IV Push every 7 days  metoprolol tartrate Injectable 5 milliGRAM(s) IV Push once  metoprolol tartrate Injectable 5 milliGRAM(s) IV Push once  metoprolol tartrate Injectable 5 milliGRAM(s) IV Push once    MEDICATIONS  (PRN):  acetaminophen   Tablet .. 650 milliGRAM(s) Oral every 6 hours PRN Temp greater or equal to 38.5C (101.3F), Mild Pain (1 - 3)  aluminum hydroxide/magnesium hydroxide/simethicone Suspension 30 milliLiter(s) Oral every 4 hours PRN Dyspepsia  ondansetron Injectable 4 milliGRAM(s) IV Push every 6 hours PRN Nausea      Allergies    barbiturates (Unknown)  Cipro (Other; Swelling)    Intolerances        LABS:                        8.0    12.31 )-----------( 440      ( 2021 06:58 )             26.7         143  |  116<H>  |  32<H>  ----------------------------<  143<H>  3.5   |  19<L>  |  1.57<H>    Ca    8.8      2021 06:58  Phos  3.4       Mg     1.9         TPro  7.1  /  Alb  2.2<L>  /  TBili  0.2  /  DBili  x   /  AST  12<L>  /  ALT  15  /  AlkPhos  74        Urinalysis Basic - ( 2021 15:35 )    Color: Yellow / Appearance: Clear / S.015 / pH: x  Gluc: x / Ketone: Negative  / Bili: Negative / Urobili: Negative mg/dL   Blood: x / Protein: 30 mg/dL / Nitrite: Negative   Leuk Esterase: Moderate / RBC: 0-2 /HPF / WBC 26-50   Sq Epi: x / Non Sq Epi: Occasional / Bacteria: Occasional        RADIOLOGY & ADDITIONAL TESTS:  Studies reviewed.    ASSESSMENT & PLAN:

## 2021-01-21 NOTE — PROGRESS NOTE ADULT - SUBJECTIVE AND OBJECTIVE BOX
Patient is a 75y old  Female who presents with a chief complaint of SWIFT (20 Jan 2021 13:29)    CC: dyspnea    HPI: 75 year old female with PMH of HTN, DM2, vertigo, and rectal melanoma who comes to the ED complaining of SWIFT. States walked up her steps to go to the bathroom and was short of breath. Pt denies chest pain or fevers. States this never occurred previously. Pt is currently on immunotherapy for melanoma at [facility] and states last dose was 2 weeks ago. Called on call MD byers at [place] who told her to go to the ED. Currently feels well no complaints. No leg edema. Denies fevers but febrile in ED. Since she has a h/o melanoma and the chief c/o was SOB PE should be ruled out; D dimer of no value in pt with cancer. JASPREET, check VQ scan (19 Jan 2021 07:44)    1/20- Patient seen and examined at bedside, feels much better, afebrile, denies pain. Dyspnea improved, no longer feel as heart is racing. + BM, no bleeding. denies headache, dizziness, CP, SOB. denies n/v. denies dysuria, hematuria, flank pain. POC discussed.   1/21 - no cp palps is in afib     Physical exam:   GENERAL: NAD  NERVOUS SYSTEM:  Alert & Oriented X3, non- focal exam, Motor Strength 5/5 B/L upper and lower extremities; DTRs 2+ intact and symmetric  HEAD:  Atraumatic, Normocephalic  EYES: EOMI, PERRLA, conjunctiva and sclera clear  HEENT: Moist mucous membranes  NECK: Supple, No JVD  CHEST/LUNG: Clear to auscultation bilaterally; No rales, no rhonchi, no wheezing  HEART: Regular rate and rhythm; No murmurs, no rubs or gallops  ABDOMEN: Soft, Non-tender, Non-distended; Bowel sounds present  GENITOURINARY- Voiding, no suprapubic tenderness  EXTREMITIES:  2+ Peripheral Pulses, No clubbing, cyanosis, or edema  MUSCULOSKELETAL:- No muscle tenderness, Muscle tone normal, No joint tenderness, no Joint swelling,  Joint ROM –normal   SKIN-diffuse rash 2/2 immunotherapy side effect    < from: US Kidney and Bladder (01.20.21 @ 13:29) >  FINDINGS:  Right kidney: 9.6 cm. No renal mass, hydronephrosis or calculi.  Left kidney: 9.1 cm. No renal mass. Mild left hydronephrosis. Multiple stones ranging from 4 mm to 7 mm  Urinary bladder: Underdistended    IMPRESSION:  Multiple stones in the left kidney with mild left hydronephrosis.  21 mm thickened endometrium may represent hyperplasia versus carcinoma. Tissue sampling is advised.             Patient is a 75y old  Female who presents with a chief complaint of SWIFT (20 Jan 2021 13:29)    CC: dyspnea    HPI: 75 year old female with PMH of HTN, DM2, vertigo, and rectal melanoma who comes to the ED complaining of SWIFT. States walked up her steps to go to the bathroom and was short of breath. Pt denies chest pain or fevers. States this never occurred previously. Pt is currently on immunotherapy for melanoma at [facility] and states last dose was 2 weeks ago. Called on call MD byers at [place] who told her to go to the ED. Currently feels well no complaints. No leg edema. Denies fevers but febrile in ED. Since she has a h/o melanoma and the chief c/o was SOB PE should be ruled out; D dimer of no value in pt with cancer. JASPREET, check VQ scan (19 Jan 2021 07:44)    1/20- Patient seen and examined at bedside, feels much better, afebrile, denies pain. Dyspnea improved, no longer feel as heart is racing. + BM, no bleeding. denies headache, dizziness, CP, SOB. denies n/v. denies dysuria, hematuria, flank pain. POC discussed.   1/21 - no cp palps is in afib; pt reports that she is very anxious at baseline and had a panic attack last night     Physical exam:   GENERAL: NAD  NERVOUS SYSTEM:  Alert & Oriented X3, non- focal exam, Motor Strength 5/5 B/L upper and lower extremities; DTRs 2+ intact and symmetric  HEAD:  Atraumatic, Normocephalic  EYES: EOMI, PERRLA, conjunctiva and sclera clear  HEENT: Moist mucous membranes  NECK: Supple, No JVD  CHEST/LUNG: Clear to auscultation bilaterally; No rales, no rhonchi, no wheezing  HEART: irr irr tachy   ABDOMEN: Soft, Non-tender, Non-distended; Bowel sounds present  GENITOURINARY- Voiding, no suprapubic tenderness  EXTREMITIES:  2+ Peripheral Pulses, No clubbing, cyanosis, or edema  MUSCULOSKELETAL:- No muscle tenderness, Muscle tone normal, No joint tenderness, no Joint swelling,  Joint ROM –normal   SKIN-diffuse rash 2/2 immunotherapy side effect    < from: US Kidney and Bladder (01.20.21 @ 13:29) >  FINDINGS:  Right kidney: 9.6 cm. No renal mass, hydronephrosis or calculi.  Left kidney: 9.1 cm. No renal mass. Mild left hydronephrosis. Multiple stones ranging from 4 mm to 7 mm  Urinary bladder: Underdistended    IMPRESSION:  Multiple stones in the left kidney with mild left hydronephrosis.  21 mm thickened endometrium may represent hyperplasia versus carcinoma. Tissue sampling is advised.    LABS: All Labs Reviewed:                        8.0    12.31 )-----------( 440      ( 21 Jan 2021 06:58 )             26.7   143  |  116<H>  |  32<H>  ----------------------------<  143<H>  3.5   |  19<L>  |  1.57<H>    TPro  7.1  /  Alb  2.2<L>  /  TBili  0.2  /  DBili  x   /  AST  12<L>  /  ALT  15  /  AlkPhos  74  01-21  CARDIAC MARKERS ( 21 Jan 2021 06:58 )  0.682 ng/mL / x     / x     / x     / x        RADIOLOGY/EKG:  TELE 1/21 shows AFIB WITH RVR       acetaminophen   Tablet .. 650 milliGRAM(s) Oral every 6 hours PRN  ALPRAZolam 0.25 milliGRAM(s) Oral every 8 hours PRN  ALPRAZolam 0.5 milliGRAM(s) Oral once  aluminum hydroxide/magnesium hydroxide/simethicone Suspension 30 milliLiter(s) Oral every 4 hours PRN  atorvastatin 40 milliGRAM(s) Oral at bedtime  cefTRIAXone Injectable. 1000 milliGRAM(s) IV Push every 24 hours  dextrose 40% Gel 15 Gram(s) Oral once  dextrose 5%. 1000 milliLiter(s) IV Continuous <Continuous>  dextrose 5%. 1000 milliLiter(s) IV Continuous <Continuous>  dextrose 50% Injectable 25 Gram(s) IV Push once  dextrose 50% Injectable 12.5 Gram(s) IV Push once  dextrose 50% Injectable 25 Gram(s) IV Push once  diltiazem Infusion 10 mG/Hr IV Continuous <Continuous>  diltiazem Injectable 10 milliGRAM(s) IV Push once  fluocinonide 0.05% Ointment 1 Application(s) Topical two times a day  glucagon  Injectable 1 milliGRAM(s) IntraMuscular once  heparin   Injectable 5500 Unit(s) IV Push every 6 hours PRN  heparin   Injectable 2500 Unit(s) IV Push every 6 hours PRN  heparin  Infusion.  Unit(s)/Hr IV Continuous <Continuous>  insulin glargine Injectable (LANTUS) 15 Unit(s) SubCutaneous at bedtime  iron sucrose Injectable 100 milliGRAM(s) IV Push every 7 days  metoprolol tartrate Injectable 5 milliGRAM(s) IV Push once  metoprolol tartrate Injectable 5 milliGRAM(s) IV Push once  metoprolol tartrate Injectable 5 milliGRAM(s) IV Push once  ondansetron Injectable 4 milliGRAM(s) IV Push every 6 hours PRN  potassium chloride    Tablet ER 40 milliEquivalent(s) Oral once

## 2021-01-21 NOTE — CONSULT NOTE ADULT - PROBLEM SELECTOR RECOMMENDATION 9
New onset and associated with RVR; Cardizem IV bolus + infusion ordered; anticoagulation is appropriate due to high risk of thromboembolism. Echocardiogram.

## 2021-01-21 NOTE — PROVIDER CONTACT NOTE (MEDICATION) - BACKGROUND
Pharmacy stated 15U order was most likely based on previous night's dose and recommended for MD to put in order for 28U, as per patient and her sliding scale regimen, for tonight.

## 2021-01-21 NOTE — PROVIDER CONTACT NOTE (CHANGE IN STATUS NOTIFICATION) - BACKGROUND
Patient came in with SWIFT. Patient had VQ scan done on admission which showed low probability PE. Patient states she cannot tolerate IV contrast for another type of scan.

## 2021-01-21 NOTE — PROGRESS NOTE ADULT - ASSESSMENT
Assessment: 76 yo female with DM2,  anal melanoma under care of Dr. Shearer at [facility] on combined IO therapy with ipilimumab + Nivolumab (cycle 2 on 1/4/2021) admitted  on 1/19/21 with SWIFT.    SWIFT  ruled out VTE   most likely related to acute on chronic iron deficiency anemia   -VQ scan  - low probability  - start IV iron as per hematology   -1/20 breathing better, no longer dyspneic  -Stool OB negative  JASPREET with baseline Cr 1.1,  nephrolithiasis , mild left hydronephrosis  - s/p IVF resuscitation , SCr 1.91-->1.62  - c/w IVF- NS @ 100 ml/hr  -Kidney and Bladder US - multiple renal stones, thickened endometrium  - urology consult Dr. Marie  - CT abd/pelv to assess the size of the stone and location   - hold Olmesartan   Pyuria suspected UTI, no actual symptoms  -UA likely contaminated, will recollect , f/u repeat urine cx   -s/p abx in ED will continue my suspicion is low though  -Ceftriaxone 1G daily x 7 days  - WBC 13--> 12  Thickened endometrium with anemia  - GYN evaluation to r/o malignancy   Anal melanoma on  immunotherapy ipilimumab + Nivolumab(cycle 2 on 1/4/2021).  under care of Dr. Shearer at [facility]  - oncology consult Dr. Terry  - both medication can cause renal injury   DM type 2 with A1C 6.6, well controlled  - sliding scale  will stop pt refusing short acting insulin   - 1/20 refusing sliding scale insulin, will give Lantus 15u at HS.  -on CHIN at home  takes 25-30 units based on scale   HLD - c/w statins    DVT proph - heparin q12h due to renal failure    Dispo - IV fluids, IV Abx, urology and GYN evaluation         Assessment: 74 yo female with DM2,  anal melanoma under care of Dr. Shearer at [facility] on combined IO therapy with ipilimumab + Nivolumab (cycle 2 on 1/4/2021) admitted  on 1/19/21 with SWIFT.    SWIFT  ruled out VTE   most likely related to acute on chronic iron deficiency anemia   -VQ scan  - low probability  - start IV iron as per hematology   -1/20 breathing better, no longer dyspneic  -Stool OB negative    JASPREET with baseline Cr 1.1,  nephrolithiasis , mild left hydronephrosis  - s/p IVF resuscitation , SCr 1.91-->1.62  - c/w IVF- NS @ 100 ml/hr  -Kidney and Bladder US - multiple renal stones, thickened endometrium  - urology consult Dr. Marie  - CT abd/pelv to assess the size of the stone and location   - hold Olmesartan     Pyuria suspected UTI, no actual symptoms  -UA likely contaminated, will recollect , f/u repeat urine cx   -s/p abx in ED will continue my suspicion is low though  -Ceftriaxone 1G daily x 7 days  - WBC 13--> 12    Thickened endometrium with anemia  - GYN evaluation to r/o malignancy     Anal melanoma on  immunotherapy ipilimumab + Nivolumab(cycle 2 on 1/4/2021).  under care of Dr. Shearer at [facility]  - oncology consult Dr. Terry  - both medication can cause renal injury   DM type 2 with A1C 6.6, well controlled  - sliding scale  will stop pt refusing short acting insulin   - 1/20 refusing sliding scale insulin, will give Lantus 15u at HS.  -on CHIN at home  takes 25-30 units based on scale   1/21 - pt refusing RISS, will try to talk to her again; for now will add back FS checks     HLD - c/w statins    1/21 NEW ONSET AFIB WITH RVR WITH high CHADSVASC   start on cardizem GTT and heparin drip  pt meets criteria for AC, risks and benefits explained to patient  at this time no symptoms - no cp palps sob or lightheadedness   discussed with Dr Terry and okay to give AC at this time; H&H low side - no bolus  on immtherapy for anal melanoma  will get ECHO, TSH FT4   f/u troponins - slight elevation likely demand ischemia       ANXIETY   start on xanax prn, uptitrate dose if needed     Dispo - CARD/URO/GYN evaluation  discussed with RN

## 2021-01-21 NOTE — PROVIDER CONTACT NOTE (MEDICATION) - RECOMMENDATIONS
Please put in Provider to RN orders for pt:  1) ok to use her own BGM machine  2) ok to use her own insulin pen KWIKPEN.

## 2021-01-21 NOTE — CONSULT NOTE ADULT - ASSESSMENT
76 yo female with PMH as above admitted for SWIFT found to have new onset Afib, with incidental finding of 1.6 cm left renal pelvic stone and left mild hydronephrosis, mild leukocytosis and creatinine elevation. UCx contaminated- pt on ABX. Patient reports she is undergoing immunotherapy at [facility] for melanoma and has fluctuation creatinine due to that. She states she is completely asymptomatic with the renal stone.  Discussed cystoscopy, ureteral stent placement with patient, risks, benefits and alternatives which include nephrostomy tube or conservative observation but given the stone size she may not be able to pass the stone. Discussed with pt that refusing any intervention may result in kidney damage vs. infection. Patient states she does not wish to have a ureteral stent placement or any intervention at this time. She asked her daughter in law Dr. Kaylah Arnett [phone] be involved. Dr. Arnett was called and the above concerns were explained. Both patient and Dr. Arnett would like to trend pt's creat and if it continues to down trend they do not wish to have any intervention, however if the creat is elevated tomorrow they will consider cystoscopy, ureteral stent placement. All risks/benefits and concerns discussed with pt and Dr. Arnett, they verbalized understanding.   Recommend  - Trend Creat  - Strain urine    Case discussed with Dr. Salinas
 76 yo female with anal melanoma under care of Dr. Shearer at [facility] on combined IO therapy with ipilimumab + Nivolumab (cycle 2 on 1/4/2021) admitted with SWIFT.      #SWIFT   -improved  -ruled out PE    #?UTI  -received abx in eR  -f/u cultures    #JASPREET  -likely pre-renal azotemia  -trial of IVF  -if Cr continues to rise, nephrology eval.  Doubt IO related nephritis but need to keep in differential.    FU with DR. Shearer upon d/c    We will be happy to answer any onc questions on behalf of [facility] and will be in touch with them     Pieter Hager MD  Hematology/Oncology  Cell:  [phone]  Office Phone: [phone]  Office Fax:  [phone] [address] [id number]

## 2021-01-21 NOTE — CHART NOTE - NSCHARTNOTEFT_GEN_A_CORE
RRT called while patient was ambulating to bathroom, became dizzy. She was helped back to bed.  EKG showed new onset afib w/ RVR to 160's. BP stable . She denies any chest pain/ palpitations/ shortness of breath.   Given 5mg IV lopressor x2. HR came down to 110-120's.   BP remained stable after Lopressor.   Send STAT BMP/Mg/phos and trops.   Transfer to telemetry   Cards consult     Continue care per medicine

## 2021-01-22 NOTE — CDI QUERY NOTE - NSCDINOTEDOCCLARIFICATION_GEN_A_CORE
PLEASE INCLUDE MORE SPECIFIC DOCUMENTATION IN YOUR PROGRESS NOTE AND DISCHARGE SUMMARY.  The documentation in this patient's medical record requires additional clarification to ensure that we accurately capture the patients diagnosis(es), treatment and/or severity of illness. Please document to the greatest level of specificity all corresponding diagnoses (either known or suspected) and/or treatment associated with the clinical information described below.
PLEASE INCLUDE MORE SPECIFIC DOCUMENTATION IN YOUR PROGRESS NOTE AND DISCHARGE SUMMARY.  The documentation in this patient's medical record requires additional clarification to ensure that we accurately capture the patients diagnosis(es), treatment and/or severity of illness. Please document to the greatest level of specificity all corresponding diagnoses (either known or suspected) and/or treatment associated with the clinical information described below.

## 2021-01-22 NOTE — PROVIDER CONTACT NOTE (OTHER) - SITUATION
Spoke with Dr. Hall
Spoke with Ofelia in the answering service regarding consult
Spoke with Dr Jones regarding consult
Patient felt like she was having a panic attack. Patient oxygen level dropped to low 80's. Heart rate 110's.
Patient woke up feeling like she was having a panic attack again.
MD stated to call back with post 1 hour vitals
Pt is on heparin gtt for new onset afib.

## 2021-01-22 NOTE — PROVIDER CONTACT NOTE (OTHER) - REASON
Activity orders for new onset AFIB
Consultant
Uro Consult
OB/GYN Consult
Patient oxygen level went down to low 80's/patient felt like panic attack
Rechecked vitals after 1 hour
Patient stated she felt panic attack again.

## 2021-01-22 NOTE — PROVIDER CONTACT NOTE (OTHER) - RECOMMENDATIONS
Nasal Cannula oxygen. Possible dose of ani anxiety medication.
Please order bedrest/bedrest with bathroom privileges as pt is high risk for clotting r/t new onset AFIB.
Place patient back on oxygen.
continue to monitor

## 2021-01-22 NOTE — PROVIDER CONTACT NOTE (OTHER) - ACTION/TREATMENT ORDERED:
Stop Nasal cannula 4L and recheck oxygen saturation in 1 hour. If oxygen level dips, recheck lungs with CT scan.  One time dose .25mg aspirin. D/C fluids.
Continue to monitor.
Place patient back on oxygen 4 L nasal cannula. Because patient states she will refuse any scan with IV contast, will monitor until morning.
MD made aware and ordered strict bedrest orders at this time.

## 2021-01-22 NOTE — PROGRESS NOTE ADULT - SUBJECTIVE AND OBJECTIVE BOX
Patient is a 75y old  Female who presents with a chief complaint of SWIFT (20 Jan 2021 13:29)    CC: dyspnea    HPI: 75 year old female with PMH of HTN, DM2, vertigo, and rectal melanoma who comes to the ED complaining of SWIFT. States walked up her steps to go to the bathroom and was short of breath. Pt denies chest pain or fevers. States this never occurred previously. Pt is currently on immunotherapy for melanoma at [facility] and states last dose was 2 weeks ago. Called on call MD byers at [facility] who told her to go to the ED. Currently feels well no complaints. No leg edema. Denies fevers but febrile in ED. Since she has a h/o melanoma and the chief c/o was SOB PE should be ruled out; D dimer of no value in pt with cancer. JASPREET, check VQ scan (19 Jan 2021 07:44)    1/20- Patient seen and examined at bedside, feels much better, afebrile, denies pain. Dyspnea improved, no longer feel as heart is racing. + BM, no bleeding. denies headache, dizziness, CP, SOB. denies n/v. denies dysuria, hematuria, flank pain. POC discussed.   1/21 - no cp palps is in afib; pt reports that she is very anxious at baseline and had a panic attack last night   01/22 Patient  on NSR, no chest no shortness fo breath Fall in the floor , patient denies   LOC, as per patient she got stock with 2 IV poles trying   , denies head trama, fall over her buttock     Physical exam:   Vital Signs Last 24 Hrs  T(C): 37.6 (22 Jan 2021 18:15), Max: 37.6 (22 Jan 2021 18:15)  T(F): 99.7 (22 Jan 2021 18:15), Max: 99.7 (22 Jan 2021 18:15)  HR: 98 (22 Jan 2021 18:15) (89 - 99)  BP: 143/51 (22 Jan 2021 18:15) (117/53 - 143/51)  BP(mean): --  RR: 20 (22 Jan 2021 18:15) (18 - 20)  SpO2: 94% (22 Jan 2021 18:15) (91% - 94%)  GENERAL: NAD  NERVOUS SYSTEM:  Alert & Oriented X3, non- focal exam, Motor Strength 5/5 B/L upper and lower extremities; DTRs 2+ intact and symmetric  HEAD:  Atraumatic, Normocephalic  EYES: EOMI, PERRLA, conjunctiva and sclera clear  HEENT: Moist mucous membranes  NECK: Supple, No JVD  CHEST/LUNG: Clear to auscultation bilaterally; No rales, no rhonchi, no wheezing  HEART: irr irr tachy   ABDOMEN: Soft, Non-tender, Non-distended; Bowel sounds present  GENITOURINARY- Voiding, no suprapubic tenderness  EXTREMITIES:  2+ Peripheral Pulses, No clubbing, cyanosis, or edema  MUSCULOSKELETAL:- No muscle tenderness, Muscle tone normal, No joint tenderness, no Joint swelling,  Joint ROM –normal   SKIN-diffuse rash 2/2 immunotherapy side effect    < from: US Kidney and Bladder (01.20.21 @ 13:29) >  FINDINGS:  Right kidney: 9.6 cm. No renal mass, hydronephrosis or calculi.  Left kidney: 9.1 cm. No renal mass. Mild left hydronephrosis. Multiple stones ranging from 4 mm to 7 mm  Urinary bladder: Underdistended    IMPRESSION:  Multiple stones in the left kidney with mild left hydronephrosis.  21 mm thickened endometrium may represent hyperplasia versus carcinoma. Tissue sampling is advised.    LABS: All Labs Reviewed:                       01-22    143  |  115<H>  |  32<H>  ----------------------------<  84  3.3<L>   |  21<L>  |  1.50<H>    Ca    8.8      22 Jan 2021 09:30  Phos  3.4     01-21  Mg     2.0     01-22    TPro  7.1  /  Alb  2.2<L>  /  TBili  0.2  /  DBili  x   /  AST  12<L>  /  ALT  15  /  AlkPhos  74  01-21                            7.3    14.11 )-----------( 399      ( 22 Jan 2021 09:30 )             24.3       CARDIAC MARKERS ( 22 Jan 2021 09:30 )  0.822 ng/mL / x     / x     / x     / x      CARDIAC MARKERS ( 21 Jan 2021 23:39 )  1.220 ng/mL / x     / x     / x     / x      CARDIAC MARKERS ( 21 Jan 2021 14:19 )  1.110 ng/mL / x     / x     / x     / x      CARDIAC MARKERS ( 21 Jan 2021 10:27 )  1.030 ng/mL / x     / x     / x     / x      CARDIAC MARKERS ( 21 Jan 2021 06:58 )  0.682 ng/mL / x     / x     / x     / x            LIVER FUNCTIONS - ( 21 Jan 2021 06:58 )  Alb: 2.2 g/dL / Pro: 7.1 gm/dL / ALK PHOS: 74 U/L / ALT: 15 U/L / AST: 12 U/L / GGT: x             PTT - ( 22 Jan 2021 03:01 )  PTT:91.8 sec      RADIOLOGY/EKG:  TELE 1/21 shows AFIB WITH RVR     MEDICATIONS  (STANDING):  atorvastatin 40 milliGRAM(s) Oral at bedtime  cefTRIAXone Injectable. 1000 milliGRAM(s) IV Push every 24 hours  dextrose 40% Gel 15 Gram(s) Oral once  dextrose 5%. 1000 milliLiter(s) (50 mL/Hr) IV Continuous <Continuous>  dextrose 5%. 1000 milliLiter(s) (100 mL/Hr) IV Continuous <Continuous>  dextrose 50% Injectable 25 Gram(s) IV Push once  dextrose 50% Injectable 12.5 Gram(s) IV Push once  dextrose 50% Injectable 25 Gram(s) IV Push once  diltiazem    milliGRAM(s) Oral daily  fluocinonide 0.05% Ointment 1 Application(s) Topical two times a day  glucagon  Injectable 1 milliGRAM(s) IntraMuscular once  insulin lispro (ADMELOG) corrective regimen sliding scale   SubCutaneous three times a day before meals  insulin lispro (ADMELOG) corrective regimen sliding scale   SubCutaneous at bedtime  iron sucrose Injectable 100 milliGRAM(s) IV Push every 7 days  metoprolol tartrate Injectable 5 milliGRAM(s) IV Push once  metoprolol tartrate Injectable 5 milliGRAM(s) IV Push once  metoprolol tartrate Injectable 5 milliGRAM(s) IV Push once  potassium chloride    Tablet ER 40 milliEquivalent(s) Oral once    MEDICATIONS  (PRN):  acetaminophen   Tablet .. 650 milliGRAM(s) Oral every 6 hours PRN Temp greater or equal to 38.5C (101.3F), Mild Pain (1 - 3)  ALPRAZolam 0.25 milliGRAM(s) Oral every 8 hours PRN anxiety  aluminum hydroxide/magnesium hydroxide/simethicone Suspension 30 milliLiter(s) Oral every 4 hours PRN Dyspepsia  Biotene Dry Mouth Oral Rinse 15 milliLiter(s) Swish and Spit every 3 hours PRN Mouth Care  ondansetron Injectable 4 milliGRAM(s) IV Push every 6 hours PRN Nausea               Patient is a 75y old  Female who presents with a chief complaint of SWIFT (20 Jan 2021 13:29)    CC: dyspnea    HPI: 75 year old female with PMH of HTN, DM2, vertigo, and rectal melanoma who comes to the ED complaining of SWIFT. States walked up her steps to go to the bathroom and was short of breath. Pt denies chest pain or fevers. States this never occurred previously. Pt is currently on immunotherapy for melanoma at [facility] and states last dose was 2 weeks ago. Called on call MD byers at [facility] who told her to go to the ED. Currently feels well no complaints. No leg edema. Denies fevers but febrile in ED. Since she has a h/o melanoma and the chief c/o was SOB PE should be ruled out; D dimer of no value in pt with cancer. JASPREET, check VQ scan (19 Jan 2021 07:44)    1/20- Patient seen and examined at bedside, feels much better, afebrile, denies pain. Dyspnea improved, no longer feel as heart is racing. + BM, no bleeding. denies headache, dizziness, CP, SOB. denies n/v. denies dysuria, hematuria, flank pain. POC discussed.   1/21 - no cp palps is in afib; pt reports that she is very anxious at baseline and had a panic attack last night   01/22 Patient  on NSR, no chest no shortness fo breath Fall in the floor , patient denies   LOC, as per patient she got stock with 2 IV poles trying   , denies head trama, fall over her buttock     Physical exam:   Vital Signs Last 24 Hrs  Vital Signs Last 24 Hrs  T(C): 36.8 (22 Jan 2021 19:47), Max: 37.6 (22 Jan 2021 18:15)  T(F): 98.2 (22 Jan 2021 19:47), Max: 99.7 (22 Jan 2021 18:15)  HR: 90 (22 Jan 2021 19:47) (89 - 98)  BP: 105/82 (22 Jan 2021 19:47) (105/82 - 143/51)  BP(mean): --  RR: 20 (22 Jan 2021 19:47) (18 - 20)  SpO2: 92% (22 Jan 2021 19:47) (91% - 94%)  GENERAL: NAD  NERVOUS SYSTEM:  Alert & Oriented X3, non- focal exam, Motor Strength 5/5 B/L upper and lower extremities; DTRs 2+ intact and symmetric  HEAD:  Atraumatic, Normocephalic  EYES: EOMI, PERRLA, conjunctiva and sclera clear  HEENT: Moist mucous membranes  NECK: Supple, No JVD  CHEST/LUNG: Clear to auscultation bilaterally; No rales, no rhonchi, no wheezing  HEART: irr irr tachy   ABDOMEN: Soft, Non-tender, Non-distended; Bowel sounds present  GENITOURINARY- Voiding, no suprapubic tenderness  EXTREMITIES:  2+ Peripheral Pulses, No clubbing, cyanosis, or edema  MUSCULOSKELETAL:- No muscle tenderness, Muscle tone normal, No joint tenderness, no Joint swelling,  Joint ROM –normal   SKIN-diffuse rash 2/2 immunotherapy side effect    < from: US Kidney and Bladder (01.20.21 @ 13:29) >  FINDINGS:  Right kidney: 9.6 cm. No renal mass, hydronephrosis or calculi.  Left kidney: 9.1 cm. No renal mass. Mild left hydronephrosis. Multiple stones ranging from 4 mm to 7 mm  Urinary bladder: Underdistended    IMPRESSION:  Multiple stones in the left kidney with mild left hydronephrosis.  21 mm thickened endometrium may represent hyperplasia versus carcinoma. Tissue sampling is advised.    LABS: All Labs Reviewed:                       01-22    143  |  115<H>  |  32<H>  ----------------------------<  84  3.3<L>   |  21<L>  |  1.50<H>    Ca    8.8      22 Jan 2021 09:30  Phos  3.4     01-21  Mg     2.0     01-22    TPro  7.1  /  Alb  2.2<L>  /  TBili  0.2  /  DBili  x   /  AST  12<L>  /  ALT  15  /  AlkPhos  74  01-21                            7.3    14.11 )-----------( 399      ( 22 Jan 2021 09:30 )             24.3       CARDIAC MARKERS ( 22 Jan 2021 09:30 )  0.822 ng/mL / x     / x     / x     / x      CARDIAC MARKERS ( 21 Jan 2021 23:39 )  1.220 ng/mL / x     / x     / x     / x      CARDIAC MARKERS ( 21 Jan 2021 14:19 )  1.110 ng/mL / x     / x     / x     / x      CARDIAC MARKERS ( 21 Jan 2021 10:27 )  1.030 ng/mL / x     / x     / x     / x      CARDIAC MARKERS ( 21 Jan 2021 06:58 )  0.682 ng/mL / x     / x     / x     / x            LIVER FUNCTIONS - ( 21 Jan 2021 06:58 )  Alb: 2.2 g/dL / Pro: 7.1 gm/dL / ALK PHOS: 74 U/L / ALT: 15 U/L / AST: 12 U/L / GGT: x             PTT - ( 22 Jan 2021 03:01 )  PTT:91.8 sec      RADIOLOGY/EKG:  TELE 1/21 shows AFIB WITH RVR     MEDICATIONS  (STANDING):  atorvastatin 40 milliGRAM(s) Oral at bedtime  cefTRIAXone Injectable. 1000 milliGRAM(s) IV Push every 24 hours  dextrose 40% Gel 15 Gram(s) Oral once  dextrose 5%. 1000 milliLiter(s) (50 mL/Hr) IV Continuous <Continuous>  dextrose 5%. 1000 milliLiter(s) (100 mL/Hr) IV Continuous <Continuous>  dextrose 50% Injectable 25 Gram(s) IV Push once  dextrose 50% Injectable 12.5 Gram(s) IV Push once  dextrose 50% Injectable 25 Gram(s) IV Push once  diltiazem    milliGRAM(s) Oral daily  fluocinonide 0.05% Ointment 1 Application(s) Topical two times a day  glucagon  Injectable 1 milliGRAM(s) IntraMuscular once  insulin lispro (ADMELOG) corrective regimen sliding scale   SubCutaneous three times a day before meals  insulin lispro (ADMELOG) corrective regimen sliding scale   SubCutaneous at bedtime  iron sucrose Injectable 100 milliGRAM(s) IV Push every 7 days  metoprolol tartrate Injectable 5 milliGRAM(s) IV Push once  metoprolol tartrate Injectable 5 milliGRAM(s) IV Push once  metoprolol tartrate Injectable 5 milliGRAM(s) IV Push once  potassium chloride    Tablet ER 40 milliEquivalent(s) Oral once    MEDICATIONS  (PRN):  acetaminophen   Tablet .. 650 milliGRAM(s) Oral every 6 hours PRN Temp greater or equal to 38.5C (101.3F), Mild Pain (1 - 3)  ALPRAZolam 0.25 milliGRAM(s) Oral every 8 hours PRN anxiety  aluminum hydroxide/magnesium hydroxide/simethicone Suspension 30 milliLiter(s) Oral every 4 hours PRN Dyspepsia  Biotene Dry Mouth Oral Rinse 15 milliLiter(s) Swish and Spit every 3 hours PRN Mouth Care  ondansetron Injectable 4 milliGRAM(s) IV Push every 6 hours PRN Nausea

## 2021-01-22 NOTE — CDI QUERY NOTE - NSCDI_DOCCLARIFY2_GEN_ALL_CORE_FT
Documentation clarification is required for accuracy in coding and billing, claim validation and reporting severity of illness, quality data and risk of mortality.
Documentation clarification is required for accuracy in coding and billing, claim validation and reporting severity of illness, quality data and risk of mortality.

## 2021-01-22 NOTE — CDI QUERY NOTE - NSCDIOTHERTXTBX_GEN_ALL_CORE_HH
Patient admitted with SOB  acute on chronic iron def anemia documented  UTI documented    In ER documentation reveals : Principal Discharge DX: Sepsis  Secondary Diagnosis: UTI     On admit : WBC= 13.81  Temp= 100.6  HR= 112  RR= 20  BP= 142/43  Urine culture= 3 organisms- probable contamination     Started on Rocephin IV qday    Please clarify the status of the Sepsis  a) Sepsis, poa, due to UTI  b) No clinical evidence of Sepsis, Sepsis ruled-out  c) Sepsis, poa, due to other source, please clarify  d) Other, please clarify
Patient admitted with SOB  acute on chronic iron def anemia documented  UTI documented    In ER documentation reveals : Principal Discharge DX: Sepsis  Secondary Diagnosis: UTI     On admit : WBC= 13.81  Temp= 100.6  HR= 112  RR= 20  BP= 142/43  Urine culture= 3 organisms- probable contamination     Started on Rocephin IV qday    Please clarify the status of the Sepsis  a) Sepsis, poa, due to UTI  b) No clinical evidence of Sepsis, Sepsis ruled-out  c) Sepsis, poa, due to other source, please clarify  d) Other, please clarify

## 2021-01-22 NOTE — PROGRESS NOTE ADULT - ASSESSMENT
64 yo female with anal melanoma under care of Dr. Shearer on combined IO therapy with ipilimumab + Nivolumab (cycle 2 on 1/4/2021) admitted for UTI     UTI   - continue with ABx     Anemia   - discussed with patient rationale for pRBCs   - discussed with primary oncology with patient present   - Likely secondary to blood loss     Afib with RVR   - now converted  - on Heparin ggt   - discussed with cardio and primary oncology   - would recommend continuing full dose A/C   - if Heme stable would transition to eliquis   - would check TFTs in am    Melanoma   - next planned treatment next week     Diarrhea   - r/o C. diff   -

## 2021-01-22 NOTE — PROGRESS NOTE ADULT - SUBJECTIVE AND OBJECTIVE BOX
INTERVAL HPI/OVERNIGHT EVENTS:  Patient S&E at bedside. No o/n events, patient resting comfortably. No complaints at this time  Converted and now NSR  VITAL SIGNS:  T(F): 98.4 (01-22-21 @ 15:30)  HR: 94 (01-22-21 @ 15:30)  BP: 136/49 (01-22-21 @ 15:30)  RR: 20 (01-22-21 @ 15:30)  SpO2: 92% (01-22-21 @ 15:30)  Wt(kg): --    PHYSICAL EXAM:    Constitutional: NAD  Eyes: EOMI, sclera non-icteric  Neck: supple, no masses, no JVD  Respiratory: CTA b/l, good air entry b/l  Cardiovascular: RRR, no M/R/G  Gastrointestinal: soft, NTND, no masses palpable, + BS, no hepatosplenomegaly  Extremities: no c/c/e  Neurological: AAOx3      MEDICATIONS  (STANDING):  atorvastatin 40 milliGRAM(s) Oral at bedtime  cefTRIAXone Injectable. 1000 milliGRAM(s) IV Push every 24 hours  dextrose 40% Gel 15 Gram(s) Oral once  dextrose 5%. 1000 milliLiter(s) (50 mL/Hr) IV Continuous <Continuous>  dextrose 5%. 1000 milliLiter(s) (100 mL/Hr) IV Continuous <Continuous>  dextrose 50% Injectable 25 Gram(s) IV Push once  dextrose 50% Injectable 12.5 Gram(s) IV Push once  dextrose 50% Injectable 25 Gram(s) IV Push once  diltiazem    milliGRAM(s) Oral daily  fluocinonide 0.05% Ointment 1 Application(s) Topical two times a day  glucagon  Injectable 1 milliGRAM(s) IntraMuscular once  heparin  Infusion.  Unit(s)/Hr (14 mL/Hr) IV Continuous <Continuous>  insulin lispro (ADMELOG) corrective regimen sliding scale   SubCutaneous three times a day before meals  insulin lispro (ADMELOG) corrective regimen sliding scale   SubCutaneous at bedtime  iron sucrose Injectable 100 milliGRAM(s) IV Push every 7 days  metoprolol tartrate Injectable 5 milliGRAM(s) IV Push once  metoprolol tartrate Injectable 5 milliGRAM(s) IV Push once  metoprolol tartrate Injectable 5 milliGRAM(s) IV Push once    MEDICATIONS  (PRN):  acetaminophen   Tablet .. 650 milliGRAM(s) Oral every 6 hours PRN Temp greater or equal to 38.5C (101.3F), Mild Pain (1 - 3)  ALPRAZolam 0.25 milliGRAM(s) Oral every 8 hours PRN anxiety  aluminum hydroxide/magnesium hydroxide/simethicone Suspension 30 milliLiter(s) Oral every 4 hours PRN Dyspepsia  Biotene Dry Mouth Oral Rinse 15 milliLiter(s) Swish and Spit every 3 hours PRN Mouth Care  heparin   Injectable 6500 Unit(s) IV Push every 6 hours PRN For aPTT less than 40  heparin   Injectable 3000 Unit(s) IV Push every 6 hours PRN For aPTT between 40 - 57  ondansetron Injectable 4 milliGRAM(s) IV Push every 6 hours PRN Nausea      Allergies    barbiturates (Unknown)  Cipro (Other; Swelling)    Intolerances        LABS:                        7.3    14.11 )-----------( 399      ( 22 Jan 2021 09:30 )             24.3     01-22    143  |  115<H>  |  32<H>  ----------------------------<  84  3.3<L>   |  21<L>  |  1.50<H>    Ca    8.8      22 Jan 2021 09:30  Phos  3.4     01-21  Mg     2.0     01-22    TPro  7.1  /  Alb  2.2<L>  /  TBili  0.2  /  DBili  x   /  AST  12<L>  /  ALT  15  /  AlkPhos  74  01-21    PTT - ( 22 Jan 2021 03:01 )  PTT:91.8 sec      RADIOLOGY & ADDITIONAL TESTS:  Studies reviewed.    ASSESSMENT & PLAN:

## 2021-01-22 NOTE — PROVIDER CONTACT NOTE (OTHER) - BACKGROUND
Patient had a mini panic attack
Patient has hx of rectal melanoma.
Pt was a RRT on 1/21 AM, ekg found her to be in RAFIB 160s. Upgraded to tele. s/p Cardizem gtt, now d/c. Pt is currently NSR on the monitor; per tele tech, pt converted afib to nsr 12:51PM.
Patient has history of rectal melanoma. Patient came in with SWIFT.

## 2021-01-22 NOTE — PROVIDER CONTACT NOTE (OTHER) - DATE AND TIME:
20-Jan-2021 17:20
21-Jan-2021 01:28
21-Jan-2021 02:11
20-Jan-2021 17:21
21-Jan-2021 00:16
22-Jan-2021 04:49

## 2021-01-22 NOTE — PROGRESS NOTE ADULT - SUBJECTIVE AND OBJECTIVE BOX
REASON FOR VISIT: AF    HPI:  75 year old woman with a history of HTN, DM, vertigo, and melanoma (re[place] [facility] [place]) who presented t the ED with complaints of dyspnea -- found to be in new-onset AF with RVR; spontaneous conversion to sinus rhythm on 1/21 (afternoon).    1/22/21:  Comfortable; diarrhea; no cardiac complaints; eager to return home.    MEDICATIONS  (STANDING):  atorvastatin 40 milliGRAM(s) Oral at bedtime  cefTRIAXone Injectable. 1000 milliGRAM(s) IV Push every 24 hours  diltiazem    milliGRAM(s) Oral daily  fluocinonide 0.05% Ointment 1 Application(s) Topical two times a day  glucagon  Injectable 1 milliGRAM(s) IntraMuscular once  heparin  Infusion.  Unit(s)/Hr (14 mL/Hr) IV Continuous <Continuous>  insulin lispro (ADMELOG) corrective regimen sliding scale   SubCutaneous three times a day before meals  insulin lispro (ADMELOG) corrective regimen sliding scale   SubCutaneous at bedtime  iron sucrose Injectable 100 milliGRAM(s) IV Push every 7 days    MEDICATIONS  (PRN):  acetaminophen   Tablet .. 650 milliGRAM(s) Oral every 6 hours PRN Temp greater or equal to 38.5C (101.3F), Mild Pain (1 - 3)  ALPRAZolam 0.25 milliGRAM(s) Oral every 8 hours PRN anxiety  aluminum hydroxide/magnesium hydroxide/simethicone Suspension 30 milliLiter(s) Oral every 4 hours PRN Dyspepsia  Biotene Dry Mouth Oral Rinse 15 milliLiter(s) Swish and Spit every 3 hours PRN Mouth Care  heparin   Injectable 6500 Unit(s) IV Push every 6 hours PRN For aPTT less than 40  heparin   Injectable 3000 Unit(s) IV Push every 6 hours PRN For aPTT between 40 - 57  ondansetron Injectable 4 milliGRAM(s) IV Push every 6 hours PRN Nausea    Vital Signs Last 24 Hrs  T(C): 36.7 (22 Jan 2021 05:38), Max: 36.7 (22 Jan 2021 05:38)  T(F): 98.1 (22 Jan 2021 05:38), Max: 98.1 (22 Jan 2021 05:38)  HR: 92 (22 Jan 2021 05:38) (92 - 130)  BP: 123/49 (22 Jan 2021 05:38) (115/51 - 125/76)  BP(mean): 90 (21 Jan 2021 08:09) (90 - 90)  RR: 18 (21 Jan 2021 17:17) (18 - 18)  SpO2: 94% (22 Jan 2021 05:38) (94% - 97%)    PHYSICAL EXAM:  Constitutional: NAD, awake and alert, supine in bed  Pulmonary: Non-labored, breath sounds are clear bilaterally, No wheezing, rales or rhonchi  Cardiovascular: Regular, normal S1 and S2  Gastrointestinal: Bowel Sounds present, soft, nontender.   Lymph: No edema.   Psych:  Anxious    LABS:          CARDIAC MARKERS ( 21 Jan 2021 23:39 ) 1.220 ng/mL / x     / x     / x     / x      CARDIAC MARKERS ( 21 Jan 2021 14:19 ) 1.110 ng/mL / x     / x     / x     / x      CARDIAC MARKERS ( 21 Jan 2021 10:27 ) 1.030 ng/mL / x     / x     / x     / x      CARDIAC MARKERS ( 21 Jan 2021 06:58 ) 0.682 ng/mL / x     / x     / x     / x                           7.5    12.55 )-----------( 398      ( 21 Jan 2021 18:56 )             25.2     143  |  116<H>  |  32<H>  ----------------------------<  143<H>  3.5   |  19<L>  |  1.57<H>    Ca    8.8      21 Jan 2021 06:58  Phos  3.4     01-21  Mg     1.9     01-21    TPro  7.1  /  Alb  2.2<L>  /  TBili  0.2  /  DBili  x   /  AST  12<L>  /  ALT  15  /  AlkPhos  74  01-21    Tele: AF with RVR --> SR    TTE Echo Complete w/o Contrast w/ Doppler (11.02.20 @ 09:02):   The left ventricle is normal in size, wall motion and contractility. Mild concentric left ventricular hypertrophy is present. Estimated left ventricular ejection fraction is 65-70 %.   The left atrium is mildly dilated.   Normal appearing right atrium.   Normal appearing right ventricle structure and function.   Normal aortic valve structure and function.   EA reversal of the mitral inflow consistent with reduced compliance of the left ventricle.   Fibrocalcific changes noted to the mitral valve leaflets with preserved leaflet excursion.   Mild (1+) mitral regurgitation is present.   Moderate mitral annular calcification is present.   No evidence of pericardial effusion.

## 2021-01-22 NOTE — PROGRESS NOTE ADULT - ASSESSMENT
# Symptomatic anemia due to blood loss and ACD  Anemia   - discussed with patient rationale for pRBCs   - discussed with primary oncology with patient present   - Likely secondary to blood loss     SWIFT pt with h/o melanoma under immunotherapy  r/o PE  VQ scan ( JASPREET)    #Afib with RVR / Elevated troponin   - now converted  - on Heparin ggt   - discussed with cardio and primary oncology   - would recommend continuing full dose A/C   - if Heme stable would transition to eliquis   - would check TFTs in am    # Sepsis, poa, due to UTI  On admit : WBC= 13.81  Temp= 100.6  HR= 112  RR= 20  BP= 142/43  Urine culture= 3 organisms- probable contamination   On Ceftriaxone    # JASPREET  creatine   IVF    * Probable UTI, no actual symptoms  s/p abx in ED will continue my suspicion is low though    * T2DM  sliding scale for now  on CHIN at home start with a lower dose of 10 units QHS       # Symptomatic anemia due to blood loss and ACD  Anemia   - discussed with patient rationale for pRBCs   - discussed with primary oncology with patient present   - Likely secondary to blood loss     SWIFT pt with h/o melanoma under immunotherapy  r/o PE  VQ scan ( JASPREET)    #Afib with RVR / Elevated troponin   - now converted  - on Heparin ggt , stooping due to the fall , Starting Eliquis in the am   - discussed with cardio and primary oncology   - would recommend continuing full dose A/C   - if Heme stable would transition to eliquis  in the am   - would check TFTs in am    # Sepsis, poa, due to UTI  On admit : WBC= 13.81  Temp= 100.6  HR= 112  RR= 20  BP= 142/43  Urine culture= 3 organisms- probable contamination   On Ceftriaxone    # JASPREET  likely prerenal   elevated Bun/creatinine   c/w IVF    # Hypokalemia   replete and recheck     # T2DM  sliding scale for now  on CHIN at home start with a lower dose of 10 units QHS    DVT heparin

## 2021-01-23 NOTE — PROGRESS NOTE ADULT - SUBJECTIVE AND OBJECTIVE BOX
INTERVAL HPI/OVERNIGHT EVENTS:  Patient S&E at bedside.   + diarrhea overnight   nonbloody     VITAL SIGNS:  T(F): 98.4 (01-22-21 @ 23:10)  HR: 98 (01-22-21 @ 23:10)  BP: 123/44 (01-22-21 @ 23:10)  RR: 20 (01-22-21 @ 19:47)  SpO2: 94% (01-22-21 @ 23:10)  Wt(kg): --    PHYSICAL EXAM:    Constitutional: NAD  Eyes: EOMI, sclera non-icteric  Neck: supple, no masses, no JVD  Respiratory: CTA b/l, good air entry b/l  Cardiovascular: RRR, no M/R/G  Gastrointestinal: soft, NTND, no masses palpable, + BS, no hepatosplenomegaly  Extremities: no c/c/e  Neurological: AAOx3      MEDICATIONS  (STANDING):  atorvastatin 40 milliGRAM(s) Oral at bedtime  cefTRIAXone Injectable. 1000 milliGRAM(s) IV Push every 24 hours  dextrose 40% Gel 15 Gram(s) Oral once  dextrose 5%. 1000 milliLiter(s) (50 mL/Hr) IV Continuous <Continuous>  dextrose 5%. 1000 milliLiter(s) (100 mL/Hr) IV Continuous <Continuous>  dextrose 50% Injectable 25 Gram(s) IV Push once  dextrose 50% Injectable 12.5 Gram(s) IV Push once  dextrose 50% Injectable 25 Gram(s) IV Push once  diltiazem    milliGRAM(s) Oral daily  fluocinonide 0.05% Ointment 1 Application(s) Topical two times a day  glucagon  Injectable 1 milliGRAM(s) IntraMuscular once  insulin lispro (ADMELOG) corrective regimen sliding scale   SubCutaneous three times a day before meals  insulin lispro (ADMELOG) corrective regimen sliding scale   SubCutaneous at bedtime  iron sucrose Injectable 100 milliGRAM(s) IV Push every 7 days  metoprolol tartrate Injectable 5 milliGRAM(s) IV Push once  metoprolol tartrate Injectable 5 milliGRAM(s) IV Push once  metoprolol tartrate Injectable 5 milliGRAM(s) IV Push once    MEDICATIONS  (PRN):  acetaminophen   Tablet .. 650 milliGRAM(s) Oral every 6 hours PRN Temp greater or equal to 38.5C (101.3F), Mild Pain (1 - 3)  ALPRAZolam 0.25 milliGRAM(s) Oral every 8 hours PRN anxiety  aluminum hydroxide/magnesium hydroxide/simethicone Suspension 30 milliLiter(s) Oral every 4 hours PRN Dyspepsia  Biotene Dry Mouth Oral Rinse 15 milliLiter(s) Swish and Spit every 3 hours PRN Mouth Care  ondansetron Injectable 4 milliGRAM(s) IV Push every 6 hours PRN Nausea      Allergies    barbiturates (Unknown)  Cipro (Other; Swelling)    Intolerances        LABS:                        7.3    14.11 )-----------( 399      ( 22 Jan 2021 09:30 )             24.3     01-22    143  |  115<H>  |  32<H>  ----------------------------<  84  3.3<L>   |  21<L>  |  1.50<H>    Ca    8.8      22 Jan 2021 09:30  Mg     2.0     01-22      PTT - ( 22 Jan 2021 03:01 )  PTT:91.8 sec      RADIOLOGY & ADDITIONAL TESTS:  Studies reviewed.    ASSESSMENT & PLAN:

## 2021-01-23 NOTE — PROGRESS NOTE ADULT - ASSESSMENT
# Symptomatic anemia due to blood loss and ACD  Anemia   - discussed with patient rationale for pRBCs   - discussed with primary oncology with patient present   - Likely secondary to blood loss     #Dyspnea on exertion likely symptomatic anemia    pt with h/o melanoma under immunotherapy  r/o PE  VQ scan ( JASPREET)  Very low probability of pulmonary embolus.      #Afib with RVR / Elevated troponin   - now converted  - s/p  Heparin ggt , sto ,   - discussed with cardio and primary oncology   - would recommend continuing full dose A/C   - if Heme stable would transition to eliquis  in the am   - would check TFTs in am  Starting Eliquis in the am     # Sepsis, poa, due to UTI  On admit : WBC= 13.81  Temp= 100.6  HR= 112  RR= 20  BP= 142/43  Urine culture= 3 organisms- probable contamination   On Ceftriaxone    # JASPREET CKD   likely prerenal   elevated Bun/creatinine   c/w IVF    # Hypokalemia   replete and recheck     # T2DM  sliding scale for now  on CHIN at home start with a lower dose of 10 units QHS    DVT heparin        # Symptomatic anemia due to blood loss and ACD  Anemia   - discussed with patient rationale for pRBCs   - discussed with primary oncology with patient present   - Likely secondary to blood loss     #Dyspnea on exertion likely symptomatic anemia    pt with h/o melanoma under immunotherapy  r/o PE  VQ scan ( JASPREET)  Very low probability of pulmonary embolus.      #Afib with RVR / Elevated troponin   - now converted  - s/p  Heparin ggt , sto ,   - discussed with cardio and primary oncology   - would recommend continuing full dose A/C   - if Heme stable would transition to eliquis  in the am   - would check TFTs in am  Starting Eliquis in the am     # Sepsis, poa, due to UTI  On admit : WBC= 13.81  Temp= 100.6  HR= 112  RR= 20  BP= 142/43  Urine culture= 3 organisms- probable contamination   On Ceftriaxone    # Acute diarrhea likely 2/2 Immunotherapy  negative GI pathogen PCR  C diff ordered in progress , waiting for results     # JASPREET CKD   likely prerenal   elevated Bun/creatinine   c/w IVF    # Hypokalemia   replete and recheck     # T2DM  sliding scale for now  on CHIN at home start with a lower dose of 10 units QHS    DVT heparin     Dispo Home  likely Dc   tomorrow 01/24  waiting for C diff results

## 2021-01-23 NOTE — PROGRESS NOTE ADULT - ASSESSMENT
64 yo female with anal melanoma under care of Dr. Shearer on combined IO therapy with ipilimumab + Nivolumab (cycle 2 on 1/4/2021) admitted for UTI     UTI   - continue with ABx     Anemia   - discussed with patient rationale for pRBCs   - discussed with primary oncology with patient present   - Likely secondary to blood loss   - will transfuse today     Afib with RVR   - now converted  - on Heparin ggt   - discussed with cardio and primary oncology   - would recommend continuing full dose A/C   - if Heme stable would transition to eliquis   - would check TFTs in am    Melanoma   - next planned treatment next week     Diarrhea   - r/o C. diff still pending       -

## 2021-01-23 NOTE — PROGRESS NOTE ADULT - SUBJECTIVE AND OBJECTIVE BOX
Patient is a 75y old  Female who presents with a chief complaint of SWIFT (20 Jan 2021 13:29)    CC: dyspnea    HPI: 75 year old female with PMH of HTN, DM2, vertigo, and rectal melanoma who comes to the ED complaining of SWIFT. States walked up her steps to go to the bathroom and was short of breath. Pt denies chest pain or fevers. States this never occurred previously. Pt is currently on immunotherapy for melanoma at [facility] and states last dose was 2 weeks ago. Called on call MD byers at [facility] who told her to go to the ED. Currently feels well no complaints. No leg edema. Denies fevers but febrile in ED. Since she has a h/o melanoma and the chief c/o was SOB PE should be ruled out; D dimer of no value in pt with cancer. JASPREET, check VQ scan (19 Jan 2021 07:44)    1/20- Patient seen and examined at bedside, feels much better, afebrile, denies pain. Dyspnea improved, no longer feel as heart is racing. + BM, no bleeding. denies headache, dizziness, CP, SOB. denies n/v. denies dysuria, hematuria, flank pain. POC discussed.   1/21 - no cp palps is in afib; pt reports that she is very anxious at baseline and had a panic attack last night   01/22 Patient  on NSR, no chest no shortness fo breath Fall in the floor , patient denies   LOC, as per patient she got stock with 2 IV poles trying   , denies head trama, fall over her buttock   01/23 Patient had 1 episode of diarrhea  last night , no melena o hematochezia     Physical exam:   Vital Signs Last 24 Hrs  Vital Signs Last 24 Hrs  T(C): 36.8 (23 Jan 2021 16:46), Max: 37.6 (22 Jan 2021 18:15)  T(F): 98.2 (23 Jan 2021 16:46), Max: 99.7 (22 Jan 2021 18:15)  HR: 81 (23 Jan 2021 16:46) (81 - 98)  BP: 127/46 (23 Jan 2021 16:46) (105/82 - 150/62)  BP(mean): --  RR: 17 (23 Jan 2021 16:46) (17 - 22)  SpO2: 99% (23 Jan 2021 16:46) (89% - 99%)  GENERAL: NAD  NERVOUS SYSTEM:  Alert & Oriented X3, non- focal exam, Motor Strength 5/5 B/L upper and lower extremities; DTRs 2+ intact and symmetric  HEAD:  Atraumatic, Normocephalic  EYES: EOMI, PERRLA, conjunctiva and sclera clear  HEENT: Moist mucous membranes  NECK: Supple, No JVD  CHEST/LUNG: Clear to auscultation bilaterally; No rales, no rhonchi, no wheezing  HEART: irr irr tachy   ABDOMEN: Soft, Non-tender, Non-distended; Bowel sounds present  GENITOURINARY- Voiding, no suprapubic tenderness  EXTREMITIES:  2+ Peripheral Pulses, No clubbing, cyanosis, or edema  MUSCULOSKELETAL:- No muscle tenderness, Muscle tone normal, No joint tenderness, no Joint swelling,  Joint ROM –normal   SKIN-diffuse rash 2/2 immunotherapy side effect    < from: US Kidney and Bladder (01.20.21 @ 13:29) >  FINDINGS:  Right kidney: 9.6 cm. No renal mass, hydronephrosis or calculi.  Left kidney: 9.1 cm. No renal mass. Mild left hydronephrosis. Multiple stones ranging from 4 mm to 7 mm  Urinary bladder: Underdistended    IMPRESSION:  Multiple stones in the left kidney with mild left hydronephrosis.  21 mm thickened endometrium may represent hyperplasia versus carcinoma. Tissue sampling is advised.    LABS: All Labs Reviewed:                01-22    143  |  115<H>  |  32<H>  ----------------------------<  84  3.3<L>   |  21<L>  |  1.50<H>    Ca    8.8      22 Jan 2021 09:30  Mg     2.0     01-22                          9.6    12.99 )-----------( 420      ( 23 Jan 2021 08:36 )             31.1       CARDIAC MARKERS ( 22 Jan 2021 09:30 )  0.822 ng/mL / x     / x     / x     / x      CARDIAC MARKERS ( 21 Jan 2021 23:39 )  1.220 ng/mL / x     / x     / x     / x          PTT - ( 23 Jan 2021 08:36 )  PTT:20.7 sec    RADIOLOGY/EKG:  TELE 1/21 shows AFIB WITH RVR     MEDICATIONS  (STANDING):  apixaban 5 milliGRAM(s) Oral every 12 hours  atorvastatin 40 milliGRAM(s) Oral at bedtime  cefTRIAXone Injectable. 1000 milliGRAM(s) IV Push every 24 hours  dextrose 40% Gel 15 Gram(s) Oral once  dextrose 5%. 1000 milliLiter(s) (50 mL/Hr) IV Continuous <Continuous>  dextrose 5%. 1000 milliLiter(s) (100 mL/Hr) IV Continuous <Continuous>  dextrose 50% Injectable 25 Gram(s) IV Push once  dextrose 50% Injectable 12.5 Gram(s) IV Push once  dextrose 50% Injectable 25 Gram(s) IV Push once  diltiazem    milliGRAM(s) Oral daily  fluocinonide 0.05% Ointment 1 Application(s) Topical two times a day  glucagon  Injectable 1 milliGRAM(s) IntraMuscular once  insulin lispro (ADMELOG) corrective regimen sliding scale   SubCutaneous three times a day before meals  insulin lispro (ADMELOG) corrective regimen sliding scale   SubCutaneous at bedtime  iron sucrose Injectable 100 milliGRAM(s) IV Push every 7 days  losartan 50 milliGRAM(s) Oral daily  metoprolol tartrate Injectable 5 milliGRAM(s) IV Push once  metoprolol tartrate Injectable 5 milliGRAM(s) IV Push once  metoprolol tartrate Injectable 5 milliGRAM(s) IV Push once    MEDICATIONS  (PRN):  acetaminophen   Tablet .. 650 milliGRAM(s) Oral every 6 hours PRN Temp greater or equal to 38.5C (101.3F), Mild Pain (1 - 3)  ALPRAZolam 0.25 milliGRAM(s) Oral every 8 hours PRN anxiety  aluminum hydroxide/magnesium hydroxide/simethicone Suspension 30 milliLiter(s) Oral every 4 hours PRN Dyspepsia  Biotene Dry Mouth Oral Rinse 15 milliLiter(s) Swish and Spit every 3 hours PRN Mouth Care  ondansetron Injectable 4 milliGRAM(s) IV Push every 6 hours PRN Nausea

## 2021-01-23 NOTE — PROGRESS NOTE ADULT - SUBJECTIVE AND OBJECTIVE BOX
REASON FOR VISIT: AF    HPI:  75 year old woman with a history of HTN, DM, vertigo, and [place] [facility] [place] ED with complaints of dyspnea -- found to be in new-onset AF with RVR; spontaneous conversion to sinus rhythm on 1/21 (afternoon).    1/22/21:  Comfortable; diarrhea; no cardiac complaints; eager to return home.  1/23/21:  No cardiac complaints; she reports a large amount of diarrhea overnight.    MEDICATIONS  (STANDING):  atorvastatin 40 milliGRAM(s) Oral at bedtime  cefTRIAXone Injectable. 1000 milliGRAM(s) IV Push every 24 hours  diltiazem    milliGRAM(s) Oral daily  fluocinonide 0.05% Ointment 1 Application(s) Topical two times a day  glucagon  Injectable 1 milliGRAM(s) IntraMuscular once  insulin lispro (ADMELOG) corrective regimen sliding scale   SubCutaneous three times a day before meals  insulin lispro (ADMELOG) corrective regimen sliding scale   SubCutaneous at bedtime  iron sucrose Injectable 100 milliGRAM(s) IV Push every 7 days    MEDICATIONS  (PRN):  acetaminophen   Tablet .. 650 milliGRAM(s) Oral every 6 hours PRN Temp greater or equal to 38.5C (101.3F), Mild Pain (1 - 3)  ALPRAZolam 0.25 milliGRAM(s) Oral every 8 hours PRN anxiety  aluminum hydroxide/magnesium hydroxide/simethicone Suspension 30 milliLiter(s) Oral every 4 hours PRN Dyspepsia  Biotene Dry Mouth Oral Rinse 15 milliLiter(s) Swish and Spit every 3 hours PRN Mouth Care  ondansetron Injectable 4 milliGRAM(s) IV Push every 6 hours PRN Nausea    Vital Signs Last 24 Hrs  T(C): 36.9 (22 Jan 2021 23:10), Max: 37.6 (22 Jan 2021 18:15)  T(F): 98.4 (22 Jan 2021 23:10), Max: 99.7 (22 Jan 2021 18:15)  HR: 98 (22 Jan 2021 23:10) (89 - 98)  BP: 123/44 (22 Jan 2021 23:10) (105/82 - 143/51)  RR: 20 (22 Jan 2021 19:47) (18 - 20)  SpO2: 94% (22 Jan 2021 23:10) (91% - 94%)    PHYSICAL EXAM:  Constitutional: NAD, awake and alert, seated in bedside chair  Pulmonary: Non-labored, breath sounds are clear bilaterally, No wheezing, rales or rhonchi  Cardiovascular: Regular, normal S1 and S2  Gastrointestinal: Bowel Sounds present, soft, nontender.   Psych:  Anxious    LABS:          CARDIAC MARKERS ( 22 Jan 2021 09:30 ) 0.822 ng/mL / x     / x     / x     / x      CARDIAC MARKERS ( 21 Jan 2021 23:39 ) 1.220 ng/mL / x     / x     / x     / x      CARDIAC MARKERS ( 21 Jan 2021 14:19 ) 1.110 ng/mL / x     / x     / x     / x      CARDIAC MARKERS ( 21 Jan 2021 10:27 ) m1.030 ng/mL / x     / x     / x     / x                          7.3    14.11 )-----------( 399      ( 22 Jan 2021 09:30 )             24.3     143  |  115<H>  |  32<H>  ----------------------------<  84  3.3<L>   |  21<L>  |  1.50<H>    Ca    8.8      22 Jan 2021 09:30  Mg     2.0     01-22    TTE Echo Complete w/o Contrast w/ Doppler (11.02.20 @ 09:02):   The left ventricle is normal in size, wall motion and contractility. Mild concentric left ventricular hypertrophy is present. Estimated left ventricular ejection fraction is 65-70 %.   The left atrium is mildly dilated.   Normal appearing right atrium.   Normal appearing right ventricle structure and function.   Normal aortic valve structure and function.   EA reversal of the mitral inflow consistent with reduced compliance of the left ventricle.   Fibrocalcific changes noted to the mitral valve leaflets with preserved leaflet excursion.   Mild (1+) mitral regurgitation is present.   Moderate mitral annular calcification is present.   No evidence of pericardial effusion.    Transthoracic Echocardiogram Follow Up (01.22.21 @ 10:03):  Limited study to asses left ventricular function.   Estimated left ventricular ejection fraction is 60-65 %.

## 2021-01-24 NOTE — PROGRESS NOTE ADULT - ASSESSMENT
64 yo female with anal melanoma under care of Dr. Shearer on combined IO therapy with ipilimumab + Nivolumab (cycle 2 on 1/4/2021) admitted for UTI     UTI   - continue with ABx     Anemia   - discussed with patient rationale for pRBCs   - discussed with primary oncology with patient present   - Likely secondary to blood loss       Afib with RVR   -  Tachycardia     Melanoma   - next planned treatment next week   - will likely need to be held     Diarrhea   - r/o C. diff  neg      -

## 2021-01-24 NOTE — PROGRESS NOTE ADULT - ASSESSMENT
# Symptomatic anemia due to blood loss and ACD  Anemia   - discussed with patient rationale for pRBCs   - discussed with primary oncology with patient present   - Likely secondary to blood loss     #Dyspnea on exertion likely symptomatic anemia    pt with h/o melanoma under immunotherapy  VQ scan negative     #Afib with RVR / Elevated troponin   - now converted: sinus tachy  - thyroiditis may be contributing factor  - s/p  Heparin ggt ,now on apixiban  - Cardio and Onc followiing  =patient on metoprolol / now started on cardizem    # Sepsis, poa, due to UTI  On Ceftriaxone last day tomorrow (day 6 of 7)    Acute diarrhea likely 2/2 Immunotherapy and thyroditis  negative GI pathogen PCR  Cdiff negative  TSH low T4 high  GI following   spoke to Heme; hold off on methimazole for now .  start immodium prn  start floraster BID    # JASPREET CKD   likely prerenal  due to diarrhea  elevated Bun/creatinine .. back to baseline 1.2  c/w IVF    # Hypokalemia   replete as neede  d  # T2DM  sliding scale for now  on CHIN at home start with a lower dose of 10 units QHS      dispo:; plan for dc today; although patient with active diarrhea also tachycardic to 150. and back on 02. will need to titrate up rate control. will continue to optimize and plan for dc in 24 hours

## 2021-01-24 NOTE — PROGRESS NOTE ADULT - SUBJECTIVE AND OBJECTIVE BOX
REASON FOR VISIT: AF    HPI:  75 year old woman with a history of HTN, DM, vertigo, and melanoma (receiving immunotherapy at [facility] [place]) who presented t the ED with complaints of dyspnea -- found to be in new-onset AF with RVR; spontaneous conversion to sinus rhythm on 1/21 (afternoon).    1/22/21:  Comfortable; diarrhea; no cardiac complaints; eager to return home.  1/23/21:  No cardiac complaints; she reports a large amount of diarrhea overnight.  1/24/21:  Anxious; she experiences palpitations when anxiety worsens; + diarrhea; no hematochezia    MEDICATIONS  (STANDING):  apixaban 5 milliGRAM(s) Oral every 12 hours  atorvastatin 40 milliGRAM(s) Oral at bedtime  cefTRIAXone Injectable. 1000 milliGRAM(s) IV Push every 24 hours  diltiazem    milliGRAM(s) Oral daily  diltiazem Injectable 10 milliGRAM(s) IV Push once  fluocinonide 0.05% Ointment 1 Application(s) Topical two times a day  glucagon  Injectable 1 milliGRAM(s) IntraMuscular once  insulin lispro (ADMELOG) corrective regimen sliding scale   SubCutaneous at bedtime  insulin lispro (ADMELOG) corrective regimen sliding scale   SubCutaneous three times a day before meals  iron sucrose Injectable 100 milliGRAM(s) IV Push every 7 days  metoprolol tartrate 25 milliGRAM(s) Oral two times a day    Vital Signs Last 24 Hrs  T(C): 36.4 (24 Jan 2021 08:50), Max: 36.9 (23 Jan 2021 22:53)  T(F): 97.6 (24 Jan 2021 08:50), Max: 98.4 (23 Jan 2021 22:53)  HR: 136 (24 Jan 2021 09:52) (81 - 150)  BP: 122/69 (24 Jan 2021 09:52) (113/58 - 141/53)  RR: 20 (24 Jan 2021 09:52) (17 - 20)  SpO2: 97% (24 Jan 2021 09:52) (93% - 100%)    PHYSICAL EXAM:  Constitutional: NAD, awake and alert, seated in bedside chair  Pulmonary: Non-labored, breath sounds are clear bilaterally, No wheezing, rales or rhonchi  Cardiovascular: Irregular and tachycardic  Gastrointestinal: Bowel Sounds present, soft, nontender.   Psych:  Anxious    LABS:                            9.5    13.65 )-----------( 419      ( 24 Jan 2021 07:16 )             30.1     142  |  113<H>  |  30<H>  ----------------------------<  196<H>  3.7   |  23  |  1.38<H>    Ca    8.8      23 Jan 2021 21:46    TTE Echo Complete w/o Contrast w/ Doppler (11.02.20 @ 09:02):   The left ventricle is normal in size, wall motion and contractility. Mild concentric left ventricular hypertrophy is present. Estimated left ventricular ejection fraction is 65-70 %.   The left atrium is mildly dilated.   Normal appearing right atrium.   Normal appearing right ventricle structure and function.   Normal aortic valve structure and function.   EA reversal of the mitral inflow consistent with reduced compliance of the left ventricle.   Fibrocalcific changes noted to the mitral valve leaflets with preserved leaflet excursion.   Mild (1+) mitral regurgitation is present.   Moderate mitral annular calcification is present.   No evidence of pericardial effusion.    Transthoracic Echocardiogram Follow Up (01.22.21 @ 10:03):  Limited study to asses left ventricular function.   Estimated left ventricular ejection fraction is 60-65 %.    Tele: AF with RVR

## 2021-01-24 NOTE — PROGRESS NOTE ADULT - SUBJECTIVE AND OBJECTIVE BOX
INTERVAL HPI/OVERNIGHT EVENTS:  Patient S&E at bedside. No o/n events, patient resting comfortably. No complaints at this time.   Diarrhea overnight   + Tachycardia     VITAL SIGNS:  T(F): 98.1 (01-25-21 @ 14:53)  HR: 83 (01-25-21 @ 14:53)  BP: 139/60 (01-25-21 @ 14:53)  RR: 16 (01-25-21 @ 14:53)  SpO2: 96% (01-25-21 @ 14:53)  Wt(kg): --    PHYSICAL EXAM:    Constitutional: NAD  Eyes: EOMI, sclera non-icteric  Neck: supple, no masses, no JVD  Respiratory: CTA b/l, good air entry b/l  Cardiovascular: RRR, no M/R/G  Gastrointestinal: soft, NTND, no masses palpable, + BS, no hepatosplenomegaly  Extremities: no c/c/e  Neurological: AAOx3      MEDICATIONS  (STANDING):  apixaban 5 milliGRAM(s) Oral every 12 hours  atorvastatin 40 milliGRAM(s) Oral at bedtime  cefTRIAXone Injectable. 1000 milliGRAM(s) IV Push every 24 hours  dextrose 40% Gel 15 Gram(s) Oral once  dextrose 5%. 1000 milliLiter(s) (50 mL/Hr) IV Continuous <Continuous>  dextrose 5%. 1000 milliLiter(s) (100 mL/Hr) IV Continuous <Continuous>  dextrose 50% Injectable 25 Gram(s) IV Push once  dextrose 50% Injectable 12.5 Gram(s) IV Push once  dextrose 50% Injectable 25 Gram(s) IV Push once  diltiazem    milliGRAM(s) Oral daily  fluocinonide 0.05% Ointment 1 Application(s) Topical two times a day  glucagon  Injectable 1 milliGRAM(s) IntraMuscular once  insulin lispro (ADMELOG) corrective regimen sliding scale   SubCutaneous three times a day before meals  insulin lispro (ADMELOG) corrective regimen sliding scale   SubCutaneous at bedtime  iron sucrose Injectable 100 milliGRAM(s) IV Push every 7 days  lactated ringers. 1000 milliLiter(s) (75 mL/Hr) IV Continuous <Continuous>  loperamide 2 milliGRAM(s) Oral five times a day  metoprolol tartrate 25 milliGRAM(s) Oral two times a day  saccharomyces boulardii 250 milliGRAM(s) Oral two times a day    MEDICATIONS  (PRN):  acetaminophen   Tablet .. 650 milliGRAM(s) Oral every 6 hours PRN Temp greater or equal to 38.5C (101.3F), Mild Pain (1 - 3)  ALPRAZolam 0.25 milliGRAM(s) Oral every 8 hours PRN anxiety  aluminum hydroxide/magnesium hydroxide/simethicone Suspension 30 milliLiter(s) Oral every 4 hours PRN Dyspepsia  Biotene Dry Mouth Oral Rinse 15 milliLiter(s) Swish and Spit every 3 hours PRN Mouth Care  ondansetron Injectable 4 milliGRAM(s) IV Push every 6 hours PRN Nausea      Allergies    barbiturates (Unknown)  Cipro (Other; Swelling)    Intolerances        LABS:                        9.5    15.00 )-----------( 417      ( 25 Jan 2021 07:59 )             30.5     01-25    142  |  114<H>  |  26<H>  ----------------------------<  107<H>  3.7   |  21<L>  |  1.20    Ca    8.5      25 Jan 2021 07:59  Phos  2.2     01-25  Mg     1.7     01-25    TPro  6.4  /  Alb  1.9<L>  /  TBili  0.2  /  DBili  x   /  AST  13<L>  /  ALT  16  /  AlkPhos  69  01-24          RADIOLOGY & ADDITIONAL TESTS:  Studies reviewed.    ASSESSMENT & PLAN:

## 2021-01-24 NOTE — PROGRESS NOTE ADULT - SUBJECTIVE AND OBJECTIVE BOX
Patient seen and exained at bedside  reports not much of appetite. she is a diabetic and doent want pancakes  +diarhea  cdif negative  K 3.1  tachycardic 130s  Thyroid function reviewed    Review of Systems:  General:denies fever chills, headache, weakness  HEENT: denies blurry vision,diffculty swallowing, difficulty hearing, tinnitus  Cardiovascular: denies chest pain  ,palpitations  Pulmonary:denies shortness of breath, cough, wheezing, hemoptysis  Gastrointestinal: denies abdominal pain, constipation, +++++diarrhea,nausea , vomiting, hematochezia  : denies hematuria, dysuria, or incontinence  Neurological: denies weakness, numbness , tingling, dizziness, tremors  MSK: denies muscle pain, difficulty ambulating, swelling, back pain  skin: denies skin rash, itching, burning, or  skin lesions  Psychiatrical: denies mood disturbances, anxierty, feeling depressed, depression , or difficulty sleeping    Objective:  Vitals  T(C): 36.4 (01-24-21 @ 08:50), Max: 36.9 (01-23-21 @ 22:53)  HR: 136 (01-24-21 @ 09:52) (81 - 150)  BP: 122/69 (01-24-21 @ 09:52) (113/58 - 141/53)  RR: 20 (01-24-21 @ 09:52) (17 - 20)  SpO2: 97% (01-24-21 @ 09:52) (93% - 100%)    Physical Exam:  General: comfortable, no acute distress, well nourished  HEENT: Atraumatic, no LAD, trachea midline, PERRLA  Cardiovascular: tachy, irregular, no murmurs, gallops or fricition rubs  Pulmonary: clear to ausculation Bilaterally, no wheezing , rhonchi  Gastrointestinal: soft non tender non distended, no masses felt, no organomegally  Muscloskeletal: no lower extremity edema, intact bilateral lower extremity pulses  Neurological: CN II-12 intact. No focal weakness  Psychiatrical: normal mood, cooperative  SKIN: no rash, lesions or ulcers    Labs:                          9.5    13.65 )-----------( 419      ( 24 Jan 2021 07:16 )             30.1     01-24    140  |  110<H>  |  27<H>  ----------------------------<  124<H>  3.1<L>   |  20<L>  |  1.20    Ca    8.4<L>      24 Jan 2021 07:16    TPro  6.4  /  Alb  1.9<L>  /  TBili  0.2  /  DBili  x   /  AST  13<L>  /  ALT  16  /  AlkPhos  69  01-24    LIVER FUNCTIONS - ( 24 Jan 2021 07:16 )  Alb: 1.9 g/dL / Pro: 6.4 gm/dL / ALK PHOS: 69 U/L / ALT: 16 U/L / AST: 13 U/L / GGT: x           PTT - ( 23 Jan 2021 08:36 )  PTT:20.7 sec      Active Medications  MEDICATIONS  (STANDING):  apixaban 5 milliGRAM(s) Oral every 12 hours  atorvastatin 40 milliGRAM(s) Oral at bedtime  cefTRIAXone Injectable. 1000 milliGRAM(s) IV Push every 24 hours  dextrose 40% Gel 15 Gram(s) Oral once  dextrose 5%. 1000 milliLiter(s) (50 mL/Hr) IV Continuous <Continuous>  dextrose 5%. 1000 milliLiter(s) (100 mL/Hr) IV Continuous <Continuous>  dextrose 50% Injectable 25 Gram(s) IV Push once  dextrose 50% Injectable 12.5 Gram(s) IV Push once  dextrose 50% Injectable 25 Gram(s) IV Push once  diltiazem    milliGRAM(s) Oral daily  fluocinonide 0.05% Ointment 1 Application(s) Topical two times a day  glucagon  Injectable 1 milliGRAM(s) IntraMuscular once  insulin lispro (ADMELOG) corrective regimen sliding scale   SubCutaneous at bedtime  insulin lispro (ADMELOG) corrective regimen sliding scale   SubCutaneous three times a day before meals  iron sucrose Injectable 100 milliGRAM(s) IV Push every 7 days  lactated ringers. 1000 milliLiter(s) (75 mL/Hr) IV Continuous <Continuous>  metoprolol tartrate 25 milliGRAM(s) Oral two times a day  potassium chloride   Powder 40 milliEquivalent(s) Oral every 4 hours    MEDICATIONS  (PRN):  acetaminophen   Tablet .. 650 milliGRAM(s) Oral every 6 hours PRN Temp greater or equal to 38.5C (101.3F), Mild Pain (1 - 3)  ALPRAZolam 0.25 milliGRAM(s) Oral every 8 hours PRN anxiety  aluminum hydroxide/magnesium hydroxide/simethicone Suspension 30 milliLiter(s) Oral every 4 hours PRN Dyspepsia  Biotene Dry Mouth Oral Rinse 15 milliLiter(s) Swish and Spit every 3 hours PRN Mouth Care  ondansetron Injectable 4 milliGRAM(s) IV Push every 6 hours PRN Nausea

## 2021-01-25 NOTE — PROGRESS NOTE ADULT - PROBLEM SELECTOR PLAN 2
Controlled; losartan discontinued to allow uptitration of AVN slowing agents.
Controlled.
Controlled; losartan discontinued to allow uptitration of AVN slowing agents.
Controlled.

## 2021-01-25 NOTE — PROGRESS NOTE ADULT - PROBLEM SELECTOR PLAN 3
June 20, 2018      Jose Luis Christoph Gonzáles  [address] [place] [address]        Dear Jose Luis,     Please call [phone] to schedule a blood pressure recheck appointment with your provider. You are overdue for a visit.       Sincerely,        [facility]           
No ischemic symptoms; elevated troponin likely secondary to demand ischemia in the setting of tachycardia and anemia; echocardiogram (1/22) revealed normal LV systolic function; outpatient ischemia evaluation appropriate.
No ischemic symptoms; elevated troponin likely secondary to demand ischemia in the setting of tachycardia and anemia; echocardiogram to assess LV function -- if normal, outpatient ischemia evaluation appropriate.
No ischemic symptoms; elevated troponin likely secondary to demand ischemia in the setting of tachycardia and anemia; echocardiogram (1/22) revealed normal LV systolic function; outpatient ischemia evaluation appropriate.
No ischemic symptoms; elevated troponin likely secondary to demand ischemia in the setting of tachycardia and anemia; echocardiogram (1/22) revealed normal LV systolic function; outpatient ischemia evaluation appropriate.

## 2021-01-25 NOTE — PROGRESS NOTE ADULT - PROBLEM SELECTOR PLAN 1
Persistent afib since yesterday, rates 90s.  Cont. cardizem & metoprolol as current dose.  Discussed w/ Dr. Hall who spoke w/ pt's oncologist - cancer agents may be inducing afib & thyroid abnormalities.  As these drugs may be causing afib & pt is asymptomatic OK to d/c from cardiology standpoint w/ rate control & anticoag & FU w/ Dr. Hall in 1 week.
Remains ins sinus rhythm -- anticoagulation is appropriate due to high risk of thromboembolism but she has had rectal bleeding and is severely anemic -- I discussed with Dr Terry -- will re-evaluate benefit/risk in 24 hours. PRBC transfusion planned.
Converted to sinus rhythm on the afternoon of 1/21 -- has maintained sinus rhythm ever since; high CHADS-VASC score -- should be anticoagulated if no significant bleeding -- mild worsening of chronic anemia
Recurrence of AF this morning (associated with RVR); Cardizem 10mg IV x 1 now; add metoprolol; continue Eliquis; optimization of thyroid status as per primary service.

## 2021-01-25 NOTE — PROGRESS NOTE ADULT - SUBJECTIVE AND OBJECTIVE BOX
INTERVAL HPI/OVERNIGHT EVENTS:  Patient S&E at bedside. No o/n events, patient resting comfortably.   + diarrhea     VITAL SIGNS:  T(F): 98.1 (01-25-21 @ 14:53)  HR: 83 (01-25-21 @ 14:53)  BP: 139/60 (01-25-21 @ 14:53)  RR: 16 (01-25-21 @ 14:53)  SpO2: 96% (01-25-21 @ 14:53)  Wt(kg): --    PHYSICAL EXAM:    Constitutional: NAD  Eyes: EOMI, sclera non-icteric  Neck: supple, no masses, no JVD  Respiratory: CTA b/l, good air entry b/l  Cardiovascular: RRR, no M/R/G  Gastrointestinal: soft, NTND, no masses palpable, + BS, no hepatosplenomegaly  Extremities: no c/c/e  Neurological: AAOx3      MEDICATIONS  (STANDING):  apixaban 5 milliGRAM(s) Oral every 12 hours  atorvastatin 40 milliGRAM(s) Oral at bedtime  cefTRIAXone Injectable. 1000 milliGRAM(s) IV Push every 24 hours  dextrose 40% Gel 15 Gram(s) Oral once  dextrose 5%. 1000 milliLiter(s) (50 mL/Hr) IV Continuous <Continuous>  dextrose 5%. 1000 milliLiter(s) (100 mL/Hr) IV Continuous <Continuous>  dextrose 50% Injectable 25 Gram(s) IV Push once  dextrose 50% Injectable 12.5 Gram(s) IV Push once  dextrose 50% Injectable 25 Gram(s) IV Push once  diltiazem    milliGRAM(s) Oral daily  fluocinonide 0.05% Ointment 1 Application(s) Topical two times a day  glucagon  Injectable 1 milliGRAM(s) IntraMuscular once  insulin lispro (ADMELOG) corrective regimen sliding scale   SubCutaneous three times a day before meals  insulin lispro (ADMELOG) corrective regimen sliding scale   SubCutaneous at bedtime  iron sucrose Injectable 100 milliGRAM(s) IV Push every 7 days  lactated ringers. 1000 milliLiter(s) (75 mL/Hr) IV Continuous <Continuous>  loperamide 2 milliGRAM(s) Oral five times a day  metoprolol tartrate 25 milliGRAM(s) Oral two times a day  saccharomyces boulardii 250 milliGRAM(s) Oral two times a day    MEDICATIONS  (PRN):  acetaminophen   Tablet .. 650 milliGRAM(s) Oral every 6 hours PRN Temp greater or equal to 38.5C (101.3F), Mild Pain (1 - 3)  ALPRAZolam 0.25 milliGRAM(s) Oral every 8 hours PRN anxiety  aluminum hydroxide/magnesium hydroxide/simethicone Suspension 30 milliLiter(s) Oral every 4 hours PRN Dyspepsia  Biotene Dry Mouth Oral Rinse 15 milliLiter(s) Swish and Spit every 3 hours PRN Mouth Care  ondansetron Injectable 4 milliGRAM(s) IV Push every 6 hours PRN Nausea      Allergies    barbiturates (Unknown)  Cipro (Other; Swelling)    Intolerances        LABS:                        9.5    15.00 )-----------( 417      ( 25 Jan 2021 07:59 )             30.5     01-25    142  |  114<H>  |  26<H>  ----------------------------<  107<H>  3.7   |  21<L>  |  1.20    Ca    8.5      25 Jan 2021 07:59  Phos  2.2     01-25  Mg     1.7     01-25    TPro  6.4  /  Alb  1.9<L>  /  TBili  0.2  /  DBili  x   /  AST  13<L>  /  ALT  16  /  AlkPhos  69  01-24          RADIOLOGY & ADDITIONAL TESTS:  Studies reviewed.    ASSESSMENT & PLAN:

## 2021-01-25 NOTE — DISCHARGE NOTE PROVIDER - NSDCCPCAREPLAN_GEN_ALL_CORE_FT
PRINCIPAL DISCHARGE DIAGNOSIS  Diagnosis: Atrial fibrillation  Assessment and Plan of Treatment: Take metoprolol, diltiazem and eliquis (blood thinner) for your newly diagnosed afib      SECONDARY DISCHARGE DIAGNOSES  Diagnosis: UTI (urinary tract infection)  Assessment and Plan of Treatment: you completed a course of antibiotics while admitted

## 2021-01-25 NOTE — DISCHARGE NOTE PROVIDER - HOSPITAL COURSE
CC: SWIFT  HPI and Hospital Course    75 year old female with PMH of HTn, DM, vertigo who comes to the ED complaining of SWIFT. States walked up her steps to go to the bathroom and was short of breath. Pt denies chest pain or fevers. States this never occurred previously. Pt is currently on immunotherapy for melanoma at [facility] and states last dose was 2 weeks ago. Called on call MD byers at [place] who told her to go to the ED. Currently feels well no complaints. No leg edema. Denies fevers but febrile in ED.     Was found to have low Hgb, transfused with appropriate response. Also found to have SWIFT, VQ negative to r/o PE given active malignancy. Was found to have Afib with RVR, likely cause of SWIFT and elevated troponin (demand ischemia, type 2 MI in setting of Afib with RVR). Pt was managed with heparin drip initially and then transitioned to Eliquis. HR controlled with Diltiazem and metoprolol. Afib likely contributed to by active immunomodulator therapy and also thyroiditis (high T4, low TSH). Per discussion with onc, thyroiditis 2/2 immunomodulator, will resolve quickly and pt likely will become hypothyroid therefore don't recommend tx for thyroiditis at this time.     Also treated for sepsis 2/2 UTI, completed 6d of abx with CTX.    Found to have diarrhea, Cdiff negative, negative GI pathogen PCR. Likely in setting of thyroiditis. Treated with immodium with improvement.    Course c/b JASPREET on CKD, resolved with IVF. Hypokalemia repleted.    See below for problem based plan.    All 10 systems reviewed and found to be negative with the exception of what has been described above.        VITALS:  T(F): 98.7 (01-24-21 @ 21:21), Max: 98.7 (01-24-21 @ 21:21)  HR: 110 (01-24-21 @ 21:21) (110 - 127)  BP: 130/64 (01-24-21 @ 21:21) (130/64 - 150/64)  RR: 17 (01-24-21 @ 21:21) (17 - 18)  SpO2: 99% (01-24-21 @ 21:21) (97% - 99%)  Wt(kg): --    I&O's Summary      CAPILLARY BLOOD GLUCOSE          PHYSICAL EXAM:    HEENT:  pupils equal and reactive, EOMI, no oropharyngeal lesions, erythema, exudates, oral thrush  NECK:   supple, no carotid bruits, no palpable lymph nodes, no thyromegaly  CV:  +S1, +S2, irregular but normal rates, no murmurs or rubs  RESP:   lungs clear to auscultation bilaterally, no wheezing, rales, rhonchi, good air entry bilaterally  BREAST:  not examined  GI:  abdomen soft, non-tender, non-distended, normal BS, no bruits, no abdominal masses, no palpable masses  RECTAL:  not examined  :  not examined  MSK:   normal muscle tone, no atrophy, no rigidity, no contractions  EXT:  no clubbing, no cyanosis, no edema, no calf pain, swelling or erythema  VASCULAR:  pulses equal and symmetric in the upper and lower extremities  NEURO:  AAOX3, no focal neurological deficits, follows all commands, able to move extremities spontaneously  SKIN:  no ulcers, lesions or rashes    Problem based plan:   # Symptomatic anemia due to blood loss and ACD  Anemia   -s/p pRBC txfusion with good result    #Dyspnea on exertion likely symptomatic anemia and Afib with RVR   pt with h/o melanoma under immunotherapy  VQ scan negative     #Afib with RVR / Elevated troponin 2/2 demand ischemia, type 2 MI  - thyroiditis may be contributing factor as described above  - s/p  Heparin ggt ,now on apixiban  - f/u with Dr. Hall as outpt  -metoprolol 25 BID, Dilitiazem 120 qd    #Thyroiditis  -likely 2/2 immunomodulator therapy  -per Dr. Terry (onc), will not treat as inflammatory phase likely short lived and will become hypothyroid soon    # Sepsis, poa, due to UTI  completed 6d abx with CTX    Acute diarrhea likely 2/2 Immunotherapy and thyroditis  negative GI pathogen PCR  Cdiff negative  TSH low T4 high  Immodium prn    # JASPREET CKD   likely prerenal  due to diarrhea  elevated Bun/creatinine .. back to baseline 1.2  c/w IVF    # Hypokalemia   repleted as needed    #IDDM2  -resume home regimen    I have spent 35 minutes coordinating d/c on day of d/c

## 2021-01-25 NOTE — PROGRESS NOTE ADULT - ATTENDING COMMENTS
Aleksandr Montes M.D.  Cardiology, [facility]  Cell: [phone]  Offices:    ([facility] Office)  [phone] ([facility] Office)

## 2021-01-25 NOTE — PROGRESS NOTE ADULT - PROBLEM SELECTOR PROBLEM 2
Per physician instructions.    If you have questions or concerns on any instructions given to you by your provider today or if you need to schedule an appointment, you can reach us at [phone].    Thank you!      
Essential hypertension

## 2021-01-25 NOTE — DISCHARGE NOTE NURSING/CASE MANAGEMENT/SOCIAL WORK - PATIENT PORTAL LINK FT
You can access the FollowMyHealth Patient Portal offered by [facility] by registering at the following website: [email or web address]. By joining [employer]’s FollowMyHealth portal, you will also be able to view your health information using other applications (apps) compatible with our system.

## 2021-01-25 NOTE — PROGRESS NOTE ADULT - ASSESSMENT
66 yo female with anal melanoma under care of Dr. Shearer on combined IO therapy with ipilimumab + Nivolumab (cycle 2 on 1/4/2021) admitted for UTI     UTI   - continue with ABx     Anemia   - discussed with patient rationale for pRBCs   - discussed with primary oncology with patient present   - Likely secondary to blood loss     Afib with RVR   - now converted  - on Heparin ggt   - discussed with cardio and primary oncology   - would recommend continuing full dose A/C       Thyroiditis   - likley ipi + nivo mediated       Melanoma   - next planned treatment next week     Diarrhea   -  C diff negative   -

## 2021-01-25 NOTE — DISCHARGE NOTE PROVIDER - CARE PROVIDER_API CALL
ELIZABETH MYRICK  [id number]  [address]  [place] [address]  Phone: ()-  Fax: ()-  Follow Up Time: 1 week    Gregory Hall)  Cardiovascular Disease; Internal Medicine  [address] [place] [address]  Phone: [phone]  Fax: [phone]  Follow Up Time: 1 week

## 2021-01-25 NOTE — PROGRESS NOTE ADULT - SUBJECTIVE AND OBJECTIVE BOX
75 year old woman with a history of HTN, DM, vertigo, and melanoma [place] [facility] who presented t the ED with complaints of dyspnea -- found to be in new-onset AF with RVR; spontaneous conversion to sinus rhythm on 1/21 (afternoon).    1/22/21:  Comfortable; diarrhea; no cardiac complaints; eager to return home.  1/23/21:  No cardiac complaints; she reports a large amount of diarrhea overnight.  1/24/21:  Anxious; she experiences palpitations when anxiety worsens; + diarrhea; no hematochezia  1/25/'21: no complaints overnight, still in afib, rate controlled in 90s        MEDICATIONS:  OUTPATIENT  Home Medications:  Basaglar KwikPen 100 units/mL subcutaneous solution: 25-35 units subcutaneously once daily as directed (19 Jan 2021 08:39)  docusate sodium 50 mg oral capsule: 1 cap(s) orally once a day, As Needed (19 Jan 2021 08:39)  fluocinonide 0.05% topical ointment: Apply topically to affected area 2 times a day (19 Jan 2021 08:39)  olmesartan 20 mg oral tablet: 1 tab(s) orally once a day (19 Jan 2021 08:39)  rosuvastatin 10 mg oral tablet: 1 tab(s) orally once a day (at bedtime) (19 Jan 2021 08:39)  Vitamin D3 2000 intl units (50 mcg) oral tablet: 1 tab(s) orally once a day (19 Jan 2021 08:39)      INPATIENT  MEDICATIONS  (STANDING):  apixaban 5 milliGRAM(s) Oral every 12 hours  atorvastatin 40 milliGRAM(s) Oral at bedtime  cefTRIAXone Injectable. 1000 milliGRAM(s) IV Push every 24 hours  dextrose 40% Gel 15 Gram(s) Oral once  dextrose 5%. 1000 milliLiter(s) (50 mL/Hr) IV Continuous <Continuous>  dextrose 5%. 1000 milliLiter(s) (100 mL/Hr) IV Continuous <Continuous>  dextrose 50% Injectable 25 Gram(s) IV Push once  dextrose 50% Injectable 12.5 Gram(s) IV Push once  dextrose 50% Injectable 25 Gram(s) IV Push once  diltiazem    milliGRAM(s) Oral daily  fluocinonide 0.05% Ointment 1 Application(s) Topical two times a day  glucagon  Injectable 1 milliGRAM(s) IntraMuscular once  insulin lispro (ADMELOG) corrective regimen sliding scale   SubCutaneous three times a day before meals  insulin lispro (ADMELOG) corrective regimen sliding scale   SubCutaneous at bedtime  iron sucrose Injectable 100 milliGRAM(s) IV Push every 7 days  lactated ringers. 1000 milliLiter(s) (75 mL/Hr) IV Continuous <Continuous>  loperamide 2 milliGRAM(s) Oral five times a day  metoprolol tartrate 25 milliGRAM(s) Oral two times a day  saccharomyces boulardii 250 milliGRAM(s) Oral two times a day    MEDICATIONS  (PRN):  acetaminophen   Tablet .. 650 milliGRAM(s) Oral every 6 hours PRN Temp greater or equal to 38.5C (101.3F), Mild Pain (1 - 3)  ALPRAZolam 0.25 milliGRAM(s) Oral every 8 hours PRN anxiety  aluminum hydroxide/magnesium hydroxide/simethicone Suspension 30 milliLiter(s) Oral every 4 hours PRN Dyspepsia  Biotene Dry Mouth Oral Rinse 15 milliLiter(s) Swish and Spit every 3 hours PRN Mouth Care  ondansetron Injectable 4 milliGRAM(s) IV Push every 6 hours PRN Nausea    Vital Signs Last 24 Hrs  T(C): 37.1 (24 Jan 2021 21:21), Max: 37.1 (24 Jan 2021 21:21)  T(F): 98.7 (24 Jan 2021 21:21), Max: 98.7 (24 Jan 2021 21:21)  HR: 110 (24 Jan 2021 21:21) (110 - 127)  BP: 130/64 (24 Jan 2021 21:21) (130/64 - 150/64)  BP(mean): --  RR: 17 (24 Jan 2021 21:21) (17 - 18)  SpO2: 99% (24 Jan 2021 21:21) (97% - 99%)Daily     Daily I&O's Summary      PHYSICAL EXAM:    Constitutional: NAD, awake and alert, well-developed  HEENT: PERR, EOMI,  No oral cyanosis.  Neck:  supple,  No JVD  Respiratory: Breath sounds are clear bilaterally, No wheezing, rales or rhonchi  Cardiovascular: S1 and S2, irregular rate and rhythm, no Murmurs, gallops or rubs  Gastrointestinal: Bowel Sounds present, soft, nontender.   Extremities: No peripheral edema. No clubbing or cyanosis.  Vascular: 2+ peripheral pulses  Neurological: A/O x 3, no focal deficits        LABS: All Labs Reviewed:                        9.5    15.00 )-----------( 417      ( 25 Jan 2021 07:59 )             30.5                         9.5    13.65 )-----------( 419      ( 24 Jan 2021 07:16 )             30.1                         9.6    12.99 )-----------( 420      ( 23 Jan 2021 08:36 )             31.1     25 Jan 2021 07:59    142    |  114    |  26     ----------------------------<  107    3.7     |  21     |  1.20   24 Jan 2021 07:16    140    |  110    |  27     ----------------------------<  124    3.1     |  20     |  1.20   23 Jan 2021 21:46    142    |  113    |  30     ----------------------------<  196    3.7     |  23     |  1.38     Ca    8.5        25 Jan 2021 07:59  Ca    8.4        24 Jan 2021 07:16  Ca    8.8        23 Jan 2021 21:46  Phos  2.2       25 Jan 2021 07:59  Mg     1.7       25 Jan 2021 07:59    TPro  6.4    /  Alb  1.9    /  TBili  0.2    /  DBili  x      /  AST  13     /  ALT  16     /  AlkPhos  69     24 Jan 2021 07:16          Blood Culture: Organism --  Gram Stain Blood -- Gram Stain --  Specimen Source .Stool Feces  Culture-Blood --    Organism --  Gram Stain Blood -- Gram Stain --  Specimen Source .Urine Clean Catch (Midstream)  Culture-Blood --        01-24 @ 07:16  TSH: 0.01    TTE Echo Complete w/o Contrast w/ Doppler (11.02.20 @ 09:02):   The left ventricle is normal in size, wall motion and contractility. Mild concentric left ventricular hypertrophy is present. Estimated left ventricular ejection fraction is 65-70 %.   The left atrium is mildly dilated.   Normal appearing right atrium.   Normal appearing right ventricle structure and function.   Normal aortic valve structure and function.   EA reversal of the mitral inflow consistent with reduced compliance of the left ventricle.   Fibrocalcific changes noted to the mitral valve leaflets with preserved leaflet excursion.   Mild (1+) mitral regurgitation is present.   Moderate mitral annular calcification is present.   No evidence of pericardial effusion.    Transthoracic Echocardiogram Follow Up (01.22.21 @ 10:03):  Limited study to asses left ventricular function.   Estimated left ventricular ejection fraction is 60-65 %.    Tele: AF with RVR

## 2021-01-25 NOTE — PROGRESS NOTE ADULT - PROVIDER SPECIALTY LIST ADULT
Hospitalist
Hospitalist
Heme/Onc
Hepatology
Hospitalist
Heme/Onc
Heme/Onc
Hospitalist
Hospitalist
Cardiology

## 2021-01-25 NOTE — DISCHARGE NOTE PROVIDER - NSDCMRMEDTOKEN_GEN_ALL_CORE_FT
apixaban 5 mg oral tablet: 1 tab(s) orally every 12 hours  Basaglar KwikPen 100 units/mL subcutaneous solution: 25-35 units subcutaneously once daily as directed  dilTIAZem 120 mg/24 hours oral capsule, extended release: 1 cap(s) orally once a day  docusate sodium 50 mg oral capsule: 1 cap(s) orally once a day, As Needed  fluocinonide 0.05% topical ointment: Apply topically to affected area 2 times a day  loperamide 2 mg oral capsule: 1 cap(s) orally every 4 hours (5 times/day), As Needed   metoprolol tartrate 25 mg oral tablet: 1 tab(s) orally 2 times a day  rosuvastatin 10 mg oral tablet: 1 tab(s) orally once a day (at bedtime)  Vitamin D3 2000 intl units (50 mcg) oral tablet: 1 tab(s) orally once a day

## 2021-01-25 NOTE — PROGRESS NOTE ADULT - REASON FOR ADMISSION
SWIFT
Syncope  Anemia
SWIFT

## 2021-02-10 NOTE — ED PROVIDER NOTE - PROGRESS NOTE DETAILS
Maite CAAL: patient with improved O2 sat on supplemental o2. will admit. sign out given to Hospitalist, Dr. Ya.

## 2021-02-10 NOTE — H&P ADULT - NSHPOUTPATIENTPROVIDERS_GEN_ALL_CORE
PCP; Dr. Franco  Cardiology;  Dr. Aleksandr Montes PCP; Dr. Franco  Cardiology;  Dr. Aleksandr Montes  Hematology/Oncology; Dr. Óscar Shearer ([facility]

## 2021-02-10 NOTE — ED ADULT NURSE NOTE - NSIMPLEMENTINTERV_GEN_ALL_ED
Implemented All Fall with Harm Risk Interventions:  [place] to call system. Call bell, personal items and telephone within reach. Instruct patient to call for assistance. Room bathroom lighting operational. Non-slip footwear when patient is off stretcher. Physically safe environment: no spills, clutter or unnecessary equipment. Stretcher in lowest position, wheels locked, appropriate side rails in place. Provide visual cue, wrist band, yellow gown, etc. Monitor gait and stability. Monitor for mental status changes and reorient to person, place, and time. Review medications for side effects contributing to fall risk. Reinforce activity limits and safety measures with patient and family. Provide visual clues: red socks.

## 2021-02-10 NOTE — H&P ADULT - ASSESSMENT
76 y/o F PMHx significant for Hypertension, Diabetes mellitus type 2, vertigo, anal [place] She[place] on combined IO therapy with ipilimumab + Nivolumab (cycle 2 on 1/4/2021), and recent admission from 1/19-1/25 for new onset Afib (now on DOAC therapy) and recent admission in [facility] for COVID-pneumonia (reportedly treated with MAB, Remdesivir and steroid therapy) presents to  for further evaluation and management of worsening shortness of breath. In triage the patient was notably hypoxic SpO2 => 80% with initial improvement of SaO2 to 100 via NRB (per ED documentation).  Labs => WBC 21.24, Hgb/Hct 9.1/29.1, , D-dimer 373, BUN/Cr 31/1.33, Glu 105, Alb 1.9, AST 64, TnI 0.070. CXR => New bilateral airspace opacities right greater than left. No significant pleural effusion. Heart size cannot be accurately assessed in this projection.     74 y/o F PMHx significant for Hypertension, Diabetes mellitus type 2, vertigo, anal melanoma under care of Dr. Shearer ([facility] on combined IO therapy with ipilimumab + Nivolumab (cycle 2 on 1/4/2021), and recent admission from 1/19-1/25 for new onset Afib (now on DOAC therapy) and recent admission in [facility] for COVID-pneumonia (reportedly treated with MAB, Remdesivir and steroid therapy) presents to  for further evaluation and management of worsening shortness of breath. In triage the patient was notably hypoxic SpO2 => 80% with initial improvement of SaO2 to 100 via NRB (per ED documentation).  Labs => WBC 21.24, Hgb/Hct 9.1/29.1, , D-dimer 373, BUN/Cr 31/1.33, Glu 105, Alb 1.9, AST 64, TnI 0.070. CXR => New bilateral airspace opacities right greater than left. No significant pleural effusion. Heart size cannot be accurately assessed in this projection.    #Bilateral Pneumonia  ~admit to Telemetry  ~f/u PAN C+S  ~cont. IV abx  ~f/u w/ ID consultation further in the am    #COVID Pneumonia  ~maintain on airborne isolation  ~cont. supplemental via non-rebreather mask  ~start continuous oximetry monitoring  ~cont. Acetaminophen 650 mg PO q4h PRN fever. Limit use of NSAIDs.  ~cont. HFA albuterol Q6 hour PRN via MDI. Would avoid nebulized preparations to limit risk of aerosol formation.  ~cont. Dexamethasone for now  ~f/u am labs     #Atrial fibrillation  Persistent afib since yesterday, rates 90s.  Cont. cardizem & metoprolol as current dose.  Discussed w/ Dr. Hall who spoke w/ pt's oncologist - cancer agents may be inducing afib & thyroid abnormalities.  As these drugs may be causing afib & pt is asymptomatic OK to d/c from cardiology standpoint w/ rate control & anticoag & FU w/ Dr. Hall in 1 week.     #Essential hypertension  ~cont. Diltiazem CD 120mg po daily  ~cont. Metoprolol 25mg po bid    #Elevated troponin  ~No ischemic symptoms; elevated troponin likely secondary to demand ischemia in the setting of tachycardia and anemia;   ~echocardiogram (1/22) revealed normal LV systolic function; outpatient ischemia evaluation appropriate.   ~serial Thomas/EKGs    #Anal melanoma  ~under care of Dr. Shearer on combined IO therapy with ipilimumab + Nivolumab (cycle 2 on 1/4/2021)    #Vte ppx  ~cont. DOAC therapy as above

## 2021-02-10 NOTE — ED PROVIDER NOTE - CLINICAL SUMMARY MEDICAL DECISION MAKING FREE TEXT BOX
74 y/o female COVID+ with acute hypoxic respiratory failure. Likely COVID PNA. Will place on high flow, steroids, Albuterol admission.

## 2021-02-10 NOTE — H&P ADULT - NSICDXPASTMEDICALHX_GEN_ALL_CORE_FT
PAST MEDICAL HISTORY:  DM (diabetes mellitus)     HTN (hypertension)     Melanoma     Vertigo      PAST MEDICAL HISTORY:  Afib     DM (diabetes mellitus) Type 2    HTN (hypertension)     Primary malignant melanoma of anal canal     Vertigo

## 2021-02-10 NOTE — ED ADULT NURSE REASSESSMENT NOTE - NS ED NURSE REASSESS COMMENT FT1
pt resting comfortably in bed at this time, saturating 100% on nonrebreather respirations between 18-22. Meal ordered for pt. Safety maintained

## 2021-02-10 NOTE — ED PROVIDER NOTE - NS_ ATTENDINGSCRIBEDETAILS _ED_A_ED_FT
Raya Arora MD: The history, relevant review of systems, past medical and surgical history, medical decision making, and physical examination was documented by the scribe in my presence and I attest to the accuracy of the documentation.

## 2021-02-10 NOTE — ED PROVIDER NOTE - OBJECTIVE STATEMENT
74 y/o female with a PMHx of DM, HTN, melanoma, vertigo presents to the ED c/o SOB. Pt recently admitted for new onset Afib from 1/18-1/26 and was started on Eliquis. Following day, pt had malaise, called MD in [place] who tested her for COVID, resulting possible. Pt was admitted in [facility] and was treated with MAB, Remdesivir and steroids. Pt d/c yesterday. Pt with worsening SOB today. Pt hypoxic in ED. No other complaints at this time.

## 2021-02-10 NOTE — H&P ADULT - NSHPPHYSICALEXAM_GEN_ALL_CORE
Vital Signs Last 24 Hrs  T(C): 37 (10 Feb 2021 13:45), Max: 37 (10 Feb 2021 13:45)  T(F): 98.6 (10 Feb 2021 13:45), Max: 98.6 (10 Feb 2021 13:45)  HR: 78 (10 Feb 2021 14:15) (78 - 85)  BP: 121/36 (10 Feb 2021 14:15) (117/62 - 124/37)  RR: 20 (10 Feb 2021 14:15) (16 - 20)  SpO2: 100% (10 Feb 2021 15:30) (80% - 100%)

## 2021-02-10 NOTE — ED ADULT NURSE NOTE - OBJECTIVE STATEMENT
pt came in complaining of SOB she is known covid+ placed on NRB saturation up to 100% with NRB. recently discharged from [facility] where she was being treated for covid.

## 2021-02-10 NOTE — H&P ADULT - HISTORY OF PRESENT ILLNESS
74 y/o F PMHx significant for Hypertension, Diabetes mellitus type 2, vertigo, anal melanoma under care of Dr. Shaerer [place] on combined IO therapy with ipilimumab + Nivolumab (cycle 2 on 1/4/2021), and recent admission from 1/19-1/25 for new onset Afib (now on DOAC therapy) and recent admission in [facility] for COVID-pneumonia (reportedly treated with MAB, Remdesivir and steroid therapy) presents to  for further evaluation and management of worsening shortness of breath. In triage the patient was notably hypoxic SpO2 => 80% with initial improvement of SaO2 to 100 via NRB (per ED documentation).  Labs => WBC 21.24, Hgb/Hct 9.1/29.1, , D-dimer 373, BUN/Cr 31/1.33, Glu 105, Alb 1.9, AST 64, TnI 0.070. CXR => New bilateral airspace opacities right greater than left. No significant pleural effusion. Heart size cannot be accurately assessed in this projection.

## 2021-02-10 NOTE — ED ADULT NURSE REASSESSMENT NOTE - NS ED NURSE REASSESS COMMENT FT1
Pt sattin low nights 90s to high 80s on NRB, pt placed on right side on NRB now satting 99%, pt comfortable in this position-call bell in reach.

## 2021-02-10 NOTE — ED ADULT NURSE REASSESSMENT NOTE - NS ED NURSE REASSESS COMMENT FT1
pt resting comfortably at this time. NRB in place. O2:95%. no needs at this time. will continue to monitor

## 2021-02-10 NOTE — ED PROVIDER NOTE - CARDIAC, MLM
Normal rate, regular rhythm.  Heart sounds S1, S2.  No murmurs, rubs or gallops. irregularoy irregular rhythm,  Heart sounds S1, S2.  No murmurs, rubs or gallops.

## 2021-02-11 NOTE — DIETITIAN INITIAL EVALUATION ADULT. - PERTINENT LABORATORY DATA
02-11    138  |  107  |  32<H>  ----------------------------<  173<H>  4.4   |  24  |  1.34<H>    Ca    8.2<L>      11 Feb 2021 06:45  Phos  3.5     02-11  Mg     2.3     02-11    TPro  5.6<L>  /  Alb  1.7<L>  /  TBili  0.3  /  DBili  x   /  AST  48<H>  /  ALT  54  /  AlkPhos  101  02-11

## 2021-02-11 NOTE — CONSULT NOTE ADULT - ASSESSMENT
ASSESSMENT     1. Acute hypoxic respiratory Failure   2. COVID PNA   3. suspected bacterial PNA   4. afib   5. JASPREET    PLAN     NEURO:   -tylenol for fever   -pt very anxious causing mild tachypnea : placed on PRN moprhine and xanax to hhelp with dypnra and anxiety    PULM:    HFNC 100% and 45 L with NRB SpO2 93%  Titrate FiO2 as tolerated   -ABG pending     CV:   -Pt h/o afib started on home apixaban, lopressor and Cardizem for rate control   -Maintain MAP>65 for adequate organ perfusion      GI:    -Diet as tolerated     :   -Check repeat labs and monitor renal function trend  -Encourage PO intake as tolerated  -IVF hydration   -Avoid nephrotoxic meds as possible. Avoid ACEI, ARB and NSAIDS.   -Monitor input and output.    ENDO:   -LAntus and SSI started   -Close glycemic control maintain FS<180  -Placed on 10 day course of 6mg decadron   -Started on home synthroid for hypothyroidism     ID:   -pt COViD +started on 10 day course of remdesvir   -Bcx/sputum cx/legionella/strep PCR pending   -BS ABX with cefepime and bactrim fir presumed superimposed PNA and ? PCP while on immunosuppressive agents for anal CA     HEME/DVT PPX:   -AC with apixaban          CODE STATUS: Full Code       Care discussed with bedside provider

## 2021-02-11 NOTE — PROGRESS NOTE ADULT - ASSESSMENT
A/P: 74 year old female who presents with Acute Hypoxic Respiratory failure possible from COVID 19 Pneumonia and or a secondary superimposed Pneumonia  AFIB  ALVIN  anal melanomia  DM II  hypothyroidism        PLAN:  CCU    PULM: Will continue HF NC, continue Cefepime and Z-Max, RVP PND    Cardio: A-FIB.  Will Continue DOAC, CCB.  Seen by cardio and no ischemic work up at this time    Renal: Alvin likely from ATN.  Continue to monitor and encourage PO    GI: PO Diet    ENDO: Lantus and RISS, Synthroid    ID: PCR PND, May be a late flair of COVID 19, on Decadron, Continue Cefepime/Z-Max, Viral PCR PND, Bactrim prophylaxis

## 2021-02-11 NOTE — ED ADULT NURSE REASSESSMENT NOTE - NS ED NURSE REASSESS COMMENT FT1
pt O2 saturation is mid 80's-low 90's%.  pt sate she feels comfortable but visibly desats when talking/coughing.  pt positioned in prone position with positive result and pt states she normally sleeps on side/can sleep prone.  O2 sat is now % consistently and denies SOB at this time.  pt also states she cannot used albuterol inhaler because every time she tries to she throws up from it.  inhaler kept for emergencies but will not use at this time.

## 2021-02-11 NOTE — PROGRESS NOTE ADULT - SUBJECTIVE AND OBJECTIVE BOX
HPI:     74 y/o F PMHx significant for Hypertension, Diabetes mellitus type 2, vertigo, anal melanoma under care of Dr. Shearer ([facility] [place]) on combined IO therapy with ipilimumab + Nivolumab (cycle 2 on 1/4/2021), and recent admission from 1/19-1/25 for new onset Afib (now on DOAC therapy) and recent admission in [facility] for COVID-pneumonia (reportedly treated with MAB, Remdesivir and steroid therapy) presents to  for further evaluation and management of worsening shortness of breath.    Pt started to desaturate into 80's  HFNC and ATC024% - more tachypneic. pt seemingly very anxious . States "shes scared." Will admit pt to the ICU for acute hypoxic respiratory, COVID PNA and ? superimposed PNA     2/11: Patient seen in bed, on HF NC, still with a nonproductive cough        PAST MEDICAL & SURGICAL HISTORY:  Primary malignant melanoma of anal canal    Afib    DM (diabetes mellitus)  Type 2    HTN (hypertension)    Vertigo    No significant past surgical history        FAMILY HISTORY:  No pertinent family history in first degree relatives        Social Hx:    Allergies    barbiturates (Unknown)  Cipro (Other; Swelling)  iodine (Unknown)    Intolerances        Height (cm): 160 (02-10 @ 13:45)  Weight (kg): 80 (02-10 @ 13:45)  BMI (kg/m2): 31.3 (02-10 @ 13:45)    ICU Vital Signs Last 24 Hrs  T(C): 37.4 (11 Feb 2021 03:30), Max: 37.4 (11 Feb 2021 03:30)  T(F): 99.4 (11 Feb 2021 03:30), Max: 99.4 (11 Feb 2021 03:30)  HR: 77 (11 Feb 2021 10:00) (68 - 98)  BP: 113/48 (11 Feb 2021 10:00) (106/47 - 124/37)  BP(mean): 63 (11 Feb 2021 10:00) (56 - 85)  ABP: --  ABP(mean): --  RR: 25 (11 Feb 2021 10:00) (16 - 33)  SpO2: 79% (11 Feb 2021 10:00) (79% - 100%)          I&O's Summary    10 Feb 2021 07:01  -  11 Feb 2021 07:00  --------------------------------------------------------  IN: 50 mL / OUT: 0 mL / NET: 50 mL                              8.4    17.44 )-----------( 344      ( 11 Feb 2021 06:45 )             27.8       02-11    138  |  107  |  32<H>  ----------------------------<  173<H>  4.4   |  24  |  1.34<H>    Ca    8.2<L>      11 Feb 2021 06:45  Phos  3.5     02-11  Mg     2.3     02-11    TPro  5.6<L>  /  Alb  1.7<L>  /  TBili  0.3  /  DBili  x   /  AST  48<H>  /  ALT  54  /  AlkPhos  101  02-11      CARDIAC MARKERS ( 10 Feb 2021 23:37 )  0.095 ng/mL / x     / x     / x     / x      CARDIAC MARKERS ( 10 Feb 2021 20:37 )  0.098 ng/mL / x     / x     / x     / x      CARDIAC MARKERS ( 10 Feb 2021 14:01 )  0.070 ng/mL / x     / x     / x     / x            ABG - ( 11 Feb 2021 03:27 )  pH, Arterial: 7.43  pH, Blood: x     /  pCO2: 36    /  pO2: 54    / HCO3: 24    / Base Excess: .1    /  SaO2: 86                      MEDICATIONS  (STANDING):  apixaban 5 milliGRAM(s) Oral every 12 hours  atorvastatin 40 milliGRAM(s) Oral at bedtime  azithromycin  IVPB      azithromycin  IVPB 500 milliGRAM(s) IV Intermittent once  cefepime   IVPB 2000 milliGRAM(s) IV Intermittent every 12 hours  cefepime   IVPB      dexAMETHasone  Injectable 6 milliGRAM(s) IV Push daily  dextrose 40% Gel 15 Gram(s) Oral once  dextrose 5%. 1000 milliLiter(s) (50 mL/Hr) IV Continuous <Continuous>  dextrose 5%. 1000 milliLiter(s) (100 mL/Hr) IV Continuous <Continuous>  dextrose 50% Injectable 25 Gram(s) IV Push once  dextrose 50% Injectable 12.5 Gram(s) IV Push once  dextrose 50% Injectable 25 Gram(s) IV Push once  diltiazem    milliGRAM(s) Oral daily  glucagon  Injectable 1 milliGRAM(s) IntraMuscular once  insulin glargine Injectable (LANTUS) 15 Unit(s) SubCutaneous at bedtime  insulin lispro (ADMELOG) corrective regimen sliding scale   SubCutaneous three times a day before meals  insulin lispro (ADMELOG) corrective regimen sliding scale   SubCutaneous at bedtime  levothyroxine 75 MICROGram(s) Oral daily  metoprolol tartrate 25 milliGRAM(s) Oral two times a day  pantoprazole    Tablet 40 milliGRAM(s) Oral before breakfast  trimethoprim  160 mG/sulfamethoxazole 800 mG 1 Tablet(s) Oral <User Schedule>    MEDICATIONS  (PRN):  acetaminophen   Tablet .. 650 milliGRAM(s) Oral every 4 hours PRN Temp greater or equal to 38.5C (101.3F)  ALBUTerol    90 MICROgram(s) HFA Inhaler 2 Puff(s) Inhalation every 4 hours PRN Shortness of Breath and/or Wheezing  ALPRAZolam 0.125 milliGRAM(s) Oral every 6 hours PRN anxiety  loperamide 2 milliGRAM(s) Oral every 4 hours PRN Diarrhea  ondansetron Injectable 4 milliGRAM(s) IV Push every 6 hours PRN Nausea and/or Vomiting      DVT Prophylaxis:    Advanced Directives:  Discussed with:    Visit Information: 30 min    ** Time is exclusive of billed procedures and/or teaching and/or routine family updates.

## 2021-02-11 NOTE — CONSULT NOTE ADULT - ASSESSMENT
76 y/o Female with h/o Hypertension, Diabetes mellitus type 2, vertigo, anal melanoma under care of Dr. Shearer -[facility] [place] on combined IO therapy with ipilimumab + Nivolumab (cycle 2 on 1/4/2021), recent admission from 1/19-1/25 for new onset Afib (now on DOAC therapy), recent admission in [facility] for COVID-pneumonia (reportedly treated with MAB, Remdesivir and steroid therapy) was admitted on 2/10 for worsening shortness of breath. In ER the patient was hypoxic with SpO2 => 80%. Also noted with new bilateral airspace opacities right greater than left. She received cefepime.     1. Acuter respiratory failure. Multifocal pneumonia. Anal melanoma on immunotherapy. Immunocompromised host. CRF stage 3.   -low grade fever  -leukocytosis  -obtain BC x 2   -CT chest shows extensive b/l pneumonia  - probable persistent COVID pneumonia  -cannot exclude superimposed bacterial etiology  -obtain RVP with COVID-19 PCR  -she was treated recently with remdesivir  -obtain sputum c/s  -start cefepime 2 gm IV q12h   -reason for abx use and side effects reviewed with patient; monitor BMP  -respiratory care  -old chart reviewed to assess prior cultures  -monitor temps  -f/u CBC  -supportive care  2. Other issues:   -care per medicine         74 y/o Female with h/o Hypertension, Diabetes mellitus type 2, vertigo, anal melanoma [place] [place] with ipilimumab + Nivolumab (cycle 2 on 1/4/2021), recent admission from 1/19-1/25 for new onset Afib (now on DOAC therapy), recent admission in [facility] for COVID-pneumonia (reportedly treated with MAB, Remdesivir and steroid therapy) was admitted on 2/10 for worsening shortness of breath. In ER the patient was hypoxic with SpO2 => 80%. Also noted with new bilateral airspace opacities right greater than left. She received cefepime.     1. Acuter respiratory failure. Multifocal pneumonia. Anal melanoma on immunotherapy. Immunocompromised host. CRF stage 3.   -low grade fever  -leukocytosis  -obtain BC x 2   -CT chest shows extensive b/l pneumonia  - probable persistent COVID pneumonia  -cannot exclude superimposed bacterial etiology  -obtain RVP with COVID-19 PCR  -she was treated recently with remdesivir  -obtain sputum c/s  -start cefepime 2 gm IV q12h and azithromycin 500 mg IV qd  -reason for abx use and side effects reviewed with patient; monitor BMP  -respiratory care  -old chart reviewed to assess prior cultures  -monitor temps  -f/u CBC  -supportive care  2. Other issues:   -care per medicine

## 2021-02-11 NOTE — PROGRESS NOTE ADULT - SUBJECTIVE AND OBJECTIVE BOX
HPI:  76 y/o F PMHx significant for Hypertension, Diabetes mellitus type 2, vertigo, anal melanoma under care of Dr. Shearer ([facility] [place]) on combined IO therapy with ipilimumab + Nivolumab (cycle 2 on 1/4/2021), and recent admission from 1/19-1/25 for new onset Afib (now on DOAC therapy) and recent admission in [facility] for COVID-pneumonia (reportedly treated with MAB, Remdesivir and steroid therapy) presents to  for further evaluation and management of worsening shortness of breath. In triage the patient was notably hypoxic SpO2 => 80% with initial improvement of SaO2 to 100 via NRB (per ED documentation).  Labs => WBC 21.24, Hgb/Hct 9.1/29.1, , D-dimer 373, BUN/Cr 31/1.33, Glu 105, Alb 1.9, AST 64, TnI 0.070. CXR => New bilateral airspace opacities right greater than left. No significant pleural effusion. Heart size cannot be accurately assessed in this projection.     2/11/21: Pt evaluated at bedside. On BiPap and switched to HFNC to allow pt to try and eat. C/o cough present since diagnosis of COVID19 on 1/26. Pt was hospitalized from 1/26-2/4 at [facility] [place] with COVID19, received MAB, remdesivir, and steroids. Pt states she was feeling better since being discharged home, except for cough. Pt was well through the weekend then symptoms began to worsen a couple days ago. Today, pt feels weak, with cough and occasional nosebleed. Has appetite and will try to eat today.  Pt received 2 cycles of therapy with ipilimumab and Nivolumab with Dr. Shearer at [facility] [place], every 3 weeks,with last cycle on 1/4/21.     ROS:  Gen: no fevers, chills, +fatigue  Cardiovascular: no chest pain, no palpitations, no orthopnea  Respiratory: +SOB, +cough  GI: no difficulty swallowing, no nausea, no vomiting, no abdominal pain, no diarrhea, no constipation  : no dysuria, no increased freq, no hematuria  Heme: nosebleeds  MSK: no joint pain, no joint swelling or redness, no extremity pain   Neuro: no headache, no numbness, + weakness    MEDICATIONS  (STANDING):  apixaban 5 milliGRAM(s) Oral every 12 hours  atorvastatin 40 milliGRAM(s) Oral at bedtime  azithromycin  IVPB      azithromycin  IVPB 500 milliGRAM(s) IV Intermittent once  cefepime   IVPB 2000 milliGRAM(s) IV Intermittent every 12 hours  cefepime   IVPB      dexAMETHasone  Injectable 6 milliGRAM(s) IV Push daily  dextrose 40% Gel 15 Gram(s) Oral once  dextrose 5%. 1000 milliLiter(s) (50 mL/Hr) IV Continuous <Continuous>  dextrose 5%. 1000 milliLiter(s) (100 mL/Hr) IV Continuous <Continuous>  dextrose 50% Injectable 25 Gram(s) IV Push once  dextrose 50% Injectable 12.5 Gram(s) IV Push once  dextrose 50% Injectable 25 Gram(s) IV Push once  diltiazem    milliGRAM(s) Oral daily  glucagon  Injectable 1 milliGRAM(s) IntraMuscular once  insulin glargine Injectable (LANTUS) 15 Unit(s) SubCutaneous at bedtime  insulin lispro (ADMELOG) corrective regimen sliding scale   SubCutaneous three times a day before meals  insulin lispro (ADMELOG) corrective regimen sliding scale   SubCutaneous at bedtime  levothyroxine 75 MICROGram(s) Oral daily  metoprolol tartrate 25 milliGRAM(s) Oral two times a day  pantoprazole    Tablet 40 milliGRAM(s) Oral before breakfast  trimethoprim  160 mG/sulfamethoxazole 800 mG 1 Tablet(s) Oral <User Schedule>    MEDICATIONS  (PRN):  acetaminophen   Tablet .. 650 milliGRAM(s) Oral every 4 hours PRN Temp greater or equal to 38.5C (101.3F)  ALBUTerol    90 MICROgram(s) HFA Inhaler 2 Puff(s) Inhalation every 4 hours PRN Shortness of Breath and/or Wheezing  ALPRAZolam 0.125 milliGRAM(s) Oral every 6 hours PRN anxiety  loperamide 2 milliGRAM(s) Oral every 4 hours PRN Diarrhea  ondansetron Injectable 4 milliGRAM(s) IV Push every 6 hours PRN Nausea and/or Vomiting    PE:  Vital Signs Last 24 Hrs  T(C): 37.4 (11 Feb 2021 03:30), Max: 37.4 (11 Feb 2021 03:30)  T(F): 99.4 (11 Feb 2021 03:30), Max: 99.4 (11 Feb 2021 03:30)  HR: 71 (11 Feb 2021 12:00) (68 - 98)  BP: 107/43 (11 Feb 2021 12:00) (106/47 - 124/37)  BP(mean): 58 (11 Feb 2021 12:00) (56 - 85)  RR: 24 (11 Feb 2021 12:00) (16 - 33)  SpO2: 84% (11 Feb 2021 12:00) (79% - 100%)    GEN: on supplemental O2, weak appearing  CV: S1S2, RRR, no murmur  RESP: coarse breath sounds bilaterally  ABD: +BS, soft, ND, NT, no guarding, no rigidity  EXT: +2 radial and pedal pulses, no edema, no calve tenderness  NEURO:  AAOx3  PSYCH: normal behavior         LABS:                          8.4    17.44 )-----------( 344      ( 11 Feb 2021 06:45 )             27.8     11 Feb 2021 06:45    138    |  107    |  32     ----------------------------<  173    4.4     |  24     |  1.34     Ca    8.2        11 Feb 2021 06:45  Phos  3.5       11 Feb 2021 06:45  Mg     2.3       11 Feb 2021 06:45    TPro  5.6    /  Alb  1.7    /  TBili  0.3    /  DBili  x      /  AST  48     /  ALT  54     /  AlkPhos  101    11 Feb 2021 06:45    LIVER FUNCTIONS - ( 11 Feb 2021 06:45 )  Alb: 1.7 g/dL / Pro: 5.6 gm/dL / ALK PHOS: 101 U/L / ALT: 54 U/L / AST: 48 U/L / GGT: x             CAPILLARY BLOOD GLUCOSE      POCT Blood Glucose.: 134 mg/dL (11 Feb 2021 12:06)  POCT Blood Glucose.: 153 mg/dL (11 Feb 2021 08:07)  POCT Blood Glucose.: 248 mg/dL (10 Feb 2021 23:01)    CARDIAC MARKERS ( 10 Feb 2021 23:37 )  0.095 ng/mL / x     / x     / x     / x      CARDIAC MARKERS ( 10 Feb 2021 20:37 )  0.098 ng/mL / x     / x     / x     / x      CARDIAC MARKERS ( 10 Feb 2021 14:01 )  0.070 ng/mL / x     / x     / x     / x              RADIOLOGY:         < from: Xray Chest 1 View- PORTABLE-Urgent (02.10.21 @ 15:00) >  IMPRESSION:  New bilateral airspace opacities right greater than left.    No significant pleural effusion    Heart size cannot be accurately assessed in this projection.    < end of copied text >

## 2021-02-11 NOTE — CONSULT NOTE ADULT - PROBLEM SELECTOR RECOMMENDATION 9
Minimally elevated serum troponin in the setting of hypoxemic respiratory failure, viral infection, critical illness; similar elevated troponin during her January 2021 admission (peak 1.2) -- lower now; no ischemic symptoms and normal LV function on TTE; she will need an ischemia evaluation when she recovers from acute illness.

## 2021-02-11 NOTE — CONSULT NOTE ADULT - SUBJECTIVE AND OBJECTIVE BOX
CC:  Patient is a 75y old  Female who presents with a chief complaint of Shortness of Breath (10 Feb 2021 17:25)      HPI/BRIEF HOSPITAL COURSE:     76 y/o F PMHx significant for Hypertension, Diabetes mellitus type 2, vertigo, anal melanoma [place]. Shearer [place] [place] therapy with ipilimumab + Nivolumab (cycle 2 on 1/4/2021), and recent admission from 1/19-1/25 for new onset Afib (now on DOAC therapy) and recent admission in [facility] for COVID-pneumonia (reportedly treated with MAB, Remdesivir and steroid therapy) presents to  for further evaluation and management of worsening shortness of breath.     Events last 24 hours:   Pt started to desaturate into 80's in ERHO on HFNC and NNL203% - more tachypneic. pt seemingly very anxious . States "shes scared." Will admit pt to the ICU for acute hypoxic respiratory, COVID PNA and ? superimposed PNA     PAST MEDICAL & SURGICAL HISTORY:  Primary malignant melanoma of anal canal    Afib    DM (diabetes mellitus)  Type 2    HTN (hypertension)    Vertigo    No significant past surgical history      Allergies    barbiturates (Unknown)  Cipro (Other; Swelling)    Intolerances      FAMILY HISTORY:  No pertinent family history in first degree relatives        Review of Systems:  CONSTITUTIONAL: No fever, chills, or fatigue  EYES: No eye pain, visual disturbances, or discharge  ENMT:  No difficulty hearing, tinnitus, vertigo; No sinus or throat pain  NECK: No pain or stiffness  RESPIRATORY: No cough, wheezing, chills or hemoptysis; No shortness of breath  CARDIOVASCULAR: No chest pain, palpitations, dizziness, or leg swelling  GASTROINTESTINAL: No abdominal or epigastric pain. No nausea, vomiting, or hematemesis; No diarrhea or constipation. No melena or hematochezia.  GENITOURINARY: No dysuria, frequency, hematuria, or incontinence  NEUROLOGICAL: No headaches, memory loss, loss of strength, numbness, or tremors  SKIN: No itching, burning, rashes, or lesions   MUSCULOSKELETAL: No joint pain or swelling; No muscle, back, or extremity pain  PSYCHIATRIC: No depression, anxiety, mood swings, or difficulty sleeping      Medications:  cefepime   IVPB 2000 milliGRAM(s) IV Intermittent every 12 hours  cefepime   IVPB      trimethoprim  160 mG/sulfamethoxazole 800 mG 1 Tablet(s) Oral <User Schedule>    diltiazem    milliGRAM(s) Oral daily  metoprolol tartrate 25 milliGRAM(s) Oral two times a day    ALBUTerol    90 MICROgram(s) HFA Inhaler 2 Puff(s) Inhalation every 4 hours PRN  guaifenesin/dextromethorphan  Syrup 10 milliLiter(s) Oral every 4 hours PRN    acetaminophen   Tablet .. 650 milliGRAM(s) Oral every 4 hours PRN  ondansetron Injectable 4 milliGRAM(s) IV Push every 6 hours PRN      apixaban 5 milliGRAM(s) Oral every 12 hours    loperamide 2 milliGRAM(s) Oral every 4 hours PRN  pantoprazole    Tablet 40 milliGRAM(s) Oral before breakfast      atorvastatin 40 milliGRAM(s) Oral at bedtime  dexAMETHasone  Injectable 6 milliGRAM(s) IV Push daily  dextrose 40% Gel 15 Gram(s) Oral once  dextrose 50% Injectable 25 Gram(s) IV Push once  dextrose 50% Injectable 12.5 Gram(s) IV Push once  dextrose 50% Injectable 25 Gram(s) IV Push once  glucagon  Injectable 1 milliGRAM(s) IntraMuscular once  insulin glargine Injectable (LANTUS) 15 Unit(s) SubCutaneous at bedtime  insulin lispro (ADMELOG) corrective regimen sliding scale   SubCutaneous three times a day before meals  insulin lispro (ADMELOG) corrective regimen sliding scale   SubCutaneous at bedtime  levothyroxine 75 MICROGram(s) Oral daily    dextrose 5%. 1000 milliLiter(s) IV Continuous <Continuous>  dextrose 5%. 1000 milliLiter(s) IV Continuous <Continuous>                ICU Vital Signs Last 24 Hrs  T(C): 36.7 (10 Feb 2021 20:21), Max: 37 (10 Feb 2021 13:45)  T(F): 98 (10 Feb 2021 20:21), Max: 98.6 (10 Feb 2021 13:45)  HR: 78 (10 Feb 2021 22:25) (73 - 85)  BP: 108/42 (10 Feb 2021 22:25) (106/47 - 124/37)  BP(mean): --  ABP: --  ABP(mean): --  RR: 26 (11 Feb 2021 01:17) (16 - 26)  SpO2: 85% (11 Feb 2021 01:17) (80% - 100%)    Vital Signs Last 24 Hrs  T(C): 36.7 (10 Feb 2021 20:21), Max: 37 (10 Feb 2021 13:45)  T(F): 98 (10 Feb 2021 20:21), Max: 98.6 (10 Feb 2021 13:45)  HR: 78 (10 Feb 2021 22:25) (73 - 85)  BP: 108/42 (10 Feb 2021 22:25) (106/47 - 124/37)  BP(mean): --  RR: 26 (11 Feb 2021 01:17) (16 - 26)  SpO2: 85% (11 Feb 2021 01:17) (80% - 100%)        I&O's Detail        LABS:                        9.1    21.24 )-----------( 403      ( 10 Feb 2021 14:01 )             29.1     02-10    136  |  104  |  31<H>  ----------------------------<  105<H>  4.1   |  24  |  1.33<H>    Ca    8.3<L>      10 Feb 2021 14:01    TPro  6.1  /  Alb  1.9<L>  /  TBili  0.3  /  DBili  x   /  AST  64<H>  /  ALT  67  /  AlkPhos  115  02-10      CARDIAC MARKERS ( 10 Feb 2021 23:37 )  0.095 ng/mL / x     / x     / x     / x      CARDIAC MARKERS ( 10 Feb 2021 20:37 )  0.098 ng/mL / x     / x     / x     / x      CARDIAC MARKERS ( 10 Feb 2021 14:01 )  0.070 ng/mL / x     / x     / x     / x          CAPILLARY BLOOD GLUCOSE      POCT Blood Glucose.: 248 mg/dL (10 Feb 2021 23:01)        CULTURES:    cefepime   IVPB 2000 milliGRAM(s) IV Intermittent every 12 hours  cefepime   IVPB      trimethoprim  160 mG/sulfamethoxazole 800 mG 1 Tablet(s) Oral <User Schedule>      Physical Examination:  General: No acute distress.  Alert, oriented, interactive, nonfocal  NEURO: A&O X3, motor function 5/5 BL UE/LE  HEENT: Pupils equal, reactive to light.  Symmetric.  PULM: CTA BL, no significant sputum production, no wheezes, rales, rhonchi  CVS: Regular rate and rhythm, no murmurs, rubs, or gallops  ABD: Soft, nondistended, nontender, normoactive bowel sounds, no masses  EXT: No edema, nontender  SKIN: Warm and well perfused, no rashes noted      EKG: ***    RADIOLOGY: ***      CENTRAL LINE: N          DATE INSERTED:                  DAVIS: N                        DATE INSERTED:                  A-LINE: N                       DATE INSERTED:                  GLOBAL ISSUE/BEST PRACTICE:  Analgesia: N/A  Sedation: N/A  HOB elevation: yes  Stress ulcer prophylaxis: protonix   VTE prophylaxis: SCD/Heparin SQ/Lovenox SQ  Glycemic control: N/A  Nutrition: NPO/Diet/TF       CC:  Patient is a 75y old  Female who presents with a chief complaint of Shortness of Breath (10 Feb 2021 17:25)      HPI/BRIEF HOSPITAL COURSE:     74 y/o F PMHx significant for Hypertension, Diabetes mellitus type 2, vertigo, anal melanoma under care of Dr. Shearer ([facility] [place]) on combined IO therapy with ipilimumab + Nivolumab (cycle 2 on 1/4/2021), and recent admission from 1/19-1/25 for new onset Afib (now on DOAC therapy) and recent admission in [facility] for COVID-pneumonia (reportedly treated with MAB, Remdesivir and steroid therapy) presents to  for further evaluation and management of worsening shortness of breath.     Events last 24 hours:   Pt started to desaturate into 80's in ERHO on HFNC and ICZ830% - more tachypneic. pt seemingly very anxious . States "shes scared." Will admit pt to the ICU for acute hypoxic respiratory, COVID PNA and ? superimposed PNA     PAST MEDICAL & SURGICAL HISTORY:  Primary malignant melanoma of anal canal    Afib    DM (diabetes mellitus)  Type 2    HTN (hypertension)    Vertigo    No significant past surgical history      Allergies    barbiturates (Unknown)  Cipro (Other; Swelling)    Intolerances      FAMILY HISTORY:  No pertinent family history in first degree relatives        Review of Systems:  CONSTITUTIONAL: +chills  EYES: No visual disturbances  ENMT:  No sinus or throat pain  NECK: No pain or stiffness  RESPIRATORY: + cough,No shortness of breath  CARDIOVASCULAR: No chest pain, palpitations, dizziness, or leg swelling  GASTROINTESTINAL: No abdominal pain. No nausea, vomiting, or hematemesis; No diarrhea or constipation. No melena  GENITOURINARY: No dysuria, frequency, hematuria, or incontinence  NEUROLOGICAL: No headaches, numbness, or tremors  SKIN: No itching, burning, rashes, or lesions   MUSCULOSKELETAL: No muscle, back, or extremity pain  PSYCHIATRIC: +anxiety,       Medications:  cefepime   IVPB 2000 milliGRAM(s) IV Intermittent every 12 hours  cefepime   IVPB      trimethoprim  160 mG/sulfamethoxazole 800 mG 1 Tablet(s) Oral <User Schedule>    diltiazem    milliGRAM(s) Oral daily  metoprolol tartrate 25 milliGRAM(s) Oral two times a day    ALBUTerol    90 MICROgram(s) HFA Inhaler 2 Puff(s) Inhalation every 4 hours PRN  guaifenesin/dextromethorphan  Syrup 10 milliLiter(s) Oral every 4 hours PRN    acetaminophen   Tablet .. 650 milliGRAM(s) Oral every 4 hours PRN  ondansetron Injectable 4 milliGRAM(s) IV Push every 6 hours PRN      apixaban 5 milliGRAM(s) Oral every 12 hours    loperamide 2 milliGRAM(s) Oral every 4 hours PRN  pantoprazole    Tablet 40 milliGRAM(s) Oral before breakfast      atorvastatin 40 milliGRAM(s) Oral at bedtime  dexAMETHasone  Injectable 6 milliGRAM(s) IV Push daily  dextrose 40% Gel 15 Gram(s) Oral once  dextrose 50% Injectable 25 Gram(s) IV Push once  dextrose 50% Injectable 12.5 Gram(s) IV Push once  dextrose 50% Injectable 25 Gram(s) IV Push once  glucagon  Injectable 1 milliGRAM(s) IntraMuscular once  insulin glargine Injectable (LANTUS) 15 Unit(s) SubCutaneous at bedtime  insulin lispro (ADMELOG) corrective regimen sliding scale   SubCutaneous three times a day before meals  insulin lispro (ADMELOG) corrective regimen sliding scale   SubCutaneous at bedtime  levothyroxine 75 MICROGram(s) Oral daily    dextrose 5%. 1000 milliLiter(s) IV Continuous <Continuous>  dextrose 5%. 1000 milliLiter(s) IV Continuous <Continuous>                ICU Vital Signs Last 24 Hrs  T(C): 36.7 (10 Feb 2021 20:21), Max: 37 (10 Feb 2021 13:45)  T(F): 98 (10 Feb 2021 20:21), Max: 98.6 (10 Feb 2021 13:45)  HR: 78 (10 Feb 2021 22:25) (73 - 85)  BP: 108/42 (10 Feb 2021 22:25) (106/47 - 124/37)  BP(mean): --  ABP: --  ABP(mean): --  RR: 26 (11 Feb 2021 01:17) (16 - 26)  SpO2: 85% (11 Feb 2021 01:17) (80% - 100%)    Vital Signs Last 24 Hrs  T(C): 36.7 (10 Feb 2021 20:21), Max: 37 (10 Feb 2021 13:45)  T(F): 98 (10 Feb 2021 20:21), Max: 98.6 (10 Feb 2021 13:45)  HR: 78 (10 Feb 2021 22:25) (73 - 85)  BP: 108/42 (10 Feb 2021 22:25) (106/47 - 124/37)  BP(mean): --  RR: 26 (11 Feb 2021 01:17) (16 - 26)  SpO2: 85% (11 Feb 2021 01:17) (80% - 100%)        I&O's Detail        LABS:                        9.1    21.24 )-----------( 403      ( 10 Feb 2021 14:01 )             29.1     02-10    136  |  104  |  31<H>  ----------------------------<  105<H>  4.1   |  24  |  1.33<H>    Ca    8.3<L>      10 Feb 2021 14:01    TPro  6.1  /  Alb  1.9<L>  /  TBili  0.3  /  DBili  x   /  AST  64<H>  /  ALT  67  /  AlkPhos  115  02-10      CARDIAC MARKERS ( 10 Feb 2021 23:37 )  0.095 ng/mL / x     / x     / x     / x      CARDIAC MARKERS ( 10 Feb 2021 20:37 )  0.098 ng/mL / x     / x     / x     / x      CARDIAC MARKERS ( 10 Feb 2021 14:01 )  0.070 ng/mL / x     / x     / x     / x          CAPILLARY BLOOD GLUCOSE      POCT Blood Glucose.: 248 mg/dL (10 Feb 2021 23:01)        CULTURES:    cefepime   IVPB 2000 milliGRAM(s) IV Intermittent every 12 hours  cefepime   IVPB      trimethoprim  160 mG/sulfamethoxazole 800 mG 1 Tablet(s) Oral <User Schedule>      Physical Examination:  General: No acute distress.  Alert, oriented, interactive, nonfocal  NEURO: A&O X3, motor function 5/5 BL UE/LE  HEENT: Pupils equal, reactive to light.  Symmetric.  PULM: CTA BL, no significant sputum production, no wheezes, rales, rhonchi  CVS: Regular rate and rhythm, no murmurs, rubs, or gallops  ABD: Soft, nondistended, nontender, normoactive bowel sounds, no masses  EXT: No edema, nontender  SKIN: Warm and well perfused, no rashes noted      EKG: afib    RADIOLOGY: < from: CT Chest No Cont (02.10.21 @ 19:47) >  IMPRESSION:  Extensive bilateral pneumonias right greater than left.        < end of copied text >        CENTRAL LINE: N          DATE INSERTED:                  DAVIS: N                        DATE INSERTED:                  A-LINE: N                       DATE INSERTED:

## 2021-02-11 NOTE — DIETITIAN INITIAL EVALUATION ADULT. - OTHER INFO
76 y/o F PMHx significant for Hypertension, Diabetes mellitus type 2, vertigo, anal melanoma under care of Dr. Shearer ([facility] [place]) on combined IO therapy with ipilimumab + Nivolumab (cycle 2 on 1/4/2021), and recent admission from 1/19-1/25 for new onset Afib (now on DOAC therapy) and recent admission in [facility] for COVID-pneumonia (reportedly treated with MAB, Remdesivir and steroid therapy) presents to  for further evaluation and management of worsening shortness of breath. In triage the patient was notably hypoxic SpO2 => 80% with initial improvement of SaO2 to 100 via NRB (per ED documentation). Labs => WBC 21.24, Hgb/Hct 9.1/29.1, , D-dimer 373, BUN/Cr 31/1.33, Glu 105, Alb 1.9, AST 64, TnI 0.070. CXR => New bilateral airspace opacities right greater than left. No significant pleural effusion. Heart size cannot be accurately assessed in this projection.    Pt seen for assessment. Pt admitted to hospital with possible delayed Covid PNA symptoms. Pt's weight appears stable from UBW (77kg). Pt is noted with poor intake in hospital 2/2 to NRB (admitted yesterday). RN and MD discussed possibility of PPN, but MD wants to see if pt can eat on high flow. Will continue to monitor and assess nutritional status. Pt is noted with 1+ edema. No overt chewing or swallowing difficulty and no noted n/v/d/c. Pt reports having appetite and will attempt to eat well, recommend Gelatein to help meet protein needs. Will continue to monitor pt's nutritional status.    I&O's Detail    10 Feb 2021 07:01  -  11 Feb 2021 07:00  --------------------------------------------------------  IN:    IV PiggyBack: 50 mL  Total IN: 50 mL    OUT:  Total OUT: 0 mL    Total NET: 50 mL 76 y/o F PMHx significant for Hypertension, Diabetes mellitus type 2, vertigo, anal melanoma under care of Dr. Shearer ([facility] [place]) on combined IO therapy with ipilimumab + Nivolumab (cycle 2 on 1/4/2021), and recent admission from 1/19-1/25 for new onset Afib (now on DOAC therapy) and recent admission in [facility] for COVID-pneumonia (reportedly treated with MAB, Remdesivir and steroid therapy) presents to  for further evaluation and management of worsening shortness of breath. In triage the patient was notably hypoxic SpO2 => 80% with initial improvement of SaO2 to 100 via NRB (per ED documentation). Labs => WBC 21.24, Hgb/Hct 9.1/29.1, , D-dimer 373, BUN/Cr 31/1.33, Glu 105, Alb 1.9, AST 64, TnI 0.070. CXR => New bilateral airspace opacities right greater than left. No significant pleural effusion. Heart size cannot be accurately assessed in this projection.    Pt seen for assessment. Pt admitted to hospital with possible delayed Covid PNA symptoms. Pt's weight appears stable from UBW (77kg). Pt is noted with poor intake in hospital 2/2 to NRB (admitted yesterday). RN and MD discussed possibility of PPN, but MD wants to see if pt can eat on high flow. Will continue to monitor and assess nutritional status. Discussed intake with dgt, pt with good appetite prior to admission. Pt on tuesday had normal intake, noted some diarrhea but with steroids aliveated and increased appetite. Dgt states that even with last hospital admission PO intake was good.  Pt is noted with 1+ edema. No overt chewing or swallowing difficulty and no noted n/v/d/c. Pt reports having appetite and will attempt to eat well, recommend Gelatein to help meet protein needs. Will continue to monitor pt's nutritional status.    I&O's Detail    10 Feb 2021 07:01  -  11 Feb 2021 07:00  --------------------------------------------------------  IN:    IV PiggyBack: 50 mL  Total IN: 50 mL    OUT:  Total OUT: 0 mL    Total NET: 50 mL

## 2021-02-11 NOTE — ED ADULT NURSE REASSESSMENT NOTE - NS ED NURSE REASSESS COMMENT FT1
pt O2 saturation again in high 80's-low 90's on NRB, no visable distress noted.  Respiratory therapy called, MD Perrin informed.  MD Perrin stated to put pt back on high-flow oxygen if respiratory therapy feels it is needed.  respiratory therapy assessing pt at this time.  will most likely put pt on high-flow oxygen, awaiting respiratory' s assessment.

## 2021-02-11 NOTE — PROGRESS NOTE ADULT - ASSESSMENT
76 y/o F PMHx significant for Hypertension, Diabetes mellitus type 2, vertigo, anal melanoma [place]. She[place] [place] combined IO therapy with ipilimumab + Nivolumab (cycle 2 on 1/4/2021), and recent admission from 1/19-1/25 for new onset Afib (now on DOAC therapy) and recent admission in [facility] for COVID-pneumonia (reportedly treated with MAB, Remdesivir and steroid therapy) presents to  for further evaluation and management of worsening shortness of breath.  #Bilateral Pneumonia  ~admit to Telemetry  ~f/u PAN C+S  ~cont. IV abx  ~f/u w/ ID consultation further in the am    #COVID Pneumonia  ~maintain on airborne isolation  ~cont. supplemental via non-rebreather mask  ~start continuous oximetry monitoring  ~cont. Acetaminophen 650 mg PO q4h PRN fever. Limit use of NSAIDs.  ~cont. HFA albuterol Q6 hour PRN via MDI. Would avoid nebulized preparations to limit risk of aerosol formation.  ~cont. Dexamethasone for now  ~f/u am labs     #Atrial fibrillation  Persistent afib since yesterday, rates 90s.  Cont. cardizem & metoprolol as current dose.  Discussed w/ Dr. Hall who spoke w/ pt's oncologist - cancer agents may be inducing afib & thyroid abnormalities.  As these drugs may be causing afib & pt is asymptomatic OK to d/c from cardiology standpoint w/ rate control & anticoag & FU w/ Dr. Hall in 1 week.     #Essential hypertension  ~cont. Diltiazem CD 120mg po daily  ~cont. Metoprolol 25mg po bid    #Elevated troponin  ~No ischemic symptoms; elevated troponin likely secondary to demand ischemia in the setting of tachycardia and anemia;   ~echocardiogram (1/22) revealed normal LV systolic function; outpatient ischemia evaluation appropriate.   ~serial Thomas/EKGs    #Anal melanoma  ~under care of Dr. Shearer on combined IO therapy with ipilimumab + Nivolumab (cycle 2 on 1/4/2021)    #Vte ppx  ~cont. DOAC therapy as above      74 y/o F PMHx significant for Hypertension, Diabetes mellitus type 2, vertigo, anal melanoma under care of Dr. Shearer ([facility] [place] on combined IO therapy with ipilimumab + Nivolumab (cycle 2 on 1/4/2021), and recent admission from 1/19-1/25 for new onset Afib (now on DOAC therapy) and recent admission in [facility] for COVID-pneumonia (reportedly treated with MAB, Remdesivir and steroid therapy) presents to  for further evaluation and management of worsening shortness of breath.      #AHRF 2/2 PNA, COVID19/RVP+  -CT chest showing extensive bilateral PNA R>L  -COVID+ diagnosed at [facility] [place] on 1/26, r/p PCR today positive  -RVP+  -Continue supplemental O2 with HFNC 45LPM, FIO2 90%  -Continue cefepime 2g q12h  -Start azithromycin  -Cont bactrim  -Alprazolam 0.125mg PO q6h for anxiety   -Diltiazem 120mg PO QD    #Atrial fibrillation  -Currently rate controlled   -Cont. Cardizem 120mg PO QD  -Cont metoprolol 25mg PO BID    -Cardio consulted; mild tropinemia likely ischemic demand and not ACS  -TT (1/22) revealed normal LV systolic function    #Hypertension  -cont. Diltiazem CD 120mg po daily  -cont. Metoprolol 25mg po bid    #DMII    #Anal melanoma  -Receiving care with Dr. Shearer at [facility] [place]  -Combined IO therapy with ipilimumab + Nivolumab  -s/p 2 cycles with last one on 1/4/21    #DVT PPX  -On Eliquis 5mg BID        74 y/o F PMHx significant for Hypertension, Diabetes mellitus type 2, vertigo, anal melanoma under care of Dr. Shearer ([facility] [place]) on combined IO therapy with ipilimumab + Nivolumab (cycle 2 on 1/4/2021), and recent admission from 1/19-1/25 for new onset Afib (now on DOAC therapy) and recent admission in [facility] for COVID-pneumonia (reportedly treated with MAB, Remdesivir and steroid therapy) presents to  for further evaluation and management of worsening shortness of breath.      #AHRF 2/2 PNA, COVID19/RVP+  -CT chest showing extensive bilateral PNA R>L  -[place] [facility] [place] r/p PCR today positive  -RVP+  -Continue supplemental O2 with HFNC 45LPM, FIO2 90%  -Continue cefepime 2g q12h  -Start azithromycin 500mg Q24H  -Alprazolam 0.125mg PO q6h for anxiety   -Diltiazem 120mg PO QD    #Atrial fibrillation  -Currently rate controlled   -Cont. Cardizem 120mg PO QD  -Cont metoprolol 25mg PO BID    -Cardio consulted; mild tropinemia likely ischemic demand and not ACS  -TTE (1/22) revealed normal LV systolic function    #Hypertension  -cont. Diltiazem CD 120mg po daily  -cont. Metoprolol 25mg po bid    #DMII    #Anal melanoma  -Receiving care with Dr. Shearer at [facility] [place]  -Combined IO therapy with ipilimumab + Nivolumab  -s/p 2 cycles with last one on 1/4/21  -Continue bactrim ppx    #DVT PPX  -On Eliquis 5mg BID        76 y/o F PMHx significant for Hypertension, Diabetes mellitus type 2, vertigo, anal melanoma under care of Dr. Shearer ([facility] [place]) on combined IO therapy with ipilimumab + Nivolumab (cycle 2 on 1/4/2021), and recent admission from 1/19-1/25 for new onset Afib (now on DOAC therapy) and recent admission in [facility] for COVID-pneumonia (reportedly treated with MAB, Remdesivir and steroid therapy) presents to  for further evaluation and management of worsening shortness of breath.      #AHRF 2/2 PNA, COVID19/RVP+   -in the setting of immunocompromised status  -CT chest showing extensive bilateral PNA R>L  -COVID[place] [facility] [place], r/p PCR today POSITIVE  -s/p monoclonal antibodies, remdesivir, and steroid course at [facility] [place]  -RVP+   -Continue supplemental O2 with HFNC 45LPM, FIO2 90%  -Tylenol PRN fevers  -Continue cefepime 2g q12h  -Start azithromycin 500mg Q24H  -Alprazolam 0.125mg PO q6h for anxiety     #Leukocytosis  -CT chest showing multifocal pna  -WBC 17.44, Tmax 99.4 this morning  -ID following  -f/u Blood/Urine Cx  -f/u sputum culture  -Continue cefepime and azithromycin  -f/u AM CBC    #Paroxysmal Atrial fibrillation  -Currently rate controlled   -Cont. Cardizem 120mg PO QD  -Cont metoprolol 25mg PO BID    -Cardio consulted; mild tropinemia likely ischemic demand and not ACS  -On Eliquis 5mg BID for AC  -TTE (1/22) revealed normal LV systolic function    #Anemia  -H/H 8.4/27.8  -Monitor CBC    #CKD 3  -Cr 1.34  -Trend renal function     #DMII  -Lantus 15Units QD  -ISS  -Monitor BGM's    #Anal melanoma  -Receiving care with Dr. Shearer at [facility] [place]  -Combined IO therapy with ipilimumab + Nivolumab  -s/p 2 cycles with last one on 1/4/21  -Continue bactrim ppx    #DVT PPX  -On Eliquis 5mg BID        74 y/o F PMHx significant for Hypertension, Diabetes mellitus type 2, vertigo, anal melanoma under care of Dr. Shearer ([facility] [place]) on combined IO therapy with ipilimumab + Nivolumab (cycle 2 on 1/4/2021), and recent admission from 1/19-1/25 for new onset Afib (now on DOAC therapy) and recent admission in [facility] for COVID-pneumonia (reportedly treated with MAB, Remdesivir and steroid therapy) presents to  for further evaluation and management of worsening shortness of breath.      #AHRF 2/2 PNA, COVID19/RVP+   -in the setting of immunocompromised status  -CT chest showing extensive bilateral PNA R>L  -[place] [facility] [place] r/p PCR today POSITIVE  -s/p monoclonal antibodies, [place], [place] [facility] [place]  -RVP+   -Continue supplemental O2 with HFNC 45LPM, FIO2 90%  -Tylenol PRN fevers  -Continue cefepime 2g q12h  -Start azithromycin 500mg Q24H  -Alprazolam 0.125mg PO q6h for anxiety     #Leukocytosis  -CT chest showing multifocal pna  -WBC 17.44, Tmax 99.4 this morning  -ID following  -f/u Blood/Urine Cx  -f/u sputum culture  -Continue cefepime and azithromycin  -f/u AM CBC    #Paroxysmal Atrial fibrillation  -Currently rate controlled   -Cont. Cardizem 120mg PO QD  -Cont metoprolol 25mg PO BID    -Cardio consulted; mild tropinemia likely ischemic demand and not ACS  -On Eliquis 5mg BID for AC  -TTE (1/22) revealed normal LV systolic function    #Anal melanoma  -Receiving care with Dr. Shearer at [facility] [place] [facility]  -Combined IO therapy with ipilimumab + Nivolumab  -s/p 2 cycles with last one on 1/4/21  -Continue bactrim ppx    #Anemia  -H/H 8.4/27.8  -H/o Fe deficiency anemia   -VSS  -Monitor CBC    #JASPREET on CKD 3  -Cr 1.34, Baseline approx 1.1  -Encourage PO intake  -Trend renal function     #DMII  -Lantus 15Units QD  -ISS  -Monitor BGM's    #Hypothyroidism  -Continue synthroid 75mcg    #DVT PPX  -On Eliquis 5mg BID

## 2021-02-11 NOTE — ED ADULT NURSE REASSESSMENT NOTE - NS ED NURSE REASSESS COMMENT FT1
ICU consult completed, pt to be admitted to ICU for further management.  MD Perrin aware, Charge RN Theodora monique aware.  report given to ICU nurse and respiratory at bedside for transport.  pt informed on the admission and reasoning, pt is agreeable and verbally stated understanding the situation.  ABG to be drawn stat, pt will remain on High flow O2 w/ NRB.  transported to ICU @0330

## 2021-02-11 NOTE — CONSULT NOTE ADULT - SUBJECTIVE AND OBJECTIVE BOX
CHIEF COMPLAINT: Patient is a 75y old  Female who presents with a chief complaint of Shortness of Breath (11 Feb 2021 10:00)    HPI:  76 y/o F PMHx significant for Hypertension, Diabetes mellitus type 2, vertigo, anal melanoma [place] [place] with ipilimumab + Nivolumab (cycle 2 on 1/4/2021), and recent admission from 1/19-1/25 for new onset Afib (now on DOAC therapy) and recent admission in [facility] for COVID-pneumonia (reportedly treated with MAB, Remdesivir and steroid therapy) presents to  for further evaluation and management of shortness of breath - persistent x 1-2 weeks but acutely worse 1-2 days prior to admission; exacerbated with exertion; no clear identifiable alleviating factors; no associated angina  In triage the patient was hypoxic; CT chest revealed extensive bilateral pneumonias and she was admitted to the ICU with acute hypoxemic respiratory failure.  There is no past history of ischemic heart disease; no clear anginal symptoms.    PAST MEDICAL & SURGICAL HISTORY:  Primary malignant melanoma of anal canal  Afib  DM (diabetes mellitus) Type 2  HTN (hypertension)  Vertigo  No significant past surgical history    SOCIAL HISTORY:   Alcohol: Denied  Smoking: Nonsmoker    FAMILY HISTORY: No pertinent family history in first degree relatives. No known premature heart disease    MEDICATIONS  (STANDING):  apixaban 5 milliGRAM(s) Oral every 12 hours  atorvastatin 40 milliGRAM(s) Oral at bedtime  cefepime   IVPB 2000 milliGRAM(s) IV Intermittent every 12 hours  dexAMETHasone  Injectable 6 milliGRAM(s) IV Push daily  diltiazem    milliGRAM(s) Oral daily  glucagon  Injectable 1 milliGRAM(s) IntraMuscular once  insulin glargine Injectable (LANTUS) 15 Unit(s) SubCutaneous at bedtime  insulin lispro (ADMELOG) corrective regimen sliding scale   SubCutaneous three times a day before meals  insulin lispro (ADMELOG) corrective regimen sliding scale   SubCutaneous at bedtime  levothyroxine 75 MICROGram(s) Oral daily  metoprolol tartrate 25 milliGRAM(s) Oral two times a day  pantoprazole    Tablet 40 milliGRAM(s) Oral before breakfast  trimethoprim  160 mG/sulfamethoxazole 800 mG 1 Tablet(s) Oral <User Schedule>    MEDICATIONS  (PRN):  acetaminophen   Tablet .. 650 milliGRAM(s) Oral every 4 hours PRN Temp greater or equal to 38.5C (101.3F)  ALBUTerol    90 MICROgram(s) HFA Inhaler 2 Puff(s) Inhalation every 4 hours PRN Shortness of Breath and/or Wheezing  ALPRAZolam 0.125 milliGRAM(s) Oral every 6 hours PRN anxiety  benzonatate 100 milliGRAM(s) Oral every 8 hours PRN Cough  loperamide 2 milliGRAM(s) Oral every 4 hours PRN Diarrhea  morphine  - Injectable 2 milliGRAM(s) IV Push every 4 hours PRN dyspnea  ondansetron Injectable 4 milliGRAM(s) IV Push every 6 hours PRN Nausea and/or Vomiting    Allergies:  barbiturates (Unknown)  Cipro (Other; Swelling)  iodine (Unknown)    REVIEW OF SYSTEMS:  CONSTITUTIONAL: + fatigue  Eyes: No visual changes  NECK: No pain or stiffness  RESPIRATORY: +  shortness of breath  CARDIOVASCULAR: No chest pain  GASTROINTESTINAL: No abdominal pain.   GENITOURINARY: No dysuria, frequency or hematuria  NEUROLOGICAL: No numbness.  SKIN: No itching or rash  All other review of systems is negative unless indicated above    VITAL SIGNS:   Vital Signs Last 24 Hrs  T(C): 37.4 (11 Feb 2021 03:30), Max: 37.4 (11 Feb 2021 03:30)  T(F): 99.4 (11 Feb 2021 03:30), Max: 99.4 (11 Feb 2021 03:30)  HR: 70 (11 Feb 2021 07:00) (68 - 98)  BP: 114/42 (11 Feb 2021 07:00) (106/47 - 124/37)  BP(mean): 60 (11 Feb 2021 07:00) (56 - 85)  RR: 33 (11 Feb 2021 07:00) (16 - 33)  SpO2: 94% (11 Feb 2021 07:00) (80% - 100%)    PHYSICAL EXAM:  Constitutional: NAD, awake and alert, semirecumbent in ICU  HEENT:  High flow supplemental O2 via nasal delivery system  Pulmonary: Non-labored but tachypneic, scattered rhonchi  Cardiovascular: S1 and S2, regular rate and rhythm  Gastrointestinal: Bowel Sounds present, soft, nontender.   Lymph: No peripheral edema. No cervical lymphadenopathy.  Neurological: Alert, no focal deficits  Skin: No rashes.  Psych:  Mood & affect appropriate    LABS:                     8.4    17.44 )-----------( 344      ( 11 Feb 2021 06:45 )             27.8              138    |  107    |  32     ----------------------------<  173    4.4     |  24     |  1.34     CARDIAC MARKERS ( 10 Feb 2021 23:37 ) 0.095 ng/mL / x     / x     / x     / x      CARDIAC MARKERS ( 10 Feb 2021 20:37 ) 0.098 ng/mL / x     / x     / x     / x      CARDIAC MARKERS ( 10 Feb 2021 14:01 ) 0.070 ng/mL / x     / x     / x     / x      Tele: SR    ECG (2/10): NSR

## 2021-02-11 NOTE — DIETITIAN INITIAL EVALUATION ADULT. - PERTINENT MEDS FT
MEDICATIONS  (STANDING):  apixaban 5 milliGRAM(s) Oral every 12 hours  atorvastatin 40 milliGRAM(s) Oral at bedtime  azithromycin  IVPB      azithromycin  IVPB 500 milliGRAM(s) IV Intermittent once  cefepime   IVPB 2000 milliGRAM(s) IV Intermittent every 12 hours  cefepime   IVPB      dexAMETHasone  Injectable 6 milliGRAM(s) IV Push daily  dextrose 40% Gel 15 Gram(s) Oral once  dextrose 5%. 1000 milliLiter(s) (50 mL/Hr) IV Continuous <Continuous>  dextrose 5%. 1000 milliLiter(s) (100 mL/Hr) IV Continuous <Continuous>  dextrose 50% Injectable 25 Gram(s) IV Push once  dextrose 50% Injectable 12.5 Gram(s) IV Push once  dextrose 50% Injectable 25 Gram(s) IV Push once  diltiazem    milliGRAM(s) Oral daily  glucagon  Injectable 1 milliGRAM(s) IntraMuscular once  insulin glargine Injectable (LANTUS) 15 Unit(s) SubCutaneous at bedtime  insulin lispro (ADMELOG) corrective regimen sliding scale   SubCutaneous three times a day before meals  insulin lispro (ADMELOG) corrective regimen sliding scale   SubCutaneous at bedtime  levothyroxine 75 MICROGram(s) Oral daily  metoprolol tartrate 25 milliGRAM(s) Oral two times a day  pantoprazole    Tablet 40 milliGRAM(s) Oral before breakfast  trimethoprim  160 mG/sulfamethoxazole 800 mG 1 Tablet(s) Oral <User Schedule>    MEDICATIONS  (PRN):  acetaminophen   Tablet .. 650 milliGRAM(s) Oral every 4 hours PRN Temp greater or equal to 38.5C (101.3F)  ALBUTerol    90 MICROgram(s) HFA Inhaler 2 Puff(s) Inhalation every 4 hours PRN Shortness of Breath and/or Wheezing  ALPRAZolam 0.125 milliGRAM(s) Oral every 6 hours PRN anxiety  loperamide 2 milliGRAM(s) Oral every 4 hours PRN Diarrhea  ondansetron Injectable 4 milliGRAM(s) IV Push every 6 hours PRN Nausea and/or Vomiting

## 2021-02-11 NOTE — ED ADULT NURSE REASSESSMENT NOTE - NS ED NURSE REASSESS COMMENT FT1
Pt satting 83-85% on highflow. Respiratory was able to get patient to 97% with NRB on top of high flow, but patient unable to tolerate it due to frequent coughing. Dr. Perrin called and made aware-stat blood gas order and being drawn by respiratory and ICU consult to be ordered. Charge nurse made aware (Theodora) and pt to be moved back to main.

## 2021-02-11 NOTE — CHART NOTE - NSCHARTNOTEFT_GEN_A_CORE
Pt brought to ICU room 75 from ED on HFNC 45/100%. Pt's saturation was consistently in the low 80's despite the addition of a non rebreather and tachypneic. Placed on bipap at approximately 0430 on charted settings. Respiratory status improved. SpO2 now in the low 90's on 90% and pt appears more comfortable at this time. ABG drawn and sent to lab. Will continue to monitor.     Tonio Burr RRT

## 2021-02-11 NOTE — CONSULT NOTE ADULT - SUBJECTIVE AND OBJECTIVE BOX
Patient is a 75y old  Female who presents with a chief complaint of Shortness of Breath    HPI:  74 y/o Female with h/o Hypertension, Diabetes mellitus type 2, vertigo, anal melanoma under care of Dr. Shearer -[facility] [place] on combined IO therapy with ipilimumab + Nivolumab (cycle 2 on 2021), recent admission from - for new onset Afib (now on DOAC therapy), recent admission in [facility] for COVID-pneumonia (reportedly treated with MAB, Remdesivir and steroid therapy) was admitted on 2/10 for worsening shortness of breath. In ER the patient was hypoxic with SpO2 => 80%. Also noted with new bilateral airspace opacities right greater than left. She received cefepime.       PMH: as above  PSH: as above  Meds: per reconciliation sheet, noted below  MEDICATIONS  (STANDING):  apixaban 5 milliGRAM(s) Oral every 12 hours  atorvastatin 40 milliGRAM(s) Oral at bedtime  cefepime   IVPB 2000 milliGRAM(s) IV Intermittent every 12 hours  cefepime   IVPB      dexAMETHasone  Injectable 6 milliGRAM(s) IV Push daily  dextrose 40% Gel 15 Gram(s) Oral once  dextrose 5%. 1000 milliLiter(s) (50 mL/Hr) IV Continuous <Continuous>  dextrose 5%. 1000 milliLiter(s) (100 mL/Hr) IV Continuous <Continuous>  dextrose 50% Injectable 25 Gram(s) IV Push once  dextrose 50% Injectable 12.5 Gram(s) IV Push once  dextrose 50% Injectable 25 Gram(s) IV Push once  diltiazem    milliGRAM(s) Oral daily  glucagon  Injectable 1 milliGRAM(s) IntraMuscular once  insulin glargine Injectable (LANTUS) 15 Unit(s) SubCutaneous at bedtime  insulin lispro (ADMELOG) corrective regimen sliding scale   SubCutaneous three times a day before meals  insulin lispro (ADMELOG) corrective regimen sliding scale   SubCutaneous at bedtime  levothyroxine 75 MICROGram(s) Oral daily  metoprolol tartrate 25 milliGRAM(s) Oral two times a day  pantoprazole    Tablet 40 milliGRAM(s) Oral before breakfast  trimethoprim  160 mG/sulfamethoxazole 800 mG 1 Tablet(s) Oral <User Schedule>    MEDICATIONS  (PRN):  acetaminophen   Tablet .. 650 milliGRAM(s) Oral every 4 hours PRN Temp greater or equal to 38.5C (101.3F)  ALBUTerol    90 MICROgram(s) HFA Inhaler 2 Puff(s) Inhalation every 4 hours PRN Shortness of Breath and/or Wheezing  ALPRAZolam 0.125 milliGRAM(s) Oral every 6 hours PRN anxiety  guaifenesin/dextromethorphan  Syrup 10 milliLiter(s) Oral every 4 hours PRN Cough  loperamide 2 milliGRAM(s) Oral every 4 hours PRN Diarrhea  morphine  - Injectable 2 milliGRAM(s) IV Push every 4 hours PRN dyspnea  ondansetron Injectable 4 milliGRAM(s) IV Push every 6 hours PRN Nausea and/or Vomiting    Allergies    barbiturates (Unknown)  Cipro (Other; Swelling)  iodine (Unknown)    Intolerances      Social: no smoking, no alcohol, no illegal drugs; no recent travel, no exposure to TB  FAMILY HISTORY:  No pertinent family history in first degree relatives      no history of premature cardiovascular disease in first degree relatives    ROS: the patient denies fever, no chills, no HA, no seizures, no dizziness, no sore throat, no nasal congestion, no blurry vision, no CP, no palpitations, has SOB, has cough, no abdominal pain, no diarrhea, no N/V, no dysuria, no leg pain, no claudication, no rash, no joint aches, no rectal pain or bleeding, no night sweats; has increased weakness  All other systems reviewed and are negative    Vital Signs Last 24 Hrs  T(C): 37.4 (2021 03:30), Max: 37.4 (2021 03:30)  T(F): 99.4 (2021 03:30), Max: 99.4 (2021 03:30)  HR: 70 (2021 07:00) (68 - 98)  BP: 114/42 (2021 07:00) (106/47 - 124/37)  BP(mean): 60 (2021 07:00) (56 - 85)  RR: 33 (2021 07:00) (16 - 33)  SpO2: 94% (2021 07:00) (80% - 100%)  Daily Height in cm: 160.02 (10 Feb 2021 13:45)    Daily Weight in k.5 (2021 03:30)    PE:    Constitutional:  No acute distress  HEENT: NC/AT, EOMI, PERRLA, conjunctivae clear; ears and nose atraumatic; pharynx benign  Neck: supple; thyroid not palpable  Back: no tenderness  Respiratory: respiratory effort normal; crackles at bases  Cardiovascular: S1S2 regular, no murmurs  Abdomen: soft, not tender, not distended, positive BS; no liver or spleen organomegaly  Genitourinary: no suprapubic tenderness  Lymphatic: no LN palpable  Musculoskeletal: no muscle tenderness, no joint swelling or tenderness  Extremities: no pedal edema  Neurological/ Psychiatric: AxOx3, judgement and insight normal; moving all extremities  Skin: no rashes; no palpable lesions    Labs: all available labs reviewed                        8.4    17.44 )-----------( 344      ( 2021 06:45 )             27.8     02-    138  |  107  |  32<H>  ----------------------------<  173<H>  4.4   |  24  |  1.34<H>    Ca    8.2<L>      2021 06:45  Phos  3.5       Mg     2.3         TPro  5.6<L>  /  Alb  1.7<L>  /  TBili  0.3  /  DBili  x   /  AST  48<H>  /  ALT  54  /  AlkPhos  101  02-11     LIVER FUNCTIONS - ( 2021 06:45 )  Alb: 1.7 g/dL / Pro: 5.6 gm/dL / ALK PHOS: 101 U/L / ALT: 54 U/L / AST: 48 U/L / GGT: x           COVID-19 PCR: NotDetec: (21 @ 01:24)     Radiology: all available radiological tests reviewed    < from: CT Chest No Cont (02.10.21 @ 19:47) >  Extensive bilateral pneumonias right greater than left.    < end of copied text >    Advanced directives addressed: full resuscitation Patient is a 75y old  Female who presents with a chief complaint of Shortness of Breath    HPI:  74 y/o Female with h/o Hypertension, Diabetes mellitus type 2, vertigo, anal melanoma [place] [place] with ipilimumab + Nivolumab (cycle 2 on 2021), recent admission from - for new onset Afib (now on DOAC therapy), recent admission in [facility] for COVID-pneumonia (reportedly treated with MAB, Remdesivir and steroid therapy) was admitted on 2/10 for worsening shortness of breath. In ER the patient was hypoxic with SpO2 => 80%. Also noted with new bilateral airspace opacities right greater than left. She received cefepime.       PMH: as above  PSH: as above  Meds: per reconciliation sheet, noted below  MEDICATIONS  (STANDING):  apixaban 5 milliGRAM(s) Oral every 12 hours  atorvastatin 40 milliGRAM(s) Oral at bedtime  cefepime   IVPB 2000 milliGRAM(s) IV Intermittent every 12 hours  cefepime   IVPB      dexAMETHasone  Injectable 6 milliGRAM(s) IV Push daily  dextrose 40% Gel 15 Gram(s) Oral once  dextrose 5%. 1000 milliLiter(s) (50 mL/Hr) IV Continuous <Continuous>  dextrose 5%. 1000 milliLiter(s) (100 mL/Hr) IV Continuous <Continuous>  dextrose 50% Injectable 25 Gram(s) IV Push once  dextrose 50% Injectable 12.5 Gram(s) IV Push once  dextrose 50% Injectable 25 Gram(s) IV Push once  diltiazem    milliGRAM(s) Oral daily  glucagon  Injectable 1 milliGRAM(s) IntraMuscular once  insulin glargine Injectable (LANTUS) 15 Unit(s) SubCutaneous at bedtime  insulin lispro (ADMELOG) corrective regimen sliding scale   SubCutaneous three times a day before meals  insulin lispro (ADMELOG) corrective regimen sliding scale   SubCutaneous at bedtime  levothyroxine 75 MICROGram(s) Oral daily  metoprolol tartrate 25 milliGRAM(s) Oral two times a day  pantoprazole    Tablet 40 milliGRAM(s) Oral before breakfast  trimethoprim  160 mG/sulfamethoxazole 800 mG 1 Tablet(s) Oral <User Schedule>    MEDICATIONS  (PRN):  acetaminophen   Tablet .. 650 milliGRAM(s) Oral every 4 hours PRN Temp greater or equal to 38.5C (101.3F)  ALBUTerol    90 MICROgram(s) HFA Inhaler 2 Puff(s) Inhalation every 4 hours PRN Shortness of Breath and/or Wheezing  ALPRAZolam 0.125 milliGRAM(s) Oral every 6 hours PRN anxiety  guaifenesin/dextromethorphan  Syrup 10 milliLiter(s) Oral every 4 hours PRN Cough  loperamide 2 milliGRAM(s) Oral every 4 hours PRN Diarrhea  morphine  - Injectable 2 milliGRAM(s) IV Push every 4 hours PRN dyspnea  ondansetron Injectable 4 milliGRAM(s) IV Push every 6 hours PRN Nausea and/or Vomiting    Allergies    barbiturates (Unknown)  Cipro (Other; Swelling)  iodine (Unknown)    Intolerances      Social: no smoking, no alcohol, no illegal drugs; no recent travel, no exposure to TB  FAMILY HISTORY:  No pertinent family history in first degree relatives      no history of premature cardiovascular disease in first degree relatives    ROS: the patient denies fever, no chills, no HA, no seizures, no dizziness, no sore throat, no nasal congestion, no blurry vision, no CP, no palpitations, has SOB, has cough, no abdominal pain, no diarrhea, no N/V, no dysuria, no leg pain, no claudication, no rash, no joint aches, no rectal pain or bleeding, no night sweats; has increased weakness  All other systems reviewed and are negative    Vital Signs Last 24 Hrs  T(C): 37.4 (2021 03:30), Max: 37.4 (2021 03:30)  T(F): 99.4 (2021 03:30), Max: 99.4 (2021 03:30)  HR: 70 (2021 07:00) (68 - 98)  BP: 114/42 (2021 07:00) (106/47 - 124/37)  BP(mean): 60 (2021 07:00) (56 - 85)  RR: 33 (2021 07:00) (16 - 33)  SpO2: 94% (2021 07:00) (80% - 100%)  Daily Height in cm: 160.02 (10 Feb 2021 13:45)    Daily Weight in k.5 (2021 03:30)    PE:    Constitutional:  No acute distress  HEENT: NC/AT, EOMI, PERRLA, conjunctivae clear; ears and nose atraumatic; pharynx benign  Neck: supple; thyroid not palpable  Back: no tenderness  Respiratory: respiratory effort normal; crackles b/l  Cardiovascular: S1S2 regular, no murmurs  Abdomen: soft, not tender, not distended, positive BS; no liver or spleen organomegaly  Genitourinary: no suprapubic tenderness  Lymphatic: no LN palpable  Musculoskeletal: no muscle tenderness, no joint swelling or tenderness  Extremities: no pedal edema  Neurological/ Psychiatric: AxOx3, judgement and insight normal; moving all extremities  Skin: no rashes; no palpable lesions    Labs: all available labs reviewed                        8.4    17.44 )-----------( 344      ( 2021 06:45 )             27.8     02-    138  |  107  |  32<H>  ----------------------------<  173<H>  4.4   |  24  |  1.34<H>    Ca    8.2<L>      2021 06:45  Phos  3.5       Mg     2.3         TPro  5.6<L>  /  Alb  1.7<L>  /  TBili  0.3  /  DBili  x   /  AST  48<H>  /  ALT  54  /  AlkPhos  101  02-11     LIVER FUNCTIONS - ( 2021 06:45 )  Alb: 1.7 g/dL / Pro: 5.6 gm/dL / ALK PHOS: 101 U/L / ALT: 54 U/L / AST: 48 U/L / GGT: x           COVID-19 PCR: NotDetec: (21 @ 01:24)     Radiology: all available radiological tests reviewed    < from: CT Chest No Cont (02.10.21 @ 19:47) >  Extensive bilateral pneumonias right greater than left.    < end of copied text >    Advanced directives addressed: full resuscitation

## 2021-02-11 NOTE — CONSULT NOTE ADULT - PROBLEM SELECTOR RECOMMENDATION 3
Acuter respiratory failure due to multifocal pneumonia in setting of immunocompromised state; hemodynamically stable; receiving cefepime, steroids.

## 2021-02-12 NOTE — PROGRESS NOTE ADULT - ASSESSMENT
74 y/o F PMHx significant for Hypertension, Diabetes mellitus type 2, vertigo, anal melanoma under care of Dr. Shearer ([facility] [place]) on combined IO therapy with ipilimumab + Nivolumab (cycle 2 on 1/4/2021), and recent admission from 1/19-1/25 for new onset Afib (now on DOAC therapy) and recent admission in [facility] for COVID-pneumonia (reportedly treated with MAB, Remdesivir and steroid therapy) presents to  for further evaluation and management of worsening shortness of breath.    #AHRF 2/2 PNA, COVID19   -in the setting of immunocompromised status  -CT chest showing extensive bilateral PNA R>L  -COVID+ diagnosed at [facility] [place] on 1/26, r/p PCR today POSITIVE  -s/p monoclonal antibodies, remdesivir, and steroid course at [facility] [place]  -Continue supplemental O2 with HFNC 40LPM, FIO2 100%, Bipap as needed  -Tylenol PRN fevers  -f/u RVP  -Continue cefepime 2g q12h  -azithromycin 500mg Q24H  -Alprazolam 0.125mg PO q6h for anxiety   -CXR in the AM    #Leukocytosis  -CT chest showing multifocal pna  -WBC 17.44>>13.37, Tmax 99.9 this yesterday evening  -ID following  -Bld Cx NGTD  -f/u Urine Cx  -f/u sputum culture  -Continue cefepime and azithromycin  -f/u AM CBC    #Hypotension  -BP low,     #Paroxysmal Atrial fibrillation  -Currently rate controlled   -Cont. Cardizem 120mg PO QD   -Cardio consulted; mild tropinemia likely ischemic demand and not ACS  -On Eliquis 5mg BID for AC  -TTE (1/22) revealed normal LV systolic function    #Anal melanoma  -Receiving care with Dr. Shearer at [facility] [place]  -Combined IO therapy with ipilimumab + Nivolumab  -s/p 2 cycles with last one on 1/4/21  -Continue bactrim ppx    #Anemia  -H/H 8.4/27.8>>8.1/25.7  -H/o Fe deficiency anemia   -VSS  -Monitor CBC    #JASPREET on CKD 3  -Improving  -Cr 1.34>>1.19, Baseline ~ 1.1  -Encourage PO intake  -Trend renal function     #DMII  -Lantus 10Units QD  -ISS  -Monitor BGM's    #Hypothyroidism  -Continue synthroid 75mcg    #DVT PPX  -On Eliquis 5mg BID        74 y/o F PMHx significant for Hypertension, Diabetes mellitus type 2, vertigo, anal melanoma under care of Dr. Shearer ([facility] [place]) on combined IO therapy with ipilimumab + Nivolumab (cycle 2 on 1/4/2021), and recent admission from 1/19-1/25 for new onset Afib (now on DOAC therapy) and recent admission in [facility] for COVID-pneumonia (reportedly treated with MAB, Remdesivir and steroid therapy) presents to  for further evaluation and management of worsening shortness of breath.    #AHRF 2/2 PNA, COVID19   -in the setting of immunocompromised status  -CT chest showing extensive bilateral PNA R>L  -COVID[place] [facility] [place], r/p PCR today POSITIVE  -s/p monoclonal antibodies, remdesivir, and steroid course at [facility] [place]  -Continue supplemental O2 with HFNC 40LPM, FIO2 100%, Bipap as needed  -Tylenol PRN fevers  -f/u RVP  -Continue cefepime 2g q12h  -azithromycin 500mg Q24H  -Alprazolam 0.125mg PO q6h for anxiety   -CXR in the AM    #Leukocytosis  -CT chest showing multifocal pna  -WBC 17.44>>13.37, Tmax 99.9 this yesterday evening  -ID following  -Bld Cx NGTD  -f/u Urine Cx  -f/u sputum culture  -Continue cefepime and azithromycin  -f/u AM CBC    #Hypotension  -BP low, 103/33 this morning  -D/c metoprolol, continue cardizem  -Closely monitor VS, Goal MAP>65    #Paroxysmal Atrial fibrillation  -Currently rate controlled   -Cont. Cardizem 120mg PO QD   -Cardio consulted; mild tropinemia likely ischemic demand and not ACS  -On Eliquis 5mg BID for AC  -TTE (1/22) revealed normal LV systolic function    #Anal melanoma  -Receiving care with Dr. Shearer at [facility] [place]  -Combined IO therapy with ipilimumab + Nivolumab  -s/p 2 cycles with last one on 1/4/21  -Continue bactrim ppx    #Anemia  -H/H 8.4/27.8>>8.1/25.7  -H/o Fe deficiency anemia   -VSS  -Monitor CBC    #JASPREET on CKD 3  -Improving  -Cr 1.34>>1.19, Baseline ~ 1.1  -Encourage PO intake  -Trend renal function     #DMII  -Lantus 10Units QD  -ISS  -Monitor BGM's    #Hypothyroidism  -Continue synthroid 75mcg    #DVT PPX  -On Eliquis 5mg BID     Case d/w Dr. Prado   74 y/o F PMHx significant for Hypertension, Diabetes mellitus type 2, vertigo, anal melanoma under care of Dr. Shearer ([facility] [place]) on combined IO therapy with ipilimumab + Nivolumab (cycle 2 on 1/4/2021), and recent admission from 1/19-1/25 for new onset Afib (now on DOAC therapy) and recent admission in [facility] for COVID-pneumonia (reportedly treated with MAB, Remdesivir and steroid therapy) presents to  for further evaluation and management of worsening shortness of breath.    #AHRF 2/2 PNA, COVID19   -in the setting of immunocompromised status  -CT chest showing extensive bilateral PNA R>L  -COVID+ diagnosed at [facility] [place] on 1/26, r/p PCR today POSITIVE  -s/p monoclonal antibodies, remdesivir, and steroid course at [facility] [place]  -Continue supplemental O2 with HFNC 40LPM, FIO2 100%, Bipap as needed  -Tylenol PRN fevers  -f/u RVP  -Continue cefepime 2g q12h  -azithromycin 500mg Q24H  -Alprazolam 0.125mg PO q6h for anxiety   -CXR in the AM    #STEMI  #CAD, s/p GIFTY to RCA   -Pt experienced acute episode of CP this afternoon with ST elevation noted in inferior leads  -s/p Asa 325 STAT, Plavix 75mg PO x1 prior to cath  -EKG reviewed with cardiology, cath   -IV hydration 1/2 NS @ 50mL/hr   -Received plavix load 600mg post-cath  -Will cont w/ Plavix 75mg daily, Eliquis 5mg BID- resume at 10pm tonight   -Lipitor increased 80mg PO qhs    -Post cath instructions reviewed, patient verbalizes and understands instructions  -f/u EKG in AM, AM Labs          #Leukocytosis  -CT chest showing multifocal PNA  -WBC 17.44>>13.37, Tmax 99.9 this yesterday evening  -ID following  -Bld Cx NGTD  -f/u Urine Cx  -f/u sputum culture  -Continue cefepime and azithromycin  -f/u AM CBC    #Paroxysmal Atrial fibrillation  -Currently rate controlled   -Cont. Cardizem 120mg PO QD   -Cardio consult noted  -On Eliquis 5mg BID for AC, next dose at 10pm s/p cath  -TTE (1/22) revealed normal LV systolic function    #Anal melanoma  -Receiving care with Dr. Shearer at [facility] [place]  -Combined IO therapy with ipilimumab + Nivolumab  -s/p 2 cycles with last one on 1/4/21  -Continue bactrim ppx    #Anemia  -H/H 8.4/27.8>>8.1/25.7  -H/o Fe deficiency anemia   -VSS  -Monitor CBC    #JASPREET on CKD 3  -Improving  -Cr 1.34>>1.19, Baseline ~ 1.1  -Encourage PO intake  -Trend renal function     #DMII  -Lantus 10Units QD  -ISS  -Monitor BGM's    #Hypothyroidism  -Continue synthroid 75mcg    #DVT PPX  -On Eliquis 5mg BID     Case d/w Dr. Prado   74 y/o F PMHx significant for Hypertension, Diabetes mellitus type 2, vertigo, anal melanoma under care of Dr. Shearer ([facility] [place] combined IO therapy with ipilimumab + Nivolumab (cycle 2 on 1/4/2021), and recent admission from 1/19-1/25 for new onset Afib (now on DOAC therapy) and recent admission in [facility] for COVID-pneumonia (reportedly treated with MAB, Remdesivir and steroid therapy) presents to  for further evaluation and management of worsening shortness of breath.    #AHRF 2/2 PNA, COVID19   -in the setting of immunocompromised status  -CT chest showing extensive bilateral PNA R>L  -[place] [facility] [place], r/p PCR today POSITIVE  -s/p monoclonal antibodies, remdesivir, and [place] [facility] [place]  -Continue supplemental O2 with HFNC 40LPM, FIO2 100%, Bipap as needed  -Tylenol PRN fevers  -f/u RVP  -Continue cefepime 2g q12h  -azithromycin 500mg Q24H  -Alprazolam 0.125mg PO q6h for anxiety   -CXR in the AM    #STEMI  #CAD, s/p GIFTY to RCA   -Pt experienced acute episode of CP this afternoon with ST elevation noted in inferior leads  -s/p Asa 325 STAT, Plavix 75mg PO x1 prior to cath  -STAT trop elevated at 0.508  -EKG reviewed with cardiology, cath performed showing complete stenosis of pRCA, GIFTY x1  -IV hydration 1/2 NS @ 50mL/hr   -Received plavix load 600mg post-cath  -Will cont w/ Plavix 75mg daily, Eliquis 5mg BID- resume at 10pm tonight   -Lipitor increased 80mg PO qhs  -Start lopressor 2.5mg IV q6h w/ parameters  -Post cath instructions reviewed, patient verbalizes and understands instructions, band removal 2 hours post-procedure  -Trend troponins post cath  -f/u EKG in AM, AM Labs    #Leukocytosis  -CT chest showing multifocal PNA  -WBC 17.44>>13.37, Tmax 99.9 this yesterday evening  -ID following  -Bld Cx NGTD  -f/u Urine Cx  -f/u sputum culture  -Continue cefepime and azithromycin  -f/u AM CBC    #Paroxysmal Atrial fibrillation  -Currently rate controlled   -Cont. Cardizem 120mg PO QD   -Cardio consult noted  -On Eliquis 5mg BID for AC, next dose at 10pm s/p cath  -TTE (1/22) revealed normal LV systolic function    #Anal melanoma  -Receiving care with Dr. Shearer at [facility] [place]  -Combined IO therapy with ipilimumab + Nivolumab  -s/p 2 cycles with last one on 1/4/21  -Continue bactrim ppx    #Anemia  -H/H 8.4/27.8>>8.1/25.7  -H/o Fe deficiency anemia   -VSS  -Monitor CBC    #JASPREET on CKD 3  -Improving  -Cr 1.34>>1.19, Baseline ~ 1.1  -Encourage PO intake  -Trend renal function     #DMII  -Lantus 10Units QD  -ISS  -Monitor BGM's    #Hypothyroidism  -Continue synthroid 75mcg    #DVT PPX  -On Eliquis 5mg BID     Case d/w Dr. Prado

## 2021-02-12 NOTE — CHART NOTE - NSCHARTNOTEFT_GEN_A_CORE
Patient is a 75y old  Female who presents with a chief complaint of Shortness of Breath admitted for COVID. c/o CP today, EKG with inferior leads ST elevations. s/p LHC with GIFTY to RCA  RRA hemoband removed , direct pressure applied. Hemostasis achieved. Site is without hematoma or bleeding, surrounding area soft.  Sensation and ROM intact. Distal pulses palpable, 2+ capillary refill  < 2secs. Patient denies pain, numbness, tingling, CP or SOB . Clean dry dressing applied. Patient tolerated without any complications

## 2021-02-12 NOTE — PROGRESS NOTE ADULT - SUBJECTIVE AND OBJECTIVE BOX
HPI:     76 y/o F PMHx significant for Hypertension, Diabetes mellitus type 2, vertigo, anal melanoma under care of Dr. Shearer ([facility] [place] combined IO therapy with ipilimumab + Nivolumab (cycle 2 on 1/4/2021), and recent admission from 1/19-1/25 for new onset Afib (now on DOAC therapy) and recent admission in [facility] for COVID-pneumonia (reportedly treated with MAB, Remdesivir and steroid therapy) presents to  for further evaluation and management of worsening shortness of breath.    Pt started to desaturate into 80's  HFNC and [id number]% - more tachypneic. pt seemingly very anxious . States "shes scared." Will admit pt to the ICU for acute hypoxic respiratory, COVID PNA and ? superimposed PNA     2/11: Patient seen in bed, on HF NC, still with a nonproductive cough  Primary malignant melanoma of anal canal  2/12: Patient seen in bed and OOB to the chair this afternoon.  This afternoon she developed SSCP radiating to her back.  EKG revealed an STEMI    Afib    DM (diabetes mellitus)  Type 2    HTN (hypertension)    Vertigo    No significant past surgical history        FAMILY HISTORY:  No pertinent family history in first degree relatives        Social Hx:    Allergies    barbiturates (Unknown)  Cipro (Other; Swelling)  iodine (Unknown)    Intolerances            ICU Vital Signs Last 24 Hrs  T(C): 37.1 (12 Feb 2021 09:00), Max: 37.7 (11 Feb 2021 17:00)  T(F): 98.7 (12 Feb 2021 09:00), Max: 99.9 (11 Feb 2021 17:00)  HR: 85 (12 Feb 2021 14:00) (51 - 85)  BP: 126/35 (12 Feb 2021 10:00) (95/37 - 134/101)  BP(mean): 58 (12 Feb 2021 10:00) (50 - 109)  ABP: --  ABP(mean): --  RR: 28 (12 Feb 2021 14:00) (21 - 31)  SpO2: 93% (12 Feb 2021 14:00) (84% - 99%)          I&O's Summary    11 Feb 2021 07:01  -  12 Feb 2021 07:00  --------------------------------------------------------  IN: 100 mL / OUT: 1000 mL / NET: -900 mL                              8.1    13.37 )-----------( 334      ( 12 Feb 2021 06:44 )             25.7       02-12    138  |  108  |  32<H>  ----------------------------<  92  4.2   |  25  |  1.19    Ca    8.5      12 Feb 2021 06:44  Phos  3.4     02-12  Mg     2.4     02-12    TPro  5.6<L>  /  Alb  1.7<L>  /  TBili  0.3  /  DBili  x   /  AST  48<H>  /  ALT  54  /  AlkPhos  101  02-11      CARDIAC MARKERS ( 10 Feb 2021 23:37 )  0.095 ng/mL / x     / x     / x     / x      CARDIAC MARKERS ( 10 Feb 2021 20:37 )  0.098 ng/mL / x     / x     / x     / x            ABG - ( 11 Feb 2021 03:27 )  pH, Arterial: 7.43  pH, Blood: x     /  pCO2: 36    /  pO2: 54    / HCO3: 24    / Base Excess: .1    /  SaO2: 86                      MEDICATIONS  (STANDING):  apixaban 5 milliGRAM(s) Oral every 12 hours  aspirin  chewable 325 milliGRAM(s) Oral once  atorvastatin 40 milliGRAM(s) Oral at bedtime  azithromycin  IVPB      azithromycin  IVPB 500 milliGRAM(s) IV Intermittent every 24 hours  cefepime   IVPB 2000 milliGRAM(s) IV Intermittent every 12 hours  cefepime   IVPB      clopidogrel Tablet 75 milliGRAM(s) Oral daily  dexAMETHasone  Injectable 6 milliGRAM(s) IV Push daily  dextrose 40% Gel 15 Gram(s) Oral once  dextrose 5%. 1000 milliLiter(s) (50 mL/Hr) IV Continuous <Continuous>  dextrose 5%. 1000 milliLiter(s) (100 mL/Hr) IV Continuous <Continuous>  dextrose 50% Injectable 25 Gram(s) IV Push once  dextrose 50% Injectable 12.5 Gram(s) IV Push once  dextrose 50% Injectable 25 Gram(s) IV Push once  diltiazem    milliGRAM(s) Oral daily  glucagon  Injectable 1 milliGRAM(s) IntraMuscular once  insulin glargine Injectable (LANTUS) 10 Unit(s) SubCutaneous at bedtime  insulin lispro (ADMELOG) corrective regimen sliding scale   SubCutaneous three times a day before meals  insulin lispro (ADMELOG) corrective regimen sliding scale   SubCutaneous at bedtime  levothyroxine 75 MICROGram(s) Oral daily  morphine  - Injectable 2 milliGRAM(s) IV Push once  nitroglycerin     SubLingual 0.4 milliGRAM(s) SubLingual once  pantoprazole    Tablet 40 milliGRAM(s) Oral before breakfast  trimethoprim  160 mG/sulfamethoxazole 800 mG 1 Tablet(s) Oral <User Schedule>    MEDICATIONS  (PRN):  acetaminophen   Tablet .. 650 milliGRAM(s) Oral every 4 hours PRN Temp greater or equal to 38.5C (101.3F)  ALBUTerol    90 MICROgram(s) HFA Inhaler 2 Puff(s) Inhalation every 4 hours PRN Shortness of Breath and/or Wheezing  ALPRAZolam 0.125 milliGRAM(s) Oral every 6 hours PRN anxiety  loperamide 2 milliGRAM(s) Oral every 4 hours PRN Diarrhea  ondansetron Injectable 4 milliGRAM(s) IV Push every 6 hours PRN Nausea and/or Vomiting      DVT Prophylaxis:    Advanced Directives:  Discussed with:    Visit Information: 90 min    ** Time is exclusive of billed procedures and/or teaching and/or routine family updates.

## 2021-02-12 NOTE — PROGRESS NOTE ADULT - SUBJECTIVE AND OBJECTIVE BOX
HPI:  74 y/o F PMHx significant for Hypertension, Diabetes mellitus type 2, vertigo, anal melanoma under care of Dr. Shearer ([facility] [place]) on combined IO therapy with ipilimumab + Nivolumab (cycle 2 on 1/4/2021), and recent admission from 1/19-1/25 for new onset Afib (now on DOAC therapy) and recent admission in [facility] for COVID-pneumonia (reportedly treated with MAB, Remdesivir and steroid therapy) presents to  for further evaluation and management of worsening shortness of breath. In triage the patient was notably hypoxic SpO2 => 80% with initial improvement of SaO2 to 100 via NRB (per ED documentation).  Labs => WBC 21.24, Hgb/Hct 9.1/29.1, , D-dimer 373, BUN/Cr 31/1.33, Glu 105, Alb 1.9, AST 64, TnI 0.070. CXR => New bilateral airspace opacities right greater than left. No significant pleural effusion. Heart size cannot be accurately assessed in this projection.     2/11/21: Pt evaluated at bedside. On BiPap and switched to HFNC to allow pt to try and eat. C/o cough present since diagnosis of COVID19 on 1/26. Pt was [place] 1[place]26[place]2/4 [place] [facility] [place], received MAB, remdesivir, and steroids. Pt states she was feeling better since being discharged home, except for cough. Pt was well through the weekend then symptoms began to worsen a couple days ago. Today, pt feels weak, with cough and occasional nosebleed. Has appetite and will try to eat today.  Pt received 2 cycles of therapy with ipilimumab and Nivolumab with Dr. Shearer at [facility] [place], every 3 weeks,with last cycle on 1/4/21.     2/12/21: Pt evaluated at bedside. Episodes of desaturation overnight into 70's on HFNC requiring use of Bipap. Pt feeling tired but has appetite; uncomfortable with HFNC. Improvement with small dose of xanax PRN. BP noted to be low, receiving cardizem and lopressor for Afib.     ROS:  Gen: no fevers, chills, +fatigue  Cardiovascular: no chest pain, no palpitations, no orthopnea  Respiratory: +SOB, +cough  GI: no difficulty swallowing, no nausea, no vomiting, no abdominal pain, no diarrhea, no constipation  : no dysuria, no increased freq, no hematuria  Heme: nosebleeds  MSK: no joint pain, no joint swelling or redness, no extremity pain   Neuro: no headache, no numbness, + weakness    MEDICATIONS  (STANDING):  apixaban 5 milliGRAM(s) Oral every 12 hours  atorvastatin 40 milliGRAM(s) Oral at bedtime  azithromycin  IVPB      azithromycin  IVPB 500 milliGRAM(s) IV Intermittent every 24 hours  cefepime   IVPB 2000 milliGRAM(s) IV Intermittent every 12 hours  cefepime   IVPB      dexAMETHasone  Injectable 6 milliGRAM(s) IV Push daily  dextrose 40% Gel 15 Gram(s) Oral once  dextrose 5%. 1000 milliLiter(s) (50 mL/Hr) IV Continuous <Continuous>  dextrose 5%. 1000 milliLiter(s) (100 mL/Hr) IV Continuous <Continuous>  dextrose 50% Injectable 25 Gram(s) IV Push once  dextrose 50% Injectable 12.5 Gram(s) IV Push once  dextrose 50% Injectable 25 Gram(s) IV Push once  diltiazem    milliGRAM(s) Oral daily  glucagon  Injectable 1 milliGRAM(s) IntraMuscular once  insulin glargine Injectable (LANTUS) 10 Unit(s) SubCutaneous at bedtime  insulin lispro (ADMELOG) corrective regimen sliding scale   SubCutaneous three times a day before meals  insulin lispro (ADMELOG) corrective regimen sliding scale   SubCutaneous at bedtime  levothyroxine 75 MICROGram(s) Oral daily  pantoprazole    Tablet 40 milliGRAM(s) Oral before breakfast  trimethoprim  160 mG/sulfamethoxazole 800 mG 1 Tablet(s) Oral <User Schedule>    MEDICATIONS  (PRN):  acetaminophen   Tablet .. 650 milliGRAM(s) Oral every 4 hours PRN Temp greater or equal to 38.5C (101.3F)  ALBUTerol    90 MICROgram(s) HFA Inhaler 2 Puff(s) Inhalation every 4 hours PRN Shortness of Breath and/or Wheezing  ALPRAZolam 0.125 milliGRAM(s) Oral every 6 hours PRN anxiety  loperamide 2 milliGRAM(s) Oral every 4 hours PRN Diarrhea  ondansetron Injectable 4 milliGRAM(s) IV Push every 6 hours PRN Nausea and/or Vomiting    ICU Vital Signs Last 24 Hrs  T(C): 36.4 (12 Feb 2021 03:00), Max: 37.7 (11 Feb 2021 17:00)  T(F): 97.6 (12 Feb 2021 03:00), Max: 99.9 (11 Feb 2021 17:00)  HR: 55 (12 Feb 2021 06:00) (51 - 72)  BP: 103/33 (12 Feb 2021 06:00) (95/37 - 117/37)  BP(mean): 50 (12 Feb 2021 06:00) (50 - 71)  ABP: --  ABP(mean): --  RR: 30 (12 Feb 2021 06:00) (23 - 31)  SpO2: 99% (12 Feb 2021 06:00) (78% - 99%)    GEN: on supplemental O2, weak appearing  CV: S1S2, RRR, no murmur  RESP: coarse breath sounds bilaterally  ABD: +BS, soft, ND, NT, no guarding, no rigidity  EXT: +2 radial and pedal pulses, no edema, no calve tenderness  NEURO:  AAOx3  PSYCH: normal behavior         LABS:                        8.1    13.37 )-----------( 334      ( 12 Feb 2021 06:44 )             25.7     02-12    138  |  108  |  32<H>  ----------------------------<  92  4.2   |  25  |  1.19    Ca    8.5      12 Feb 2021 06:44  Phos  3.4     02-12  Mg     2.4     02-12    TPro  5.6<L>  /  Alb  1.7<L>  /  TBili  0.3  /  DBili  x   /  AST  48<H>  /  ALT  54  /  AlkPhos  101  02-11    CARDIAC MARKERS ( 10 Feb 2021 23:37 )  0.095 ng/mL / x     / x     / x     / x      CARDIAC MARKERS ( 10 Feb 2021 20:37 )  0.098 ng/mL / x     / x     / x     / x      CARDIAC MARKERS ( 10 Feb 2021 14:01 )  0.070 ng/mL / x     / x     / x     / x              RADIOLOGY:         < from: Xray Chest 1 View- PORTABLE-Urgent (02.10.21 @ 15:00) >  IMPRESSION:  New bilateral airspace opacities right greater than left.    No significant pleural effusion    Heart size cannot be accurately assessed in this projection.       HPI:  74 y/o F PMHx significant for Hypertension, Diabetes mellitus type 2, vertigo, anal melanoma under care of Dr. Shearer ([facility] [place]) on combined IO therapy with ipilimumab + Nivolumab (cycle 2 on 1/4/2021), and recent admission from 1/19-1/25 for new onset Afib (now on DOAC therapy) and recent admission in [facility] for COVID-pneumonia (reportedly treated with MAB, Remdesivir and steroid therapy) presents to  for further evaluation and management of worsening shortness of breath. In triage the patient was notably hypoxic SpO2 => 80% with initial improvement of SaO2 to 100 via NRB (per ED documentation).  Labs => WBC 21.24, Hgb/Hct 9.1/29.1, , D-dimer 373, BUN/Cr 31/1.33, Glu 105, Alb 1.9, AST 64, TnI 0.070. CXR => New bilateral airspace opacities right greater than left. No significant pleural effusion. Heart size cannot be accurately assessed in this projection.     2/11/21: Pt evaluated at bedside. On BiPap and switched to HFNC to allow pt to try and eat. C/o cough present since diagnosis of COVID19 on 1/26. Pt was hospitalized from 1/26-2/4 at [facility] [place], received MAB, remdesivir, and steroids. Pt states she was feeling better since being discharged home, except for cough. Pt was well through the weekend then symptoms began to worsen a couple days ago. Today, pt feels weak, with cough and occasional nosebleed. Has appetite and will try to eat today.  Pt received 2 cycles of therapy with ipilimumab and Nivolumab with Dr. Shearer at [facility] [place], every 3 weeks,with last cycle on 1/4/21.     2/12/21: Pt evaluated at bedside. Episodes of desaturation overnight into 70's on HFNC requiring use of Bipap. Pt feeling tired but has appetite; uncomfortable with HFNC. Improvement with small dose of xanax PRN. BP noted to be low, receiving cardizem and lopressor for Afib.     Note: Pt began c/o chest pain w/ radiation to back in afternoon. STAT EKG was performed showing ST elevations in inferior leads. Pt's family notified and pt agreed to cardiac cath. Urgent catheterization performed by Dr. Marie; found to have complete blockage of RCA, GIFTY x1 placed. Pt tolerated procedure well and returned to ICU floor. Pt's daughter at bedside after procedure.     ROS:  Gen: no fevers, chills, +fatigue  Cardiovascular: no chest pain, no palpitations, no orthopnea  Respiratory: +SOB, +cough  GI: no difficulty swallowing, no nausea, no vomiting, no abdominal pain, no diarrhea, no constipation  : no dysuria, no increased freq, no hematuria  Heme: nosebleeds  MSK: no joint pain, no joint swelling or redness, no extremity pain   Neuro: no headache, no numbness, + weakness    MEDICATIONS  (STANDING):  apixaban 5 milliGRAM(s) Oral every 12 hours  atorvastatin 40 milliGRAM(s) Oral at bedtime  azithromycin  IVPB      azithromycin  IVPB 500 milliGRAM(s) IV Intermittent every 24 hours  cefepime   IVPB 2000 milliGRAM(s) IV Intermittent every 12 hours  cefepime   IVPB      dexAMETHasone  Injectable 6 milliGRAM(s) IV Push daily  dextrose 40% Gel 15 Gram(s) Oral once  dextrose 5%. 1000 milliLiter(s) (50 mL/Hr) IV Continuous <Continuous>  dextrose 5%. 1000 milliLiter(s) (100 mL/Hr) IV Continuous <Continuous>  dextrose 50% Injectable 25 Gram(s) IV Push once  dextrose 50% Injectable 12.5 Gram(s) IV Push once  dextrose 50% Injectable 25 Gram(s) IV Push once  diltiazem    milliGRAM(s) Oral daily  glucagon  Injectable 1 milliGRAM(s) IntraMuscular once  insulin glargine Injectable (LANTUS) 10 Unit(s) SubCutaneous at bedtime  insulin lispro (ADMELOG) corrective regimen sliding scale   SubCutaneous three times a day before meals  insulin lispro (ADMELOG) corrective regimen sliding scale   SubCutaneous at bedtime  levothyroxine 75 MICROGram(s) Oral daily  pantoprazole    Tablet 40 milliGRAM(s) Oral before breakfast  trimethoprim  160 mG/sulfamethoxazole 800 mG 1 Tablet(s) Oral <User Schedule>    MEDICATIONS  (PRN):  acetaminophen   Tablet .. 650 milliGRAM(s) Oral every 4 hours PRN Temp greater or equal to 38.5C (101.3F)  ALBUTerol    90 MICROgram(s) HFA Inhaler 2 Puff(s) Inhalation every 4 hours PRN Shortness of Breath and/or Wheezing  ALPRAZolam 0.125 milliGRAM(s) Oral every 6 hours PRN anxiety  loperamide 2 milliGRAM(s) Oral every 4 hours PRN Diarrhea  ondansetron Injectable 4 milliGRAM(s) IV Push every 6 hours PRN Nausea and/or Vomiting    ICU Vital Signs Last 24 Hrs  T(C): 36.4 (12 Feb 2021 03:00), Max: 37.7 (11 Feb 2021 17:00)  T(F): 97.6 (12 Feb 2021 03:00), Max: 99.9 (11 Feb 2021 17:00)  HR: 55 (12 Feb 2021 06:00) (51 - 72)  BP: 103/33 (12 Feb 2021 06:00) (95/37 - 117/37)  BP(mean): 50 (12 Feb 2021 06:00) (50 - 71)  ABP: --  ABP(mean): --  RR: 30 (12 Feb 2021 06:00) (23 - 31)  SpO2: 99% (12 Feb 2021 06:00) (78% - 99%)    GEN: on supplemental O2, weak appearing  CV: S1S2, RRR, no murmur  RESP: coarse breath sounds bilaterally  ABD: +BS, soft, ND, NT, no guarding, no rigidity  EXT: +2 radial and pedal pulses, no edema, no calve tenderness  NEURO:  AAOx3  PSYCH: normal behavior         LABS:                        8.1    13.37 )-----------( 334      ( 12 Feb 2021 06:44 )             25.7     02-12    138  |  108  |  32<H>  ----------------------------<  92  4.2   |  25  |  1.19    Ca    8.5      12 Feb 2021 06:44  Phos  3.4     02-12  Mg     2.4     02-12    TPro  5.6<L>  /  Alb  1.7<L>  /  TBili  0.3  /  DBili  x   /  AST  48<H>  /  ALT  54  /  AlkPhos  101  02-11    CARDIAC MARKERS ( 10 Feb 2021 23:37 )  0.095 ng/mL / x     / x     / x     / x      CARDIAC MARKERS ( 10 Feb 2021 20:37 )  0.098 ng/mL / x     / x     / x     / x      CARDIAC MARKERS ( 10 Feb 2021 14:01 )  0.070 ng/mL / x     / x     / x     / x              RADIOLOGY:         < from: Xray Chest 1 View- PORTABLE-Urgent (02.10.21 @ 15:00) >  IMPRESSION:  New bilateral airspace opacities right greater than left.    No significant pleural effusion    Heart size cannot be accurately assessed in this projection.

## 2021-02-12 NOTE — PROGRESS NOTE ADULT - SUBJECTIVE AND OBJECTIVE BOX
Cath Lab Nurse Practitioner Note  HPI:  76 y/o F PMHx significant for Hypertension, Diabetes mellitus type 2, vertigo, anal melanoma under care of Dr. Shearer ([facility] [place]) on combined IO therapy with ipilimumab + Nivolumab (cycle 2 on 1/4/2021), and recent admission from 1/19-1/25 for new onset Afib (now on DOAC therapy) and recent admission in [facility] for COVID-pneumonia (reportedly treated with MAB, Remdesivir and steroid therapy) presents to  for further evaluation and management of worsening shortness of breath. In triage the patient was notably hypoxic SpO2 => 80% with initial improvement of SaO2 to 100 via NRB (per ED documentation).    with CP today at 2pm; EKG with ST elevation in leads II III AVF    ASA class: III  Creatinine: 1.19  GFR: 44  Bleeding  Risk score: 12.0%    -Consent obtained for cardiac catheterization w/ coronary angiogram and possible stent placement prior to procedure. Pt is competent, has capacity, and understands risks and benefits of procedure. Risks and benefits discussed. Risk discussed included, but not limited to MI, stroke, mortality, major bleeding, arrythmia, or infection. All questions answered           s/p LHC :  Pt denies chest pain/SOB/palpitations post cath.      Vital Signs Last 24 Hrs  T(C): 37.1 (12 Feb 2021 09:00), Max: 37.7 (11 Feb 2021 17:00)  T(F): 98.7 (12 Feb 2021 09:00), Max: 99.9 (11 Feb 2021 17:00)  HR: 79 (12 Feb 2021 14:55) (51 - 85)  BP: 124/46 (12 Feb 2021 14:43) (95/37 - 134/101)  BP(mean): 64 (12 Feb 2021 14:43) (50 - 109)  RR: 31 (12 Feb 2021 14:55) (19 - 31)  SpO2: 94% (12 Feb 2021 14:55) (80% - 99%)    Labs:  LABS: All Labs Reviewed:                        8.1    13.37 )-----------( 334      ( 12 Feb 2021 06:44 )             25.7     02-12    138  |  108  |  32<H>  ----------------------------<  92  4.2   |  25  |  1.19    Ca    8.5      12 Feb 2021 06:44  Phos  3.4     02-12  Mg     2.4     02-12    TPro  5.6<L>  /  Alb  1.7<L>  /  TBili  0.3  /  DBili  x   /  AST  48<H>  /  ALT  54  /  AlkPhos  101  02-11      CARDIAC MARKERS ( 12 Feb 2021 14:42 )  0.508 ng/mL / x     / x     / x     / x      CARDIAC MARKERS ( 10 Feb 2021 23:37 )  0.095 ng/mL / x     / x     / x     / x      CARDIAC MARKERS ( 10 Feb 2021 20:37 )  0.098 ng/mL / x     / x     / x     / x        MEDICATIONS  (STANDING):  apixaban 5 milliGRAM(s) Oral every 12 hours  atorvastatin 40 milliGRAM(s) Oral at bedtime  azithromycin  IVPB      azithromycin  IVPB 500 milliGRAM(s) IV Intermittent every 24 hours  cefepime   IVPB 2000 milliGRAM(s) IV Intermittent every 12 hours  cefepime   IVPB      clopidogrel Tablet 75 milliGRAM(s) Oral daily  dexAMETHasone  Injectable 6 milliGRAM(s) IV Push daily  dextrose 40% Gel 15 Gram(s) Oral once  dextrose 5%. 1000 milliLiter(s) (50 mL/Hr) IV Continuous <Continuous>  dextrose 5%. 1000 milliLiter(s) (100 mL/Hr) IV Continuous <Continuous>  dextrose 50% Injectable 25 Gram(s) IV Push once  dextrose 50% Injectable 12.5 Gram(s) IV Push once  dextrose 50% Injectable 25 Gram(s) IV Push once  diltiazem    milliGRAM(s) Oral daily  glucagon  Injectable 1 milliGRAM(s) IntraMuscular once  insulin glargine Injectable (LANTUS) 10 Unit(s) SubCutaneous at bedtime  insulin lispro (ADMELOG) corrective regimen sliding scale   SubCutaneous three times a day before meals  insulin lispro (ADMELOG) corrective regimen sliding scale   SubCutaneous at bedtime  levothyroxine 75 MICROGram(s) Oral daily  pantoprazole    Tablet 40 milliGRAM(s) Oral before breakfast  sodium chloride 0.9%. 1000 milliLiter(s) (60 mL/Hr) IV Continuous <Continuous>  trimethoprim  160 mG/sulfamethoxazole 800 mG 1 Tablet(s) Oral <User Schedule>      PHYSICAL EXAM  GENERAL: NAD, AAOx3  HEAD:  Atraumatic, Normocephalic  EYES: EOMI, PERRLA, conjunctiva and sclera clear  NECK: Supple, No JVD, No LAD  CHEST/LUNG: Clear to auscultation bilaterally; No wheeze  HEART: s1 s2 Regular rate and rhythm; No murmurs, rubs, or gallops  ABDOMEN: Soft, Nontender, Nondistended; Bowel sounds present X 4 quadrants   EXTREMITIES:  2+ Peripheral Pulses, No clubbing, cyanosis, or edema  SKIN: No rashes or lesions,  b/l LE not red, cool to touch,, no open skin no drainage  NEURO: nonfocal CN/motor/sensory/reflexes  Psych: normal affect and behavior, calm and cooperative   PROCEDURE SITE: RRA accessed, hemoband in place to be removed in 2 hours. Site is without hematoma or bleeding. Sensation and LU intact. Distal pulses palpable 2+, capillary refill < 2 seconds. Patient denies pain, numbness, tingling, CP or SOB. Clean dry dressing applied     EKG POST PCI ------    PROCEDURE RESULTS : full report to follow     ASSESSMENT : HPI:  76 y/o F PMHx significant for Hypertension, Diabetes mellitus type 2, vertigo, anal melanoma under care of Dr. Shearer ([facility] [place]) on combined IO therapy with ipilimumab + Nivolumab (cycle 2 on 1/4/2021), and recent admission from 1/19-1/25 for new onset Afib (now on DOAC therapy) admitted for COVID,   with acute CP today, EKG with STEMI     PLAN:  CAD, s/p GIFTY to RCA   -continue ICU care   -IV hydration NS @ 75mL/hr x 10 hours  VS, lab, diet and activity per PCI post orders  -continue Plavix 75mg daily, continue Eliquis 5mg BID ,  -increase Lipitor to 80mg at HS  -f/u EKG in AM, AM Labs  -plan discussed with patient and Dr Marie  -Discussed therapeutic lifestyle changes to reduce risk factors such as following a cardiac diet, weight loss, maintaining a healthy weight, exercise, smoking cessation, medication compliance, and regular follow-up  with PCP/Cardioloigst  --Post cath instructions reviewed, patient verbalizes and understands instructions  - rest of care per primary team         Cath Lab Nurse Practitioner Note  HPI:  76 y/o F PMHx significant for Hypertension, Diabetes mellitus type 2, vertigo, anal melanoma under care of Dr. Shearer ([facility] [place]) on combined IO therapy with ipilimumab + Nivolumab (cycle 2 on 1/4/2021), and recent admission from 1/19-1/25 for new onset Afib (now on DOAC therapy) and recent admission in [facility] for COVID-pneumonia (reportedly treated with MAB, Remdesivir and steroid therapy) presents to  for further evaluation and management of worsening shortness of breath. In triage the patient was notably hypoxic SpO2 => 80% with initial improvement of SaO2 to 100 via NRB (per ED documentation).    with CP today at 2pm; EKG with ST elevation in leads II III AVF    ASA class: III  Creatinine: 1.19  GFR: 44  Bleeding  Risk score: 12.0%    -Consent obtained for cardiac catheterization w/ coronary angiogram and possible stent placement prior to procedure. Pt is competent, has capacity, and understands risks and benefits of procedure. Risks and benefits discussed. Risk discussed included, but not limited to MI, stroke, mortality, major bleeding, arrythmia, or infection. All questions answered           s/p LHC :  Pt denies chest pain/SOB/palpitations post cath.      Vital Signs Last 24 Hrs  T(C): 37.1 (12 Feb 2021 09:00), Max: 37.7 (11 Feb 2021 17:00)  T(F): 98.7 (12 Feb 2021 09:00), Max: 99.9 (11 Feb 2021 17:00)  HR: 79 (12 Feb 2021 14:55) (51 - 85)  BP: 124/46 (12 Feb 2021 14:43) (95/37 - 134/101)  BP(mean): 64 (12 Feb 2021 14:43) (50 - 109)  RR: 31 (12 Feb 2021 14:55) (19 - 31)  SpO2: 94% (12 Feb 2021 14:55) (80% - 99%)    Labs:  LABS: All Labs Reviewed:                        8.1    13.37 )-----------( 334      ( 12 Feb 2021 06:44 )             25.7     02-12    138  |  108  |  32<H>  ----------------------------<  92  4.2   |  25  |  1.19    Ca    8.5      12 Feb 2021 06:44  Phos  3.4     02-12  Mg     2.4     02-12    TPro  5.6<L>  /  Alb  1.7<L>  /  TBili  0.3  /  DBili  x   /  AST  48<H>  /  ALT  54  /  AlkPhos  101  02-11      CARDIAC MARKERS ( 12 Feb 2021 14:42 )  0.508 ng/mL / x     / x     / x     / x      CARDIAC MARKERS ( 10 Feb 2021 23:37 )  0.095 ng/mL / x     / x     / x     / x      CARDIAC MARKERS ( 10 Feb 2021 20:37 )  0.098 ng/mL / x     / x     / x     / x        MEDICATIONS  (STANDING):  apixaban 5 milliGRAM(s) Oral every 12 hours  atorvastatin 40 milliGRAM(s) Oral at bedtime  azithromycin  IVPB      azithromycin  IVPB 500 milliGRAM(s) IV Intermittent every 24 hours  cefepime   IVPB 2000 milliGRAM(s) IV Intermittent every 12 hours  cefepime   IVPB      clopidogrel Tablet 75 milliGRAM(s) Oral daily  dexAMETHasone  Injectable 6 milliGRAM(s) IV Push daily  dextrose 40% Gel 15 Gram(s) Oral once  dextrose 5%. 1000 milliLiter(s) (50 mL/Hr) IV Continuous <Continuous>  dextrose 5%. 1000 milliLiter(s) (100 mL/Hr) IV Continuous <Continuous>  dextrose 50% Injectable 25 Gram(s) IV Push once  dextrose 50% Injectable 12.5 Gram(s) IV Push once  dextrose 50% Injectable 25 Gram(s) IV Push once  diltiazem    milliGRAM(s) Oral daily  glucagon  Injectable 1 milliGRAM(s) IntraMuscular once  insulin glargine Injectable (LANTUS) 10 Unit(s) SubCutaneous at bedtime  insulin lispro (ADMELOG) corrective regimen sliding scale   SubCutaneous three times a day before meals  insulin lispro (ADMELOG) corrective regimen sliding scale   SubCutaneous at bedtime  levothyroxine 75 MICROGram(s) Oral daily  pantoprazole    Tablet 40 milliGRAM(s) Oral before breakfast  sodium chloride 0.9%. 1000 milliLiter(s) (60 mL/Hr) IV Continuous <Continuous>  trimethoprim  160 mG/sulfamethoxazole 800 mG 1 Tablet(s) Oral <User Schedule>      PHYSICAL EXAM  GENERAL: NAD, AAOx3  HEAD:  Atraumatic, Normocephalic  EYES: EOMI, PERRLA, conjunctiva and sclera clear  NECK: Supple, No JVD, No LAD  CHEST/LUNG: Clear to auscultation bilaterally; No wheeze  HEART: s1 s2 Regular rate and rhythm; No murmurs, rubs, or gallops  ABDOMEN: Soft, Nontender, Nondistended; Bowel sounds present X 4 quadrants   EXTREMITIES:  2+ Peripheral Pulses, No clubbing, cyanosis, or edema  SKIN: No rashes or lesions,  b/l LE not red, cool to touch,, no open skin no drainage  NEURO: nonfocal CN/motor/sensory/reflexes  Psych: normal affect and behavior, calm and cooperative   PROCEDURE SITE: RRA accessed, hemoband in place to be removed in 2 hours. Site is without hematoma or bleeding. Sensation and LU intact. Distal pulses palpable 2+, capillary refill < 2 seconds. Patient denies pain, numbness, tingling, CP or SOB. Clean dry dressing applied     EKG POST PCI ------    PROCEDURE RESULTS : full report to follow     ASSESSMENT :  76 y/o F PMHx significant for Hypertension, Diabetes mellitus type 2, vertigo, anal melanoma under care of Dr. Shearer ([facility] [place]) on combined IO therapy with ipilimumab + Nivolumab (cycle 2 on 1/4/2021), and recent admission from 1/19-1/25 for new onset Afib (now on DOAC therapy) admitted for COVID,   with acute CP today, EKG with STEMI     PLAN:  CAD, s/p GIFTY to RCA   -continue ICU care   -IV hydration NS @ 75mL/hr x 10 hours  VS, lab, diet and activity per PCI post orders  -continue Plavix 75mg daily, continue Eliquis 5mg BID- resume at 10pm tonight   -increase Lipitor to 80mg at HS  -f/u EKG in AM, AM Labs  -plan discussed with patient and Dr Marie  --Post cath instructions reviewed, patient verbalizes and understands instructions  - rest of care per primary team         Cath Lab Nurse Practitioner Note  HPI:  76 y/o F PMHx significant for Hypertension, Diabetes mellitus type 2, vertigo, anal melanoma under care of Dr. Shearer ([facility] [place]) on combined IO therapy with ipilimumab + Nivolumab (cycle 2 on 1/4/2021), and recent admission from 1/19-1/25 for new onset Afib (now on DOAC therapy) and recent admission in [facility] for COVID-pneumonia (reportedly treated with MAB, Remdesivir and steroid therapy) presents to  for further evaluation and management of worsening shortness of breath. In triage the patient was notably hypoxic SpO2 => 80% with initial improvement of SaO2 to 100 via NRB (per ED documentation).    with CP today at 2pm; EKG with ST elevation in leads II III AVF    ASA class: III  Creatinine: 1.19  GFR: 44  Bleeding  Risk score: 12.0%    -Consent obtained for cardiac catheterization w/ coronary angiogram and possible stent placement prior to procedure. Pt is competent, has capacity, and understands risks and benefits of procedure. Risks and benefits discussed. Risk discussed included, but not limited to MI, stroke, mortality, major bleeding, arrythmia, or infection. All questions answered           s/p LHC :  Pt denies chest pain/SOB/palpitations post cath.      Vital Signs Last 24 Hrs  T(C): 37.1 (12 Feb 2021 09:00), Max: 37.7 (11 Feb 2021 17:00)  T(F): 98.7 (12 Feb 2021 09:00), Max: 99.9 (11 Feb 2021 17:00)  HR: 79 (12 Feb 2021 14:55) (51 - 85)  BP: 124/46 (12 Feb 2021 14:43) (95/37 - 134/101)  BP(mean): 64 (12 Feb 2021 14:43) (50 - 109)  RR: 31 (12 Feb 2021 14:55) (19 - 31)  SpO2: 94% (12 Feb 2021 14:55) (80% - 99%)    Labs:  LABS: All Labs Reviewed:                        8.1    13.37 )-----------( 334      ( 12 Feb 2021 06:44 )             25.7     02-12    138  |  108  |  32<H>  ----------------------------<  92  4.2   |  25  |  1.19    Ca    8.5      12 Feb 2021 06:44  Phos  3.4     02-12  Mg     2.4     02-12    TPro  5.6<L>  /  Alb  1.7<L>  /  TBili  0.3  /  DBili  x   /  AST  48<H>  /  ALT  54  /  AlkPhos  101  02-11      CARDIAC MARKERS ( 12 Feb 2021 14:42 )  0.508 ng/mL / x     / x     / x     / x      CARDIAC MARKERS ( 10 Feb 2021 23:37 )  0.095 ng/mL / x     / x     / x     / x      CARDIAC MARKERS ( 10 Feb 2021 20:37 )  0.098 ng/mL / x     / x     / x     / x        MEDICATIONS  (STANDING):  apixaban 5 milliGRAM(s) Oral every 12 hours  atorvastatin 40 milliGRAM(s) Oral at bedtime  azithromycin  IVPB      azithromycin  IVPB 500 milliGRAM(s) IV Intermittent every 24 hours  cefepime   IVPB 2000 milliGRAM(s) IV Intermittent every 12 hours  cefepime   IVPB      clopidogrel Tablet 75 milliGRAM(s) Oral daily  dexAMETHasone  Injectable 6 milliGRAM(s) IV Push daily  dextrose 40% Gel 15 Gram(s) Oral once  dextrose 5%. 1000 milliLiter(s) (50 mL/Hr) IV Continuous <Continuous>  dextrose 5%. 1000 milliLiter(s) (100 mL/Hr) IV Continuous <Continuous>  dextrose 50% Injectable 25 Gram(s) IV Push once  dextrose 50% Injectable 12.5 Gram(s) IV Push once  dextrose 50% Injectable 25 Gram(s) IV Push once  diltiazem    milliGRAM(s) Oral daily  glucagon  Injectable 1 milliGRAM(s) IntraMuscular once  insulin glargine Injectable (LANTUS) 10 Unit(s) SubCutaneous at bedtime  insulin lispro (ADMELOG) corrective regimen sliding scale   SubCutaneous three times a day before meals  insulin lispro (ADMELOG) corrective regimen sliding scale   SubCutaneous at bedtime  levothyroxine 75 MICROGram(s) Oral daily  pantoprazole    Tablet 40 milliGRAM(s) Oral before breakfast  sodium chloride 0.9%. 1000 milliLiter(s) (60 mL/Hr) IV Continuous <Continuous>  trimethoprim  160 mG/sulfamethoxazole 800 mG 1 Tablet(s) Oral <User Schedule>      PHYSICAL EXAM  GENERAL: NAD, AAOx3  HEAD:  Atraumatic, Normocephalic  EYES: EOMI, PERRLA, conjunctiva and sclera clear  NECK: Supple, No JVD, No LAD  CHEST/LUNG: Clear to auscultation bilaterally; No wheeze  HEART: s1 s2 Regular rate and rhythm; No murmurs, rubs, or gallops  ABDOMEN: Soft, Nontender, Nondistended; Bowel sounds present X 4 quadrants   EXTREMITIES:  2+ Peripheral Pulses, No clubbing, cyanosis, or edema  SKIN: No rashes or lesions,  b/l LE not red, cool to touch,, no open skin no drainage  NEURO: nonfocal CN/motor/sensory/reflexes  Psych: normal affect and behavior, calm and cooperative   PROCEDURE SITE: RRA accessed, hemoband in place to be removed in 2 hours. Site is without hematoma or bleeding. Sensation and LU intact. Distal pulses palpable 2+, capillary refill < 2 seconds. Patient denies pain, numbness, tingling, CP or SOB. Clean dry dressing applied     EKG POST PCI NSR- rate 67. ST elevations improving    PROCEDURE RESULTS : full report to follow     ASSESSMENT :  76 y/o F PMHx significant for Hypertension, Diabetes mellitus type 2, vertigo, anal melanoma under care of Dr. Shearer ([facility] [place]) on combined IO therapy with ipilimumab + Nivolumab (cycle 2 on 1/4/2021), and recent admission from 1/19-1/25 for new onset Afib (now on DOAC therapy) admitted for COVID,   with acute CP today, EKG with ST elevations inferior leads    PLAN:  CAD, s/p GIFTY to RCA   -continue ICU care   -IV hydration NS @ 75mL/hr x 10 hours  VS, lab, diet and activity per PCI post orders  -continue Plavix 75mg daily, continue Eliquis 5mg BID- resume at 10pm tonight   -increase Lipitor to 80mg at HS  -f/u EKG in AM, AM Labs  -plan discussed with patient and Dr Marie  --Post cath instructions reviewed, patient verbalizes and understands instructions  - rest of care per primary team  - cardiac rehab deferred at this time d/t other ongoing medical issues.

## 2021-02-12 NOTE — PROGRESS NOTE ADULT - ASSESSMENT
A/P: 74 year old female who presents with Acute Hypoxic Respiratory failure possible from COVID 19 Pneumonia and or a secondary superimposed Pneumonia  AFIB  CISCO  anal melanomia  DM II  hypothyroidism  STEMI        PLAN:  CCU    PULM: Will continue HF NC, continue Cefepime and Z-Max    Cardio: A-FIB.  Will Continue DOAC, STEMI-- patient given 321 ASA chewable, Plavix and is going to the cathlab now    Renal: Cisco likely from ATN.  CISCO improving    GI: PO Diet    ENDO: Lantus and RISS, Synthroid    ID: May be a late flair of COVID 19, on Decadron, Continue Cefepime/Z-Max, Bactrim prophylaxis    D/W the patients family at length

## 2021-02-12 NOTE — PROGRESS NOTE ADULT - SUBJECTIVE AND OBJECTIVE BOX
Date of service: 02-12-21 @ 12:31    Sitting in chair in NAD  On O2 therapy  No SOB at rest  Has dry cough  Has low grade fever    ROS: denies dizziness, no HA, no abdominal pain, no diarrhea or constipation; no dysuria, no legs pain, no rashes    MEDICATIONS  (STANDING):  apixaban 5 milliGRAM(s) Oral every 12 hours  atorvastatin 40 milliGRAM(s) Oral at bedtime  azithromycin  IVPB      azithromycin  IVPB 500 milliGRAM(s) IV Intermittent every 24 hours  cefepime   IVPB 2000 milliGRAM(s) IV Intermittent every 12 hours  cefepime   IVPB      dexAMETHasone  Injectable 6 milliGRAM(s) IV Push daily  dextrose 40% Gel 15 Gram(s) Oral once  dextrose 5%. 1000 milliLiter(s) (50 mL/Hr) IV Continuous <Continuous>  dextrose 5%. 1000 milliLiter(s) (100 mL/Hr) IV Continuous <Continuous>  dextrose 50% Injectable 25 Gram(s) IV Push once  dextrose 50% Injectable 12.5 Gram(s) IV Push once  dextrose 50% Injectable 25 Gram(s) IV Push once  diltiazem    milliGRAM(s) Oral daily  glucagon  Injectable 1 milliGRAM(s) IntraMuscular once  insulin glargine Injectable (LANTUS) 10 Unit(s) SubCutaneous at bedtime  insulin lispro (ADMELOG) corrective regimen sliding scale   SubCutaneous three times a day before meals  insulin lispro (ADMELOG) corrective regimen sliding scale   SubCutaneous at bedtime  levothyroxine 75 MICROGram(s) Oral daily  pantoprazole    Tablet 40 milliGRAM(s) Oral before breakfast  trimethoprim  160 mG/sulfamethoxazole 800 mG 1 Tablet(s) Oral <User Schedule>    Vital Signs Last 24 Hrs  T(C): 36.4 (12 Feb 2021 03:00), Max: 37.7 (11 Feb 2021 17:00)  T(F): 97.6 (12 Feb 2021 03:00), Max: 99.9 (11 Feb 2021 17:00)  HR: 55 (12 Feb 2021 06:00) (51 - 67)  BP: 103/33 (12 Feb 2021 06:00) (95/37 - 114/43)  BP(mean): 50 (12 Feb 2021 06:00) (50 - 71)  RR: 30 (12 Feb 2021 06:00) (23 - 31)  SpO2: 99% (12 Feb 2021 06:00) (78% - 99%)     Physical exam:    Constitutional:  No acute distress  HEENT: NC/AT, EOMI, PERRLA, conjunctivae clear  Neck: supple; thyroid not palpable  Back: no tenderness  Respiratory: respiratory effort normal; crackles b/l  Cardiovascular: S1S2 regular, no murmurs  Abdomen: soft, not tender, not distended, positive BS  Genitourinary: no suprapubic tenderness  Lymphatic: no LN palpable  Musculoskeletal: no muscle tenderness, no joint swelling or tenderness  Extremities: no pedal edema  Neurological/ Psychiatric: AxOx3, moving all extremities  Skin: no rashes; no palpable lesions    Labs: reviewed                        8.1    13.37 )-----------( 334      ( 12 Feb 2021 06:44 )             25.7     02-12    138  |  108  |  32<H>  ----------------------------<  92  4.2   |  25  |  1.19    Ca    8.5      12 Feb 2021 06:44  Phos  3.4     02-12  Mg     2.4     02-12    TPro  5.6<L>  /  Alb  1.7<L>  /  TBili  0.3  /  DBili  x   /  AST  48<H>  /  ALT  54  /  AlkPhos  101  02-11    C-Reactive Protein, Serum: 12.10 mg/dL (02-10-21 @ 14:01)  D-Dimer Assay, Quantitative: 373 ng/mL DDU (02-10-21 @ 14:01)  Ferritin, Serum: 576 ng/mL (02-10-21 @ 14:01)                        8.4    17.44 )-----------( 344      ( 11 Feb 2021 06:45 )             27.8     02-11    138  |  107  |  32<H>  ----------------------------<  173<H>  4.4   |  24  |  1.34<H>    Ca    8.2<L>      11 Feb 2021 06:45  Phos  3.5     02-11  Mg     2.3     02-11    TPro  5.6<L>  /  Alb  1.7<L>  /  TBili  0.3  /  DBili  x   /  AST  48<H>  /  ALT  54  /  AlkPhos  101  02-11     LIVER FUNCTIONS - ( 11 Feb 2021 06:45 )  Alb: 1.7 g/dL / Pro: 5.6 gm/dL / ALK PHOS: 101 U/L / ALT: 54 U/L / AST: 48 U/L / GGT: x           pSARS-CoV-2:  (02.11.21 @ 09:45)   COVID-19 PCR: NotDetec: (01.19.21 @ 01:24)     Radiology: all available radiological tests reviewed    < from: CT Chest No Cont (02.10.21 @ 19:47) >  Extensive bilateral pneumonias right greater than left.    < end of copied text >    Advanced directives addressed: full resuscitation

## 2021-02-12 NOTE — PROGRESS NOTE ADULT - ASSESSMENT
76 y/o Female with h/o Hypertension, Diabetes mellitus type 2, vertigo, anal melanoma under care of Dr. Shearer -[facility] [place] on combined IO therapy with ipilimumab + Nivolumab (cycle 2 on 1/4/2021), recent admission from 1/19-1/25 for new onset Afib (now on DOAC therapy), recent admission in [facility] for COVID-pneumonia (reportedly treated with MAB, Remdesivir and steroid therapy) was admitted on 2/10 for worsening shortness of breath. In ER the patient was hypoxic with SpO2 => 80%. Also noted with new bilateral airspace opacities right greater than left. She received cefepime.     1. COVID-19 viral syndrome. Acuter respiratory failure. Multifocal pneumonia. Anal melanoma on immunotherapy. Immunocompromised host. CRF stage 3.   -low grade fever  -leukocytosis  -obtain BC x 2   -CT chest shows extensive b/l pneumonia  - probable persistent COVID pneumonia  -cannot exclude superimposed bacterial etiology  -obtain RVP with COVID-19 PCR  -she was treated recently with remdesivir  -on cefepime 2 gm IV q12h and azithromycin 500 mg IV qd # 2  -tolerating abx well so far; no side effects noted  -respiratory care  -continue IV abx coverage  -monitor temps  -f/u CBC  -supportive care  2. Other issues:   -care per medicine

## 2021-02-12 NOTE — PROGRESS NOTE ADULT - SUBJECTIVE AND OBJECTIVE BOX
Patient is a 75y old  Female who presents with a chief complaint of Shortness of Breath (11 Feb 2021 13:02)      BRIEF HOSPITAL COURSE:74 y/o F PMHx significant for Hypertension, Diabetes mellitus type 2, vertigo, anal melanoma under care of Dr. Shearer ([facility] [place] combined IO therapy with ipilimumab + Nivolumab (cycle 2 on 1/4/2021), and recent admission from 1/19-1/25 for new onset Afib (now on DOAC therapy) and recent admission in [facility] for COVID-pneumonia (reportedly treated with MAB, Remdesivir and steroid therapy) presents to  for further evaluation and management of worsening shortness of breath. Pt admited pt to the ICU for acute hypoxic respiratory, COVID PNA and ? superimposed PNA     Events last 24 hours:   ON pt stared to desaturate on 100% fiO2 on HFNC and became more tachypneic, pt was started on BiPAP ON for tachypnea and saturation came up to 96% and wob improved. Will plan to have pt transitioned to HFNC in AM      PAST MEDICAL & SURGICAL HISTORY:  Primary malignant melanoma of anal canal    Afib    DM (diabetes mellitus)  Type 2    HTN (hypertension)    Vertigo    No significant past surgical history          Hosp day #2d      Vital signs / Reviewed and Physical Exam Performed where pertinent and urgently required    Lab / Radiology  studies / ABG / Meds -  reviewed and interpreted into the assessment and treatment plan.

## 2021-02-12 NOTE — PROGRESS NOTE ADULT - ASSESSMENT
ASSESSMENT     1. Acute hypoxic respiratory Failure   2. COVID PNA   3. suspected bacterial PNA   4. afib   5. ALVIN    PULM: Will continue HF NC, continue Cefepime and Z-Max, RVP PND    Cardio: A-FIB.  Will Continue DOAC, CCB.  Seen by cardio and no ischemic work up at this time    Renal: Alvin likely from ATN.  Continue to monitor and encourage PO    GI: PO Diet    ENDO: Lantus and RISS, Synthroid    ID: PCR PND, May be a late flair of COVID 19, on Decadron, Continue Cefepime/Z-Max, Viral PCR PND, Bactrim prophylaxis      Critical Care time: 40 mins assessing presenting problems of acute illness that poses high probability of life threatening deterioration or end organ damage/dysfunction.  Medical decision making including Initiating plan of care, reviewing data, reviewing radiology, direct patient bedside evaluation and interpretation of vital signs, any necessary ventilator management , discussion with multidisciplinary team, discussing goals of care with patient/family, all non inclusive of procedures    ASSESSMENT     1. Acute hypoxic respiratory Failure   2. COVID PNA   3. suspected bacterial PNA   4. afib   5. ALVIN ATN   NEURO: morphine and xanax for dyspnea and anxiety PRN     PULM: ON pt stared to desaturate on 100% fiO2 on HFNC and became more tachypneic, pt was started on BiPAP ON for tachypnea and saturation came up to 96% and wob improved. Will plan to have pt transitioned to HFNC in AM        Cardio: A-FIB.  Will Continue DOAC, Pt BP borderline tonight in AM may consider redosing lopressor to 12.5 BID from 25 BID. Continue Cardizem     Renal: Alvin likely from ATN.  Continue to monitor and encourage PO    GI: PO Diet    ENDO: Lantus and RISS, Synthroid    ID:  Cefepime and Z-Max-mycoplasma + ppx bactrim on immunosuppressive drugs     HEME: apixaban     Critical Care time: 40 mins assessing presenting problems of acute illness that poses high probability of life threatening deterioration or end organ damage/dysfunction.  Medical decision making including Initiating plan of care, reviewing data, reviewing radiology, direct patient bedside evaluation and interpretation of vital signs, any necessary ventilator management , discussion with multidisciplinary team, discussing goals of care with patient/family, all non inclusive of procedures

## 2021-02-12 NOTE — PROGRESS NOTE ADULT - SUBJECTIVE AND OBJECTIVE BOX
PCP:    REQUESTING PHYSICIAN:    REASON FOR CONSULT:    CHIEF COMPLAINT:    HPI:  6 y/o F PMHx significant for Hypertension, Diabetes mellitus type 2, vertigo, anal [place] [place] therapy with ipilimumab + Nivolumab (cycle 2 on 1/4/2021), and recent admission from 1/19-1/25 for new onset Afib (now on DOAC therapy) and recent admission in [facility] for COVID-pneumonia (reportedly treated with MAB, Remdesivir and steroid therapy) presents to  for further evaluation and management of shortness of breath - persistent x 1-2 weeks but acutely worse 1-2 days prior to admission; exacerbated with exertion; no clear identifiable alleviating factors; no associated angina  In triage the patient was hypoxic; CT chest revealed extensive bilateral pneumonias and she was admitted to the ICU with acute hypoxemic respiratory failure.  There is no past history of ischemic heart disease; no clear anginal symptoms.  2/12/21: Pt complains of midsternal chest pain today. ECG suggests STEMI    PAST MEDICAL & SURGICAL HISTORY:  Primary malignant melanoma of anal canal    Afib    DM (diabetes mellitus)  Type 2    HTN (hypertension)    Vertigo    No significant past surgical history        SOCIAL HISTORY:    FAMILY HISTORY:  No pertinent family history in first degree relatives        ALLERGIES:  Allergies    barbiturates (Unknown)  Cipro (Other; Swelling)  iodine (Unknown)    Intolerances        MEDICATIONS:    MEDICATIONS  (STANDING):  apixaban 5 milliGRAM(s) Oral every 12 hours  aspirin  chewable 325 milliGRAM(s) Oral once  atorvastatin 40 milliGRAM(s) Oral at bedtime  azithromycin  IVPB      azithromycin  IVPB 500 milliGRAM(s) IV Intermittent every 24 hours  cefepime   IVPB 2000 milliGRAM(s) IV Intermittent every 12 hours  cefepime   IVPB      clopidogrel Tablet 75 milliGRAM(s) Oral daily  dexAMETHasone  Injectable 6 milliGRAM(s) IV Push daily  dextrose 40% Gel 15 Gram(s) Oral once  dextrose 5%. 1000 milliLiter(s) (50 mL/Hr) IV Continuous <Continuous>  dextrose 5%. 1000 milliLiter(s) (100 mL/Hr) IV Continuous <Continuous>  dextrose 50% Injectable 25 Gram(s) IV Push once  dextrose 50% Injectable 12.5 Gram(s) IV Push once  dextrose 50% Injectable 25 Gram(s) IV Push once  diltiazem    milliGRAM(s) Oral daily  glucagon  Injectable 1 milliGRAM(s) IntraMuscular once  insulin glargine Injectable (LANTUS) 10 Unit(s) SubCutaneous at bedtime  insulin lispro (ADMELOG) corrective regimen sliding scale   SubCutaneous three times a day before meals  insulin lispro (ADMELOG) corrective regimen sliding scale   SubCutaneous at bedtime  levothyroxine 75 MICROGram(s) Oral daily  pantoprazole    Tablet 40 milliGRAM(s) Oral before breakfast  trimethoprim  160 mG/sulfamethoxazole 800 mG 1 Tablet(s) Oral <User Schedule>    MEDICATIONS  (PRN):  acetaminophen   Tablet .. 650 milliGRAM(s) Oral every 4 hours PRN Temp greater or equal to 38.5C (101.3F)  ALBUTerol    90 MICROgram(s) HFA Inhaler 2 Puff(s) Inhalation every 4 hours PRN Shortness of Breath and/or Wheezing  ALPRAZolam 0.125 milliGRAM(s) Oral every 6 hours PRN anxiety  loperamide 2 milliGRAM(s) Oral every 4 hours PRN Diarrhea  ondansetron Injectable 4 milliGRAM(s) IV Push every 6 hours PRN Nausea and/or Vomiting        Vital Signs Last 24 Hrs  T(C): 37.1 (12 Feb 2021 09:00), Max: 37.7 (11 Feb 2021 17:00)  T(F): 98.7 (12 Feb 2021 09:00), Max: 99.9 (11 Feb 2021 17:00)  HR: 79 (12 Feb 2021 14:55) (51 - 85)  BP: 124/46 (12 Feb 2021 14:43) (95/37 - 134/101)  BP(mean): 64 (12 Feb 2021 14:43) (50 - 109)  RR: 31 (12 Feb 2021 14:55) (19 - 31)  SpO2: 94% (12 Feb 2021 14:55) (80% - 99%)Daily     Daily I&O's Summary    11 Feb 2021 07:01  -  12 Feb 2021 07:00  --------------------------------------------------------  IN: 100 mL / OUT: 1000 mL / NET: -900 mL        PHYSICAL EXAM:    Constitutional: NAD, awake and alert, well-developed  HEENT: PERR, EOMI,  No oral cyananosis.  Neck:  supple,  No JVD  Respiratory: Breath sounds are clear bilaterally, No wheezing, rales or rhonchi  Cardiovascular: S1 and S2, regular rate and rhythm, no Murmurs, gallops or rubs  Gastrointestinal: Bowel Sounds present, soft, nontender.   Extremities: No peripheral edema. No clubbing or cyanosis.  Vascular: 2+ peripheral pulses  Neurological: A/O x 3, no focal deficits  Musculoskeletal: no calf tenderness.  Skin: No rashes.      LABS: All Labs Reviewed:                        8.1    13.37 )-----------( 334      ( 12 Feb 2021 06:44 )             25.7                         8.4    17.44 )-----------( 344      ( 11 Feb 2021 06:45 )             27.8                         9.1    21.24 )-----------( 403      ( 10 Feb 2021 14:01 )             29.1     12 Feb 2021 06:44    138    |  108    |  32     ----------------------------<  92     4.2     |  25     |  1.19   11 Feb 2021 06:45    138    |  107    |  32     ----------------------------<  173    4.4     |  24     |  1.34   10 Feb 2021 14:01    136    |  104    |  31     ----------------------------<  105    4.1     |  24     |  1.33     Ca    8.5        12 Feb 2021 06:44  Ca    8.2        11 Feb 2021 06:45  Ca    8.3        10 Feb 2021 14:01  Phos  3.4       12 Feb 2021 06:44  Phos  3.5       11 Feb 2021 06:45  Mg     2.4       12 Feb 2021 06:44  Mg     2.3       11 Feb 2021 06:45    TPro  5.6    /  Alb  1.7    /  TBili  0.3    /  DBili  x      /  AST  48     /  ALT  54     /  AlkPhos  101    11 Feb 2021 06:45  TPro  6.1    /  Alb  1.9    /  TBili  0.3    /  DBili  x      /  AST  64     /  ALT  67     /  AlkPhos  115    10 Feb 2021 14:01      CARDIAC MARKERS ( 12 Feb 2021 14:42 )  0.508 ng/mL / x     / x     / x     / x      CARDIAC MARKERS ( 10 Feb 2021 23:37 )  0.095 ng/mL / x     / x     / x     / x      CARDIAC MARKERS ( 10 Feb 2021 20:37 )  0.098 ng/mL / x     / x     / x     / x          Blood Culture: Organism --  Gram Stain Blood -- Gram Stain --  Specimen Source .Blood None  Culture-Blood --            RADIOLOGY/EKG: NSR ST T changes c/w STEMI      ECHO/CARDIAC CATHTERIZATION/STRESS TEST:  < from: Transthoracic Echocardiogram Follow Up (01.22.21 @ 10:03) >   Impression     Summary     Limited study to asses left ventricular function.   Estimated left ventricular ejection fraction is 60-65 %.     Signature     ----------------------------------------------------------------   Electronically signed by Alfonso Fisher MD(Interpreting   physician) on 01/22/2021 06:30 PM   ----------------------------------------------------------------      < end of copied text >

## 2021-02-13 NOTE — PROGRESS NOTE ADULT - SUBJECTIVE AND OBJECTIVE BOX
HPI:  76 y/o F PMHx significant for Hypertension, Diabetes mellitus type 2, vertigo, anal melanoma under care of Dr. Shearer [place] [place] combined IO therapy with ipilimumab + Nivolumab (cycle 2 on 1/4/2021), and recent admission from 1/19-1/25 for new onset Afib (now on DOAC therapy) and recent admission in [facility] for COVID-pneumonia (reportedly treated with MAB, Remdesivir and steroid therapy) presents to  for further evaluation and management of worsening shortness of breath. In triage the patient was notably hypoxic SpO2 => 80% with initial improvement of SaO2 to 100 via NRB (per ED documentation).  Chanelle developed CP yesterday 2/12/21 at 2pm; EKG with ST elevation in leads II III AVF, recommended for cardiac cath.     ASA class: III  Creatinine: 1.19  GFR: 44  Bleeding  Risk score: 12.0%    PAST MEDICAL & SURGICAL HISTORY:  Primary malignant melanoma of anal canal    Afib    Melanoma    DM (diabetes mellitus)  Type 2    HTN (hypertension)    Vertigo    No significant past surgical history        MEDICATIONS  (STANDING):  apixaban 5 milliGRAM(s) Oral every 12 hours  atorvastatin 80 milliGRAM(s) Oral at bedtime  azithromycin  IVPB      azithromycin  IVPB 500 milliGRAM(s) IV Intermittent every 24 hours  cefepime   IVPB 2000 milliGRAM(s) IV Intermittent every 12 hours  cefepime   IVPB      clopidogrel Tablet 75 milliGRAM(s) Oral daily  dexAMETHasone  Injectable 6 milliGRAM(s) IV Push daily  dextrose 40% Gel 15 Gram(s) Oral once  dextrose 5%. 1000 milliLiter(s) (50 mL/Hr) IV Continuous <Continuous>  dextrose 5%. 1000 milliLiter(s) (100 mL/Hr) IV Continuous <Continuous>  dextrose 50% Injectable 25 Gram(s) IV Push once  dextrose 50% Injectable 12.5 Gram(s) IV Push once  dextrose 50% Injectable 25 Gram(s) IV Push once  diltiazem    milliGRAM(s) Oral daily  glucagon  Injectable 1 milliGRAM(s) IntraMuscular once  insulin glargine Injectable (LANTUS) 15 Unit(s) SubCutaneous at bedtime  insulin lispro (ADMELOG) corrective regimen sliding scale   SubCutaneous three times a day before meals  insulin lispro (ADMELOG) corrective regimen sliding scale   SubCutaneous at bedtime  levothyroxine 75 MICROGram(s) Oral daily  metoprolol tartrate Injectable 2.5 milliGRAM(s) IV Push every 6 hours  pantoprazole    Tablet 40 milliGRAM(s) Oral before breakfast  sodium chloride 0.45%. 1000 milliLiter(s) (50 mL/Hr) IV Continuous <Continuous>  trimethoprim  160 mG/sulfamethoxazole 800 mG 1 Tablet(s) Oral <User Schedule>    MEDICATIONS  (PRN):  acetaminophen   Tablet .. 650 milliGRAM(s) Oral every 4 hours PRN Temp greater or equal to 38.5C (101.3F)  ALBUTerol    90 MICROgram(s) HFA Inhaler 2 Puff(s) Inhalation every 4 hours PRN Shortness of Breath and/or Wheezing  ALPRAZolam 0.125 milliGRAM(s) Oral every 6 hours PRN anxiety  loperamide 2 milliGRAM(s) Oral every 4 hours PRN Diarrhea  ondansetron Injectable 4 milliGRAM(s) IV Push every 6 hours PRN Nausea and/or Vomiting      Allergies    barbiturates (Unknown)  Cipro (Other; Swelling)  iodine (Unknown)      Vital Signs Last 24 Hrs  T(C): 36.6 (13 Feb 2021 04:00), Max: 36.6 (13 Feb 2021 04:00)  T(F): 97.9 (13 Feb 2021 04:00), Max: 97.9 (13 Feb 2021 04:00)  HR: 62 (13 Feb 2021 08:00) (58 - 88)  BP: 121/45 (13 Feb 2021 08:00) (104/81 - 136/50)  BP(mean): 64 (13 Feb 2021 08:00) (21 - 87)  RR: 24 (13 Feb 2021 08:00) (16 - 32)  SpO2: 98% (13 Feb 2021 08:00) (75% - 100%)    PHYSICAL EXAM:  GENERAL: NAD, well-groomed, well-developed  HEAD:  Atraumatic, Normocephalic  EYES: EOMI, PERRLA, conjunctiva and sclera clear  ENMT: No tonsillar erythema, exudates, or enlargement; Moist mucous membranes, Good dentition, No lesions  NECK: Supple, No JVD, Normal thyroid  NERVOUS SYSTEM:  Alert & Oriented X3, Good concentration; Motor Strength 5/5 B/L upper and lower extremities;   CHEST/LUNG: Clear to percussion bilaterally; No rales, rhonchi, wheezing, or rubs  HEART: Regular rate and rhythm; No murmurs, rubs, or gallops  ABDOMEN: Soft, Nontender, Nondistended; Bowel sounds present  EXTREMITIES:  2+ Peripheral Pulses, No clubbing, cyanosis, or edema  R wrist : bandage removed, + radial and ulnar pulses, normal cap refill    LABS:                        8.1    10.37 )-----------( 315      ( 13 Feb 2021 07:10 )             26.4     02-13    137  |  106  |  31<H>  ----------------------------<  228<H>  4.9   |  24  |  1.18    Ca    8.3<L>      13 Feb 2021 07:10  Phos  3.0     02-13  Mg     2.1     02-13      EKG 2/13/2021 - normal sinus rhythm 63bpm, Quin 188ms, QRS 82ms, QTc 419ms

## 2021-02-13 NOTE — PROGRESS NOTE ADULT - SUBJECTIVE AND OBJECTIVE BOX
PCP:    REQUESTING PHYSICIAN:    REASON FOR CONSULT:    CHIEF COMPLAINT:    HPI:  6 y/o F PMHx significant for Hypertension, Diabetes mellitus type 2, vertigo, anal melanoma under care of Dr. Shearer ([facility] [place]) on combined IO therapy with ipilimumab + Nivolumab (cycle 2 on 1/4/2021), and recent admission from 1/19-1/25 for new onset Afib (now on DOAC therapy) and recent admission in [facility] for COVID-pneumonia (reportedly treated with MAB, Remdesivir and steroid therapy) presents to  for further evaluation and management of shortness of breath - persistent x 1-2 weeks but acutely worse 1-2 days prior to admission; exacerbated with exertion; no clear identifiable alleviating factors; no associated angina  In triage the patient was hypoxic; CT chest revealed extensive bilateral pneumonias and she was admitted to the ICU with acute hypoxemic respiratory failure.  There is no past history of ischemic heart disease; no clear anginal symptoms.  2/12/21: Pt complains of midsternal chest pain today. ECG suggests STEMI  2/13/21: S/P PCI RCA yesterday. Pain free today. VSS. Pt feels improved but remains dyspneic    PAST MEDICAL & SURGICAL HISTORY:  Primary malignant melanoma of anal canal    Afib    DM (diabetes mellitus)  Type 2    HTN (hypertension)    Vertigo    No significant past surgical history        SOCIAL HISTORY:    FAMILY HISTORY:  No pertinent family history in first degree relatives        ALLERGIES:  Allergies    barbiturates (Unknown)  Cipro (Other; Swelling)  iodine (Unknown)    Intolerances        MEDICATIONS:    MEDICATIONS  (STANDING):  apixaban 5 milliGRAM(s) Oral every 12 hours  atorvastatin 80 milliGRAM(s) Oral at bedtime  azithromycin  IVPB      azithromycin  IVPB 500 milliGRAM(s) IV Intermittent every 24 hours  cefepime   IVPB 2000 milliGRAM(s) IV Intermittent every 12 hours  cefepime   IVPB      clopidogrel Tablet 75 milliGRAM(s) Oral daily  dexAMETHasone  Injectable 6 milliGRAM(s) IV Push daily  dextrose 40% Gel 15 Gram(s) Oral once  dextrose 5%. 1000 milliLiter(s) (50 mL/Hr) IV Continuous <Continuous>  dextrose 5%. 1000 milliLiter(s) (100 mL/Hr) IV Continuous <Continuous>  dextrose 50% Injectable 25 Gram(s) IV Push once  dextrose 50% Injectable 12.5 Gram(s) IV Push once  dextrose 50% Injectable 25 Gram(s) IV Push once  diltiazem    milliGRAM(s) Oral daily  glucagon  Injectable 1 milliGRAM(s) IntraMuscular once  insulin glargine Injectable (LANTUS) 15 Unit(s) SubCutaneous at bedtime  insulin lispro (ADMELOG) corrective regimen sliding scale   SubCutaneous three times a day before meals  insulin lispro (ADMELOG) corrective regimen sliding scale   SubCutaneous at bedtime  levothyroxine 75 MICROGram(s) Oral daily  metoprolol tartrate 12.5 milliGRAM(s) Oral every 8 hours  pantoprazole    Tablet 40 milliGRAM(s) Oral before breakfast  trimethoprim  160 mG/sulfamethoxazole 800 mG 1 Tablet(s) Oral <User Schedule>    MEDICATIONS  (PRN):  acetaminophen   Tablet .. 650 milliGRAM(s) Oral every 4 hours PRN Temp greater or equal to 38.5C (101.3F)  ALBUTerol    90 MICROgram(s) HFA Inhaler 2 Puff(s) Inhalation every 4 hours PRN Shortness of Breath and/or Wheezing  ALPRAZolam 0.125 milliGRAM(s) Oral every 6 hours PRN anxiety  loperamide 2 milliGRAM(s) Oral every 4 hours PRN Diarrhea  ondansetron Injectable 4 milliGRAM(s) IV Push every 6 hours PRN Nausea and/or Vomiting      Vital Signs Last 24 Hrs  T(C): 36.6 (13 Feb 2021 12:00), Max: 36.6 (13 Feb 2021 04:00)  T(F): 97.8 (13 Feb 2021 12:00), Max: 97.9 (13 Feb 2021 04:00)  HR: 78 (13 Feb 2021 13:00) (58 - 88)  BP: 95/67 (13 Feb 2021 13:00) (95/67 - 136/50)  BP(mean): 74 (13 Feb 2021 13:00) (21 - 87)  RR: 22 (13 Feb 2021 13:00) (0 - 32)  SpO2: 98% (13 Feb 2021 13:00) (75% - 100%)Daily     Daily I&O's Summary    12 Feb 2021 07:01  -  13 Feb 2021 07:00  --------------------------------------------------------  IN: 950 mL / OUT: 925 mL / NET: 25 mL    13 Feb 2021 07:01  -  13 Feb 2021 13:55  --------------------------------------------------------  IN: 0 mL / OUT: 500 mL / NET: -500 mL        PHYSICAL EXAM:    Constitutional: NAD, awake and alert, well-developed  HEENT: PERR, EOMI,  No oral cyananosis.  Neck:  supple,  No JVD  Respiratory: Breath sounds are clear bilaterally, No wheezing, rales or rhonchi  Cardiovascular: S1 and S2, regular rate and rhythm, no Murmurs, gallops or rubs  Gastrointestinal: Bowel Sounds present, soft, nontender.   Extremities: No peripheral edema. No clubbing or cyanosis.  Vascular: 2+ peripheral pulses  Neurological: A/O x 3, no focal deficits  Musculoskeletal: no calf tenderness.  Skin: No rashes.      LABS: All Labs Reviewed:                        8.1    10.37 )-----------( 315      ( 13 Feb 2021 07:10 )             26.4                         8.1    13.37 )-----------( 334      ( 12 Feb 2021 06:44 )             25.7                         8.4    17.44 )-----------( 344      ( 11 Feb 2021 06:45 )             27.8     13 Feb 2021 07:10    137    |  106    |  31     ----------------------------<  228    4.9     |  24     |  1.18   12 Feb 2021 06:44    138    |  108    |  32     ----------------------------<  92     4.2     |  25     |  1.19   11 Feb 2021 06:45    138    |  107    |  32     ----------------------------<  173    4.4     |  24     |  1.34     Ca    8.3        13 Feb 2021 07:10  Ca    8.5        12 Feb 2021 06:44  Ca    8.2        11 Feb 2021 06:45  Phos  3.0       13 Feb 2021 07:10  Phos  3.4       12 Feb 2021 06:44  Phos  3.5       11 Feb 2021 06:45  Mg     2.1       13 Feb 2021 07:10  Mg     2.4       12 Feb 2021 06:44  Mg     2.3       11 Feb 2021 06:45    TPro  5.6    /  Alb  1.7    /  TBili  0.3    /  DBili  x      /  AST  48     /  ALT  54     /  AlkPhos  101    11 Feb 2021 06:45  TPro  6.1    /  Alb  1.9    /  TBili  0.3    /  DBili  x      /  AST  64     /  ALT  67     /  AlkPhos  115    10 Feb 2021 14:01      CARDIAC MARKERS ( 13 Feb 2021 07:10 )  36.700 ng/mL / x     / x     / x     / x      CARDIAC MARKERS ( 13 Feb 2021 00:51 )  60.400 ng/mL / x     / x     / x     / x      CARDIAC MARKERS ( 12 Feb 2021 14:42 )  0.508 ng/mL / x     / x     / x     / x          Blood Culture: Organism --  Gram Stain Blood -- Gram Stain --  Specimen Source .Blood None  Culture-Blood --            RADIOLOGY/EKG:      ECHO/CARDIAC CATHTERIZATION/STRESS TEST:

## 2021-02-13 NOTE — PROGRESS NOTE ADULT - ASSESSMENT
74 y/o F PMHx significant for Hypertension, Diabetes mellitus type 2, vertigo, anal melanoma [place]. Shearer [place] [place] combined IO therapy with ipilimumab + Nivolumab (cycle 2 on 1/4/2021), and recent admission from 1/19-1/25 for new onset Afib (now on DOAC therapy) admitted for COVID PNA,  developed acute chest pain yesterday EKG with ST elevations inferior leads, s/p cardiac cath yesterday with GIFTY to RCA  Repiratory tenuous overnight requiring high flow O2 vir NRB mask ,       Plan  -continue ICU care   - VS stable , H/H stable  - started pm  Plavix 75mg daily and lipitor 80mg QHS  - continue Eliquis 5mg BID for stroke ppx  - EKG this am stable, cont daily EKGs  - rest of care per ICU team.

## 2021-02-13 NOTE — PROGRESS NOTE ADULT - SUBJECTIVE AND OBJECTIVE BOX
Patient is a 75y old  Female who presents with a chief complaint of Shortness of Breath (12 Feb 2021 15:53)      BRIEF HOSPITAL COURSE: 75y Female pmhx HTN, DM, Vertigo, melanoma (on IO therapy with ipilmunam + Nivolumab), Afib on DOAC presented with COVID-19 Viral pneumonia and acute hypoxic respiratory failure.    Events last 24 hours: Developed STEMI earlier today, taken emergently to cath lab w/ GIFTY to RCA. Tonight patient took off her high flow nasal cannula and desaturated to 50's. Replaced and 100% NRB added in addition to highflow for hypoxia    PAST MEDICAL & SURGICAL HISTORY:  Primary malignant melanoma of anal canal    Afib    DM (diabetes mellitus)  Type 2    HTN (hypertension)    Vertigo    No significant past surgical history          Hosp day #3d    Vent day #        Vital signs / Reviewed and Physical Exam Performed where pertinent and urgently required    Lab / Radiology  studies / ABG / Meds -  reviewed and interpreted into the assessment and treatment plan.      Assessment/Plan/Therapeutic interventions      Neuro - Sedation neuromuscular blockade to facilitate safe ventilation    CV -  Acute STEMI w/ GIFTY to RCA. Plavix/Lipitor. Lopressor for AF; full AC w/ Eliquis.    Pulm -  Acute hypoxic respiratory failure w/ ARDS. Removed high flow NC and desaturated quickly. Now on high flow NC 65L 100%FiO2 w/ 100% NRB. High risk of intubation.    GI -  Feeds as tolerated    Renal - Even to negative fluid balance as tolerated by hemodynamics and renal fx.     Heme -  Full AC w/ Eliquis    ID - COVID-19 pneumonia s/p Decadron. Pt. on immunosuppressants coverage for underlying bacterial PNA w/ ABX Azithro/Cefepime/Bactrim discontinuation based on discussion with ID in conjunction with clinical features, culture data, and judicious procalcitonin monitoring.      Endo -  Aggressive glycemic control to limit FS glucose to < 180mg/dl.      COVID 19 specific considerations and therapeutic  options based on the available and rapidly changing literature    Goals of care considerations:  Ongoing assessment for patient specific treatment options based on progression or decline.  I have involved the family with updates and requests in guidance for medical decision making.          38  Minutes of critical care tiem spent in the management of this critically ill COVID-19 patient/PUI patient with continuous assessments and interventions based on the interpretation of multiple databases.   Patient is a 75y old  Female who presents with a chief complaint of Shortness of Breath (12 Feb 2021 15:53)      BRIEF HOSPITAL COURSE: 75y Female pmhx HTN, DM, Vertigo, melanoma (on IO therapy with ipilmunam + Nivolumab), Afib on DOAC presented with COVID-19 Viral pneumonia and acute hypoxic respiratory failure.    Events last 24 hours: Developed STEMI earlier today, taken emergently to cath lab w/ GIFTY to RCA. Tonight patient took off her high flow nasal cannula and desaturated to 50's. Replaced and 100% NRB added in addition to highflow for hypoxia    PAST MEDICAL & SURGICAL HISTORY:  Primary malignant melanoma of anal canal    Afib    DM (diabetes mellitus)  Type 2    HTN (hypertension)    Vertigo    No significant past surgical history          Hosp day #3d    Vent day #        Vital signs / Reviewed and Physical Exam Performed where pertinent and urgently required    Lab / Radiology  studies / ABG / Meds -  reviewed and interpreted into the assessment and treatment plan.      Assessment/Plan/Therapeutic interventions    CV -  Acute STEMI w/ GIFTY to RCA. Plavix/Lipitor. Lopressor for AF; full AC w/ Eliquis.    Pulm -  Acute hypoxic respiratory failure w/ ARDS. Removed high flow NC and desaturated quickly. Now on high flow NC 65L 100%FiO2 w/ 100% NRB. High risk of intubation.    GI -  Feeds as tolerated    Renal - Even to negative fluid balance as tolerated by hemodynamics and renal fx.     Heme -  Full AC w/ Eliquis    ID - COVID-19 pneumonia s/p Decadron. Pt. on immunosuppressants coverage for underlying bacterial PNA w/ ABX Azithro/Cefepime/Bactrim discontinuation based on discussion with ID in conjunction with clinical features, culture data, and judicious procalcitonin monitoring.      Endo -  Aggressive glycemic control to limit FS glucose to < 180mg/dl.      COVID 19 specific considerations and therapeutic  options based on the available and rapidly changing literature    Goals of care considerations:  Ongoing assessment for patient specific treatment options based on progression or decline.  I have involved the family with updates and requests in guidance for medical decision making.          38  Minutes of critical care tiem spent in the management of this critically ill COVID-19 patient/PUI patient with continuous assessments and interventions based on the interpretation of multiple databases.

## 2021-02-13 NOTE — PROGRESS NOTE ADULT - ASSESSMENT
A/P: 74 year old female who presents with Acute Hypoxic Respiratory failure possible from COVID 19 Pneumonia and or a secondary superimposed Pneumonia  AFIB  CISCO  anal melanomia  DM II  hypothyroidism  STEMI        PLAN:  CCU    PULM: Will continue HF NC, continue Cefepime and Z-Max    Cardio: A-FIB.  Will Continue DOAC, STEMI-- Plavix/Statin, change to Po lopressor    Renal: Cisco likely from ATN.  CISCO improved, D/C IVF    GI: PO Diet    ENDO: Lantus and RISS, Synthroid    ID: May be a late flair of COVID 19, on Decadron, Continue Cefepime/Z-Max, Bactrim prophylaxis    D/W the patients family at length

## 2021-02-13 NOTE — PROGRESS NOTE ADULT - SUBJECTIVE AND OBJECTIVE BOX
HPI:     74 y/o F PMHx significant for Hypertension, Diabetes mellitus type 2, vertigo, anal melanoma under care of Dr. Shearer ([facility] [place]) on combined IO therapy with ipilimumab + Nivolumab (cycle 2 on 1/4/2021), and recent admission from 1/19-1/25 for new onset Afib (now on DOAC therapy) and [facility] (reportedly treated with MAB, Remdesivir and steroid therapy) presents to  for further evaluation and management of worsening shortness of breath.    Pt started to desaturate into 80's  HFNC and [id number]% - more tachypneic. pt seemingly very anxious . States "shes scared." Will admit pt to the ICU for acute hypoxic respiratory, COVID PNA and ? superimposed PNA     2/11: Patient seen in bed, on HF NC, still with a nonproductive cough  Primary malignant melanoma of anal canal  2/12: Patient seen in bed and OOB to the chair this afternoon.  This afternoon she developed SSCP radiating to her back.  EKG revealed an STEMI  2/13: No CP, tolerating PO, and remains on HF NC          PAST MEDICAL & SURGICAL HISTORY:  Primary malignant melanoma of anal canal    Afib    DM (diabetes mellitus)  Type 2    HTN (hypertension)    Vertigo    No significant past surgical history        FAMILY HISTORY:  No pertinent family history in first degree relatives        Social Hx:    Allergies    barbiturates (Unknown)  Cipro (Other; Swelling)  iodine (Unknown)    Intolerances            ICU Vital Signs Last 24 Hrs  T(C): 36.6 (13 Feb 2021 04:00), Max: 36.6 (13 Feb 2021 04:00)  T(F): 97.9 (13 Feb 2021 04:00), Max: 97.9 (13 Feb 2021 04:00)  HR: 62 (13 Feb 2021 08:00) (58 - 88)  BP: 121/45 (13 Feb 2021 08:00) (104/81 - 136/50)  BP(mean): 64 (13 Feb 2021 08:00) (21 - 87)  ABP: --  ABP(mean): --  RR: 24 (13 Feb 2021 08:00) (16 - 32)  SpO2: 98% (13 Feb 2021 08:00) (75% - 100%)          I&O's Summary    12 Feb 2021 07:01  -  13 Feb 2021 07:00  --------------------------------------------------------  IN: 950 mL / OUT: 925 mL / NET: 25 mL                              8.1    10.37 )-----------( 315      ( 13 Feb 2021 07:10 )             26.4       02-13    137  |  106  |  31<H>  ----------------------------<  228<H>  4.9   |  24  |  1.18    Ca    8.3<L>      13 Feb 2021 07:10  Phos  3.0     02-13  Mg     2.1     02-13        CARDIAC MARKERS ( 13 Feb 2021 07:10 )  36.700 ng/mL / x     / x     / x     / x      CARDIAC MARKERS ( 13 Feb 2021 00:51 )  60.400 ng/mL / x     / x     / x     / x      CARDIAC MARKERS ( 12 Feb 2021 14:42 )  0.508 ng/mL / x     / x     / x     / x                    MEDICATIONS  (STANDING):  apixaban 5 milliGRAM(s) Oral every 12 hours  atorvastatin 80 milliGRAM(s) Oral at bedtime  azithromycin  IVPB      azithromycin  IVPB 500 milliGRAM(s) IV Intermittent every 24 hours  cefepime   IVPB 2000 milliGRAM(s) IV Intermittent every 12 hours  cefepime   IVPB      clopidogrel Tablet 75 milliGRAM(s) Oral daily  dexAMETHasone  Injectable 6 milliGRAM(s) IV Push daily  dextrose 40% Gel 15 Gram(s) Oral once  dextrose 5%. 1000 milliLiter(s) (50 mL/Hr) IV Continuous <Continuous>  dextrose 5%. 1000 milliLiter(s) (100 mL/Hr) IV Continuous <Continuous>  dextrose 50% Injectable 25 Gram(s) IV Push once  dextrose 50% Injectable 12.5 Gram(s) IV Push once  dextrose 50% Injectable 25 Gram(s) IV Push once  diltiazem    milliGRAM(s) Oral daily  glucagon  Injectable 1 milliGRAM(s) IntraMuscular once  insulin glargine Injectable (LANTUS) 15 Unit(s) SubCutaneous at bedtime  insulin lispro (ADMELOG) corrective regimen sliding scale   SubCutaneous three times a day before meals  insulin lispro (ADMELOG) corrective regimen sliding scale   SubCutaneous at bedtime  levothyroxine 75 MICROGram(s) Oral daily  metoprolol tartrate Injectable 2.5 milliGRAM(s) IV Push every 6 hours  pantoprazole    Tablet 40 milliGRAM(s) Oral before breakfast  sodium chloride 0.45%. 1000 milliLiter(s) (50 mL/Hr) IV Continuous <Continuous>  trimethoprim  160 mG/sulfamethoxazole 800 mG 1 Tablet(s) Oral <User Schedule>    MEDICATIONS  (PRN):  acetaminophen   Tablet .. 650 milliGRAM(s) Oral every 4 hours PRN Temp greater or equal to 38.5C (101.3F)  ALBUTerol    90 MICROgram(s) HFA Inhaler 2 Puff(s) Inhalation every 4 hours PRN Shortness of Breath and/or Wheezing  ALPRAZolam 0.125 milliGRAM(s) Oral every 6 hours PRN anxiety  loperamide 2 milliGRAM(s) Oral every 4 hours PRN Diarrhea  ondansetron Injectable 4 milliGRAM(s) IV Push every 6 hours PRN Nausea and/or Vomiting      DVT Prophylaxis:    Advanced Directives:  Discussed with:    Visit Information: 30 min    ** Time is exclusive of billed procedures and/or teaching and/or routine family updates.

## 2021-02-14 NOTE — PROGRESS NOTE ADULT - SUBJECTIVE AND OBJECTIVE BOX
HPI:     74 y/o F PMHx significant for Hypertension, Diabetes mellitus type 2, vertigo, anal melanoma under care of Dr. Shearer ([facility] [place]) on combined IO therapy with ipilimumab + Nivolumab (cycle 2 on 1/4/2021), and recent admission from 1/19-1/25 for new onset Afib (now on DOAC therapy) and recent admission in [facility] for COVID-pneumonia (reportedly treated with MAB, Remdesivir and steroid therapy) presents to  for further evaluation and management of worsening shortness of breath.    Pt started to desaturate into 80's  HFNC and [id number]% - more tachypneic. pt seemingly very anxious . States "shes scared." Will admit pt to the ICU for acute hypoxic respiratory, COVID PNA and ? superimposed PNA     2/11: Patient seen in bed, on HF NC, still with a nonproductive cough  Primary malignant melanoma of anal canal  2/12: Patient seen in bed and OOB to the chair this afternoon.  This afternoon she developed SSCP radiating to her back.  EKG revealed an STEMI  2/13: No CP, tolerating PO, and remains on HF NC  2/14: No events over night, no arrhythmias, no CP, on 100% NRB        PAST MEDICAL & SURGICAL HISTORY:  Primary malignant melanoma of anal canal    Afib    DM (diabetes mellitus)  Type 2    HTN (hypertension)    Vertigo    No significant past surgical history        FAMILY HISTORY:  No pertinent family history in first degree relatives        Social Hx:    Allergies    barbiturates (Unknown)  Cipro (Other; Swelling)  iodine (Unknown)    Intolerances            ICU Vital Signs Last 24 Hrs  T(C): 36.1 (14 Feb 2021 04:00), Max: 37.2 (13 Feb 2021 17:00)  T(F): 97 (14 Feb 2021 04:00), Max: 98.9 (13 Feb 2021 17:00)  HR: 67 (14 Feb 2021 14:00) (58 - 75)  BP: 107/38 (14 Feb 2021 13:00) (100/39 - 131/44)  BP(mean): 53 (14 Feb 2021 13:00) (22 - 98)  ABP: --  ABP(mean): --  RR: 30 (14 Feb 2021 14:00) (17 - 30)  SpO2: 99% (14 Feb 2021 14:00) (85% - 100%)          I&O's Summary    13 Feb 2021 07:01  -  14 Feb 2021 07:00  --------------------------------------------------------  IN: 1360 mL / OUT: 1050 mL / NET: 310 mL                              7.6    16.52 )-----------( 348      ( 14 Feb 2021 05:39 )             24.9       02-14    141  |  108  |  36<H>  ----------------------------<  115<H>  4.2   |  26  |  1.11    Ca    8.2<L>      14 Feb 2021 05:39  Phos  2.1     02-14  Mg     2.2     02-14        CARDIAC MARKERS ( 13 Feb 2021 15:08 )  18.900 ng/mL / x     / x     / x     / x      CARDIAC MARKERS ( 13 Feb 2021 07:10 )  36.700 ng/mL / x     / x     / x     / x      CARDIAC MARKERS ( 13 Feb 2021 00:51 )  60.400 ng/mL / x     / x     / x     / x      CARDIAC MARKERS ( 12 Feb 2021 14:42 )  0.508 ng/mL / x     / x     / x     / x                    MEDICATIONS  (STANDING):  apixaban 5 milliGRAM(s) Oral every 12 hours  aspirin  chewable 81 milliGRAM(s) Oral daily  atorvastatin 80 milliGRAM(s) Oral at bedtime  azithromycin  IVPB      azithromycin  IVPB 500 milliGRAM(s) IV Intermittent every 24 hours  cefepime   IVPB 2000 milliGRAM(s) IV Intermittent every 12 hours  cefepime   IVPB      clopidogrel Tablet 75 milliGRAM(s) Oral daily  dexAMETHasone  Injectable 6 milliGRAM(s) IV Push daily  dextrose 40% Gel 15 Gram(s) Oral once  dextrose 5%. 1000 milliLiter(s) (50 mL/Hr) IV Continuous <Continuous>  dextrose 5%. 1000 milliLiter(s) (100 mL/Hr) IV Continuous <Continuous>  dextrose 50% Injectable 25 Gram(s) IV Push once  dextrose 50% Injectable 12.5 Gram(s) IV Push once  dextrose 50% Injectable 25 Gram(s) IV Push once  diltiazem    milliGRAM(s) Oral daily  glucagon  Injectable 1 milliGRAM(s) IntraMuscular once  insulin glargine Injectable (LANTUS) 25 Unit(s) SubCutaneous at bedtime  insulin lispro (ADMELOG) corrective regimen sliding scale   SubCutaneous Before meals and at bedtime  levothyroxine 75 MICROGram(s) Oral daily  metoprolol tartrate 12.5 milliGRAM(s) Oral every 8 hours  pantoprazole    Tablet 40 milliGRAM(s) Oral before breakfast  potassium phosphate / sodium phosphate Powder (PHOS-NaK) 2 Packet(s) Oral two times a day  trimethoprim  160 mG/sulfamethoxazole 800 mG 1 Tablet(s) Oral <User Schedule>    MEDICATIONS  (PRN):  acetaminophen   Tablet .. 650 milliGRAM(s) Oral every 4 hours PRN Temp greater or equal to 38.5C (101.3F)  ALBUTerol    90 MICROgram(s) HFA Inhaler 2 Puff(s) Inhalation every 4 hours PRN Shortness of Breath and/or Wheezing  ALPRAZolam 0.125 milliGRAM(s) Oral every 6 hours PRN anxiety  loperamide 2 milliGRAM(s) Oral every 4 hours PRN Diarrhea  ondansetron Injectable 4 milliGRAM(s) IV Push every 6 hours PRN Nausea and/or Vomiting      DVT Prophylaxis:    Advanced Directives:  Discussed with:    Visit Information: 30 min    ** Time is exclusive of billed procedures and/or teaching and/or routine family updates.

## 2021-02-14 NOTE — PROGRESS NOTE ADULT - SUBJECTIVE AND OBJECTIVE BOX
Patient is a 75y old  Female who presents with a chief complaint of Shortness of Breath (13 Feb 2021 13:54)      BRIEF HOSPITAL COURSE: 75y Female pmhx HTN, DM, Vertigo, melanoma (on IO therapy with ipilmunam + Nivolumab), Afib on DOAC presented with COVID-19 Viral pneumonia and acute hypoxic respiratory failure. 2/12 pt. developed NSTEMI with emergent cardiac cath and GIFTY to RCA.    Events last 24 hours: Episodes of coughing and desaturation tonight with 100% NRB    PAST MEDICAL & SURGICAL HISTORY:  Primary malignant melanoma of anal canal    Afib    DM (diabetes mellitus)  Type 2    HTN (hypertension)    Vertigo    No significant past surgical history          Hosp day #4d    Vent day #        Vital signs / Reviewed and Physical Exam Performed where pertinent and urgently required    Lab / Radiology  studies / ABG / Meds -  reviewed and interpreted into the assessment and treatment plan.      Assessment/Plan/Therapeutic interventions      CV -  Acute STEMI w/ GIFTY to RCA. Plavix/Lipitor. ASA added today. Lopressor for AF; full AC w/ Eliquis.    Pulm -  Acute hypoxic respiratory failure w/ ARDS. Episodes of desaturation tonight while coughing on 100% NRB. Utilize additional high flow NC as needed. Actively titrating for FiO2 >90%. High risk of intubation.    GI -  Feeds as tolerated    Renal - Even to negative fluid balance as tolerated by hemodynamics and renal fx.     Heme -  Full AC w/ Eliquis    ID - COVID-19 pneumonia s/p Decadron. Pt. on immunosuppressants coverage for underlying bacterial PNA w/ ABX Azithro/Cefepime/Bactrim discontinuation based on discussion with ID in conjunction with clinical features, culture data, and judicious procalcitonin monitoring.      Endo -  Hyperglycemic tonight; Lantus added back. Additional Lispro SS. Aggressive glycemic control to limit FS glucose to < 180mg/dl.      COVID 19 specific considerations and therapeutic  options based on the available and rapidly changing literature    Goals of care considerations:  Ongoing assessment for patient specific treatment options based on progression or decline.  I have involved the family with updates and requests in guidance for medical decision making.          38  Minutes of critical care tiem spent in the management of this critically ill COVID-19 patient/PUI patient with continuous assessments and interventions based on the interpretation of multiple databases.

## 2021-02-14 NOTE — PROGRESS NOTE ADULT - ASSESSMENT
A/P: 74 year old female who presents with Acute Hypoxic Respiratory failure possible from COVID 19 Pneumonia and or a secondary superimposed Pneumonia  AFIB  CISCO  anal melanomia  DM II  hypothyroidism  STEMI        PLAN:  CCU    PULM: Will try to change to 50% VM O2.  Continue Cefepime(5) and Z-Max(5)    Cardio: A-FIB.  Will Continue DOAC, STEMI-- Plavix/Statin/ASA, changed to Po lopressor    Renal: Cisco likely from ATN.  CISCO improved    GI: PO Diet    ENDO: Lantus and RISS, Synthroid    ID: May be a late flair of COVID 19, on Decadron, Continue Cefepime/Z-Max, Bactrim prophylaxis    D/W the patients family at length

## 2021-02-14 NOTE — PROGRESS NOTE ADULT - SUBJECTIVE AND OBJECTIVE BOX
HPI:  74 y/o F PMHx significant for Hypertension, Diabetes mellitus type 2, vertigo, anal melanoma under care of Dr. Shearer ([facility] [place]) on combined IO therapy with ipilimumab + Nivolumab (cycle 2 on 1/4/2021), and recent admission from 1/19-1/25 for new onset Afib (now on DOAC therapy) and recent admission in [facility] for COVID-pneumonia (reportedly treated with MAB, Remdesivir and steroid therapy) presents to  for further evaluation and management of worsening shortness of breath. In triage the patient was notably hypoxic SpO2 => 80% with initial improvement of SaO2 to 100 via NRB (per ED documentation).  Labs => WBC 21.24, Hgb/Hct 9.1/29.1, , D-dimer 373, BUN/Cr 31/1.33, Glu 105, Alb 1.9, AST 64, TnI 0.070. CXR => New bilateral airspace opacities right greater than left. No significant pleural effusion. Heart size cannot be accurately assessed in this projection.     2/14/21: Pt evaluated at bedside. Chest pain has resolved. Pt is alternating between HFNC and NRB.     ROS:  Gen: no fevers, chills, +fatigue  Cardiovascular: no chest pain, no palpitations, no orthopnea  Respiratory: +SOB, +cough  GI: no difficulty swallowing, no nausea, no vomiting, no abdominal pain, no diarrhea, no constipation  : no dysuria, no increased freq, no hematuria  Heme: nosebleeds  MSK: no joint pain, no joint swelling or redness, no extremity pain   Neuro: no headache, no numbness, + weakness    MEDICATIONS  (STANDING):  apixaban 5 milliGRAM(s) Oral every 12 hours  atorvastatin 40 milliGRAM(s) Oral at bedtime  azithromycin  IVPB      azithromycin  IVPB 500 milliGRAM(s) IV Intermittent every 24 hours  cefepime   IVPB 2000 milliGRAM(s) IV Intermittent every 12 hours  cefepime   IVPB      dexAMETHasone  Injectable 6 milliGRAM(s) IV Push daily  dextrose 40% Gel 15 Gram(s) Oral once  dextrose 5%. 1000 milliLiter(s) (50 mL/Hr) IV Continuous <Continuous>  dextrose 5%. 1000 milliLiter(s) (100 mL/Hr) IV Continuous <Continuous>  dextrose 50% Injectable 25 Gram(s) IV Push once  dextrose 50% Injectable 12.5 Gram(s) IV Push once  dextrose 50% Injectable 25 Gram(s) IV Push once  diltiazem    milliGRAM(s) Oral daily  glucagon  Injectable 1 milliGRAM(s) IntraMuscular once  insulin glargine Injectable (LANTUS) 10 Unit(s) SubCutaneous at bedtime  insulin lispro (ADMELOG) corrective regimen sliding scale   SubCutaneous three times a day before meals  insulin lispro (ADMELOG) corrective regimen sliding scale   SubCutaneous at bedtime  levothyroxine 75 MICROGram(s) Oral daily  pantoprazole    Tablet 40 milliGRAM(s) Oral before breakfast  trimethoprim  160 mG/sulfamethoxazole 800 mG 1 Tablet(s) Oral <User Schedule>    MEDICATIONS  (PRN):  acetaminophen   Tablet .. 650 milliGRAM(s) Oral every 4 hours PRN Temp greater or equal to 38.5C (101.3F)  ALBUTerol    90 MICROgram(s) HFA Inhaler 2 Puff(s) Inhalation every 4 hours PRN Shortness of Breath and/or Wheezing  ALPRAZolam 0.125 milliGRAM(s) Oral every 6 hours PRN anxiety  loperamide 2 milliGRAM(s) Oral every 4 hours PRN Diarrhea  ondansetron Injectable 4 milliGRAM(s) IV Push every 6 hours PRN Nausea and/or Vomiting    ICU Vital Signs Last 24 Hrs  T(C): 36.4 (12 Feb 2021 03:00), Max: 37.7 (11 Feb 2021 17:00)  T(F): 97.6 (12 Feb 2021 03:00), Max: 99.9 (11 Feb 2021 17:00)  HR: 55 (12 Feb 2021 06:00) (51 - 72)  BP: 103/33 (12 Feb 2021 06:00) (95/37 - 117/37)  BP(mean): 50 (12 Feb 2021 06:00) (50 - 71)  ABP: --  ABP(mean): --  RR: 30 (12 Feb 2021 06:00) (23 - 31)  SpO2: 99% (12 Feb 2021 06:00) (78% - 99%)    GEN: on supplemental O2, weak appearing  CV: S1S2, RRR, no murmur  RESP: coarse breath sounds bilaterally  ABD: +BS, soft, ND, NT, no guarding, no rigidity  EXT: +2 radial and pedal pulses, no edema, no calve tenderness  NEURO:  AAOx3  PSYCH: normal behavior         LABS:                        8.1    13.37 )-----------( 334      ( 12 Feb 2021 06:44 )             25.7     02-12    138  |  108  |  32<H>  ----------------------------<  92  4.2   |  25  |  1.19    Ca    8.5      12 Feb 2021 06:44  Phos  3.4     02-12  Mg     2.4     02-12    TPro  5.6<L>  /  Alb  1.7<L>  /  TBili  0.3  /  DBili  x   /  AST  48<H>  /  ALT  54  /  AlkPhos  101  02-11    CARDIAC MARKERS ( 10 Feb 2021 23:37 )  0.095 ng/mL / x     / x     / x     / x      CARDIAC MARKERS ( 10 Feb 2021 20:37 )  0.098 ng/mL / x     / x     / x     / x      CARDIAC MARKERS ( 10 Feb 2021 14:01 )  0.070 ng/mL / x     / x     / x     / x              RADIOLOGY:         < from: Xray Chest 1 View- PORTABLE-Urgent (02.10.21 @ 15:00) >  IMPRESSION:  New bilateral airspace opacities right greater than left.    No significant pleural effusion    Heart size cannot be accurately assessed in this projection.       HPI:  74 y/o F PMHx significant for Hypertension, Diabetes mellitus type 2, vertigo, anal melanoma under care of Dr. Shearer ([facility]) on combined IO therapy with ipilimumab + Nivolumab (cycle 2 on 1/4/2021), and recent admission from 1/19-1/25 for new onset Afib (now on DOAC therapy) and recent admission in [facility] for COVID-pneumonia (reportedly treated with MAB, Remdesivir and steroid therapy) presents to  for further evaluation and management of worsening shortness of breath. In triage the patient was notably hypoxic SpO2 => 80% with initial improvement of SaO2 to 100 via NRB (per ED documentation).  Labs => WBC 21.24, Hgb/Hct 9.1/29.1, , D-dimer 373, BUN/Cr 31/1.33, Glu 105, Alb 1.9, AST 64, TnI 0.070. CXR => New bilateral airspace opacities right greater than left. No significant pleural effusion. Heart size cannot be accurately assessed in this projection.     2/14/21: Pt evaluated at bedside. Chest pain has resolved. Pt is alternating between HFNC and NRB. Will switch to HFNC and OOBTC today.     ROS:  Gen: no fevers, chills, +fatigue  Cardiovascular: no chest pain, no palpitations, no orthopnea  Respiratory: +SOB, +cough  GI: no difficulty swallowing, no nausea, no vomiting, no abdominal pain, no diarrhea, no constipation  : no dysuria, no increased freq, no hematuria  Heme: nosebleeds  MSK: no joint pain, no joint swelling or redness, no extremity pain   Neuro: no headache, no numbness, + weakness    MEDICATIONS  (STANDING):  apixaban 5 milliGRAM(s) Oral every 12 hours  aspirin  chewable 81 milliGRAM(s) Oral daily  atorvastatin 80 milliGRAM(s) Oral at bedtime  azithromycin  IVPB      azithromycin  IVPB 500 milliGRAM(s) IV Intermittent every 24 hours  cefepime   IVPB 2000 milliGRAM(s) IV Intermittent every 12 hours  cefepime   IVPB      clopidogrel Tablet 75 milliGRAM(s) Oral daily  dexAMETHasone  Injectable 6 milliGRAM(s) IV Push daily  dextrose 40% Gel 15 Gram(s) Oral once  dextrose 5%. 1000 milliLiter(s) (50 mL/Hr) IV Continuous <Continuous>  dextrose 5%. 1000 milliLiter(s) (100 mL/Hr) IV Continuous <Continuous>  dextrose 50% Injectable 25 Gram(s) IV Push once  dextrose 50% Injectable 12.5 Gram(s) IV Push once  dextrose 50% Injectable 25 Gram(s) IV Push once  diltiazem    milliGRAM(s) Oral daily  glucagon  Injectable 1 milliGRAM(s) IntraMuscular once  insulin glargine Injectable (LANTUS) 25 Unit(s) SubCutaneous at bedtime  insulin lispro (ADMELOG) corrective regimen sliding scale   SubCutaneous Before meals and at bedtime  levothyroxine 75 MICROGram(s) Oral daily  metoprolol tartrate 12.5 milliGRAM(s) Oral every 8 hours  pantoprazole    Tablet 40 milliGRAM(s) Oral before breakfast  potassium phosphate / sodium phosphate Powder (PHOS-NaK) 2 Packet(s) Oral two times a day  trimethoprim  160 mG/sulfamethoxazole 800 mG 1 Tablet(s) Oral <User Schedule>    MEDICATIONS  (PRN):  acetaminophen   Tablet .. 650 milliGRAM(s) Oral every 4 hours PRN Temp greater or equal to 38.5C (101.3F)  ALBUTerol    90 MICROgram(s) HFA Inhaler 2 Puff(s) Inhalation every 4 hours PRN Shortness of Breath and/or Wheezing  ALPRAZolam 0.125 milliGRAM(s) Oral every 6 hours PRN anxiety  loperamide 2 milliGRAM(s) Oral every 4 hours PRN Diarrhea  ondansetron Injectable 4 milliGRAM(s) IV Push every 6 hours PRN Nausea and/or Vomiting    ICU Vital Signs Last 24 Hrs  T(C): 36.1 (14 Feb 2021 04:00), Max: 37.2 (13 Feb 2021 17:00)  T(F): 97 (14 Feb 2021 04:00), Max: 98.9 (13 Feb 2021 17:00)  HR: 70 (14 Feb 2021 12:00) (58 - 76)  BP: 109/43 (14 Feb 2021 12:00) (100/39 - 131/44)  BP(mean): 22 (14 Feb 2021 10:00) (22 - 98)  ABP: --  ABP(mean): --  RR: 17 (14 Feb 2021 12:00) (17 - 30)  SpO2: 92% (14 Feb 2021 12:00) (85% - 100%)    GEN: on supplemental O2, weak appearing  CV: S1S2, RRR, no murmur  RESP: coarse breath sounds bilaterally  ABD: +BS, soft, ND, NT, no guarding, no rigidity  EXT: +2 radial and pedal pulses, no edema, no calve tenderness  NEURO:  AAOx3  PSYCH: normal behavior         LABS:                                   7.6    16.52 )-----------( 348      ( 14 Feb 2021 05:39 )             24.9       02-14    141  |  108  |  36<H>  ----------------------------<  115<H>  4.2   |  26  |  1.11    Ca    8.2<L>      14 Feb 2021 05:39  Phos  2.1     02-14  Mg     2.2     02-14    CARDIAC MARKERS ( 13 Feb 2021 15:08 )  18.900 ng/mL / x     / x     / x     / x      CARDIAC MARKERS ( 13 Feb 2021 07:10 )  36.700 ng/mL / x     / x     / x     / x      CARDIAC MARKERS ( 13 Feb 2021 00:51 )  60.400 ng/mL / x     / x     / x     / x      CARDIAC MARKERS ( 12 Feb 2021 14:42 )  0.508 ng/mL / x     / x     / x     / x          Troponin I, Serum: 0.508: Troponin I, Serum: 60.400Troponin I, Serum: 36.700Troponin I, Serum: 18.900  CARDIAC MARKERS ( 10 Feb 2021 23:37 )  0.095 ng/mL / x     / x     / x     / x      CARDIAC MARKERS ( 10 Feb 2021 20:37 )  0.098 ng/mL / x     / x     / x     / x      CARDIAC MARKERS ( 10 Feb 2021 14:01 )  0.070 ng/mL / x     / x     / x     / x              RADIOLOGY:         < from: Xray Chest 1 View- PORTABLE-Urgent (02.10.21 @ 15:00) >  IMPRESSION:  New bilateral airspace opacities right greater than left.    No significant pleural effusion    Heart size cannot be accurately assessed in this projection.    < from: Xray Chest 1 View AP/PA. (02.13.21 @ 09:51) >    Frontal expiratory view of the chest shows the heart to be similar in size. The lungs show similar patchy infiltrates and there is no evidence of pleural effusion. Questionable small left apical pneumothorax is present.    IMPRESSION:  Similar infiltrates. Questionable left pneumothorax. Follow-up study is recommended as clinically warranted.             HPI:  76 y/o F PMHx significant for Hypertension, Diabetes mellitus type 2, vertigo, anal melanoma under care of Dr. Shearer ([facility] [place]) on combined IO therapy with ipilimumab + Nivolumab (cycle 2 on 1/4/2021), and recent admission from 1/19-1/25 for new onset Afib (now on DOAC therapy) and recent admission in [facility] for COVID-pneumonia (reportedly treated with MAB, Remdesivir and steroid therapy) presents to  for further evaluation and management of worsening shortness of breath. In triage the patient was notably hypoxic SpO2 => 80% with initial improvement of SaO2 to 100 via NRB (per ED documentation).  Labs => WBC 21.24, Hgb/Hct 9.1/29.1, , D-dimer 373, BUN/Cr 31/1.33, Glu 105, Alb 1.9, AST 64, TnI 0.070. CXR => New bilateral airspace opacities right greater than left. No significant pleural effusion. Heart size cannot be accurately assessed in this projection.     2/14/21: Pt evaluated at bedside. No cardiac events overnight. Chest pain has resolved. Denies dizziness, palpitations, abd pain, melena, hematuria. Notes episodes of nosebleeds during this admission but none today. Pt is alternating between HFNC and 100% NRB. Will switch to HFNC and OOBTC today.     ROS:  Gen: no fevers, chills, +fatigue  Cardiovascular: no chest pain, no palpitations, no orthopnea  Respiratory: +SOB, +cough  GI: no difficulty swallowing, no nausea, no vomiting, no abdominal pain, no diarrhea, no constipation  : no dysuria, no increased freq, no hematuria  Heme: nosebleeds  MSK: no joint pain, no joint swelling or redness, no extremity pain   Neuro: no headache, no numbness, + weakness    MEDICATIONS  (STANDING):  apixaban 5 milliGRAM(s) Oral every 12 hours  aspirin  chewable 81 milliGRAM(s) Oral daily  atorvastatin 80 milliGRAM(s) Oral at bedtime  azithromycin  IVPB      azithromycin  IVPB 500 milliGRAM(s) IV Intermittent every 24 hours  cefepime   IVPB 2000 milliGRAM(s) IV Intermittent every 12 hours  cefepime   IVPB      clopidogrel Tablet 75 milliGRAM(s) Oral daily  dexAMETHasone  Injectable 6 milliGRAM(s) IV Push daily  dextrose 40% Gel 15 Gram(s) Oral once  dextrose 5%. 1000 milliLiter(s) (50 mL/Hr) IV Continuous <Continuous>  dextrose 5%. 1000 milliLiter(s) (100 mL/Hr) IV Continuous <Continuous>  dextrose 50% Injectable 25 Gram(s) IV Push once  dextrose 50% Injectable 12.5 Gram(s) IV Push once  dextrose 50% Injectable 25 Gram(s) IV Push once  diltiazem    milliGRAM(s) Oral daily  glucagon  Injectable 1 milliGRAM(s) IntraMuscular once  insulin glargine Injectable (LANTUS) 25 Unit(s) SubCutaneous at bedtime  insulin lispro (ADMELOG) corrective regimen sliding scale   SubCutaneous Before meals and at bedtime  levothyroxine 75 MICROGram(s) Oral daily  metoprolol tartrate 12.5 milliGRAM(s) Oral every 8 hours  pantoprazole    Tablet 40 milliGRAM(s) Oral before breakfast  potassium phosphate / sodium phosphate Powder (PHOS-NaK) 2 Packet(s) Oral two times a day  trimethoprim  160 mG/sulfamethoxazole 800 mG 1 Tablet(s) Oral <User Schedule>    MEDICATIONS  (PRN):  acetaminophen   Tablet .. 650 milliGRAM(s) Oral every 4 hours PRN Temp greater or equal to 38.5C (101.3F)  ALBUTerol    90 MICROgram(s) HFA Inhaler 2 Puff(s) Inhalation every 4 hours PRN Shortness of Breath and/or Wheezing  ALPRAZolam 0.125 milliGRAM(s) Oral every 6 hours PRN anxiety  loperamide 2 milliGRAM(s) Oral every 4 hours PRN Diarrhea  ondansetron Injectable 4 milliGRAM(s) IV Push every 6 hours PRN Nausea and/or Vomiting    ICU Vital Signs Last 24 Hrs  T(C): 36.1 (14 Feb 2021 04:00), Max: 37.2 (13 Feb 2021 17:00)  T(F): 97 (14 Feb 2021 04:00), Max: 98.9 (13 Feb 2021 17:00)  HR: 70 (14 Feb 2021 12:00) (58 - 76)  BP: 109/43 (14 Feb 2021 12:00) (100/39 - 131/44)  BP(mean): 22 (14 Feb 2021 10:00) (22 - 98)  ABP: --  ABP(mean): --  RR: 17 (14 Feb 2021 12:00) (17 - 30)  SpO2: 92% (14 Feb 2021 12:00) (85% - 100%)    GEN: on supplemental O2, weak appearing  CV: S1S2, RRR, no murmur  RESP: coarse breath sounds bilaterally  ABD: +BS, soft, ND, NT, no guarding, no rigidity  EXT: +2 radial and pedal pulses, no edema, no calve tenderness  NEURO:  AAOx3  PSYCH: normal behavior         LABS:                                   7.6    16.52 )-----------( 348      ( 14 Feb 2021 05:39 )             24.9       02-14    141  |  108  |  36<H>  ----------------------------<  115<H>  4.2   |  26  |  1.11    Ca    8.2<L>      14 Feb 2021 05:39  Phos  2.1     02-14  Mg     2.2     02-14    CARDIAC MARKERS ( 13 Feb 2021 15:08 )  18.900 ng/mL / x     / x     / x     / x      CARDIAC MARKERS ( 13 Feb 2021 07:10 )  36.700 ng/mL / x     / x     / x     / x      CARDIAC MARKERS ( 13 Feb 2021 00:51 )  60.400 ng/mL / x     / x     / x     / x      CARDIAC MARKERS ( 12 Feb 2021 14:42 )  0.508 ng/mL / x     / x     / x     / x          Troponin I, Serum: 0.508: Troponin I, Serum: 60.400Troponin I, Serum: 36.700Troponin I, Serum: 18.900  CARDIAC MARKERS ( 10 Feb 2021 23:37 )  0.095 ng/mL / x     / x     / x     / x      CARDIAC MARKERS ( 10 Feb 2021 20:37 )  0.098 ng/mL / x     / x     / x     / x      CARDIAC MARKERS ( 10 Feb 2021 14:01 )  0.070 ng/mL / x     / x     / x     / x              RADIOLOGY:         < from: Xray Chest 1 View- PORTABLE-Urgent (02.10.21 @ 15:00) >  IMPRESSION:  New bilateral airspace opacities right greater than left.    No significant pleural effusion    Heart size cannot be accurately assessed in this projection.    < from: Xray Chest 1 View AP/PA. (02.13.21 @ 09:51) >    Frontal expiratory view of the chest shows the heart to be similar in size. The lungs show similar patchy infiltrates and there is no evidence of pleural effusion. Questionable small left apical pneumothorax is present.    IMPRESSION:  Similar infiltrates. Questionable left pneumothorax. Follow-up study is recommended as clinically warranted.             HPI:  74 y/o F PMHx significant for Hypertension, Diabetes mellitus type 2, vertigo, anal melanoma under care of Dr. Shearer ([facility] [place]) on combined IO therapy with ipilimumab + Nivolumab (cycle 2 on 1/4/2021), and recent admission from 1/19-1/25 for new onset Afib (now on DOAC therapy) [facility]reportedly treated with MAB, Remdesivir and steroid therapy) presents to  for further evaluation and management of worsening shortness of breath. In triage the patient was notably hypoxic SpO2 => 80% with initial improvement of SaO2 to 100 via NRB (per ED documentation).  Labs => WBC 21.24, Hgb/Hct 9.1/29.1, , D-dimer 373, BUN/Cr 31/1.33, Glu 105, Alb 1.9, AST 64, TnI 0.070. CXR => New bilateral airspace opacities right greater than left. No significant pleural effusion. Heart size cannot be accurately assessed in this projection.     2/14/21: Pt evaluated at bedside. No cardiac events overnight. Chest pain has resolved. Denies dizziness, palpitations, abd pain, melena, hematuria. Notes episodes of nosebleeds during this admission but none today. Pt is alternating between HFNC and 100% NRB. Will switch to HFNC and OOBTC today.     ROS:  Gen: no fevers, chills, +fatigue  Cardiovascular: no chest pain, no palpitations, no orthopnea  Respiratory: +SOB, +cough  GI: no difficulty swallowing, no nausea, no vomiting, no abdominal pain, no diarrhea, no constipation  : no dysuria, no increased freq, no hematuria  Heme: nosebleeds  MSK: no joint pain, no joint swelling or redness, no extremity pain   Neuro: no headache, no numbness, + weakness    MEDICATIONS  (STANDING):  apixaban 5 milliGRAM(s) Oral every 12 hours  aspirin  chewable 81 milliGRAM(s) Oral daily  atorvastatin 80 milliGRAM(s) Oral at bedtime  azithromycin  IVPB      azithromycin  IVPB 500 milliGRAM(s) IV Intermittent every 24 hours  cefepime   IVPB 2000 milliGRAM(s) IV Intermittent every 12 hours  cefepime   IVPB      clopidogrel Tablet 75 milliGRAM(s) Oral daily  dexAMETHasone  Injectable 6 milliGRAM(s) IV Push daily  dextrose 40% Gel 15 Gram(s) Oral once  dextrose 5%. 1000 milliLiter(s) (50 mL/Hr) IV Continuous <Continuous>  dextrose 5%. 1000 milliLiter(s) (100 mL/Hr) IV Continuous <Continuous>  dextrose 50% Injectable 25 Gram(s) IV Push once  dextrose 50% Injectable 12.5 Gram(s) IV Push once  dextrose 50% Injectable 25 Gram(s) IV Push once  diltiazem    milliGRAM(s) Oral daily  glucagon  Injectable 1 milliGRAM(s) IntraMuscular once  insulin glargine Injectable (LANTUS) 25 Unit(s) SubCutaneous at bedtime  insulin lispro (ADMELOG) corrective regimen sliding scale   SubCutaneous Before meals and at bedtime  levothyroxine 75 MICROGram(s) Oral daily  metoprolol tartrate 12.5 milliGRAM(s) Oral every 8 hours  pantoprazole    Tablet 40 milliGRAM(s) Oral before breakfast  potassium phosphate / sodium phosphate Powder (PHOS-NaK) 2 Packet(s) Oral two times a day  trimethoprim  160 mG/sulfamethoxazole 800 mG 1 Tablet(s) Oral <User Schedule>    MEDICATIONS  (PRN):  acetaminophen   Tablet .. 650 milliGRAM(s) Oral every 4 hours PRN Temp greater or equal to 38.5C (101.3F)  ALBUTerol    90 MICROgram(s) HFA Inhaler 2 Puff(s) Inhalation every 4 hours PRN Shortness of Breath and/or Wheezing  ALPRAZolam 0.125 milliGRAM(s) Oral every 6 hours PRN anxiety  loperamide 2 milliGRAM(s) Oral every 4 hours PRN Diarrhea  ondansetron Injectable 4 milliGRAM(s) IV Push every 6 hours PRN Nausea and/or Vomiting    ICU Vital Signs Last 24 Hrs  T(C): 36.1 (14 Feb 2021 04:00), Max: 37.2 (13 Feb 2021 17:00)  T(F): 97 (14 Feb 2021 04:00), Max: 98.9 (13 Feb 2021 17:00)  HR: 70 (14 Feb 2021 12:00) (58 - 76)  BP: 109/43 (14 Feb 2021 12:00) (100/39 - 131/44)  BP(mean): 22 (14 Feb 2021 10:00) (22 - 98)  ABP: --  ABP(mean): --  RR: 17 (14 Feb 2021 12:00) (17 - 30)  SpO2: 92% (14 Feb 2021 12:00) (85% - 100%)    GEN: on supplemental O2, weak appearing  CV: S1S2, RRR, no murmur  RESP: coarse breath sounds bilaterally  ABD: +BS, soft, ND, NT, no guarding, no rigidity  EXT: +2 radial and pedal pulses, no edema, no calve tenderness  NEURO:  AAOx3  PSYCH: normal behavior         LABS:                                   7.6    16.52 )-----------( 348      ( 14 Feb 2021 05:39 )             24.9       02-14    141  |  108  |  36<H>  ----------------------------<  115<H>  4.2   |  26  |  1.11    Ca    8.2<L>      14 Feb 2021 05:39  Phos  2.1     02-14  Mg     2.2     02-14    CARDIAC MARKERS ( 13 Feb 2021 15:08 )  18.900 ng/mL / x     / x     / x     / x      CARDIAC MARKERS ( 13 Feb 2021 07:10 )  36.700 ng/mL / x     / x     / x     / x      CARDIAC MARKERS ( 13 Feb 2021 00:51 )  60.400 ng/mL / x     / x     / x     / x      CARDIAC MARKERS ( 12 Feb 2021 14:42 )  0.508 ng/mL / x     / x     / x     / x          Troponin I, Serum: 0.508: Troponin I, Serum: 60.400Troponin I, Serum: 36.700Troponin I, Serum: 18.900  CARDIAC MARKERS ( 10 Feb 2021 23:37 )  0.095 ng/mL / x     / x     / x     / x      CARDIAC MARKERS ( 10 Feb 2021 20:37 )  0.098 ng/mL / x     / x     / x     / x      CARDIAC MARKERS ( 10 Feb 2021 14:01 )  0.070 ng/mL / x     / x     / x     / x              RADIOLOGY:         < from: Xray Chest 1 View- PORTABLE-Urgent (02.10.21 @ 15:00) >  IMPRESSION:  New bilateral airspace opacities right greater than left.    No significant pleural effusion    Heart size cannot be accurately assessed in this projection.    < from: Xray Chest 1 View AP/PA. (02.13.21 @ 09:51) >    Frontal expiratory view of the chest shows the heart to be similar in size. The lungs show similar patchy infiltrates and there is no evidence of pleural effusion. Questionable small left apical pneumothorax is present.    IMPRESSION:  Similar infiltrates. Questionable left pneumothorax. Follow-up study is recommended as clinically warranted.

## 2021-02-14 NOTE — PROGRESS NOTE ADULT - ASSESSMENT
76 y/o F PMHx significant for Hypertension, Diabetes mellitus type 2, vertigo, anal melanoma under care of Dr. Shearer ([facility] [place]) on combined IO therapy with ipilimumab + Nivolumab (cycle 2 on 1/4/2021), and recent admission from 1/19-1/25 for new onset Afib (now on DOAC therapy) and recent admission in [facility] for COVID-pneumonia (reportedly treated with MAB, Remdesivir and steroid therapy) presents to  for further evaluation and management of worsening shortness of breath.    #AHRF 2/2 PNA, COVID19   -in the setting of immunocompromised status  -CT chest showing extensive bilateral PNA R>L  -COVID+ diagnosed at [facility] [place] on 1/26, r/p PCR today POSITIVE  -s/p monoclonal antibodies, remdesivir, and steroid course at [facility]  -CXR 2/13 showing small L PTX  -Continue supplemental O2 with HFNC 40LPM, FIO2 100%, Bipap as needed  -Tylenol PRN fevers  -Continue cefepime 2g q12h  -azithromycin 500mg Q24H  -Alprazolam 0.125mg PO q6h for anxiety     #STEMI  #CAD, s/p GIFTY to RCA   -New onset CP, EKG noting ST elevation in Leads II, III, aVF  -EKG reviewed with cardiology, cath performed showing complete stenosis of pRCA and placement of GIFTY x1  -Pt no longer with chest pain  -Cont w/ Plavix 75mg daily  -Eliquis 5mg BID  -Lipitor 80mg PO qhs  -Lopressor 2.5mg IV q6h w/ parameters  -Post cath instructions reviewed, patient verbalizes and understands instructions  -Troponins post-cath 60.4>>36.7>>18.9  -Serial EKGs, AM Labs    #Leukocytosis  -CT chest showing multifocal PNA  -WBC 10.37>>16.52  -ID following  -Bld/Urine Cx NGTD  -f/u sputum culture  -Continue cefepime and azithromycin  -f/u AM CBC    #Paroxysmal Atrial fibrillation  -Currently rate controlled   -Cont. Cardizem 120mg PO QD   -Cont lopressor  -Cardio consult noted  -On Eliquis 5mg BID for AC  -TTE (1/22) revealed normal LV systolic function    #Hypophosphatemia  -Phos 2.1  -replete  -f/u levels in AM    #Anal melanoma  -Receiving care with Dr. Shearer at [facility] [place]  -Combined IO therapy with ipilimumab + Nivolumab  -s/p 2 cycles with last one on 1/4/21  -Continue bactrim ppx    #Anemia  -H/H 8.4/27.8>>8.1/25.7>>7.6/24.9  -H/o Fe deficiency anemia, received IV iron last admission    -VSS  -Spoke with Heme/Onc, will f/u   -Monitor CBC    #JASPREET on CKD 3  -Improved  -Cr 1.11, Baseline ~ 1.1  -Encourage PO intake  -Trend renal function     #DMII  -Lantus 10Units QD  -ISS  -Monitor BGM's    #Hypothyroidism  -Continue synthroid 75mcg    #DVT PPX  -On Eliquis 5mg BID     Case d/w Dr. Prado   74 y/o F PMHx significant for Hypertension, Diabetes mellitus type 2, vertigo, anal melanoma under care of Dr. Shearer ([facility] on combined IO therapy with ipilimumab + Nivolumab (cycle 2 on 1/4/2021), and recent admission from 1/19-1/25 for new onset Afib (now on DOAC therapy) and recent admission in [facility] for COVID-pneumonia (reportedly treated with MAB, Remdesivir and steroid therapy) presents to  for further evaluation and management of worsening shortness of breath.    #AHRF 2/2 PNA, COVID19   -in the setting of immunocompromised status  -CT chest showing extensive bilateral PNA R>L  -COVID+ diagnosed at [facility] on 1/26, r/p PCR today POSITIVE  -s/p monoclonal antibodies, remdesivir, and steroid course at [facility] [place] [facility]  -CXR 2/13 showing small L PTX, will follow   -Continue supplemental O2 with HFNC, Goal SpO2>86%  -Tylenol PRN fevers  -Continue cefepime 2g q12h  -azithromycin 500mg Q24H  -Continue decadron 6mg IV QD  -Alprazolam 0.125mg PO q6h for anxiety     #STEMI  #CAD, s/p GIFTY to RCA   -New onset CP, EKG noting ST elevation in Leads II, III, aVF  -EKG reviewed with cardiology, cath performed showing complete stenosis of pRCA and placement of GIFTY x1  -Pt no longer with chest pain  -Cont w/ Plavix 75mg daily  -Eliquis 5mg BID  -Lipitor 80mg PO qhs  -Lopressor 12.5mg PO q6h w/ parameters  -Post cath instructions reviewed, patient verbalizes and understands instructions  -Troponins post-cath 60.4>>36.7>>18.9  -Serial EKGs, AM Labs    #Leukocytosis  -CT chest showing multifocal PNA  -CXR 2/13 showing similar b/l infiltrates  -WBC 10.37>>16.52, afebrile  -ID following  -Bld/Urine Cx NGTD  -f/u sputum culture  -Continue cefepime and azithromycin  -f/u AM CBC    #Paroxysmal Atrial fibrillation  -Currently rate controlled   -Cont. Cardizem 120mg PO QD   -Cont lopressor 12.5mg PO q8h  -Cardio consult noted  -On Eliquis 5mg BID for AC  -TTE (1/22) revealed normal LV systolic function    #Hypophosphatemia  -Phos 2.1  -replete  -f/u levels in AM    #Anal melanoma  -Receiving [facility] with [facility]  -Combined IO therapy with ipilimumab + Nivolumab  -s/p 2 cycles with last one on 1/4/21  -Continue bactrim ppx    #Anemia  -H/H 8.4/27.8>>8.1/25.7>>7.6/24.9  -H/o Fe deficiency anemia, received IV iron last admission    -VSS  -Spoke with Heme/Onc, will f/u   -Monitor CBC    #JASPREET on CKD 3  -Improved  -Cr 1.11, Baseline ~ 1.1  -Encourage PO intake  -Trend renal function     #DMII  -Lantus 25Units QD  -ISS  -Monitor BGM's    #Hypothyroidism  -Continue synthroid 75mcg    #DVT PPX  -On Eliquis 5mg BID     Case d/w Dr. Prado   74 y/o F PMHx significant for Hypertension, Diabetes mellitus type 2, vertigo, anal melanoma [place] of Dr. Shearer [place] [place] combined IO therapy with ipilimumab + Nivolumab (cycle 2 on 1/4/2021), and recent admission from 1/19-1/25 for new onset Afib (now on DOAC therapy) and recent admission in [facility] for COVID-pneumonia (reportedly treated with MAB, Remdesivir and steroid therapy) presents to  for further evaluation and management of worsening shortness of breath.    #AHRF 2/2 PNA, COVID19   -in the setting of immunocompromised status  -CT chest showing extensive bilateral PNA R>L  -COVID+ diagnosed at [facility] on 1/26, r/p PCR today POSITIVE  -s/p monoclonal antibodies, remdesivir, and steroid course at [facility]  -CXR 2/13 showing small L PTX, will follow   -Continue supplemental O2 with HFNC, Goal SpO2>86%  -Tylenol PRN fevers  -Continue cefepime 2g q12h  -azithromycin 500mg Q24H  -Continue decadron 6mg IV QD  -Alprazolam 0.125mg PO q6h for anxiety     #STEMI  #CAD, s/p GIFTY to RCA   -New onset CP, EKG noting ST elevation in Leads II, III, aVF on 2/12/21  -EKG reviewed with cardiology, cath performed showing complete stenosis of RCA and placement of GIFTY x1  -Pt no longer with chest pain today  -Cont w/ ASA 81mg, Plavix 75mg daily  -Eliquis 5mg BID  -Lipitor 80mg PO qhs  -Lopressor 12.5mg PO q6h w/ parameters  -Post cath instructions reviewed, patient verbalizes and understands instructions  -Troponins post-cath 60.4>>36.7>>18.9  -Serial EKGs, AM Labs    #Leukocytosis  -CT chest showing multifocal PNA  -CXR 2/13 showing similar b/l infiltrates  -WBC 10.37>>16.52, afebrile  -ID following  -Bld/Urine Cx NGTD  -f/u sputum culture  -Continue cefepime and azithromycin  -Pt on bactrim as prophylaxis  -f/u AM CBC    #Paroxysmal Atrial fibrillation  -Currently rate controlled   -Cont. Cardizem 120mg PO QD   -Cont lopressor 12.5mg PO q8h  -Cardio consult noted  -On Eliquis 5mg BID for AC  -TTE (1/22) revealed normal LV systolic function    #Hypophosphatemia  -Phos 2.1  -replete  -f/u levels in AM    #Anal melanoma  -Re[place]. She[place] [facility]  -Combined IO therapy with ipilimumab + Nivolumab  -s/p 2 cycles with last one on 1/4/21  -Continue bactrim ppx    #Anemia  -H/H 8.4/27.8>>8.1/25.7>>7.6/24.9  -No episodes of melena, hematuria  -H/o Fe deficiency anemia, received IV iron last admission    -VSS  -Spoke with Heme/Onc, will f/u   -Will transfuse 1unit PRBC's today for goal Hb>8  -CBC 4 hours after transfusion  -f/u AM CBC    #JASPREET on CKD 3  -Improved  -Cr 1.11, Baseline ~ 1.1  -Encourage PO intake  -Trend renal function     #DMII  -Lantus 25Units QD  -ISS  -Monitor BGM's    #Hypothyroidism  -Continue synthroid 75mcg    #DVT PPX  -On Eliquis 5mg BID     Case d/w Dr. Prado

## 2021-02-14 NOTE — PROGRESS NOTE ADULT - RS GEN PE MLT RESP DETAILS PC
breath sounds equal/no rales/no rhonchi/no wheezes

## 2021-02-14 NOTE — PROGRESS NOTE ADULT - SUBJECTIVE AND OBJECTIVE BOX
PCP:    REQUESTING PHYSICIAN:    REASON FOR CONSULT:    CHIEF COMPLAINT:    HPI:  76 y/o F PMHx significant for Hypertension, Diabetes mellitus type 2, vertigo, anal melanoma under care of Dr. Shearer ([facility] [place]) on combined IO therapy with ipilimumab + Nivolumab (cycle 2 on 1/4/2021), and recent admission from 1/19-1/25 for new onset Afib (now on DOAC therapy) and recent admission in [facility] for COVID-pneumonia (reportedly treated with MAB, Remdesivir and steroid therapy) presents to  for further evaluation and management of shortness of breath - persistent x 1-2 weeks but acutely worse 1-2 days prior to admission; exacerbated with exertion; no clear identifiable alleviating factors; no associated angina  In triage the patient was hypoxic; CT chest revealed extensive bilateral pneumonias and she was admitted to the ICU with acute hypoxemic respiratory failure.  There is no past history of ischemic heart disease; no clear anginal symptoms.  2/12/21: Pt complains of midsternal chest pain today. ECG suggests STEMI  2/13/21: S/P PCI RCA yesterday. Pain free today. VSS. Pt feels improved but remains dyspneic  2/14/21: Denies chest pain. NSR.       PAST MEDICAL & SURGICAL HISTORY:  Primary malignant melanoma of anal canal    Afib    DM (diabetes mellitus)  Type 2    HTN (hypertension)    Vertigo    No significant past surgical history        SOCIAL HISTORY:    FAMILY HISTORY:  No pertinent family history in first degree relatives        ALLERGIES:  Allergies    barbiturates (Unknown)  Cipro (Other; Swelling)  iodine (Unknown)    Intolerances        MEDICATIONS:    MEDICATIONS  (STANDING):  apixaban 5 milliGRAM(s) Oral every 12 hours  aspirin  chewable 81 milliGRAM(s) Oral daily  atorvastatin 80 milliGRAM(s) Oral at bedtime  azithromycin  IVPB      azithromycin  IVPB 500 milliGRAM(s) IV Intermittent every 24 hours  cefepime   IVPB 2000 milliGRAM(s) IV Intermittent every 12 hours  cefepime   IVPB      clopidogrel Tablet 75 milliGRAM(s) Oral daily  dexAMETHasone  Injectable 6 milliGRAM(s) IV Push daily  dextrose 40% Gel 15 Gram(s) Oral once  dextrose 5%. 1000 milliLiter(s) (50 mL/Hr) IV Continuous <Continuous>  dextrose 5%. 1000 milliLiter(s) (100 mL/Hr) IV Continuous <Continuous>  dextrose 50% Injectable 25 Gram(s) IV Push once  dextrose 50% Injectable 12.5 Gram(s) IV Push once  dextrose 50% Injectable 25 Gram(s) IV Push once  diltiazem    milliGRAM(s) Oral daily  glucagon  Injectable 1 milliGRAM(s) IntraMuscular once  insulin glargine Injectable (LANTUS) 25 Unit(s) SubCutaneous at bedtime  insulin lispro (ADMELOG) corrective regimen sliding scale   SubCutaneous Before meals and at bedtime  levothyroxine 75 MICROGram(s) Oral daily  metoprolol tartrate 12.5 milliGRAM(s) Oral every 8 hours  pantoprazole    Tablet 40 milliGRAM(s) Oral before breakfast  potassium phosphate / sodium phosphate Powder (PHOS-NaK) 2 Packet(s) Oral two times a day  trimethoprim  160 mG/sulfamethoxazole 800 mG 1 Tablet(s) Oral <User Schedule>    MEDICATIONS  (PRN):  acetaminophen   Tablet .. 650 milliGRAM(s) Oral every 4 hours PRN Temp greater or equal to 38.5C (101.3F)  ALBUTerol    90 MICROgram(s) HFA Inhaler 2 Puff(s) Inhalation every 4 hours PRN Shortness of Breath and/or Wheezing  ALPRAZolam 0.125 milliGRAM(s) Oral every 6 hours PRN anxiety  loperamide 2 milliGRAM(s) Oral every 4 hours PRN Diarrhea  ondansetron Injectable 4 milliGRAM(s) IV Push every 6 hours PRN Nausea and/or Vomiting        Vital Signs Last 24 Hrs  T(C): 36.1 (14 Feb 2021 04:00), Max: 37.2 (13 Feb 2021 17:00)  T(F): 97 (14 Feb 2021 04:00), Max: 98.9 (13 Feb 2021 17:00)  HR: 67 (14 Feb 2021 14:00) (58 - 75)  BP: 107/38 (14 Feb 2021 13:00) (100/39 - 131/44)  BP(mean): 53 (14 Feb 2021 13:00) (22 - 98)  RR: 30 (14 Feb 2021 14:00) (17 - 30)  SpO2: 99% (14 Feb 2021 14:00) (85% - 100%)Daily     Daily I&O's Summary    13 Feb 2021 07:01  -  14 Feb 2021 07:00  --------------------------------------------------------  IN: 1360 mL / OUT: 1050 mL / NET: 310 mL        PHYSICAL EXAM:    Constitutional: NAD, awake and alert, well-developed  HEENT: PERR, EOMI,  No oral cyananosis.  Neck:  supple,  No JVD  Respiratory: Breath sounds are clear bilaterally, No wheezing, rales or rhonchi  Cardiovascular: S1 and S2, regular rate and rhythm, no Murmurs, gallops or rubs  Gastrointestinal: Bowel Sounds present, soft, nontender.   Extremities: No peripheral edema. No clubbing or cyanosis.  Vascular: 2+ peripheral pulses  Neurological: A/O x 3, no focal deficits  Musculoskeletal: no calf tenderness.  Skin: No rashes.      LABS: All Labs Reviewed:                        7.6    16.52 )-----------( 348      ( 14 Feb 2021 05:39 )             24.9                         8.1    10.37 )-----------( 315      ( 13 Feb 2021 07:10 )             26.4                         8.1    13.37 )-----------( 334      ( 12 Feb 2021 06:44 )             25.7     14 Feb 2021 05:39    141    |  108    |  36     ----------------------------<  115    4.2     |  26     |  1.11   13 Feb 2021 07:10    137    |  106    |  31     ----------------------------<  228    4.9     |  24     |  1.18   12 Feb 2021 06:44    138    |  108    |  32     ----------------------------<  92     4.2     |  25     |  1.19     Ca    8.2        14 Feb 2021 05:39  Ca    8.3        13 Feb 2021 07:10  Ca    8.5        12 Feb 2021 06:44  Phos  2.1       14 Feb 2021 05:39  Phos  3.0       13 Feb 2021 07:10  Phos  3.4       12 Feb 2021 06:44  Mg     2.2       14 Feb 2021 05:39  Mg     2.1       13 Feb 2021 07:10  Mg     2.4       12 Feb 2021 06:44        CARDIAC MARKERS ( 13 Feb 2021 15:08 )  18.900 ng/mL / x     / x     / x     / x      CARDIAC MARKERS ( 13 Feb 2021 07:10 )  36.700 ng/mL / x     / x     / x     / x      CARDIAC MARKERS ( 13 Feb 2021 00:51 )  60.400 ng/mL / x     / x     / x     / x          Blood Culture: Organism --  Gram Stain Blood -- Gram Stain --  Specimen Source .Blood None  Culture-Blood --            RADIOLOGY/EKG:      ECHO/CARDIAC CATHTERIZATION/STRESS TEST:< from: 12 Lead ECG (02.10.21 @ 14:25) >  Diagnosis Line Normal sinus rhythm  Normal ECG  When compared with ECG of 21-JAN-2021 13:32,  No significant change was found  Confirmed by MALLORY CAAL, ERMELINDA ([id number]) on 2/12/2021 10:49:02 AM    < end of copied text >

## 2021-02-15 NOTE — PROCEDURE NOTE - ADDITIONAL PROCEDURE DETAILS
Muricel packing soaked in lidocaine with epi  Stuffed ~ 6cm into nose  Pressure held  Hemostasis acheived

## 2021-02-15 NOTE — CONSULT NOTE ADULT - ASSESSMENT
76 yo female with history of [place] melenoma of Dr[place] [place] therapy with ipilimumab + Nivolumab now admitted for COVID-19 complicated by Respiratory Failure and STEMI     metastatic melanoma   - on Ipi and NIVO   - hold treatment given active ongoing infection and recent cardiac STEMI     STEMI   - now on Eliquis, Asa, and plavix   - s/p PCE to RCA   - Cardio following      Respiratory failure   =- now on Dex  - continue with NRB   - will plan to titrate FIO2 today .     [place] [facility]      76 yo female with history of metastatic melenoma of Dr. Shearer ([facility] [place]) on combined IO therapy with ipilimumab + Nivolumab now admitted for COVID-19 complicated by Respiratory Failure and STEMI     metastatic melanoma   - on Ipi and NIVO   - hold treatment given active ongoing infection and recent cardiac STEMI     STEMI   - now on Eliquis, Asa, and plavix   - s/p PCE to RCA   - Cardio following      Respiratory failure   =- now on Dex  - continue with NRB   - will plan to titrate FIO2 today .     Anemia   - hb currently stable   - now noted at 9.4   - will need to monitor as patietn has anal lesion that has been known to to experience intermittent hematochezia.   Will contact [facility] [place].

## 2021-02-15 NOTE — PROGRESS NOTE ADULT - ASSESSMENT
A/P: 74 year old female who presents with Acute Hypoxic Respiratory failure possible from COVID 19 Pneumonia and or a secondary superimposed Pneumonia  AFIB  CISCO  anal melanomia  DM II  hypothyroidism  STEMI        PLAN:    PULM: Will O2 suport via high flow.  Continue Cefepime(5) and Z-Max(5)    Cardio: A-FIB.  Will Continue DOAC, STEMI-- Plavix/Statin/ASA, changed to Po lopressor    Renal: Cisco likely from ATN.  CISCO improved , will continue to monitor    GI: PO Diet    ENDO: Lantus and RISS, Synthroid    ID: May be a late flair of COVID 19, on Decadron, Continue Cefepime/Z-Max, Bactrim prophylaxis  Oncology to follow up    Will update  the patients family .

## 2021-02-15 NOTE — CHART NOTE - NSCHARTNOTEFT_GEN_A_CORE
Clinical Nutrition Follow Up Note    *75yo female admitted with acute hypoxic resp failure possible from COVID-19 PNA and or 2/2 superimposed PNA, Afib, JASPREET, anal/metastatic melanoma, DM2, hypothyroidism, NSTEMI.  Pt in ICU on 100% NRB.    *labs reviewed; 02-15    142  |  109<H>  |  31<H>  ----------------------------<  102<H>  4.1   |  26  |  0.99    Ca    8.3<L>      15 Feb 2021 05:34  Phos  2.9     02-15  Mg     2.0     02-15    BMI: BMI (kg/m2): 31.3 (02-10-21 @ 13:45)  HbA1c: A1C with Estimated Average Glucose Result: 6.6 % (01-20-21 @ 07:59)    Glucose: POCT Blood Glucose.: 194 mg/dL (02-15-21 @ 11:45)    POCT Blood Glucose.: 194 mg/dL (15 Feb 2021 11:45)  POCT Blood Glucose.: 79 mg/dL (15 Feb 2021 07:52)  POCT Blood Glucose.: 241 mg/dL (14 Feb 2021 21:44)  POCT Blood Glucose.: 256 mg/dL (14 Feb 2021 16:14)  Iron Total, Serum: 22 ug/dL (01-22-21 @ 09:30)  Iron Total, Serum: 27 ug/dL (11-03-20 @ 06:51)  Iron Total, Serum: 21 ug/dL (11-02-20 @ 16:53)  Vitamin B12, Serum: 538 pg/mL (11-02-20 @ 16:53)  Folate, Serum: 7.7 ng/mL (11-02-20 @ 16:53)    *tereza WNL.  hyperglycemia, on insulin regimen.    *I&O's Detail 14 Feb 2021 07:01  -  15 Feb 2021 07:00  --------------------------------------------------------  IN:    IV PiggyBack: 350 mL    PRBCs (Packed Red Blood Cells): 343 mL  Total IN: 693 mL    OUT:    Incontinent per Collection Bag (mL): 250 mL    Voided (mL): 700 mL  Total OUT: 950 mL    Total NET: -257 mL    *negative fluid balance; BM (+) 2/10, on imodium; rec'd add bowel regimen (miralax and senna).    *mansoor score of 17; stage 1 PU on buttocks, coccyx, and sacrum.  (+1) generalized.    *pt has been consuming >75% of tray, mostly carb with minimal protein.  Which has been providing ~870Kcal and 28g protein; meeting ~56% of estimated calorie needs and ~30% of estimated protein needs.  Pt has been refusing Glucerna 2/2 taste.   d/w pt importance of calorie/protein intake for healing, pt trialed ensure max (150Kcal, 30g protein, 1g sugar), pt preferred the taste of ensure max.  rec'd add ensure max TID to optimize PO intake.    *NFPE showed moderate muscle wasting: clavicle, shoulder.  moderate fat wasting; orbital.    *pt now meeting criteria for severe malnutrition in acute illness r/t COVID causing decreased ability to meet increased needs AEB moderate muscle/fat wasting; mild edema; meeting <50% of estimated protein needs and <60% of estimated calorie needs x 5 days.    *will continue to monitor and adjust plan prn*    RECOMMENDATIONS:  1) add ensure max TID  2) add bowel regimen and monitor BM closely  3) add MVI with minerals daily to ensure 100% RDI met  4) daily wt checks to track/trend changes  5) track all PO intake in EMR    ESTIMATED NUTR NEEDS: based on: actual wt for calories: 77Kg.  IBW for protein: 52Kg.  1540-1925Kcal (20-25Kcal/Kg)  94-104g protein (1.8-2g/Kg)  1540-1925mL (20-25mL/Kg)    Wt Hx:  no new wt since 2/11; obtain new wt when feasible  76.5Kg (2/11)  Height (cm): 160 (02-10)  Weight (kg): 80 (02-10)  BMI (kg/m2): 31.3 (02-10)  Ideal Body Weight: 52Kg  % Ideal Body Weight: 154%

## 2021-02-15 NOTE — PROGRESS NOTE ADULT - SUBJECTIVE AND OBJECTIVE BOX
76 y/o F PMHx significant for Hypertension, Diabetes mellitus type 2, vertigo, anal melanoma under care of Dr. Shearer ([facility] [place] on combined IO therapy with ipilimumab + Nivolumab (cycle 2 on 1/4/2021), and recent admission from 1/19-1/25 for new onset Afib (now on DOAC therapy) and recent admission in [facility] for COVID-pneumonia (reportedly treated with MAB, Remdesivir and steroid therapy) presents to  for further evaluation and management of shortness of breath - persistent x 1-2 weeks but acutely worse 1-2 days prior to admission; exacerbated with exertion; no clear identifiable alleviating factors; no associated angina  In triage the patient was hypoxic; CT chest revealed extensive bilateral pneumonias and she was admitted to the ICU with acute hypoxemic respiratory failure.  There is no past history of ischemic heart disease; no clear anginal symptoms.  2/12/21: Pt complains of midsternal chest pain today. ECG suggests STEMI  2/13/21: S/P PCI RCA yesterday. Pain free today. VSS. Pt feels improved but remains dyspneic  2/14/21: Denies chest pain. NSR.   2/15/21: no chest pain overnight.    MEDICATIONS:    INPATIENT  MEDICATIONS  (STANDING):  apixaban 5 milliGRAM(s) Oral every 12 hours  aspirin  chewable 81 milliGRAM(s) Oral daily  atorvastatin 80 milliGRAM(s) Oral at bedtime  azithromycin  IVPB      azithromycin  IVPB 500 milliGRAM(s) IV Intermittent every 24 hours  cefepime   IVPB 2000 milliGRAM(s) IV Intermittent every 12 hours  cefepime   IVPB      clopidogrel Tablet 75 milliGRAM(s) Oral daily  dexAMETHasone  Injectable 6 milliGRAM(s) IV Push daily  dextrose 40% Gel 15 Gram(s) Oral once  dextrose 5%. 1000 milliLiter(s) (50 mL/Hr) IV Continuous <Continuous>  dextrose 5%. 1000 milliLiter(s) (100 mL/Hr) IV Continuous <Continuous>  dextrose 50% Injectable 25 Gram(s) IV Push once  dextrose 50% Injectable 12.5 Gram(s) IV Push once  dextrose 50% Injectable 25 Gram(s) IV Push once  diltiazem    milliGRAM(s) Oral daily  glucagon  Injectable 1 milliGRAM(s) IntraMuscular once  insulin glargine Injectable (LANTUS) 25 Unit(s) SubCutaneous at bedtime  insulin lispro (ADMELOG) corrective regimen sliding scale   SubCutaneous Before meals and at bedtime  levothyroxine 75 MICROGram(s) Oral daily  metoprolol tartrate 12.5 milliGRAM(s) Oral every 8 hours  pantoprazole    Tablet 40 milliGRAM(s) Oral before breakfast  trimethoprim  160 mG/sulfamethoxazole 800 mG 1 Tablet(s) Oral <User Schedule>    MEDICATIONS  (PRN):  acetaminophen   Tablet .. 650 milliGRAM(s) Oral every 4 hours PRN Temp greater or equal to 38.5C (101.3F)  ALBUTerol    90 MICROgram(s) HFA Inhaler 2 Puff(s) Inhalation every 4 hours PRN Shortness of Breath and/or Wheezing  ALPRAZolam 0.125 milliGRAM(s) Oral every 6 hours PRN anxiety  loperamide 2 milliGRAM(s) Oral every 4 hours PRN Diarrhea  ondansetron Injectable 4 milliGRAM(s) IV Push every 6 hours PRN Nausea and/or Vomiting      Vital Signs Last 24 Hrs  T(C): 36.1 (15 Feb 2021 12:00), Max: 37.3 (14 Feb 2021 22:00)  T(F): 97 (15 Feb 2021 12:00), Max: 99.1 (14 Feb 2021 22:00)  HR: 80 (15 Feb 2021 14:00) (58 - 80)  BP: 107/42 (15 Feb 2021 14:00) (103/42 - 129/47)  BP(mean): 57 (15 Feb 2021 14:00) (53 - 74)  RR: 22 (15 Feb 2021 14:00) (19 - 28)  SpO2: 96% (15 Feb 2021 14:00) (90% - 100%)Daily     Daily I&O's Summary    14 Feb 2021 07:01  -  15 Feb 2021 07:00  --------------------------------------------------------  IN: 693 mL / OUT: 950 mL / NET: -257 mL    15 Feb 2021 07:01  -  15 Feb 2021 14:37  --------------------------------------------------------  IN: 250 mL / OUT: 0 mL / NET: 250 mL      PHYSICAL EXAM:    Constitutional: NAD, awake and alert, well-developed  HEENT: PERR, EOMI,  No oral cyananosis.  Neck:  supple,  No JVD  Respiratory: Breath sounds are clear bilaterally, No wheezing, rales or rhonchi  Cardiovascular: S1 and S2, regular rate and rhythm, no Murmurs, gallops or rubs  Gastrointestinal: Bowel Sounds present, soft, nontender.   Extremities: No peripheral edema. No clubbing or cyanosis.  Vascular: 2+ peripheral pulses  Neurological: A/O x 3, no focal deficits  Musculoskeletal: no calf tenderness.  Skin: No rashes.    LABS: All Labs Reviewed:                        9.4    16.14 )-----------( 313      ( 15 Feb 2021 05:34 )             29.1                         7.6    16.52 )-----------( 348      ( 14 Feb 2021 05:39 )             24.9                         8.1    10.37 )-----------( 315      ( 13 Feb 2021 07:10 )             26.4     15 Feb 2021 05:34    142    |  109    |  31     ----------------------------<  102    4.1     |  26     |  0.99   14 Feb 2021 05:39    141    |  108    |  36     ----------------------------<  115    4.2     |  26     |  1.11   13 Feb 2021 07:10    137    |  106    |  31     ----------------------------<  228    4.9     |  24     |  1.18     Ca    8.3        15 Feb 2021 05:34  Ca    8.2        14 Feb 2021 05:39  Ca    8.3        13 Feb 2021 07:10  Phos  2.9       15 Feb 2021 05:34  Phos  2.1       14 Feb 2021 05:39  Phos  3.0       13 Feb 2021 07:10  Mg     2.0       15 Feb 2021 05:34  Mg     2.2       14 Feb 2021 05:39  Mg     2.1       13 Feb 2021 07:10      CARDIAC MARKERS ( 13 Feb 2021 15:08 )  18.900 ng/mL / x     / x     / x     / x      CARDIAC MARKERS ( 13 Feb 2021 07:10 )  36.700 ng/mL / x     / x     / x     / x      CARDIAC MARKERS ( 13 Feb 2021 00:51 )  60.400 ng/mL / x     / x     / x     / x            ===============================  EKG:    Normal ECG  When compared with ECG of 21-JAN-2021 13:32,    ==============================  ECHO:    < from: TTE Echo Complete w/o Contrast w/ Doppler (11.02.20 @ 09:02) >  Impression     Summary     The left ventricle is normal in size, wall motion and contractility.   Mild concentric left ventricular hypertrophy is present.   Estimated left ventricular ejection fraction is 65-70 %.   The left atrium is mildly dilated.   Normal appearing right atrium.   Normal appearing right ventricle structure and function.   Normal aortic valve structure and function.   EA reversal of the mitral inflow consistent with reduced compliance of the   left ventricle.   Fibrocalcific changes noted to the mitral valve leaflets with preserved   leaflet excursion.   Mild (1+) mitral regurgitation is present.   Moderate mitral annular calcification is present.   No evidence of pericardial effusion.    ------------------------------------------------------------    < from: Transthoracic Echocardiogram Follow Up (01.22.21 @ 10:03) >     Impression     Summary     Limited study to asses left ventricular function.   Estimated left ventricular ejection fraction is 60-65 %.      ==============================  CATH:  Preliminary - RCA w/ long stenosis s/p multiple stents, LAD LCx w/ nonobstructive disease.    ==============================  STRESS:      ==============================

## 2021-02-15 NOTE — CONSULT NOTE ADULT - SUBJECTIVE AND OBJECTIVE BOX
HPI:  74 y/o F PMHx significant for Hypertension, Diabetes mellitus type 2, vertigo, anal melanoma under care of Dr. Shearer ([facility] [place]) on combined IO therapy with ipilimumab + Nivolumab (cycle 2 on 1/4/2021), and recent admission from 1/19-1/25 for new onset Afib (now on DOAC therapy) and recent admission in [facility] for COVID-pneumonia (reportedly treated with MAB, Remdesivir and steroid therapy) presented  to  for further evaluation and management of worsening shortness of breath. In triage the patient was notably hypoxic SpO2 => 80% with initial improvement of SaO2 to 100 via NRB (per ED documentation). Pt was found to have worsening Airspace disease on chest imaging. patient's hospitalization has been complicated by STEMI EKG with inferior leads ST elevations. s/p LHC with GIFTY to RCA. Now on NRB. Patient on Eliquis, Plavix and Asa.       Labs => WBC 21.24, Hgb/Hct 9.1/29.1, , D-dimer 373, BUN/Cr 31/1.33, Glu 105, Alb 1.9, AST 64, TnI 0.070. CXR => New bilateral airspace opacities right greater than left. No significant pleural effusion. Heart size cannot be accurately assessed in this projection. (10 Feb 2021 17:25)      PAST MEDICAL & SURGICAL HISTORY:  Primary malignant melanoma of anal canal    Afib    DM (diabetes mellitus)  Type 2    HTN (hypertension)    Vertigo    No significant past surgical history        Allergies    barbiturates (Unknown)  Cipro (Other; Swelling)  iodine (Unknown)    Intolerances        MEDICATIONS  (STANDING):  apixaban 5 milliGRAM(s) Oral every 12 hours  aspirin  chewable 81 milliGRAM(s) Oral daily  atorvastatin 80 milliGRAM(s) Oral at bedtime  azithromycin  IVPB      azithromycin  IVPB 500 milliGRAM(s) IV Intermittent every 24 hours  cefepime   IVPB 2000 milliGRAM(s) IV Intermittent every 12 hours  cefepime   IVPB      clopidogrel Tablet 75 milliGRAM(s) Oral daily  dexAMETHasone  Injectable 6 milliGRAM(s) IV Push daily  dextrose 40% Gel 15 Gram(s) Oral once  dextrose 5%. 1000 milliLiter(s) (50 mL/Hr) IV Continuous <Continuous>  dextrose 5%. 1000 milliLiter(s) (100 mL/Hr) IV Continuous <Continuous>  dextrose 50% Injectable 25 Gram(s) IV Push once  dextrose 50% Injectable 12.5 Gram(s) IV Push once  dextrose 50% Injectable 25 Gram(s) IV Push once  diltiazem    milliGRAM(s) Oral daily  glucagon  Injectable 1 milliGRAM(s) IntraMuscular once  insulin glargine Injectable (LANTUS) 25 Unit(s) SubCutaneous at bedtime  insulin lispro (ADMELOG) corrective regimen sliding scale   SubCutaneous Before meals and at bedtime  levothyroxine 75 MICROGram(s) Oral daily  metoprolol tartrate 12.5 milliGRAM(s) Oral every 8 hours  pantoprazole    Tablet 40 milliGRAM(s) Oral before breakfast  potassium phosphate / sodium phosphate Powder (PHOS-NaK) 2 Packet(s) Oral two times a day  trimethoprim  160 mG/sulfamethoxazole 800 mG 1 Tablet(s) Oral <User Schedule>    MEDICATIONS  (PRN):  acetaminophen   Tablet .. 650 milliGRAM(s) Oral every 4 hours PRN Temp greater or equal to 38.5C (101.3F)  ALBUTerol    90 MICROgram(s) HFA Inhaler 2 Puff(s) Inhalation every 4 hours PRN Shortness of Breath and/or Wheezing  ALPRAZolam 0.125 milliGRAM(s) Oral every 6 hours PRN anxiety  loperamide 2 milliGRAM(s) Oral every 4 hours PRN Diarrhea  ondansetron Injectable 4 milliGRAM(s) IV Push every 6 hours PRN Nausea and/or Vomiting      FAMILY HISTORY:  No pertinent family history in first degree relatives        SOCIAL HISTORY: No EtOH, no tobacco    REVIEW OF SYSTEMS:        T(F): 96.8 (02-15-21 @ 07:00), Max: 99.1 (02-14-21 @ 22:00)  HR: 70 (02-15-21 @ 09:00)  BP: 114/54 (02-15-21 @ 09:00)  RR: 26 (02-15-21 @ 09:00)  SpO2: 92% (02-15-21 @ 09:00)  Wt(kg): --                        9.4    16.14 )-----------( 313      ( 15 Feb 2021 05:34 )             29.1       02-15    142  |  109<H>  |  31<H>  ----------------------------<  102<H>  4.1   |  26  |  0.99    Ca    8.3<L>      15 Feb 2021 05:34  Phos  2.9     02-15  Mg     2.0     02-15        Magnesium, Serum: 2.0 mg/dL (02-15 @ 05:34)  Phosphorus Level, Serum: 2.9 mg/dL (02-15 @ 05:34)          .Blood None  02-10 @ 17:59   No growth to date.  --  --      .Stool Feces  01-21 @ 22:30   GI PCR Results: NOT detected  *******Please Note:*******  GI panel PCR evaluates for:  Campylobacter, Plesiomonas shigelloides, Salmonella,  Vibrio, Yersinia enterocolitica, Enteroaggregative  Escherichia coli (EAEC), Enteropathogenic E.coli (EPEC),  Enterotoxigenic E. coli (ETEC) lt/st, Shiga-like  toxin-producing E. coli (STEC) stx1/stx2,  Shigella/ Enteroinvasive E. coli (EIEC), Cryptosporidium,  Cyclospora cayetanensis, Entamoeba histolytica,  Giardia lamblia, Adenovirus F 40/41, Astrovirus,  Norovirus GI/GII, Rotavirus A, Sapovirus  --  --      .Urine Clean Catch (Midstream)  01-20 @ 15:35   <10,000 CFU/mL Normal Urogenital Simran  --  --      .Urine None  01-19 @ 03:50   >=3 organisms. Probable collection contamination.  --  --      .Blood None  01-19 @ 00:29   No Growth Final  --  --

## 2021-02-15 NOTE — DIETITIAN NUTRITION RISK NOTIFICATION - ADDITIONAL COMMENTS/DIETITIAN RECOMMENDATIONS
RECOMMENDATIONS:  1) add ensure max TID  2) add bowel regimen and monitor BM closely  3) add MVI with minerals daily to ensure 100% RDI met  4) daily wt checks to track/trend changes  5) track all PO intake in EMR

## 2021-02-15 NOTE — PROGRESS NOTE ADULT - SUBJECTIVE AND OBJECTIVE BOX
HPI:     76 y/o F PMHx significant for Hypertension, Diabetes mellitus type 2, vertigo, anal melanoma under care of Dr. Shearer ([facility] [place]) on combined IO therapy with ipilimumab + Nivolumab (cycle 2 on 1/4/2021), and recent admission from 1/19-1/25 for new onset Afib (now on DOAC therapy) and recent admission in [facility] for COVID-pneumonia (reportedly treated with MAB, Remdesivir and steroid therapy) presents to  for further evaluation and management of worsening shortness of breath.    Pt started to desaturate into 80's  HFNC and GWY500% - more tachypneic. pt seemingly very anxious . States "shes scared." Will admit pt to the ICU for acute hypoxic respiratory, COVID PNA and ? superimposed PNA     2/11: Patient seen in bed, on HF NC, still with a nonproductive cough  Primary malignant melanoma of anal canal  2/12: Patient seen in bed and OOB to the chair this afternoon.  This afternoon she developed SSCP radiating to her back.  EKG revealed an STEMI  2/13: No CP, tolerating PO, and remains on HF NC  2/14: No events over night, no arrhythmias, no CP, on 100% NRB  2/15 : Remains on high flow. Hemodynamically stable. H/H stable       PAST MEDICAL & SURGICAL HISTORY:  Primary malignant melanoma of anal canal    Afib    DM (diabetes mellitus)  Type 2    HTN (hypertension)    Vertigo    No significant past surgical history        FAMILY HISTORY:  No pertinent family history in first degree relatives        Social Hx:    Allergies    barbiturates (Unknown)  Cipro (Other; Swelling)  iodine (Unknown)    Intolerances      Complete Blood Count in AM (02.15.21 @ 05:34)    WBC Count: 16.14 K/uL    RBC Count: 3.49 M/uL    Hemoglobin: 9.4 g/dL    Hematocrit: 29.1 %    Mean Cell Volume: 83.4 fl    Mean Cell Hemoglobin: 26.9 pg    Mean Cell Hemoglobin Conc: 32.3 gm/dL    Red Cell Distrib Width: 18.9 %    Platelet Count - Automated: 313 K/uL    Phosphorus Level, Serum (02.15.21 @ 05:34)    Phosphorus Level, Serum: 2.9 mg/dL    Magnesium, Serum in AM (02.15.21 @ 05:34)    Magnesium, Serum: 2.0 mg/dL    Basic Metabolic Panel in AM (02.15.21 @ 05:34)    Sodium, Serum: 142 mmol/L    Potassium, Serum: 4.1 mmol/L    Chloride, Serum: 109 mmol/L    Carbon Dioxide, Serum: 26 mmol/L    Anion Gap, Serum: 7 mmol/L    Blood Urea Nitrogen, Serum: 31 mg/dL    Creatinine, Serum: 0.99 mg/dL    Glucose, Serum: 102 mg/dL    Calcium, Total Serum: 8.3 mg/dL    ICU Vital Signs Last 24 Hrs  T(C): 36.1 (15 Feb 2021 12:00), Max: 37.3 (14 Feb 2021 22:00)  T(F): 97 (15 Feb 2021 12:00), Max: 99.1 (14 Feb 2021 22:00)  HR: 80 (15 Feb 2021 14:00) (58 - 80)  BP: 107/42 (15 Feb 2021 14:00) (103/42 - 129/47)  BP(mean): 57 (15 Feb 2021 14:00) (53 - 74)  ABP: --  ABP(mean): --  RR: 22 (15 Feb 2021 14:00) (19 - 28)  SpO2: 96% (15 Feb 2021 14:00) (90% - 100%)      I&O's Detail    14 Feb 2021 07:01  -  15 Feb 2021 07:00  --------------------------------------------------------  IN:    IV PiggyBack: 350 mL    PRBCs (Packed Red Blood Cells): 343 mL  Total IN: 693 mL    OUT:    Incontinent per Collection Bag (mL): 250 mL    Voided (mL): 700 mL  Total OUT: 950 mL    Total NET: -257 mL      15 Feb 2021 07:01  -  15 Feb 2021 16:03  --------------------------------------------------------  IN:    IV PiggyBack: 250 mL  Total IN: 250 mL    OUT:  Total OUT: 0 mL    Total NET: 250 mL    MEDICATIONS  (STANDING):  apixaban 5 milliGRAM(s) Oral every 12 hours  aspirin  chewable 81 milliGRAM(s) Oral daily  atorvastatin 80 milliGRAM(s) Oral at bedtime  azithromycin  IVPB      azithromycin  IVPB 500 milliGRAM(s) IV Intermittent every 24 hours  cefepime   IVPB 2000 milliGRAM(s) IV Intermittent every 12 hours  cefepime   IVPB      clopidogrel Tablet 75 milliGRAM(s) Oral daily  dexAMETHasone  Injectable 6 milliGRAM(s) IV Push daily  dextrose 40% Gel 15 Gram(s) Oral once  dextrose 5%. 1000 milliLiter(s) (50 mL/Hr) IV Continuous <Continuous>  dextrose 5%. 1000 milliLiter(s) (100 mL/Hr) IV Continuous <Continuous>  dextrose 50% Injectable 25 Gram(s) IV Push once  dextrose 50% Injectable 12.5 Gram(s) IV Push once  dextrose 50% Injectable 25 Gram(s) IV Push once  diltiazem    milliGRAM(s) Oral daily  glucagon  Injectable 1 milliGRAM(s) IntraMuscular once  insulin glargine Injectable (LANTUS) 25 Unit(s) SubCutaneous at bedtime  insulin lispro (ADMELOG) corrective regimen sliding scale   SubCutaneous Before meals and at bedtime  levothyroxine 75 MICROGram(s) Oral daily  metoprolol tartrate 12.5 milliGRAM(s) Oral every 8 hours  pantoprazole    Tablet 40 milliGRAM(s) Oral before breakfast  potassium phosphate / sodium phosphate Powder (PHOS-NaK) 2 Packet(s) Oral two times a day  trimethoprim  160 mG/sulfamethoxazole 800 mG 1 Tablet(s) Oral <User Schedule>    MEDICATIONS  (PRN):  acetaminophen   Tablet .. 650 milliGRAM(s) Oral every 4 hours PRN Temp greater or equal to 38.5C (101.3F)  ALBUTerol    90 MICROgram(s) HFA Inhaler 2 Puff(s) Inhalation every 4 hours PRN Shortness of Breath and/or Wheezing  ALPRAZolam 0.125 milliGRAM(s) Oral every 6 hours PRN anxiety  loperamide 2 milliGRAM(s) Oral every 4 hours PRN Diarrhea  ondansetron Injectable 4 milliGRAM(s) IV Push every 6 hours PRN Nausea and/or Vomiting      DVT Prophylaxis:    Advanced Directives:  Discussed with:        ROS:  Gen: no fevers, chills, +fatigue  Cardiovascular: no chest pain, no palpitations, no orthopnea  Respiratory: +SOB, +cough  GI: no difficulty swallowing, no nausea, no vomiting, no abdominal pain, no diarrhea, no constipation  : no dysuria, no increased freq, no hematuria  Heme: nosebleeds  MSK: no joint pain, no joint swelling or redness, no extremity pain   Neuro: no headache, no numbness, + weakness      Physical Exam ;   GEN: on supplemental O2 via high flow , weak appearing  CV: S1S2, RRR, no murmur  RESP: coarse breath sounds bilaterally  ABD: +BS, soft, ND, NT, no guarding, no rigidity  EXT: +2 radial and pedal pulses, no edema, no calve tenderness  NEURO:  AAOx3  PSYCH: normal behavior           Visit Information: 30 min    ** Time is exclusive of billed procedures and/or teaching and/or routine family updates.

## 2021-02-16 NOTE — PROGRESS NOTE ADULT - SUBJECTIVE AND OBJECTIVE BOX
Date of service: 02-16-21 @ 13:30    Pt seen and examined  On high flow NC/NRB  Has dry cough  Afebrile    ROS: denies dizziness, no HA, no abdominal pain, no diarrhea or constipation; no dysuria, no legs pain, no rashes      MEDICATIONS  (STANDING):  apixaban 5 milliGRAM(s) Oral every 12 hours  aspirin  chewable 81 milliGRAM(s) Oral daily  atorvastatin 80 milliGRAM(s) Oral at bedtime  azithromycin  IVPB      azithromycin  IVPB 500 milliGRAM(s) IV Intermittent every 24 hours  cefepime   IVPB 2000 milliGRAM(s) IV Intermittent every 12 hours  cefepime   IVPB      clopidogrel Tablet 75 milliGRAM(s) Oral daily  dexAMETHasone  Injectable 6 milliGRAM(s) IV Push daily  dextrose 40% Gel 15 Gram(s) Oral once  dextrose 5%. 1000 milliLiter(s) (50 mL/Hr) IV Continuous <Continuous>  dextrose 5%. 1000 milliLiter(s) (100 mL/Hr) IV Continuous <Continuous>  dextrose 50% Injectable 25 Gram(s) IV Push once  dextrose 50% Injectable 12.5 Gram(s) IV Push once  dextrose 50% Injectable 25 Gram(s) IV Push once  diltiazem    milliGRAM(s) Oral daily  glucagon  Injectable 1 milliGRAM(s) IntraMuscular once  insulin glargine Injectable (LANTUS) 25 Unit(s) SubCutaneous at bedtime  insulin lispro (ADMELOG) corrective regimen sliding scale   SubCutaneous Before meals and at bedtime  levothyroxine 75 MICROGram(s) Oral daily  metoprolol tartrate 12.5 milliGRAM(s) Oral every 8 hours  pantoprazole    Tablet 40 milliGRAM(s) Oral before breakfast  trimethoprim  160 mG/sulfamethoxazole 800 mG 1 Tablet(s) Oral <User Schedule>    Vital Signs Last 24 Hrs  T(C): 37.1 (16 Feb 2021 16:00), Max: 37.1 (16 Feb 2021 16:00)  T(F): 98.7 (16 Feb 2021 16:00), Max: 98.7 (16 Feb 2021 16:00)  HR: 71 (16 Feb 2021 16:00) (64 - 87)  BP: 115/36 (16 Feb 2021 16:00) (112/82 - 138/42)  BP(mean): 56 (16 Feb 2021 16:00) (48 - 89)  RR: 29 (16 Feb 2021 16:00) (17 - 32)  SpO2: 94% (16 Feb 2021 16:00) (92% - 100%)      Physical exam:  Constitutional:  No acute distress  HEENT: NC/AT, EOMI, PERRLA, conjunctivae clear  Neck: supple; thyroid not palpable  Back: no tenderness  Respiratory: respiratory effort normal; crackles b/l  Cardiovascular: S1S2 regular, no murmurs  Abdomen: soft, not tender, not distended, positive BS  Genitourinary: no suprapubic tenderness  Lymphatic: no LN palpable  Musculoskeletal: no muscle tenderness, no joint swelling or tenderness  Extremities: no pedal edema  Neurological/ Psychiatric: AxOx3, moving all extremities  Skin: no rashes; no palpable lesions    Labs: reviewed                                 9.8    16.62 )-----------( 301      ( 16 Feb 2021 05:58 )             30.7     02-16    139  |  107  |  43<H>  ----------------------------<  104<H>  4.5   |  24  |  0.99    Ca    8.7      16 Feb 2021 05:58  Phos  2.9     02-15  Mg     2.1     02-16    TPro  5.7<L>  /  Alb  1.9<L>  /  TBili  0.3  /  DBili  x   /  AST  44<H>  /  ALT  59  /  AlkPhos  218<H>  02-16        pSARS-CoV-2:  (02.11.21 @ 09:45)   COVID-19 PCR: NotDetec: (01.19.21 @ 01:24)     Radiology: all available radiological tests reviewed    < from: CT Chest No Cont (02.10.21 @ 19:47) >  Extensive bilateral pneumonias right greater than left.    < end of copied text >    Advanced directives addressed: full resuscitation

## 2021-02-16 NOTE — PROGRESS NOTE ADULT - ASSESSMENT
A/P: 74 year old female who presents with Acute Hypoxic Respiratory failure possible from COVID 19 Pneumonia and or a secondary superimposed Pneumonia  AFIB  CISCO  anal melanomia  DM II  hypothyroidism  STEMI        PLAN:    PULM: Will O2 suport via high flow.  Continue Cefepime(7) and Z-Max(7)    Cardio: A-FIB.  Will Continue DOAC, STEMI-- Plavix/Statin/ASA, changed to Po lopressor  Continue same despite the epistaxis which seem to have subsided  Renal: Cisco likely from ATN.  CISCO improved , will continue to monitor    GI: PO Diet    ENDO: Lantus and RISS, Synthroid    ID: May be a late flair of COVID 19, on Decadron, Continue Cefepime/Z-Max, Bactrim prophylaxis  Oncology to follow up    Will update  the patients family .

## 2021-02-16 NOTE — PROGRESS NOTE ADULT - SUBJECTIVE AND OBJECTIVE BOX
74 y/o F PMHx significant for Hypertension, Diabetes mellitus type 2, vertigo, anal melanoma under care of Dr. Shearer ([facility] [place]) on combined IO therapy with ipilimumab + Nivolumab (cycle 2 on 1/4/2021), and recent admission from 1/19-1/25 for new onset Afib (now on DOAC therapy) and recent admission in [facility] for COVID-pneumonia (reportedly treated with MAB, Remdesivir and steroid therapy) presents to  for further evaluation and management of shortness of breath - persistent x 1-2 weeks but acutely worse 1-2 days prior to admission; exacerbated with exertion; no clear identifiable alleviating factors; no associated angina  In triage the patient was hypoxic; CT chest revealed extensive bilateral pneumonias and she was admitted to the ICU with acute hypoxemic respiratory failure.  There is no past history of ischemic heart disease; no clear anginal symptoms.  2/12/21: Pt complains of midsternal chest pain today. ECG suggests STEMI  2/13/21: S/P PCI RCA yesterday. Pain free today. VSS. Pt feels improved but remains dyspneic  2/14/21: Denies chest pain. NSR.   2/15/21: no chest pain overnight.  2/16/'21: still on high flow NRB, no chest pain.    MEDICATIONS:  OUTPATIENT  Home Medications:  Basaglar KwikPen 100 units/mL subcutaneous solution: 22 unit(s) subcutaneous once a day (at bedtime)  (10 Feb 2021 23:30)  levothyroxine 75 mcg (0.075 mg) oral tablet: 1 tab(s) orally once a day (10 Feb 2021 23:30)  NovoLOG FlexPen 100 units/mL injectable solution: injectable 3 times a day (before meals)  *****Sliding Scale*** (10 Feb 2021 23:30)  pantoprazole 40 mg oral delayed release tablet: 1 tab(s) orally once a day (10 Feb 2021 23:30)  predniSONE 5 mg oral tablet: Taper  2/10-2/11 = 4 tabs two time a day  2/12-2/14 = 4 tabs once a day  2/15-2/17 = 2 tabs once a day  2/18-2/21 = 1 tab once a day (10 Feb 2021 23:30)  rosuvastatin 10 mg oral tablet: 1 tab(s) orally once a day (at bedtime) (10 Feb 2021 23:30)  SMZ-TMP  mg-160 mg oral tablet: 1 tab(s) orally Monday, Wednesday, and Friday (10 Feb 2021 23:30)      INPATIENT  MEDICATIONS  (STANDING):  apixaban 5 milliGRAM(s) Oral every 12 hours  aspirin  chewable 81 milliGRAM(s) Oral daily  atorvastatin 80 milliGRAM(s) Oral at bedtime  azithromycin  IVPB      azithromycin  IVPB 500 milliGRAM(s) IV Intermittent every 24 hours  cefepime   IVPB 2000 milliGRAM(s) IV Intermittent every 12 hours  cefepime   IVPB      clopidogrel Tablet 75 milliGRAM(s) Oral daily  dexAMETHasone  Injectable 6 milliGRAM(s) IV Push daily  dextrose 40% Gel 15 Gram(s) Oral once  dextrose 5%. 1000 milliLiter(s) (50 mL/Hr) IV Continuous <Continuous>  dextrose 5%. 1000 milliLiter(s) (100 mL/Hr) IV Continuous <Continuous>  dextrose 50% Injectable 25 Gram(s) IV Push once  dextrose 50% Injectable 12.5 Gram(s) IV Push once  dextrose 50% Injectable 25 Gram(s) IV Push once  diltiazem    milliGRAM(s) Oral daily  glucagon  Injectable 1 milliGRAM(s) IntraMuscular once  insulin glargine Injectable (LANTUS) 25 Unit(s) SubCutaneous at bedtime  insulin lispro (ADMELOG) corrective regimen sliding scale   SubCutaneous Before meals and at bedtime  levothyroxine 75 MICROGram(s) Oral daily  metoprolol tartrate 12.5 milliGRAM(s) Oral every 8 hours  pantoprazole    Tablet 40 milliGRAM(s) Oral before breakfast  trimethoprim  160 mG/sulfamethoxazole 800 mG 1 Tablet(s) Oral <User Schedule>    MEDICATIONS  (PRN):  acetaminophen   Tablet .. 650 milliGRAM(s) Oral every 4 hours PRN Temp greater or equal to 38.5C (101.3F)  ALBUTerol    90 MICROgram(s) HFA Inhaler 2 Puff(s) Inhalation every 4 hours PRN Shortness of Breath and/or Wheezing  ALPRAZolam 0.125 milliGRAM(s) Oral every 6 hours PRN anxiety  loperamide 2 milliGRAM(s) Oral every 4 hours PRN Diarrhea  ondansetron Injectable 4 milliGRAM(s) IV Push every 6 hours PRN Nausea and/or Vomiting      Vital Signs Last 24 Hrs  T(C): 37.1 (16 Feb 2021 16:00), Max: 37.1 (16 Feb 2021 16:00)  T(F): 98.7 (16 Feb 2021 16:00), Max: 98.7 (16 Feb 2021 16:00)  HR: 70 (16 Feb 2021 19:00) (64 - 87)  BP: 132/41 (16 Feb 2021 19:00) (112/82 - 138/42)  BP(mean): 64 (16 Feb 2021 19:00) (56 - 89)  RR: 20 (16 Feb 2021 19:00) (17 - 32)  SpO2: 92% (16 Feb 2021 19:00) (92% - 100%)Daily     Daily I&O's Summary      I&O's Summary    15 Feb 2021 07:01  -  16 Feb 2021 07:00  --------------------------------------------------------  IN: 830.6 mL / OUT: 1200 mL / NET: -369.4 mL    16 Feb 2021 07:01  -  16 Feb 2021 19:47  --------------------------------------------------------  IN: 300 mL / OUT: 700 mL / NET: -400 mL          PHYSICAL EXAM:    Constitutional: NAD,   HEENT: PERR, EOMI,  No oral cyananosis.  Neck:  supple,  No JVD  Respiratory: Breath sounds are clear bilaterally, No wheezing, rales or rhonchi  Cardiovascular: S1 and S2, regular rate and rhythm, no Murmurs, gallops or rubs  Gastrointestinal: Bowel Sounds present, soft, nontender.   Extremities: No peripheral edema. No clubbing or cyanosis.  Vascular: 2+ peripheral pulses  Neurological: A/O x 3, no focal deficits  Musculoskeletal: no calf tenderness.      LABS: All Labs Reviewed:                        9.8    16.62 )-----------( 301      ( 16 Feb 2021 05:58 )             30.7                         9.4    16.14 )-----------( 313      ( 15 Feb 2021 05:34 )             29.1                         7.6    16.52 )-----------( 348      ( 14 Feb 2021 05:39 )             24.9     16 Feb 2021 05:58    139    |  107    |  43     ----------------------------<  104    4.5     |  24     |  0.99   15 Feb 2021 05:34    142    |  109    |  31     ----------------------------<  102    4.1     |  26     |  0.99   14 Feb 2021 05:39    141    |  108    |  36     ----------------------------<  115    4.2     |  26     |  1.11     Ca    8.7        16 Feb 2021 05:58  Ca    8.3        15 Feb 2021 05:34  Ca    8.2        14 Feb 2021 05:39  Phos  2.9       15 Feb 2021 05:34  Phos  2.1       14 Feb 2021 05:39  Mg     2.1       16 Feb 2021 05:58  Mg     2.0       15 Feb 2021 05:34  Mg     2.2       14 Feb 2021 05:39    TPro  5.7    /  Alb  1.9    /  TBili  0.3    /  DBili  x      /  AST  44     /  ALT  59     /  AlkPhos  218    16 Feb 2021 05:58    PT/INR - ( 16 Feb 2021 05:58 )   PT: 18.9 sec;   INR: 1.67 ratio           CARDIAC MARKERS ( 13 Feb 2021 15:08 )  18.900 ng/mL / x     / x     / x     / x      CARDIAC MARKERS ( 13 Feb 2021 07:10 )  36.700 ng/mL / x     / x     / x     / x      CARDIAC MARKERS ( 13 Feb 2021 00:51 )  60.400 ng/mL / x     / x     / x     / x            ===============================  EKG:    Normal ECG  When compared with ECG of 21-JAN-2021 13:32,    ==============================  ECHO:    < from: TTE Echo Complete w/o Contrast w/ Doppler (11.02.20 @ 09:02) >  Impression     Summary     The left ventricle is normal in size, wall motion and contractility.   Mild concentric left ventricular hypertrophy is present.   Estimated left ventricular ejection fraction is 65-70 %.   The left atrium is mildly dilated.   Normal appearing right atrium.   Normal appearing right ventricle structure and function.   Normal aortic valve structure and function.   EA reversal of the mitral inflow consistent with reduced compliance of the   left ventricle.   Fibrocalcific changes noted to the mitral valve leaflets with preserved   leaflet excursion.   Mild (1+) mitral regurgitation is present.   Moderate mitral annular calcification is present.   No evidence of pericardial effusion.    ------------------------------------------------------------    < from: Transthoracic Echocardiogram Follow Up (01.22.21 @ 10:03) >     Impression     Summary     Limited study to asses left ventricular function.   Estimated left ventricular ejection fraction is 60-65 %.      ==============================  CATH:  Preliminary - RCA w/ long stenosis s/p multiple stents, LAD LCx w/ nonobstructive disease.    ==============================

## 2021-02-16 NOTE — PROGRESS NOTE ADULT - ASSESSMENT
76 yo female with history of metastatic melenoma of Dr. Shearer [place] [place] with ipilimumab + Nivolumab now admitted for COVID-19 complicated by Respiratory Failure and STEMI     metastatic melanoma   - on Ipi and NIVO   - hold treatment given active ongoing infection and recent cardiac STEMI     STEMI   - now on Eliquis, Asa, and plavix   - s/p PCE to RCA   - Cardio following      Respiratory failure   =- now on Dex  - continue with supplemental O2  - titrate FIO2 today  as tolerated    Anemia   - now improved from 9.4  to 9.8  - will need to monitor as patietn has anal lesion that has been known to to experience intermittent hematochezia.   [place] [facility] [place]     Pieter Hager MD  Hematology/Oncology  Cell:  [phone]  Office Phone: [phone]  Office Fax:  [phone] [address] [place] [address]

## 2021-02-16 NOTE — PROGRESS NOTE ADULT - SUBJECTIVE AND OBJECTIVE BOX
maintaining O2 sat on supplement oxygen 15 L/m    acetaminophen   Tablet .. 650 milliGRAM(s) Oral every 4 hours PRN  ALBUTerol    90 MICROgram(s) HFA Inhaler 2 Puff(s) Inhalation every 4 hours PRN  ALPRAZolam 0.125 milliGRAM(s) Oral every 6 hours PRN  apixaban 5 milliGRAM(s) Oral every 12 hours  aspirin  chewable 81 milliGRAM(s) Oral daily  atorvastatin 80 milliGRAM(s) Oral at bedtime  azithromycin  IVPB      azithromycin  IVPB 500 milliGRAM(s) IV Intermittent every 24 hours  cefepime   IVPB 2000 milliGRAM(s) IV Intermittent every 12 hours  cefepime   IVPB      clopidogrel Tablet 75 milliGRAM(s) Oral daily  dexAMETHasone  Injectable 6 milliGRAM(s) IV Push daily  dextrose 40% Gel 15 Gram(s) Oral once  dextrose 5%. 1000 milliLiter(s) IV Continuous <Continuous>  dextrose 5%. 1000 milliLiter(s) IV Continuous <Continuous>  dextrose 50% Injectable 25 Gram(s) IV Push once  dextrose 50% Injectable 12.5 Gram(s) IV Push once  dextrose 50% Injectable 25 Gram(s) IV Push once  diltiazem    milliGRAM(s) Oral daily  glucagon  Injectable 1 milliGRAM(s) IntraMuscular once  insulin glargine Injectable (LANTUS) 25 Unit(s) SubCutaneous at bedtime  insulin lispro (ADMELOG) corrective regimen sliding scale   SubCutaneous Before meals and at bedtime  levothyroxine 75 MICROGram(s) Oral daily  loperamide 2 milliGRAM(s) Oral every 4 hours PRN  metoprolol tartrate 12.5 milliGRAM(s) Oral every 8 hours  ondansetron Injectable 4 milliGRAM(s) IV Push every 6 hours PRN  pantoprazole    Tablet 40 milliGRAM(s) Oral before breakfast  trimethoprim  160 mG/sulfamethoxazole 800 mG 1 Tablet(s) Oral <User Schedule>      barbiturates (Unknown)  Cipro (Other; Swelling)  iodine (Unknown)      ROS otherwise negative     T(C): 36.4 (02-16-21 @ 08:00), Max: 37 (02-15-21 @ 20:00)  HR: 72 (02-16-21 @ 10:00) (63 - 80)  BP: 128/35 (02-16-21 @ 10:00) (99/37 - 138/42)  RR: 22 (02-16-21 @ 10:00) (17 - 32)  SpO2: 98% (02-16-21 @ 10:00) (74% - 99%)                          9.8    16.62 )-----------( 301      ( 16 Feb 2021 05:58 )             30.7                         9.4    16.14 )-----------( 313      ( 15 Feb 2021 05:34 )             29.1                         7.6    16.52 )-----------( 348      ( 14 Feb 2021 05:39 )             24.9   02-16    139  |  107  |  43<H>  ----------------------------<  104<H>  4.5   |  24  |  0.99    Ca    8.7      16 Feb 2021 05:58  Phos  2.9     02-15  Mg     2.1     02-16    TPro  5.7<L>  /  Alb  1.9<L>  /  TBili  0.3  /  DBili  x   /  AST  44<H>  /  ALT  59  /  AlkPhos  218<H>  02-16

## 2021-02-16 NOTE — PROGRESS NOTE ADULT - ASSESSMENT
76 y/o Female with h/o Hypertension, Diabetes mellitus type 2, vertigo, anal melanoma under care of Dr. Shearer -[facility] [place] on combined IO therapy with ipilimumab + Nivolumab (cycle 2 on 1/4/2021), recent admission from 1/19-1/25 for new onset Afib (now on DOAC therapy), recent admission in [facility] for COVID-pneumonia (reportedly treated with MAB, Remdesivir and steroid therapy) was admitted on 2/10 for worsening shortness of breath. In ER the patient was hypoxic with SpO2 => 80%. Also noted with new bilateral airspace opacities right greater than left. She received cefepime.     1. COVID-19 viral syndrome. Acuter respiratory failure. Multifocal pneumonia. Anal melanoma on immunotherapy. Immunocompromised host. CRF stage 3.   -leukocytosis  -CT chest shows extensive b/l pneumonia  - probable persistent COVID pneumonia  -cannot exclude superimposed bacterial etiology  -covid-19 pcr and ab positive, legionella ag (-)  -she was treated recently with remdesivir  -on cefepime 2 gm IV q12h and azithromycin 500 mg IV qd # 6  -complete 7 day course   -tolerating abx well so far; no side effects noted  -respiratory care  -continue IV abx coverage  -monitor temps  -f/u CBC  -supportive care  2. Other issues:   -care per medicine

## 2021-02-16 NOTE — PROGRESS NOTE ADULT - SUBJECTIVE AND OBJECTIVE BOX
HPI:     74 y/o F PMHx significant for Hypertension, Diabetes mellitus type 2, vertigo, anal melanoma [place] [place] IO therapy with ipilimumab + Nivolumab (cycle 2 on 1/4/2021), and recent admission from 1/19-1/25 for new onset Afib (now on DOAC therapy) and recent admission in [facility] for COVID-pneumonia (reportedly treated with MAB, Remdesivir and steroid therapy) presents to  for further evaluation and management of worsening shortness of breath.    Pt started to desaturate into 80's  HFNC and [id number]% - more tachypneic. pt seemingly very anxious . States "shes scared." Will admit pt to the ICU for acute hypoxic respiratory, COVID PNA and ? superimposed PNA     2/11: Patient seen in bed, on HF NC, still with a nonproductive cough  Primary malignant melanoma of anal canal  2/12: Patient seen in bed and OOB to the chair this afternoon.  This afternoon she developed SSCP radiating to her back.  EKG revealed an STEMI  2/13: No CP, tolerating PO, and remains on HF NC  2/14: No events over night, no arrhythmias, no CP, on 100% NRB  2/15 : Remains on high flow. Hemodynamically stable. H/H stable   2/16 : patient seen sitting in the chair. Awake , alert , not in distress. No more epistaxis noted. Still on high flow.    PAST MEDICAL & SURGICAL HISTORY:  Primary malignant melanoma of anal canal    Afib    DM (diabetes mellitus)  Type 2    HTN (hypertension)    Vertigo    No significant past surgical history        FAMILY HISTORY:  No pertinent family history in first degree relatives        Social Hx:    Allergies    barbiturates (Unknown)  Cipro (Other; Swelling)  iodine (Unknown)    Intolerances    Magnesium, Serum in AM (02.16.21 @ 05:58)    Magnesium, Serum: 2.1 mg/dL    Prothrombin Time and INR, Plasma in AM (02.16.21 @ 05:58)    Prothrombin Time, Plasma: 18.9 sec    INR: 1.67: Recommended ranges for therapeutic INR:    2.0-3.0 for most medical and surgical thromboembolic states    2.0-3.0 for atrial fibrillation    2.0-3.0 for bileaflet mechanical valve in aortic position    2.5-3.5 for mechanical heart valves    Chest 2004;[id number]  The presence of direct thrombin inhibitors (argatroban, refludan)  may falsely increase results. ratio      Comprehensive Metabolic Panel in AM (02.16.21 @ 05:58)    Sodium, Serum: 139 mmol/L    Potassium, Serum: 4.5 mmol/L    Chloride, Serum: 107 mmol/L    Carbon Dioxide, Serum: 24 mmol/L    Anion Gap, Serum: 8 mmol/L    Blood Urea Nitrogen, Serum: 43 mg/dL    Creatinine, Serum: 0.99 mg/dL    Glucose, Serum: 104 mg/dL    Calcium, Total Serum: 8.7 mg/dL    Protein Total, Serum: 5.7 gm/dL    Albumin, Serum: 1.9 g/dL    Bilirubin Total, Serum: 0.3 mg/dL    Alkaline Phosphatase, Serum: 218 U/L    Aspartate Aminotransferase (AST/SGOT): 44 U/L    Alanine Aminotransferase (ALT/SGPT): 59 U/L     Complete Blood Count in AM (02.16.21 @ 05:58)    WBC Count: 16.62 K/uL    RBC Count: 3.62 M/uL    Hemoglobin: 9.8 g/dL    Hematocrit: 30.7 %    Mean Cell Volume: 84.8 fl    Mean Cell Hemoglobin: 27.1 pg    Mean Cell Hemoglobin Conc: 31.9 gm/dL    Red Cell Distrib Width: 19.1 %    Platelet Count - Automated: 301 K/uL      ICU Vital Signs Last 24 Hrs  T(C): 36.8 (16 Feb 2021 12:00), Max: 37 (15 Feb 2021 20:00)  T(F): 98.3 (16 Feb 2021 12:00), Max: 98.6 (15 Feb 2021 20:00)  HR: 73 (16 Feb 2021 12:00) (63 - 80)  BP: 124/47 (16 Feb 2021 12:00) (99/37 - 138/42)  BP(mean): 64 (16 Feb 2021 12:00) (48 - 89)  ABP: --  ABP(mean): --  RR: 24 (16 Feb 2021 12:00) (17 - 32)  SpO2: 95% (16 Feb 2021 12:00) (92% - 99%)      I&O's Summary    15 Feb 2021 07:01  -  16 Feb 2021 07:00  --------------------------------------------------------  IN: 830.6 mL / OUT: 1200 mL / NET: -369.4 mL    16 Feb 2021 07:01  -  16 Feb 2021 13:01  --------------------------------------------------------  IN: 250 mL / OUT: 0 mL / NET: 250 mL          MEDICATIONS  (STANDING):  apixaban 5 milliGRAM(s) Oral every 12 hours  aspirin  chewable 81 milliGRAM(s) Oral daily  atorvastatin 80 milliGRAM(s) Oral at bedtime  azithromycin  IVPB      azithromycin  IVPB 500 milliGRAM(s) IV Intermittent every 24 hours  cefepime   IVPB 2000 milliGRAM(s) IV Intermittent every 12 hours  cefepime   IVPB      clopidogrel Tablet 75 milliGRAM(s) Oral daily  dexAMETHasone  Injectable 6 milliGRAM(s) IV Push daily  dextrose 40% Gel 15 Gram(s) Oral once  dextrose 5%. 1000 milliLiter(s) (50 mL/Hr) IV Continuous <Continuous>  dextrose 5%. 1000 milliLiter(s) (100 mL/Hr) IV Continuous <Continuous>  dextrose 50% Injectable 25 Gram(s) IV Push once  dextrose 50% Injectable 12.5 Gram(s) IV Push once  dextrose 50% Injectable 25 Gram(s) IV Push once  diltiazem    milliGRAM(s) Oral daily  glucagon  Injectable 1 milliGRAM(s) IntraMuscular once  insulin glargine Injectable (LANTUS) 25 Unit(s) SubCutaneous at bedtime  insulin lispro (ADMELOG) corrective regimen sliding scale   SubCutaneous Before meals and at bedtime  levothyroxine 75 MICROGram(s) Oral daily  metoprolol tartrate 12.5 milliGRAM(s) Oral every 8 hours  pantoprazole    Tablet 40 milliGRAM(s) Oral before breakfast  potassium phosphate / sodium phosphate Powder (PHOS-NaK) 2 Packet(s) Oral two times a day  trimethoprim  160 mG/sulfamethoxazole 800 mG 1 Tablet(s) Oral <User Schedule>    MEDICATIONS  (PRN):  acetaminophen   Tablet .. 650 milliGRAM(s) Oral every 4 hours PRN Temp greater or equal to 38.5C (101.3F)  ALBUTerol    90 MICROgram(s) HFA Inhaler 2 Puff(s) Inhalation every 4 hours PRN Shortness of Breath and/or Wheezing  ALPRAZolam 0.125 milliGRAM(s) Oral every 6 hours PRN anxiety  loperamide 2 milliGRAM(s) Oral every 4 hours PRN Diarrhea  ondansetron Injectable 4 milliGRAM(s) IV Push every 6 hours PRN Nausea and/or Vomiting      DVT Prophylaxis:    Advanced Directives:  Discussed with:        ROS:  Gen: no fevers, chills, +fatigue  Cardiovascular: no chest pain, no palpitations, no orthopnea  Respiratory: +SOB, +cough  GI: no difficulty swallowing, no nausea, no vomiting, no abdominal pain, no diarrhea, no constipation  : no dysuria, no increased freq, no hematuria  Heme: nosebleeds  MSK: no joint pain, no joint swelling or redness, no extremity pain   Neuro: no headache, no numbness, + weakness      Physical Exam ;   GEN: on supplemental O2 via high flow , weak appearing  CV: S1S2, RRR, no murmur  RESP: coarse breath sounds bilaterally  ABD: +BS, soft, ND, NT, no guarding, no rigidity  EXT: +2 radial and pedal pulses, no edema, no calve tenderness  NEURO:  AAOx3  PSYCH: normal behavior           Visit Information: 30 min    ** Time is exclusive of billed procedures and/or teaching and/or routine family updates.

## 2021-02-17 NOTE — PROGRESS NOTE ADULT - ASSESSMENT
A/P: 74 year old female who presents with Acute Hypoxic Respiratory failure possible from COVID 19 Pneumonia and or a secondary superimposed Pneumonia  AFIB  CISCO  anal melanomia  DM II  hypothyroidism  STEMI        PLAN:    PULM: Will O2 suport via high flow.  Continue Cefepime(7) and Z-Max(7)    Cardio: A-FIB.  Will Continue DOAC, STEMI-- Plavix/Statin/ASA, changed to Po lopressor  Continue same despite the epistaxis which seem to have subsided  Renal: Cisco likely from ATN.  CISCO improved , will continue to monitor    GI: PO Diet    ENDO: Lantus and RISS, Synthroid    ID: May be a late flair of COVID 19, on Decadron, Continue Cefepime/Z-Max, Bactrim prophylaxis  Oncology to follow up    Will update  the patients family .     A/P: 74 year old female who presents with Acute Hypoxic Respiratory failure possible from COVID 19 Pneumonia and or a secondary superimposed Pneumonia  AFIB  CISCO  anal melanomia  DM II  hypothyroidism  STEMI    Severe epistaxis in the ICU requiring packing    PLAN:    PULM: Will continue O2 support with 100% NRM. If no further epistaxis , we may switch to hi flow again.  Continue Cefepime(7) and Z-Max(7)    Cardio: A-FIB.  Will Continue DOAC, STEMI-- Plavix/Statin/ASA, changed to Po lopressor  Continue same despite the epistaxis has stoped even after the rhino removal  Renal: Cisco likely from ATN.  CISCO improved , will continue to monitor    GI: PO Diet    ENDO: Lantus and RISS, Synthroid    ID: May be a late flair of COVID 19, on Decadron, Continue Cefepime/Z-Max, Bactrim prophylaxis  Oncology to follow up    Will update  the patients family .

## 2021-02-17 NOTE — PROGRESS NOTE ADULT - SUBJECTIVE AND OBJECTIVE BOX
HPI:     74 y/o F PMHx significant for Hypertension, Diabetes mellitus type 2, vertigo, anal melanoma under care of Dr. Shearer ([facility] [place]) on combined IO therapy with ipilimumab + Nivolumab (cycle 2 on 1/4/2021), and recent admission from 1/19-1/25 for new onset Afib (now on DOAC therapy) and recent admission in [facility] for COVID-pneumonia (reportedly treated with MAB, Remdesivir and steroid therapy) presents to  for further evaluation and management of worsening shortness of breath.    Pt started to desaturate into 80's  HFNC and [id number]% - more tachypneic. pt seemingly very anxious . States "shes scared." Will admit pt to the ICU for acute hypoxic respiratory, COVID PNA and ? superimposed PNA     2/11: Patient seen in bed, on HF NC, still with a nonproductive cough  Primary malignant melanoma of anal canal  2/12: Patient seen in bed and OOB to the chair this afternoon.  This afternoon she developed SSCP radiating to her back.  EKG revealed an STEMI  2/13: No CP, tolerating PO, and remains on HF NC  2/14: No events over night, no arrhythmias, no CP, on 100% NRB  2/15 : Remains on high flow. Hemodynamically stable. H/H stable   2/16 : patient seen sitting in the chair. Awake , alert , not in distress. No more epistaxis noted. Still on high flow.    PAST MEDICAL & SURGICAL HISTORY:  Primary malignant melanoma of anal canal    Afib    DM (diabetes mellitus)  Type 2    HTN (hypertension)    Vertigo    No significant past surgical history        FAMILY HISTORY:  No pertinent family history in first degree relatives        Social Hx:    Allergies    barbiturates (Unknown)  Cipro (Other; Swelling)  iodine (Unknown)    Intolerances    Magnesium, Serum in AM (02.16.21 @ 05:58)    Magnesium, Serum: 2.1 mg/dL    Prothrombin Time and INR, Plasma in AM (02.16.21 @ 05:58)    Prothrombin Time, Plasma: 18.9 sec    INR: 1.67: Recommended ranges for therapeutic INR:    2.0-3.0 for most medical and surgical thromboembolic states    2.0-3.0 for atrial fibrillation    2.0-3.0 for bileaflet mechanical valve in aortic position    2.5-3.5 for mechanical heart valves    Chest 2004;[id number]  The presence of direct thrombin inhibitors (argatroban, refludan)  may falsely increase results. ratio      Comprehensive Metabolic Panel in AM (02.16.21 @ 05:58)    Sodium, Serum: 139 mmol/L    Potassium, Serum: 4.5 mmol/L    Chloride, Serum: 107 mmol/L    Carbon Dioxide, Serum: 24 mmol/L    Anion Gap, Serum: 8 mmol/L    Blood Urea Nitrogen, Serum: 43 mg/dL    Creatinine, Serum: 0.99 mg/dL    Glucose, Serum: 104 mg/dL    Calcium, Total Serum: 8.7 mg/dL    Protein Total, Serum: 5.7 gm/dL    Albumin, Serum: 1.9 g/dL    Bilirubin Total, Serum: 0.3 mg/dL    Alkaline Phosphatase, Serum: 218 U/L    Aspartate Aminotransferase (AST/SGOT): 44 U/L    Alanine Aminotransferase (ALT/SGPT): 59 U/L     Complete Blood Count in AM (02.16.21 @ 05:58)    WBC Count: 16.62 K/uL    RBC Count: 3.62 M/uL    Hemoglobin: 9.8 g/dL    Hematocrit: 30.7 %    Mean Cell Volume: 84.8 fl    Mean Cell Hemoglobin: 27.1 pg    Mean Cell Hemoglobin Conc: 31.9 gm/dL    Red Cell Distrib Width: 19.1 %    Platelet Count - Automated: 301 K/uL      ICU Vital Signs Last 24 Hrs  T(C): 36.8 (16 Feb 2021 12:00), Max: 37 (15 Feb 2021 20:00)  T(F): 98.3 (16 Feb 2021 12:00), Max: 98.6 (15 Feb 2021 20:00)  HR: 73 (16 Feb 2021 12:00) (63 - 80)  BP: 124/47 (16 Feb 2021 12:00) (99/37 - 138/42)  BP(mean): 64 (16 Feb 2021 12:00) (48 - 89)  ABP: --  ABP(mean): --  RR: 24 (16 Feb 2021 12:00) (17 - 32)  SpO2: 95% (16 Feb 2021 12:00) (92% - 99%)      I&O's Summary    15 Feb 2021 07:01  -  16 Feb 2021 07:00  --------------------------------------------------------  IN: 830.6 mL / OUT: 1200 mL / NET: -369.4 mL    16 Feb 2021 07:01  -  16 Feb 2021 13:01  --------------------------------------------------------  IN: 250 mL / OUT: 0 mL / NET: 250 mL          MEDICATIONS  (STANDING):  apixaban 5 milliGRAM(s) Oral every 12 hours  aspirin  chewable 81 milliGRAM(s) Oral daily  atorvastatin 80 milliGRAM(s) Oral at bedtime  azithromycin  IVPB      azithromycin  IVPB 500 milliGRAM(s) IV Intermittent every 24 hours  cefepime   IVPB 2000 milliGRAM(s) IV Intermittent every 12 hours  cefepime   IVPB      clopidogrel Tablet 75 milliGRAM(s) Oral daily  dexAMETHasone  Injectable 6 milliGRAM(s) IV Push daily  dextrose 40% Gel 15 Gram(s) Oral once  dextrose 5%. 1000 milliLiter(s) (50 mL/Hr) IV Continuous <Continuous>  dextrose 5%. 1000 milliLiter(s) (100 mL/Hr) IV Continuous <Continuous>  dextrose 50% Injectable 25 Gram(s) IV Push once  dextrose 50% Injectable 12.5 Gram(s) IV Push once  dextrose 50% Injectable 25 Gram(s) IV Push once  diltiazem    milliGRAM(s) Oral daily  glucagon  Injectable 1 milliGRAM(s) IntraMuscular once  insulin glargine Injectable (LANTUS) 25 Unit(s) SubCutaneous at bedtime  insulin lispro (ADMELOG) corrective regimen sliding scale   SubCutaneous Before meals and at bedtime  levothyroxine 75 MICROGram(s) Oral daily  metoprolol tartrate 12.5 milliGRAM(s) Oral every 8 hours  pantoprazole    Tablet 40 milliGRAM(s) Oral before breakfast  potassium phosphate / sodium phosphate Powder (PHOS-NaK) 2 Packet(s) Oral two times a day  trimethoprim  160 mG/sulfamethoxazole 800 mG 1 Tablet(s) Oral <User Schedule>    MEDICATIONS  (PRN):  acetaminophen   Tablet .. 650 milliGRAM(s) Oral every 4 hours PRN Temp greater or equal to 38.5C (101.3F)  ALBUTerol    90 MICROgram(s) HFA Inhaler 2 Puff(s) Inhalation every 4 hours PRN Shortness of Breath and/or Wheezing  ALPRAZolam 0.125 milliGRAM(s) Oral every 6 hours PRN anxiety  loperamide 2 milliGRAM(s) Oral every 4 hours PRN Diarrhea  ondansetron Injectable 4 milliGRAM(s) IV Push every 6 hours PRN Nausea and/or Vomiting      DVT Prophylaxis:    Advanced Directives:  Discussed with:        ROS:  Gen: no fevers, chills, +fatigue  Cardiovascular: no chest pain, no palpitations, no orthopnea  Respiratory: +SOB, +cough  GI: no difficulty swallowing, no nausea, no vomiting, no abdominal pain, no diarrhea, no constipation  : no dysuria, no increased freq, no hematuria  Heme: nosebleeds  MSK: no joint pain, no joint swelling or redness, no extremity pain   Neuro: no headache, no numbness, + weakness      Physical Exam ;   GEN: on supplemental O2 via high flow , weak appearing  CV: S1S2, RRR, no murmur  RESP: coarse breath sounds bilaterally  ABD: +BS, soft, ND, NT, no guarding, no rigidity  EXT: +2 radial and pedal pulses, no edema, no calve tenderness  NEURO:  AAOx3  PSYCH: normal behavior           Visit Information: 30 min    ** Time is exclusive of billed procedures and/or teaching and/or routine family updates.         HPI:     74 y/o F PMHx significant for Hypertension, Diabetes mellitus type 2, vertigo, anal melanoma under care of Dr. Shearer ([facility] [place]) on combined IO therapy with ipilimumab + Nivolumab (cycle 2 on 1/4/2021), and recent admission from 1/19-1/25 for new onset Afib (now on DOAC therapy) and recent admission in [facility] for COVID-pneumonia (reportedly treated with MAB, Remdesivir and steroid therapy) presents to  for further evaluation and management of worsening shortness of breath.    Pt started to desaturate into 80's  HFNC and FUM397% - more tachypneic. pt seemingly very anxious . States "shes scared." Will admit pt to the ICU for acute hypoxic respiratory, COVID PNA and ? superimposed PNA     2/11: Patient seen in bed, on HF NC, still with a nonproductive cough  Primary malignant melanoma of anal canal  2/12: Patient seen in bed and OOB to the chair this afternoon.  This afternoon she developed SSCP radiating to her back.  EKG revealed an STEMI  2/13: No CP, tolerating PO, and remains on HF NC  2/14: No events over night, no arrhythmias, no CP, on 100% NRB  2/15 : Remains on high flow. Hemodynamically stable. H/H stable   2/16 : patient seen sitting in the chair. Awake , alert , not in distress. No more epistaxis noted. Still on high flow.  2/17 : Remains stable on 100% NRM. Rhino has been removed with no bleeding from the nose  PAST MEDICAL & SURGICAL HISTORY:  Primary malignant melanoma of anal canal    Afib    DM (diabetes mellitus)  Type 2    HTN (hypertension)    Vertigo    No significant past surgical history        FAMILY HISTORY:  No pertinent family history in first degree relatives        Social Hx:    Allergies    barbiturates (Unknown)  Cipro (Other; Swelling)  iodine (Unknown)    Intolerances    Magnesium, Serum in AM (02.16.21 @ 05:58)    Magnesium, Serum: 2.1 mg/dL    Prothrombin Time and INR, Plasma in AM (02.16.21 @ 05:58)    Prothrombin Time, Plasma: 18.9 sec    INR: 1.67: Recommended ranges for therapeutic INR:    2.0-3.0 for most medical and surgical thromboembolic states    2.0-3.0 for atrial fibrillation    2.0-3.0 for bileaflet mechanical valve in aortic position    2.5-3.5 for mechanical heart valves    Chest 2004;[id number]  The presence of direct thrombin inhibitors (argatroban, refludan)  may falsely increase results. ratio      Comprehensive Metabolic Panel in AM (02.16.21 @ 05:58)    Sodium, Serum: 139 mmol/L    Potassium, Serum: 4.5 mmol/L    Chloride, Serum: 107 mmol/L    Carbon Dioxide, Serum: 24 mmol/L    Anion Gap, Serum: 8 mmol/L    Blood Urea Nitrogen, Serum: 43 mg/dL    Creatinine, Serum: 0.99 mg/dL    Glucose, Serum: 104 mg/dL    Calcium, Total Serum: 8.7 mg/dL    Protein Total, Serum: 5.7 gm/dL    Albumin, Serum: 1.9 g/dL    Bilirubin Total, Serum: 0.3 mg/dL    Alkaline Phosphatase, Serum: 218 U/L    Aspartate Aminotransferase (AST/SGOT): 44 U/L    Alanine Aminotransferase (ALT/SGPT): 59 U/L     Complete Blood Count in AM (02.16.21 @ 05:58)    WBC Count: 16.62 K/uL    RBC Count: 3.62 M/uL    Hemoglobin: 9.8 g/dL    Hematocrit: 30.7 %    Mean Cell Volume: 84.8 fl    Mean Cell Hemoglobin: 27.1 pg    Mean Cell Hemoglobin Conc: 31.9 gm/dL    Red Cell Distrib Width: 19.1 %    Platelet Count - Automated: 301 K/uL      ICU Vital Signs Last 24 Hrs  T(C): 36.8 (16 Feb 2021 12:00), Max: 37 (15 Feb 2021 20:00)  T(F): 98.3 (16 Feb 2021 12:00), Max: 98.6 (15 Feb 2021 20:00)  HR: 73 (16 Feb 2021 12:00) (63 - 80)  BP: 124/47 (16 Feb 2021 12:00) (99/37 - 138/42)  BP(mean): 64 (16 Feb 2021 12:00) (48 - 89)  ABP: --  ABP(mean): --  RR: 24 (16 Feb 2021 12:00) (17 - 32)  SpO2: 95% (16 Feb 2021 12:00) (92% - 99%)      I&O's Summary    15 Feb 2021 07:01  -  16 Feb 2021 07:00  --------------------------------------------------------  IN: 830.6 mL / OUT: 1200 mL / NET: -369.4 mL    16 Feb 2021 07:01  -  16 Feb 2021 13:01  --------------------------------------------------------  IN: 250 mL / OUT: 0 mL / NET: 250 mL          MEDICATIONS  (STANDING):  apixaban 5 milliGRAM(s) Oral every 12 hours  aspirin  chewable 81 milliGRAM(s) Oral daily  atorvastatin 80 milliGRAM(s) Oral at bedtime  azithromycin  IVPB      azithromycin  IVPB 500 milliGRAM(s) IV Intermittent every 24 hours  cefepime   IVPB 2000 milliGRAM(s) IV Intermittent every 12 hours  cefepime   IVPB      clopidogrel Tablet 75 milliGRAM(s) Oral daily  dexAMETHasone  Injectable 6 milliGRAM(s) IV Push daily  dextrose 40% Gel 15 Gram(s) Oral once  dextrose 5%. 1000 milliLiter(s) (50 mL/Hr) IV Continuous <Continuous>  dextrose 5%. 1000 milliLiter(s) (100 mL/Hr) IV Continuous <Continuous>  dextrose 50% Injectable 25 Gram(s) IV Push once  dextrose 50% Injectable 12.5 Gram(s) IV Push once  dextrose 50% Injectable 25 Gram(s) IV Push once  diltiazem    milliGRAM(s) Oral daily  glucagon  Injectable 1 milliGRAM(s) IntraMuscular once  insulin glargine Injectable (LANTUS) 25 Unit(s) SubCutaneous at bedtime  insulin lispro (ADMELOG) corrective regimen sliding scale   SubCutaneous Before meals and at bedtime  levothyroxine 75 MICROGram(s) Oral daily  metoprolol tartrate 12.5 milliGRAM(s) Oral every 8 hours  pantoprazole    Tablet 40 milliGRAM(s) Oral before breakfast  potassium phosphate / sodium phosphate Powder (PHOS-NaK) 2 Packet(s) Oral two times a day  trimethoprim  160 mG/sulfamethoxazole 800 mG 1 Tablet(s) Oral <User Schedule>    MEDICATIONS  (PRN):  acetaminophen   Tablet .. 650 milliGRAM(s) Oral every 4 hours PRN Temp greater or equal to 38.5C (101.3F)  ALBUTerol    90 MICROgram(s) HFA Inhaler 2 Puff(s) Inhalation every 4 hours PRN Shortness of Breath and/or Wheezing  ALPRAZolam 0.125 milliGRAM(s) Oral every 6 hours PRN anxiety  loperamide 2 milliGRAM(s) Oral every 4 hours PRN Diarrhea  ondansetron Injectable 4 milliGRAM(s) IV Push every 6 hours PRN Nausea and/or Vomiting      DVT Prophylaxis:    Advanced Directives:  Discussed with:        ROS:  Gen: no fevers, chills, +fatigue  Cardiovascular: no chest pain, no palpitations, no orthopnea  Respiratory: +SOB, +cough  GI: no difficulty swallowing, no nausea, no vomiting, no abdominal pain, no diarrhea, no constipation  : no dysuria, no increased freq, no hematuria  Heme: nosebleeds  MSK: no joint pain, no joint swelling or redness, no extremity pain   Neuro: no headache, no numbness, + weakness      Physical Exam ;   GEN: on supplemental O2 via high flow , weak appearing  CV: S1S2, RRR, no murmur  RESP: coarse breath sounds bilaterally  ABD: +BS, soft, ND, NT, no guarding, no rigidity  EXT: +2 radial and pedal pulses, no edema, no calve tenderness  NEURO:  AAOx3  PSYCH: normal behavior           Visit Information: 30 min    ** Time is exclusive of billed procedures and/or teaching and/or routine family updates.

## 2021-02-17 NOTE — PROGRESS NOTE ADULT - SUBJECTIVE AND OBJECTIVE BOX
INTERVAL HPI/OVERNIGHT EVENTS:  Patient S&E at bedside. No o/n events,   patietn continues to have shortness of breath.   continues on nRB     VITAL SIGNS:  T(F): 96.6 (02-17-21 @ 04:00)  HR: 61 (02-17-21 @ 08:00)  BP: 117/45 (02-17-21 @ 08:00)  RR: 23 (02-17-21 @ 08:00)  SpO2: 95% (02-17-21 @ 08:00)  Wt(kg): --    PHYSICAL EXAM:    MEDICATIONS  (STANDING):  apixaban 5 milliGRAM(s) Oral every 12 hours  aspirin  chewable 81 milliGRAM(s) Oral daily  atorvastatin 80 milliGRAM(s) Oral at bedtime  azithromycin  IVPB      azithromycin  IVPB 500 milliGRAM(s) IV Intermittent every 24 hours  cefepime   IVPB 2000 milliGRAM(s) IV Intermittent every 12 hours  cefepime   IVPB      clopidogrel Tablet 75 milliGRAM(s) Oral daily  dexAMETHasone  Injectable 6 milliGRAM(s) IV Push daily  dextrose 40% Gel 15 Gram(s) Oral once  dextrose 5%. 1000 milliLiter(s) (50 mL/Hr) IV Continuous <Continuous>  dextrose 5%. 1000 milliLiter(s) (100 mL/Hr) IV Continuous <Continuous>  dextrose 50% Injectable 25 Gram(s) IV Push once  dextrose 50% Injectable 12.5 Gram(s) IV Push once  dextrose 50% Injectable 25 Gram(s) IV Push once  diltiazem    milliGRAM(s) Oral daily  glucagon  Injectable 1 milliGRAM(s) IntraMuscular once  insulin glargine Injectable (LANTUS) 25 Unit(s) SubCutaneous at bedtime  insulin lispro (ADMELOG) corrective regimen sliding scale   SubCutaneous Before meals and at bedtime  levothyroxine 75 MICROGram(s) Oral daily  metoprolol tartrate 12.5 milliGRAM(s) Oral every 8 hours  pantoprazole    Tablet 40 milliGRAM(s) Oral before breakfast  trimethoprim  160 mG/sulfamethoxazole 800 mG 1 Tablet(s) Oral <User Schedule>    MEDICATIONS  (PRN):  acetaminophen   Tablet .. 650 milliGRAM(s) Oral every 4 hours PRN Temp greater or equal to 38.5C (101.3F)  ALBUTerol    90 MICROgram(s) HFA Inhaler 2 Puff(s) Inhalation every 4 hours PRN Shortness of Breath and/or Wheezing  ALPRAZolam 0.125 milliGRAM(s) Oral every 6 hours PRN anxiety  loperamide 2 milliGRAM(s) Oral every 4 hours PRN Diarrhea  ondansetron Injectable 4 milliGRAM(s) IV Push every 6 hours PRN Nausea and/or Vomiting      Allergies    barbiturates (Unknown)  Cipro (Other; Swelling)  iodine (Unknown)    Intolerances        LABS:                        9.7    16.66 )-----------( 293      ( 17 Feb 2021 05:36 )             30.7     02-17    138  |  108  |  47<H>  ----------------------------<  74  4.6   |  23  |  1.03    Ca    8.4<L>      17 Feb 2021 05:36  Phos  2.5     02-17  Mg     2.0     02-17    TPro  5.9<L>  /  Alb  1.8<L>  /  TBili  0.3  /  DBili  x   /  AST  33  /  ALT  50  /  AlkPhos  206<H>  02-17    PT/INR - ( 16 Feb 2021 05:58 )   PT: 18.9 sec;   INR: 1.67 ratio               RADIOLOGY & ADDITIONAL TESTS:  Studies reviewed.    ASSESSMENT & PLAN:

## 2021-02-17 NOTE — PROGRESS NOTE ADULT - ASSESSMENT
74 yo female with history of metastatic melenoma of Dr. She[place] [place] with ipilimumab + Nivolumab now admitted for COVID-19 complicated by Respiratory Failure and STEMI     metastatic melanoma   - on Ipi and NIVO   - hold treatment given active ongoing infection and recent cardiac STEMI     STEMI   - now on Eliquis, Asa, and plavix   - s/p PCE to RCA   - Cardio following      Respiratory failure   =- now on Dex  - continue with supplemental O2  - titrate FIO2 today  as tolerated  - continues on NRB     Anemia   - currently stable   - will need to monitor as patietn has anal lesion that has been known to to experience intermittent hematochezia.   Will [place] [facility] [place]

## 2021-02-18 NOTE — PROGRESS NOTE ADULT - SUBJECTIVE AND OBJECTIVE BOX
INTERVAL HPI/OVERNIGHT EVENTS:  Patient S&E at bedside. No o/n event  continues on NRB   patient concerned about low BP   no light headedness shortness of breath   patient trialled on venti mask 50% yet desatted to 86%  VITAL SIGNS:  T(F): 97.7 (02-18-21 @ 04:00)  HR: 65 (02-18-21 @ 08:00)  BP: 107/49 (02-18-21 @ 08:00)  RR: 26 (02-18-21 @ 08:00)  SpO2: 96% (02-18-21 @ 08:00)  Wt(kg): --    PHYSICAL EXAM:      MEDICATIONS  (STANDING):  apixaban 5 milliGRAM(s) Oral every 12 hours  aspirin  chewable 81 milliGRAM(s) Oral daily  atorvastatin 80 milliGRAM(s) Oral at bedtime  clopidogrel Tablet 75 milliGRAM(s) Oral daily  dexAMETHasone  Injectable 6 milliGRAM(s) IV Push daily  dextrose 40% Gel 15 Gram(s) Oral once  dextrose 5%. 1000 milliLiter(s) (50 mL/Hr) IV Continuous <Continuous>  dextrose 5%. 1000 milliLiter(s) (100 mL/Hr) IV Continuous <Continuous>  dextrose 50% Injectable 25 Gram(s) IV Push once  dextrose 50% Injectable 12.5 Gram(s) IV Push once  dextrose 50% Injectable 25 Gram(s) IV Push once  diltiazem    milliGRAM(s) Oral daily  glucagon  Injectable 1 milliGRAM(s) IntraMuscular once  insulin glargine Injectable (LANTUS) 25 Unit(s) SubCutaneous at bedtime  insulin lispro (ADMELOG) corrective regimen sliding scale   SubCutaneous Before meals and at bedtime  levothyroxine 75 MICROGram(s) Oral daily  metoprolol tartrate 12.5 milliGRAM(s) Oral every 8 hours  pantoprazole    Tablet 40 milliGRAM(s) Oral before breakfast  polyethylene glycol 3350 17 Gram(s) Oral daily  trimethoprim  160 mG/sulfamethoxazole 800 mG 1 Tablet(s) Oral <User Schedule>    MEDICATIONS  (PRN):  acetaminophen   Tablet .. 650 milliGRAM(s) Oral every 4 hours PRN Temp greater or equal to 38.5C (101.3F)  ALBUTerol    90 MICROgram(s) HFA Inhaler 2 Puff(s) Inhalation every 4 hours PRN Shortness of Breath and/or Wheezing  ALPRAZolam 0.125 milliGRAM(s) Oral every 6 hours PRN anxiety  loperamide 2 milliGRAM(s) Oral every 4 hours PRN Diarrhea  ondansetron Injectable 4 milliGRAM(s) IV Push every 6 hours PRN Nausea and/or Vomiting      Allergies    barbiturates (Unknown)  Cipro (Other; Swelling)  iodine (Unknown)    Intolerances        LABS:                        9.4    15.38 )-----------( 276      ( 18 Feb 2021 06:34 )             29.6     02-18    138  |  107  |  53<H>  ----------------------------<  119<H>  4.4   |  26  |  1.06    Ca    8.9      18 Feb 2021 06:34  Phos  2.8     02-18  Mg     2.1     02-18    TPro  5.6<L>  /  Alb  1.9<L>  /  TBili  0.3  /  DBili  x   /  AST  31  /  ALT  52  /  AlkPhos  203<H>  02-18          RADIOLOGY & ADDITIONAL TESTS:  Studies reviewed.    ASSESSMENT & PLAN:

## 2021-02-18 NOTE — PROGRESS NOTE ADULT - ASSESSMENT
76 yo female with history of metastatic melenoma of Dr. Shearer ([facility] [place]) on combined IO therapy with ipilimumab + Nivolumab now admitted for COVID-19 complicated by Respiratory Failure and STEMI     metastatic melanoma   - on Ipi and NIVO   - hold treatment given active ongoing infection and recent cardiac STEMI     STEMI   - now on Eliquis, Asa, and plavix   - s/p PCE to RCA   - Cardio following      Respiratory failure   =- now on Dex  - continue with supplemental O2  - titrate FIO2 today  as tolerated  - continues on NRB     Anemia   - currently stable   - will need to monitor as patietn has anal lesion that has been known to to experience intermittent hematochezia.   Will are in contact with [facility] [place].     BP   will check COrtisol in AM   yet likely secondary to CCB and BB   discussed with ICU team

## 2021-02-18 NOTE — PROGRESS NOTE ADULT - SUBJECTIVE AND OBJECTIVE BOX
HPI:     74 y/o F PMHx significant for Hypertension, Diabetes mellitus type 2, vertigo, anal [place] [place] with ipilimumab + Nivolumab (cycle 2 on 1/4/2021), and recent admission from 1/19-1/25 for new onset Afib (now on DOAC therapy) and recent admission in [facility] for COVID-pneumonia (reportedly treated with MAB, Remdesivir and steroid therapy) presents to  for further evaluation and management of worsening shortness of breath.    Pt started to desaturate into 80's  HFNC and [id number]% - more tachypneic. pt seemingly very anxious . States "shes scared." Will admit pt to the ICU for acute hypoxic respiratory, COVID PNA and ? superimposed PNA     2/11: Patient seen in bed, on HF NC, still with a nonproductive cough  Primary malignant melanoma of anal canal  2/12: Patient seen in bed and OOB to the chair this afternoon.  This afternoon she developed SSCP radiating to her back.  EKG revealed an STEMI  2/13: No CP, tolerating PO, and remains on HF NC  2/14: No events over night, no arrhythmias, no CP, on 100% NRB  2/15 : Remains on high flow. Hemodynamically stable. H/H stable   2/16 : patient seen sitting in the chair. Awake , alert , not in distress. No more epistaxis noted. Still on high flow.  2/17 : Remains stable on 100% NRM. Rhino has been removed with no bleeding from the nose  2/18 : Patient remains stable on 100% NRM. No more epistaxis  PAST MEDICAL & SURGICAL HISTORY:  Primary malignant melanoma of anal canal    Afib    DM (diabetes mellitus)  Type 2    HTN (hypertension)    Vertigo    No significant past surgical history        FAMILY HISTORY:  No pertinent family history in first degree relatives        Social Hx:    Allergies    barbiturates (Unknown)  Cipro (Other; Swelling)  iodine (Unknown)    Intolerances      Phosphorus Level, Serum (02.18.21 @ 06:34)    Phosphorus Level, Serum: 2.8 mg/dL      Magnesium, Serum (02.18.21 @ 06:34)    Magnesium, Serum: 2.1 mg/dL      Comprehensive Metabolic Panel (02.18.21 @ 06:34)    Sodium, Serum: 138 mmol/L    Potassium, Serum: 4.4 mmol/L    Chloride, Serum: 107 mmol/L    Carbon Dioxide, Serum: 26 mmol/L    Anion Gap, Serum: 5 mmol/L    Blood Urea Nitrogen, Serum: 53 mg/dL    Creatinine, Serum: 1.06 mg/dL    Glucose, Serum: 119 mg/dL    Calcium, Total Serum: 8.9 mg/dL    Protein Total, Serum: 5.6 gm/dL    Albumin, Serum: 1.9 g/dL    Bilirubin Total, Serum: 0.3 mg/dL    Alkaline Phosphatase, Serum: 203 U/L    Aspartate Aminotransferase (AST/SGOT): 31 U/L    Alanine Aminotransferase (ALT/SGPT): 52 U/L          Complete Blood Count (02.18.21 @ 06:34)    WBC Count: 15.38 K/uL    RBC Count: 3.50 M/uL    Hemoglobin: 9.4 g/dL    Hematocrit: 29.6 %    Mean Cell Volume: 84.6 fl    Mean Cell Hemoglobin: 26.9 pg    Mean Cell Hemoglobin Conc: 31.8 gm/dL    Red Cell Distrib Width: 19.5 %    Platelet Count - Automated: 276 K/uL        ICU Vital Signs Last 24 Hrs  T(C): 36.2 (18 Feb 2021 10:00), Max: 36.5 (17 Feb 2021 20:00)  T(F): 97.2 (18 Feb 2021 10:00), Max: 97.7 (17 Feb 2021 20:00)  HR: 80 (18 Feb 2021 13:00) (65 - 80)  BP: 117/46 (18 Feb 2021 13:00) (101/39 - 119/47)  BP(mean): 64 (18 Feb 2021 13:00) (50 - 65)  ABP: --  ABP(mean): --  RR: 25 (18 Feb 2021 13:00) (21 - 31)  SpO2: 95% (18 Feb 2021 13:00) (82% - 97%)      I&O's Detail    17 Feb 2021 07:01  -  18 Feb 2021 07:00  --------------------------------------------------------  IN:    IV PiggyBack: 250 mL    Oral Fluid: 500 mL  Total IN: 750 mL    OUT:    Incontinent per Collection Bag (mL): 1150 mL  Total OUT: 1150 mL    Total NET: -400 mL      MEDICATIONS  (STANDING):  apixaban 5 milliGRAM(s) Oral every 12 hours  aspirin  chewable 81 milliGRAM(s) Oral daily  atorvastatin 80 milliGRAM(s) Oral at bedtime  azithromycin  IVPB      azithromycin  IVPB 500 milliGRAM(s) IV Intermittent every 24 hours  cefepime   IVPB 2000 milliGRAM(s) IV Intermittent every 12 hours  cefepime   IVPB      clopidogrel Tablet 75 milliGRAM(s) Oral daily  dexAMETHasone  Injectable 6 milliGRAM(s) IV Push daily  dextrose 40% Gel 15 Gram(s) Oral once  dextrose 5%. 1000 milliLiter(s) (50 mL/Hr) IV Continuous <Continuous>  dextrose 5%. 1000 milliLiter(s) (100 mL/Hr) IV Continuous <Continuous>  dextrose 50% Injectable 25 Gram(s) IV Push once  dextrose 50% Injectable 12.5 Gram(s) IV Push once  dextrose 50% Injectable 25 Gram(s) IV Push once  diltiazem    milliGRAM(s) Oral daily  glucagon  Injectable 1 milliGRAM(s) IntraMuscular once  insulin glargine Injectable (LANTUS) 25 Unit(s) SubCutaneous at bedtime  insulin lispro (ADMELOG) corrective regimen sliding scale   SubCutaneous Before meals and at bedtime  levothyroxine 75 MICROGram(s) Oral daily  metoprolol tartrate 12.5 milliGRAM(s) Oral every 8 hours  pantoprazole    Tablet 40 milliGRAM(s) Oral before breakfast  potassium phosphate / sodium phosphate Powder (PHOS-NaK) 2 Packet(s) Oral two times a day  trimethoprim  160 mG/sulfamethoxazole 800 mG 1 Tablet(s) Oral <User Schedule>    MEDICATIONS  (PRN):  acetaminophen   Tablet .. 650 milliGRAM(s) Oral every 4 hours PRN Temp greater or equal to 38.5C (101.3F)  ALBUTerol    90 MICROgram(s) HFA Inhaler 2 Puff(s) Inhalation every 4 hours PRN Shortness of Breath and/or Wheezing  ALPRAZolam 0.125 milliGRAM(s) Oral every 6 hours PRN anxiety  loperamide 2 milliGRAM(s) Oral every 4 hours PRN Diarrhea  ondansetron Injectable 4 milliGRAM(s) IV Push every 6 hours PRN Nausea and/or Vomiting      DVT Prophylaxis:    Advanced Directives:  Discussed with:        ROS:  Gen: no fevers, chills, +fatigue  Cardiovascular: no chest pain, no palpitations, no orthopnea  Respiratory: +SOB, +cough  GI: no difficulty swallowing, no nausea, no vomiting, no abdominal pain, no diarrhea, no constipation  : no dysuria, no increased freq, no hematuria  Heme: nosebleeds  MSK: no joint pain, no joint swelling or redness, no extremity pain   Neuro: no headache, no numbness, + weakness      Physical Exam ;   GEN: on supplemental O2 via high flow , weak appearing  CV: S1S2, RRR, no murmur  RESP: coarse breath sounds bilaterally  ABD: +BS, soft, ND, NT, no guarding, no rigidity  EXT: +2 radial and pedal pulses, no edema, no calve tenderness  NEURO:  AAOx3  PSYCH: normal behavior           Visit Information: 30 min    ** Time is exclusive of billed procedures and/or teaching and/or routine family updates.

## 2021-02-18 NOTE — PROGRESS NOTE ADULT - ASSESSMENT
A/P: 74 year old female who presents with Acute Hypoxic Respiratory failure possible from COVID 19 Pneumonia and or a secondary superimposed Pneumonia  AFIB  CISCO  anal melanomia  DM II  hypothyroidism  STEMI    Severe epistaxis in the ICU requiring packing    PLAN:    PULM: Will continue O2 support with 100% NRM.   Continue Cefepime(7) and Z-Max(7)     Cardio: A-FIB.  Will Continue DOAC, STEMI-- Plavix/Statin/ASA, changed to Po lopressor  Renal: Cisco likely from ATN.  CISCO improved , will continue to monitor  GI: PO Diet  ENDO: Lantus and RISS, Synthroid  ID: May be a late flair of COVID 19, on Decadron, Continue Cefepime/Z-Max, Bactrim prophylaxis  Oncology to follow up  Will update  the patients family .  BP was a bit low today. If it persists , we will bet a cortisol level to assess adrenal function

## 2021-02-18 NOTE — PROGRESS NOTE ADULT - SUBJECTIVE AND OBJECTIVE BOX
REASON FOR VISIT: CAD, PAF    HPI: 74 y/o F PMHx significant for Hypertension, Diabetes mellitus type 2, vertigo, anal melanoma under care of Dr. Shearer [place] [place] on combined IO therapy with ipilimumab + Nivolumab (cycle 2 on 1/4/2021), and recent admission from 1/19-1/25 for new onset Afib (now on DOAC therapy) and recent admission in [facility] for COVID-pneumonia (reportedly treated with MAB, Remdesivir and steroid therapy) now admitted on 2/10/21 with respiratory failure acute hypoxemic respiratory failure. Her hospitalization was complicated by an acute inferior wall MI and she underwent PCI of RCA on 2/12/21.    2/12/21: Pt complains of midsternal chest pain today. ECG suggests STEMI  2/13/21: S/P PCI RCA yesterday. Pain free today. VSS. Pt feels improved but remains dyspneic  2/14/21: Denies chest pain. NSR.   2/15/21: no chest pain overnight.  2/16/'21: still on high flow NRB, no chest pain.  2/18/21:  Comfortable; no cardiac symptoms (no CP since last Friday).    MEDICATIONS  (STANDING):  apixaban 5 milliGRAM(s) Oral every 12 hours  aspirin  chewable 81 milliGRAM(s) Oral daily  atorvastatin 80 milliGRAM(s) Oral at bedtime  clopidogrel Tablet 75 milliGRAM(s) Oral daily  dexAMETHasone  Injectable 6 milliGRAM(s) IV Push daily  diltiazem    milliGRAM(s) Oral daily  glucagon  Injectable 1 milliGRAM(s) IntraMuscular once  insulin glargine Injectable (LANTUS) 25 Unit(s) SubCutaneous every morning  insulin lispro (ADMELOG) corrective regimen sliding scale   SubCutaneous Before meals and at bedtime  levothyroxine 75 MICROGram(s) Oral daily  metoprolol tartrate 12.5 milliGRAM(s) Oral every 8 hours  pantoprazole    Tablet 40 milliGRAM(s) Oral before breakfast  polyethylene glycol 3350 17 Gram(s) Oral daily  trimethoprim  160 mG/sulfamethoxazole 800 mG 1 Tablet(s) Oral <User Schedule>    Vital Signs Last 24 Hrs  T(C): 36.2 (18 Feb 2021 10:00), Max: 36.5 (17 Feb 2021 12:00)  T(F): 97.2 (18 Feb 2021 10:00), Max: 97.7 (17 Feb 2021 12:00)  HR: 71 (18 Feb 2021 10:00) (65 - 79)  BP: 118/42 (18 Feb 2021 10:00) (100/41 - 123/45)  BP(mean): 61 (18 Feb 2021 10:00) (50 - 65)  RR: 25 (18 Feb 2021 10:00) (20 - 31)  SpO2: 94% (18 Feb 2021 10:00) (82% - 100%)    PHYSICAL EXAM:  Constitutional: NAD, semirecumbent in bed (ICU)  Respiratory: Breath sounds are clear bilaterally, tachypneic, supplemental O2 via NRB, cough precipitated by speech  Cardiovascular: S1 and S2, regular rate and rhythm, 2+ R radial pulse  Gastrointestinal: Bowel Sounds present, abdomen is soft, nontender.   Extremities: No edema.     LABS:              9.4    15.38 )-----------( 276      ( 18 Feb 2021 06:34 )             29.6     138  |  107  |  53<H>  ----------------------------<  119<H>  4.4   |  26  |  1.06    ===============================  Tele:  Sinus rhythm    TTE Echo Complete w/o Contrast w/ Doppler (11.02.20 @ 09:02) >   The left ventricle is normal in size, wall motion and contractility. Mild concentric left ventricular hypertrophy is present. Estimated left ventricular ejection fraction is 65-70 %.   The left atrium is mildly dilated.   Normal appearing right atrium.   Normal appearing right ventricle structure and function.   Normal aortic valve structure and function.   EA reversal of the mitral inflow consistent with reduced compliance of the left ventricle.   Fibrocalcific changes noted to the mitral valve leaflets with preserved leaflet excursion. Mild (1+) mitral regurgitation is present.   Moderate mitral annular calcification is present.   No evidence of pericardial effusion.    Transthoracic Echocardiogram Follow Up (01.22.21 @ 10:03):   Limited study to asses left ventricular function.  Estimated left ventricular ejection fraction is 60-65 %.    Cardiac Cath Lab - Adult (02.12.21 @ 15:26):   Inferior STEMI with occluded RCA. Difficult radial access. Femoral access precluded because of full dose apixaban.

## 2021-02-19 NOTE — CHART NOTE - NSCHARTNOTEFT_GEN_A_CORE
Clinical Nutrition Follow Up Note:    *75yo female admitted with acute hypoxic resp failure possible from COVID-19 PNA and or 2/2 superimposed PNA, Afib, JASPREET, anal/metastatic melanoma, DM2, hypothyroidism, NSTEMI.  Pt in ICU on 100% NRB.    *Labs Reviewed:02-19    139  |  109<H>  |  53<H>  ----------------------------<  100<H>  4.5   |  24  |  1.04    Ca    8.8      19 Feb 2021 05:42  Phos  2.6     02-19  Mg     2.1     02-19    TPro  5.6<L>  /  Alb  1.9<L>  /  TBili  0.3  /  DBili  x   /  AST  31  /  ALT  52  /  AlkPhos  203<H>  02-18    HbA1c: A1C with Estimated Average Glucose Result: 6.6 % (01-20-21 @ 07:59)    Glucose: POCT Blood Glucose.: 108 mg/dL (02-19-21 @ 07:31)    POCT Blood Glucose.: 108 mg/dL (19 Feb 2021 07:31)  POCT Blood Glucose.: 298 mg/dL (18 Feb 2021 21:42)  POCT Blood Glucose.: 352 mg/dL (18 Feb 2021 17:12)  POCT Blood Glucose.: 330 mg/dL (18 Feb 2021 11:48)    *labs reviewed: tereza TOSCANO.  hyperglycemia noted; d/w MD, insulin regimen being adjusted for better glycemic control.    *I and O's:    02-18-21 @ 07:01  -  02-19-21 @ 07:00  --------------------------------------------------------  IN:    Oral Fluid: 720 mL  Total IN: 720 mL    OUT:    Incontinent per Collection Bag (mL): 1200 mL  Total OUT: 1200 mL    Total NET: -480 mL    *negative fluid balance: pt with PO intake making up for balance.  *(+) BM on  2/18 ; pt on bowel regimen.    *mansoor score of 16 : stage 1 PU on coccyx documented. (+2) generalized edema documented.    *pt consuming >75% of each tray and 2 full ensure max per day; pt meeting ~90% of estimated calorie needs and ~100% of estimated protein needs with tolerance.    *malnutrition dx: severe malnutrition in acute illness r/t COVID causing decreased ability to meet increased needs AEB moderate muscle/fat wasting; mild edema; meeting <50% of estimated protein needs and <60% of estimated calorie needs x 5 days, 9% wt loss in 7 days.      Recommendations:  1) continue to encourage PO intake and ensure max BID  2) monitor BM; adjust bowel regimen prn  3) monitor POCT; adjust insulin regimen to ensure tight glycemic control  4) daily wt checks to track/trend changes    Diet, Consistent Carbohydrate w/Evening Snack (02-10-21 @ 22:43) [Active]      Estimated Needs: Based on 77Kg for calories (actual wt); IBW for protein: 52Kg  Calories: 6625-9251 Kcal (20-25Kcal/Kg)  Protein:  g (1.8-2g/Kg)  Fluids: 1922-3023  mL ( 20-25mL/Kg)    Wt Hx:  72.5Kg (2/17); pt with wt loss of ~7.5Kg in ~7 days (9%), clinically significant.  76.5Kg (2/11)  Height (cm): 160 (02-10)  Weight (kg): 80 (02-10)  BMI (kg/m2): 31.3 (02-10)  Ideal Body Weight: 52Kg  % Ideal Body Weight: 154%.    *will continue to monitor and follow up with adjustments to treatment plan prn*    Jackie Hannah MA, RDN, CDN, [facility] [phone]

## 2021-02-19 NOTE — PROGRESS NOTE ADULT - ASSESSMENT
A/P: 74 year old female who presents with Acute Hypoxic Respiratory failure possible from COVID 19 Pneumonia and or a secondary superimposed Pneumonia  AFIB  CISCO  anal melanomia  DM II  hypothyroidism  STEMI    Severe epistaxis in the ICU requiring packing    PLAN:    PULM: Will continue O2 support with 50% Venturi mask.   Continue Cefepime(7) and Z-Max(7)     Cardio: A-FIB.  Will Continue DOAC, STEMI-- Plavix/Statin/ASA, changed to Po lopressor  Renal: Cisco likely from ATN.  CISCO improved , will continue to monitor  GI: PO Diet  ENDO: Lantus and RISS, Synthroid  ID: May be a late flair of COVID 19, on Decadron, Continue Cefepime/Z-Max, Bactrim prophylaxis  Oncology to follow up  Will update  the patients family today .  BP has been stable . No concern of adrenal insufficiency at this point

## 2021-02-19 NOTE — PROGRESS NOTE ADULT - ASSESSMENT
74 yo female with history of metastatic melenoma of Dr. [place] [place] with ipilimumab + Nivolumab now admitted for COVID-19 complicated by Respiratory Failure and STEMI     metastatic melanoma   - on Ipi and NIVO   - hold treatment given active ongoing infection and recent cardiac STEMI     STEMI   - now on Eliquis, Asa, and plavix   - s/p PCE to RCA   - Cardio following   - CCB d/c'ed      Respiratory failure   =- now on Dex  - continue with supplemental O2  - titrate FIO2 today  as tolerated  - now on Venti mask     Anemia   - currently stable   - will need to monitor as patietn has anal lesion that has been known to to experience intermittent hematochezia.   [place] [facility] [place]

## 2021-02-19 NOTE — PROGRESS NOTE ADULT - SUBJECTIVE AND OBJECTIVE BOX
INTERVAL HPI/OVERNIGHT EVENTS:  Patient S&E at bedside. No o/n events, patient resting comfortably.    VITAL SIGNS:  T(F): 98 (02-19-21 @ 10:00)  HR: 90 (02-19-21 @ 10:00)  BP: 116/52 (02-19-21 @ 10:00)  RR: 25 (02-19-21 @ 10:00)  SpO2: 95% (02-19-21 @ 10:00)  Wt(kg): --      MEDICATIONS  (STANDING):  apixaban 5 milliGRAM(s) Oral every 12 hours  aspirin  chewable 81 milliGRAM(s) Oral daily  atorvastatin 80 milliGRAM(s) Oral at bedtime  clopidogrel Tablet 75 milliGRAM(s) Oral daily  dexAMETHasone  Injectable 6 milliGRAM(s) IV Push daily  dextrose 40% Gel 15 Gram(s) Oral once  dextrose 5%. 1000 milliLiter(s) (50 mL/Hr) IV Continuous <Continuous>  dextrose 5%. 1000 milliLiter(s) (100 mL/Hr) IV Continuous <Continuous>  dextrose 50% Injectable 25 Gram(s) IV Push once  dextrose 50% Injectable 12.5 Gram(s) IV Push once  dextrose 50% Injectable 25 Gram(s) IV Push once  diltiazem    milliGRAM(s) Oral daily  glucagon  Injectable 1 milliGRAM(s) IntraMuscular once  insulin glargine Injectable (LANTUS) 25 Unit(s) SubCutaneous every morning  insulin lispro (ADMELOG) corrective regimen sliding scale   SubCutaneous Before meals and at bedtime  levothyroxine 75 MICROGram(s) Oral daily  metoprolol tartrate 12.5 milliGRAM(s) Oral every 8 hours  pantoprazole    Tablet 40 milliGRAM(s) Oral before breakfast  polyethylene glycol 3350 17 Gram(s) Oral daily  trimethoprim  160 mG/sulfamethoxazole 800 mG 1 Tablet(s) Oral <User Schedule>    MEDICATIONS  (PRN):  acetaminophen   Tablet .. 650 milliGRAM(s) Oral every 4 hours PRN Temp greater or equal to 38.5C (101.3F)  ALBUTerol    90 MICROgram(s) HFA Inhaler 2 Puff(s) Inhalation every 4 hours PRN Shortness of Breath and/or Wheezing  loperamide 2 milliGRAM(s) Oral every 4 hours PRN Diarrhea  ondansetron Injectable 4 milliGRAM(s) IV Push every 6 hours PRN Nausea and/or Vomiting      Allergies    barbiturates (Unknown)  Cipro (Other; Swelling)  iodine (Unknown)    Intolerances        LABS:                        9.9    15.43 )-----------( 277      ( 19 Feb 2021 05:42 )             30.6     02-19    139  |  109<H>  |  53<H>  ----------------------------<  100<H>  4.5   |  24  |  1.04    Ca    8.8      19 Feb 2021 05:42  Phos  2.6     02-19  Mg     2.1     02-19    TPro  5.6<L>  /  Alb  1.9<L>  /  TBili  0.3  /  DBili  x   /  AST  31  /  ALT  52  /  AlkPhos  203<H>  02-18          RADIOLOGY & ADDITIONAL TESTS:  Studies reviewed.    ASSESSMENT & PLAN:

## 2021-02-19 NOTE — PROGRESS NOTE ADULT - SUBJECTIVE AND OBJECTIVE BOX
HPI:     76 y/o F PMHx significant for Hypertension, Diabetes mellitus type 2, vertigo, anal melanoma under care of Dr. Shearer ([facility] [place]) on combined IO therapy with ipilimumab + Nivolumab (cycle 2 on 1/4/2021), and recent admission from 1/19-1/25 for new onset Afib (now on DOAC therapy) and recent admission in [facility] for COVID-pneumonia (reportedly treated with MAB, Remdesivir and steroid therapy) presents to  for further evaluation and management of worsening shortness of breath.    Pt started to desaturate into 80's  HFNC and OMM517% - more tachypneic. pt seemingly very anxious . States "shes scared." Will admit pt to the ICU for acute hypoxic respiratory, COVID PNA and ? superimposed PNA     2/11: Patient seen in bed, on HF NC, still with a nonproductive cough  Primary malignant melanoma of anal canal  2/12: Patient seen in bed and OOB to the chair this afternoon.  This afternoon she developed SSCP radiating to her back.  EKG revealed an STEMI  2/13: No CP, tolerating PO, and remains on HF NC  2/14: No events over night, no arrhythmias, no CP, on 100% NRB  2/15 : Remains on high flow. Hemodynamically stable. H/H stable   2/16 : patient seen sitting in the chair. Awake , alert , not in distress. No more epistaxis noted. Still on high flow.  2/17 : Remains stable on 100% NRM. Rhino has been removed with no bleeding from the nose  2/18 : Patient remains stable on 100% NRM. No more epistaxis  2/19 : No significant event noted overnight. FiO2 support is down to 50 % Venturi mask.  PAST MEDICAL & SURGICAL HISTORY:  Primary malignant melanoma of anal canal    Afib    DM (diabetes mellitus)  Type 2    HTN (hypertension)    Vertigo    No significant past surgical history        FAMILY HISTORY:  No pertinent family history in first degree relatives        Social Hx:    Allergies    barbiturates (Unknown)  Cipro (Other; Swelling)  iodine (Unknown)    Intolerances      Complete Blood Count in AM (02.19.21 @ 05:42)    WBC Count: 15.43 K/uL    RBC Count: 3.65 M/uL    Hemoglobin: 9.9 g/dL    Hematocrit: 30.6 %    Mean Cell Volume: 83.8 fl    Mean Cell Hemoglobin: 27.1 pg    Mean Cell Hemoglobin Conc: 32.4 gm/dL    Red Cell Distrib Width: 19.3 %    Platelet Count - Automated: 277 K/uL      Phosphorus Level, Serum (02.19.21 @ 05:42)    Phosphorus Level, Serum: 2.6 mg/dL    Magnesium, Serum in AM (02.19.21 @ 05:42)    Magnesium, Serum: 2.1 mg/dL      Basic Metabolic Panel in AM (02.19.21 @ 05:42)    Sodium, Serum: 139 mmol/L    Potassium, Serum: 4.5 mmol/L    Chloride, Serum: 109 mmol/L    Carbon Dioxide, Serum: 24 mmol/L    Anion Gap, Serum: 6 mmol/L    Blood Urea Nitrogen, Serum: 53 mg/dL    Creatinine, Serum: 1.04 mg/dL    Glucose, Serum: 100 mg/dL    Calcium, Total Serum: 8.8 mg/dL       ICU Vital Signs Last 24 Hrs  T(C): 36.7 (19 Feb 2021 10:00), Max: 36.7 (18 Feb 2021 20:00)  T(F): 98 (19 Feb 2021 10:00), Max: 98 (18 Feb 2021 20:00)  HR: 88 (19 Feb 2021 13:00) (75 - 90)  BP: 118/39 (19 Feb 2021 13:00) (108/38 - 128/41)  BP(mean): 58 (19 Feb 2021 13:00) (55 - 69)  ABP: --  ABP(mean): --  RR: 24 (19 Feb 2021 13:00) (21 - 29)  SpO2: 93% (19 Feb 2021 13:00) (88% - 100%)      I&O's Detail    18 Feb 2021 07:01  -  19 Feb 2021 07:00  --------------------------------------------------------  IN:    Oral Fluid: 720 mL  Total IN: 720 mL    OUT:    Incontinent per Collection Bag (mL): 1200 mL  Total OUT: 1200 mL    Total NET: -480 mL            MEDICATIONS  (STANDING):  apixaban 5 milliGRAM(s) Oral every 12 hours  aspirin  chewable 81 milliGRAM(s) Oral daily  atorvastatin 80 milliGRAM(s) Oral at bedtime  azithromycin  IVPB      azithromycin  IVPB 500 milliGRAM(s) IV Intermittent every 24 hours  cefepime   IVPB 2000 milliGRAM(s) IV Intermittent every 12 hours  cefepime   IVPB      clopidogrel Tablet 75 milliGRAM(s) Oral daily  dexAMETHasone  Injectable 6 milliGRAM(s) IV Push daily  dextrose 40% Gel 15 Gram(s) Oral once  dextrose 5%. 1000 milliLiter(s) (50 mL/Hr) IV Continuous <Continuous>  dextrose 5%. 1000 milliLiter(s) (100 mL/Hr) IV Continuous <Continuous>  dextrose 50% Injectable 25 Gram(s) IV Push once  dextrose 50% Injectable 12.5 Gram(s) IV Push once  dextrose 50% Injectable 25 Gram(s) IV Push once  diltiazem    milliGRAM(s) Oral daily  glucagon  Injectable 1 milliGRAM(s) IntraMuscular once  insulin glargine Injectable (LANTUS) 25 Unit(s) SubCutaneous at bedtime  insulin lispro (ADMELOG) corrective regimen sliding scale   SubCutaneous Before meals and at bedtime  levothyroxine 75 MICROGram(s) Oral daily  metoprolol tartrate 12.5 milliGRAM(s) Oral every 8 hours  pantoprazole    Tablet 40 milliGRAM(s) Oral before breakfast  potassium phosphate / sodium phosphate Powder (PHOS-NaK) 2 Packet(s) Oral two times a day  trimethoprim  160 mG/sulfamethoxazole 800 mG 1 Tablet(s) Oral <User Schedule>    MEDICATIONS  (PRN):  acetaminophen   Tablet .. 650 milliGRAM(s) Oral every 4 hours PRN Temp greater or equal to 38.5C (101.3F)  ALBUTerol    90 MICROgram(s) HFA Inhaler 2 Puff(s) Inhalation every 4 hours PRN Shortness of Breath and/or Wheezing  ALPRAZolam 0.125 milliGRAM(s) Oral every 6 hours PRN anxiety  loperamide 2 milliGRAM(s) Oral every 4 hours PRN Diarrhea  ondansetron Injectable 4 milliGRAM(s) IV Push every 6 hours PRN Nausea and/or Vomiting      DVT Prophylaxis:    Advanced Directives:  Discussed with:        ROS:  Gen: no fevers, chills, +fatigue  Cardiovascular: no chest pain, no palpitations, no orthopnea  Respiratory: +SOB, +cough  GI: no difficulty swallowing, no nausea, no vomiting, no abdominal pain, no diarrhea, no constipation  : no dysuria, no increased freq, no hematuria  Heme: nosebleeds  MSK: no joint pain, no joint swelling or redness, no extremity pain   Neuro: no headache, no numbness, + weakness      Physical Exam ;   GEN: on supplemental O2 via high flow , weak appearing  CV: S1S2, RRR, no murmur  RESP: coarse breath sounds bilaterally  ABD: +BS, soft, ND, NT, no guarding, no rigidity  EXT: +2 radial and pedal pulses, no edema, no calve tenderness  NEURO:  AAOx3  PSYCH: normal behavior           Visit Information: 30 min    ** Time is exclusive of billed procedures and/or teaching and/or routine family updates.

## 2021-02-19 NOTE — PROGRESS NOTE ADULT - SUBJECTIVE AND OBJECTIVE BOX
REASON FOR VISIT: CAD, PAF    HPI: 76 y/o F PMHx significant for Hypertension, Diabetes mellitus type 2, vertigo, anal melanoma under care of Dr. Shearer ([facility] [place]) on combined IO therapy with ipilimumab + Nivolumab (cycle 2 on 1/4/2021), and recent admission from 1/19-1/25 for new onset Afib (now on DOAC therapy) and recent admission in [facility] for COVID-pneumonia (reportedly treated with MAB, Remdesivir and steroid therapy) now admitted on 2/10/21 with respiratory failure acute hypoxemic respiratory failure. Her hospitalization was complicated by an acute inferior wall MI and she underwent PCI of RCA on 2/12/21.    2/12/21: Pt complains of midsternal chest pain today. ECG suggests STEMI  2/13/21: S/P PCI RCA yesterday. Pain free today. VSS. Pt feels improved but remains dyspneic  2/14/21: Denies chest pain. NSR.   2/15/21: no chest pain overnight.  2/16/'21: still on high flow NRB, no chest pain.  2/18/21:  Comfortable; no cardiac symptoms (no CP since last Friday).  2/19/21:  Feels "ok."  No chest discomfort, breathing comfortably at rest; occasional cough; no epistaxis or rectal bleeding    CARDIAC MEDICATIONS  (STANDING):  apixaban 5 milliGRAM(s) Oral every 12 hours  aspirin  chewable 81 milliGRAM(s) Oral daily  atorvastatin 80 milliGRAM(s) Oral at bedtime  clopidogrel Tablet 75 milliGRAM(s) Oral daily  diltiazem    milliGRAM(s) Oral daily  metoprolol tartrate 12.5 milliGRAM(s) Oral every 8 hours    Vital Signs Last 24 Hrs  T(C): 36.7 (19 Feb 2021 10:00), Max: 36.7 (18 Feb 2021 20:00)  T(F): 98 (19 Feb 2021 10:00), Max: 98 (18 Feb 2021 20:00)  HR: 90 (19 Feb 2021 10:00) (75 - 90)  BP: 116/52 (19 Feb 2021 10:00) (110/46 - 128/41)  BP(mean): 63 (19 Feb 2021 10:00) (56 - 69)  RR: 25 (19 Feb 2021 10:00) (21 - 29)  SpO2: 95% (19 Feb 2021 10:00) (88% - 100%)    PHYSICAL EXAM:  Constitutional: NAD, semirecumbent in bed, supplemental O2 via Ventimask  Respiratory: Breath sounds are clear bilaterally  Cardiovascular: S1 and S2, regular rate and rhythm, 2+ R radial pulse  Gastrointestinal: Bowel Sounds present, abdomen is soft, nontender.   Extremities: No edema.     LABS:                9.9    15.43 )-----------( 277      ( 19 Feb 2021 05:42 )             30.6     139  |  109<H>  |  53<H>  ----------------------------<  100<H>  4.5   |  24  |  1.04    Ca    8.8      19 Feb 2021 05:42  Phos  2.6     02-19  Mg     2.1     02-19    TPro  5.6<L>  /  Alb  1.9<L>  /  TBili  0.3  /  DBili  x   /  AST  31  /  ALT  52  /  AlkPhos  203<H>  02-18    ===============================  Tele:  Sinus rhythm    TTE Echo Complete w/o Contrast w/ Doppler (11.02.20 @ 09:02) >   The left ventricle is normal in size, wall motion and contractility. Mild concentric left ventricular hypertrophy is present. Estimated left ventricular ejection fraction is 65-70 %.   The left atrium is mildly dilated.   Normal appearing right atrium.   Normal appearing right ventricle structure and function.   Normal aortic valve structure and function.   EA reversal of the mitral inflow consistent with reduced compliance of the left ventricle.   Fibrocalcific changes noted to the mitral valve leaflets with preserved leaflet excursion. Mild (1+) mitral regurgitation is present.   Moderate mitral annular calcification is present.   No evidence of pericardial effusion.    Transthoracic Echocardiogram Follow Up (01.22.21 @ 10:03):   Limited study to asses left ventricular function.  Estimated left ventricular ejection fraction is 60-65 %.    Cardiac Cath Lab - Adult (02.12.21 @ 15:26):   Inferior STEMI with occluded RCA. Difficult radial access. Femoral access precluded because of full dose apixaban.

## 2021-02-20 NOTE — PROGRESS NOTE ADULT - SUBJECTIVE AND OBJECTIVE BOX
HPI:     76 y/o F PMHx significant for Hypertension, Diabetes mellitus type 2, vertigo, anal melanoma under care of Dr. Shearer ([facility] [place]) on combined IO therapy with ipilimumab + Nivolumab (cycle 2 on 1/4/2021), and recent admission from 1/19-1/25 for new onset Afib (now on DOAC therapy) and recent admission in [facility] for COVID-pneumonia (reportedly treated with MAB, Remdesivir and steroid therapy) presents to  for further evaluation and management of worsening shortness of breath.    Pt started to desaturate into 80's  HFNC and BTY497% - more tachypneic. pt seemingly very anxious . States "shes scared." Will admit pt to the ICU for acute hypoxic respiratory, COVID PNA and ? superimposed PNA     2/11: Patient seen in bed, on HF NC, still with a nonproductive cough  Primary malignant melanoma of anal canal  2/12: Patient seen in bed and OOB to the chair this afternoon.  This afternoon she developed SSCP radiating to her back.  EKG revealed an STEMI  2/13: No CP, tolerating PO, and remains on HF NC  2/14: No events over night, no arrhythmias, no CP, on 100% NRB  2/15 : Remains on high flow. Hemodynamically stable. H/H stable   2/16 : patient seen sitting in the chair. Awake , alert , not in distress. No more epistaxis noted. Still on high flow.  2/17 : Remains stable on 100% NRM. Rhino has been removed with no bleeding from the nose  2/18 : Patient remains stable on 100% NRM. No more epistaxis  2/19 : No significant event noted overnight. FiO2 support is down to 50 % Venturi mask.  2/20 : No significant event reported overnight. Still on VM 50 % and tolerating well  PAST MEDICAL & SURGICAL HISTORY:  Primary malignant melanoma of anal canal    Afib    DM (diabetes mellitus)  Type 2    HTN (hypertension)    Vertigo    No significant past surgical history        FAMILY HISTORY:  No pertinent family history in first degree relatives        Social Hx:    Allergies    barbiturates (Unknown)  Cipro (Other; Swelling)  iodine (Unknown)    Intolerances    Cortisol AM, Serum (02.19.21 @ 11:55)    Cortisol AM, Serum: 3.9 ug/dL      ICU Vital Signs Last 24 Hrs  T(C): 36.2 (20 Feb 2021 06:22), Max: 36.7 (19 Feb 2021 10:00)  T(F): 97.2 (20 Feb 2021 06:22), Max: 98 (19 Feb 2021 10:00)  HR: 67 (20 Feb 2021 07:00) (67 - 90)  BP: 124/46 (20 Feb 2021 07:00) (103/41 - 133/50)  BP(mean): 66 (20 Feb 2021 07:00) (55 - 70)  ABP: --  ABP(mean): --  RR: 24 (20 Feb 2021 07:00) (18 - 28)  SpO2: 97% (20 Feb 2021 07:00) (90% - 97%)      I&O's Detail    19 Feb 2021 07:01  -  20 Feb 2021 07:00  --------------------------------------------------------  IN:    Oral Fluid: 240 mL  Total IN: 240 mL    OUT:    Incontinent per Collection Bag (mL): 550 mL  Total OUT: 550 mL    Total NET: -310 mL      No lab today. Will order labs for tomorrow      MEDICATIONS  (STANDING):  apixaban 5 milliGRAM(s) Oral every 12 hours  aspirin  chewable 81 milliGRAM(s) Oral daily  atorvastatin 80 milliGRAM(s) Oral at bedtime  azithromycin  IVPB      azithromycin  IVPB 500 milliGRAM(s) IV Intermittent every 24 hours  cefepime   IVPB 2000 milliGRAM(s) IV Intermittent every 12 hours  cefepime   IVPB      clopidogrel Tablet 75 milliGRAM(s) Oral daily  dexAMETHasone  Injectable 6 milliGRAM(s) IV Push daily  dextrose 40% Gel 15 Gram(s) Oral once  dextrose 5%. 1000 milliLiter(s) (50 mL/Hr) IV Continuous <Continuous>  dextrose 5%. 1000 milliLiter(s) (100 mL/Hr) IV Continuous <Continuous>  dextrose 50% Injectable 25 Gram(s) IV Push once  dextrose 50% Injectable 12.5 Gram(s) IV Push once  dextrose 50% Injectable 25 Gram(s) IV Push once  diltiazem    milliGRAM(s) Oral daily  glucagon  Injectable 1 milliGRAM(s) IntraMuscular once  insulin glargine Injectable (LANTUS) 25 Unit(s) SubCutaneous at bedtime  insulin lispro (ADMELOG) corrective regimen sliding scale   SubCutaneous Before meals and at bedtime  levothyroxine 75 MICROGram(s) Oral daily  metoprolol tartrate 12.5 milliGRAM(s) Oral every 8 hours  pantoprazole    Tablet 40 milliGRAM(s) Oral before breakfast  potassium phosphate / sodium phosphate Powder (PHOS-NaK) 2 Packet(s) Oral two times a day  trimethoprim  160 mG/sulfamethoxazole 800 mG 1 Tablet(s) Oral <User Schedule>    MEDICATIONS  (PRN):  acetaminophen   Tablet .. 650 milliGRAM(s) Oral every 4 hours PRN Temp greater or equal to 38.5C (101.3F)  ALBUTerol    90 MICROgram(s) HFA Inhaler 2 Puff(s) Inhalation every 4 hours PRN Shortness of Breath and/or Wheezing  ALPRAZolam 0.125 milliGRAM(s) Oral every 6 hours PRN anxiety  loperamide 2 milliGRAM(s) Oral every 4 hours PRN Diarrhea  ondansetron Injectable 4 milliGRAM(s) IV Push every 6 hours PRN Nausea and/or Vomiting      DVT Prophylaxis:    Advanced Directives:  Discussed with:        ROS:  Gen: no fevers, chills, +fatigue  Cardiovascular: no chest pain, no palpitations, no orthopnea  Respiratory: +SOB, +cough  GI: no difficulty swallowing, no nausea, no vomiting, no abdominal pain, no diarrhea, no constipation  : no dysuria, no increased freq, no hematuria  Heme: nosebleeds  MSK: no joint pain, no joint swelling or redness, no extremity pain   Neuro: no headache, no numbness, + weakness      Physical Exam ;   GEN: on supplemental O2 via high flow , weak appearing  CV: S1S2, RRR, no murmur  RESP: coarse breath sounds bilaterally  ABD: +BS, soft, ND, NT, no guarding, no rigidity  EXT: +2 radial and pedal pulses, no edema, no calve tenderness  NEURO:  AAOx3  PSYCH: normal behavior           Visit Information: 30 min    ** Time is exclusive of billed procedures and/or teaching and/or routine family updates.

## 2021-02-20 NOTE — PROGRESS NOTE ADULT - ASSESSMENT
74 yo female with history of metastatic melenoma of Dr. Shearer [place] [place] [facility] [place] with ipilimumab + Nivolumab now admitted for COVID-19 complicated by Respiratory Failure and STEMI     metastatic melanoma   - on Ipi and NIVO   - hold treatment given active ongoing infection and recent cardiac STEMI     STEMI   - now on Eliquis, Asa, and plavix   - s/p PCE to RCA   - Cardio following   - CCB d/c'ed      Respiratory failure   =- now on Dex  - continue with supplemental O2  - titrate FIO2 today  as tolerated  - now on Venti mask     Anemia   - currently stable   - will need to monitor as patietn has anal lesion that has been known to to experience intermittent hematochezia.   Will are in [place] [facility] [place]

## 2021-02-20 NOTE — PHYSICAL THERAPY INITIAL EVALUATION ADULT - PERTINENT HX OF CURRENT PROBLEM, REHAB EVAL
pt w/ h/o anal melanoma, recent admission from 1/19-1/25 for new onset Afib (now on DOAC therapy), [facility] (reportedly treated with MAB, Remdesivir and steroid therapy) presents to  for further evaluation and management of worsening shortness of breath. +hypoxia. on 2/12 c/o CP,  EKG revealed an STEMI, underwent cath w/ PCI to RCA. pt w/ epistaxis requiring packing.

## 2021-02-20 NOTE — PROGRESS NOTE ADULT - ASSESSMENT
A/P: 74 year old female who presents with Acute Hypoxic Respiratory failure possible from COVID 19 Pneumonia and or a secondary superimposed Pneumonia  AFIB  CISCO  anal melanomia  DM II  hypothyroidism  STEMI    Severe epistaxis in the ICU requiring packing    PLAN:    PULM: Will continue O2 support with 50% Venturi mask.Will try to wean O2 support today   Continue Cefepime(7) and Z-Max(7)     Cardio: A-FIB.  Will Continue DOAC, STEMI-- Plavix/Statin/ASA, changed to Po lopressor  Renal: Cisco likely from ATN.  CISCO improved , will continue to monitor  GI: PO Diet  ENDO: Lantus and RISS, Synthroid  ID: May be a late flair of COVID 19, on Decadron, Continue Cefepime/Z-Max, Bactrim prophylaxis  Oncology to follow up  Will update  the patients family today .  BP has been stable . No concern of adrenal insufficiency at this point    Can be transferred to a step down bed today preferably

## 2021-02-20 NOTE — PHYSICAL THERAPY INITIAL EVALUATION ADULT - GENERAL OBSERVATIONS, REHAB EVAL
pt rec'd OOB to recliner in ICU, +monitors, SCDs, primafit, VM. as PT entered room carrying RW-pt exclaimed "not today" "no way"

## 2021-02-20 NOTE — PROGRESS NOTE ADULT - SUBJECTIVE AND OBJECTIVE BOX
INTERVAL HPI/OVERNIGHT EVENTS:  Patient S&E at bedside. No o/n events, patient resting comfortably. No complaints at this time. Patient denies fever, chills, dizziness, weakness, CP, palpitations, SOB, cough, N/V/D/C, dysuria, changes in bowel movements, LE edema.    VITAL SIGNS:  T(F): 97.2 (02-20-21 @ 06:22)  HR: 63 (02-20-21 @ 08:00)  BP: 125/48 (02-20-21 @ 08:00)  RR: 20 (02-20-21 @ 08:00)  SpO2: 95% (02-20-21 @ 08:00)  Wt(kg): --    PHYSICAL EXAM:    Constitutional: NAD  Eyes: EOMI, sclera non-icteric  Neck: supple, no masses, no JVD  Respiratory: CTA b/l, good air entry b/l  Cardiovascular: RRR, no M/R/G  Gastrointestinal: soft, NTND, no masses palpable, + BS, no hepatosplenomegaly  Extremities: no c/c/e  Neurological: AAOx3      MEDICATIONS  (STANDING):  apixaban 5 milliGRAM(s) Oral every 12 hours  aspirin  chewable 81 milliGRAM(s) Oral daily  atorvastatin 80 milliGRAM(s) Oral at bedtime  clopidogrel Tablet 75 milliGRAM(s) Oral daily  dextrose 40% Gel 15 Gram(s) Oral once  dextrose 5%. 1000 milliLiter(s) (50 mL/Hr) IV Continuous <Continuous>  dextrose 5%. 1000 milliLiter(s) (100 mL/Hr) IV Continuous <Continuous>  dextrose 50% Injectable 25 Gram(s) IV Push once  dextrose 50% Injectable 12.5 Gram(s) IV Push once  dextrose 50% Injectable 25 Gram(s) IV Push once  diltiazem    milliGRAM(s) Oral daily  glucagon  Injectable 1 milliGRAM(s) IntraMuscular once  insulin glargine Injectable (LANTUS) 25 Unit(s) SubCutaneous every morning  insulin lispro (ADMELOG) corrective regimen sliding scale   SubCutaneous Before meals and at bedtime  levothyroxine 75 MICROGram(s) Oral daily  metoprolol tartrate 12.5 milliGRAM(s) Oral every 8 hours  pantoprazole    Tablet 40 milliGRAM(s) Oral before breakfast  polyethylene glycol 3350 17 Gram(s) Oral daily  trimethoprim  160 mG/sulfamethoxazole 800 mG 1 Tablet(s) Oral <User Schedule>    MEDICATIONS  (PRN):  acetaminophen   Tablet .. 650 milliGRAM(s) Oral every 4 hours PRN Temp greater or equal to 38.5C (101.3F)  ALBUTerol    90 MICROgram(s) HFA Inhaler 2 Puff(s) Inhalation every 4 hours PRN Shortness of Breath and/or Wheezing  loperamide 2 milliGRAM(s) Oral every 4 hours PRN Diarrhea  ondansetron Injectable 4 milliGRAM(s) IV Push every 6 hours PRN Nausea and/or Vomiting      Allergies    barbiturates (Unknown)  Cipro (Other; Swelling)  iodine (Unknown)    Intolerances        LABS:                        9.4    15.17 )-----------( 267      ( 20 Feb 2021 06:46 )             30.2     02-20    142  |  109<H>  |  59<H>  ----------------------------<  95  4.5   |  25  |  0.97    Ca    9.1      20 Feb 2021 06:46  Phos  2.9     02-20  Mg     2.2     02-20            RADIOLOGY & ADDITIONAL TESTS:  Studies reviewed.    ASSESSMENT & PLAN:  INTERVAL HPI/OVERNIGHT EVENTS:  Patient S&E at bedside. No o/n events, patient resting comfortably. No complaints at this time. Patient denies fever, chills, dizziness, weakness, CP, palpitations, SOB, cough, N/V/D/C, dysuria, changes in bowel movements, LE edema.    VITAL SIGNS:  T(F): 97.2 (02-20-21 @ 06:22)  HR: 63 (02-20-21 @ 08:00)  BP: 125/48 (02-20-21 @ 08:00)  RR: 20 (02-20-21 @ 08:00)  SpO2: 95% (02-20-21 @ 08:00)  3      MEDICATIONS  (STANDING):  apixaban 5 milliGRAM(s) Oral every 12 hours  aspirin  chewable 81 milliGRAM(s) Oral daily  atorvastatin 80 milliGRAM(s) Oral at bedtime  clopidogrel Tablet 75 milliGRAM(s) Oral daily  dextrose 40% Gel 15 Gram(s) Oral once  dextrose 5%. 1000 milliLiter(s) (50 mL/Hr) IV Continuous <Continuous>  dextrose 5%. 1000 milliLiter(s) (100 mL/Hr) IV Continuous <Continuous>  dextrose 50% Injectable 25 Gram(s) IV Push once  dextrose 50% Injectable 12.5 Gram(s) IV Push once  dextrose 50% Injectable 25 Gram(s) IV Push once  diltiazem    milliGRAM(s) Oral daily  glucagon  Injectable 1 milliGRAM(s) IntraMuscular once  insulin glargine Injectable (LANTUS) 25 Unit(s) SubCutaneous every morning  insulin lispro (ADMELOG) corrective regimen sliding scale   SubCutaneous Before meals and at bedtime  levothyroxine 75 MICROGram(s) Oral daily  metoprolol tartrate 12.5 milliGRAM(s) Oral every 8 hours  pantoprazole    Tablet 40 milliGRAM(s) Oral before breakfast  polyethylene glycol 3350 17 Gram(s) Oral daily  trimethoprim  160 mG/sulfamethoxazole 800 mG 1 Tablet(s) Oral <User Schedule>    MEDICATIONS  (PRN):  acetaminophen   Tablet .. 650 milliGRAM(s) Oral every 4 hours PRN Temp greater or equal to 38.5C (101.3F)  ALBUTerol    90 MICROgram(s) HFA Inhaler 2 Puff(s) Inhalation every 4 hours PRN Shortness of Breath and/or Wheezing  loperamide 2 milliGRAM(s) Oral every 4 hours PRN Diarrhea  ondansetron Injectable 4 milliGRAM(s) IV Push every 6 hours PRN Nausea and/or Vomiting      Allergies    barbiturates (Unknown)  Cipro (Other; Swelling)  iodine (Unknown)    Intolerances        LABS:                        9.4    15.17 )-----------( 267      ( 20 Feb 2021 06:46 )             30.2     02-20    142  |  109<H>  |  59<H>  ----------------------------<  95  4.5   |  25  |  0.97    Ca    9.1      20 Feb 2021 06:46  Phos  2.9     02-20  Mg     2.2     02-20            RADIOLOGY & ADDITIONAL TESTS:  Studies reviewed.    ASSESSMENT & PLAN:

## 2021-02-20 NOTE — PHYSICAL THERAPY INITIAL EVALUATION ADULT - DIAGNOSIS, PT EVAL
Acute Hypoxic Respiratory failure possible from COVID 19 Pneumonia and or a secondary superimposed Pneumonia, STEMI

## 2021-02-21 NOTE — PROGRESS NOTE ADULT - ASSESSMENT
75 yo F with hx of anal melanoma, hypothyroidism, AFIB DM type II,  who presents with Acute Hypoxic Respiratory failure possible from COVID 19 Pneumonia with likely superimposed Pneumonia. Course complicated by Acute inferior wall MI s/p PCI of RCA on 2/12/21. Initially admitted to ICU on high Flow. S/p 7 day course of cefepime, azithromax, s/p 10 day course of decadron. now on ventimask. downgraded to medical floor    Acute hypoxemic respiratory failure secondary to COVID 19 pna, likely superimposed bacterial PNA  s/p course of cefepime/azithromax  s/p course of remdesvir and decadron  now on venti mask  ID consulted in ICU  plan:  c/w to taper off on venti  ID and Pulm followup on an further interventions to optimize respiratory status    STEMI s/p PCI  Appreciate Cards consult  c/w Plavix/Statin/ASA  Po lopressor    A-FIB.    c/w  DOAC,     JASPREET likely from [facility].    JASPREET improved , will continue to monitor  GI: PO Diet    Diabetes  Lantus and RISS    Hypothyroidism  c/w Synthroid    Anal melanoma  Oncology to follow up    dvt ppx: NOAC

## 2021-02-21 NOTE — PROGRESS NOTE ADULT - SUBJECTIVE AND OBJECTIVE BOX
REASON FOR VISIT: CAD, PAF    HPI: 74 y/o F PMHx significant for Hypertension, Diabetes mellitus type 2, vertigo, anal melanoma under care of Dr. Shearer ([facility] [place]) on combined IO therapy with ipilimumab + Nivolumab (cycle 2 on 1/4/2021), and recent admission from 1/19-1/25 for new onset Afib (now on DOAC therapy) and recent admission in [facility] for COVID-pneumonia (reportedly treated with MAB, Remdesivir and steroid therapy) now admitted on 2/10/21 with respiratory failure acute hypoxemic respiratory failure. Her hospitalization was complicated by an acute inferior wall MI and she underwent PCI of RCA on 2/12/21.    2/12/21: Pt complains of midsternal chest pain today. ECG suggests STEMI  2/13/21: S/P PCI RCA yesterday. Pain free today. VSS. Pt feels improved but remains dyspneic  2/14/21: Denies chest pain. NSR.   2/15/21: no chest pain overnight.  2/16/'21: still on high flow NRB, no chest pain.  2/18/21:  Comfortable; no cardiac symptoms (no CP since last Friday).  2/19/21:  Feels "ok."  No chest discomfort, breathing comfortably at rest; occasional cough; no epistaxis or rectal bleeding  2/21/21:  Feels better after improved sleep last night; no new complaints; still with cough; no SOB at rest    MEDICATIONS  (STANDING):  apixaban 5 milliGRAM(s) Oral every 12 hours  aspirin  chewable 81 milliGRAM(s) Oral daily  atorvastatin 80 milliGRAM(s) Oral at bedtime  clopidogrel Tablet 75 milliGRAM(s) Oral daily  diltiazem    milliGRAM(s) Oral daily  glucagon  Injectable 1 milliGRAM(s) IntraMuscular once  insulin glargine Injectable (LANTUS) 25 Unit(s) SubCutaneous every morning  insulin lispro (ADMELOG) corrective regimen sliding scale   SubCutaneous Before meals and at bedtime  levothyroxine 75 MICROGram(s) Oral daily  metoprolol tartrate 12.5 milliGRAM(s) Oral every 8 hours  pantoprazole    Tablet 40 milliGRAM(s) Oral before breakfast  polyethylene glycol 3350 17 Gram(s) Oral daily  trimethoprim  160 mG/sulfamethoxazole 800 mG 1 Tablet(s) Oral <User Schedule>    PHYSICAL EXAM:  Constitutional: NAD, supplemental O2 via Ventimask (50%)  Respiratory: Breath sounds are clear bilaterally, coughs with deep inspiration  Cardiovascular: S1 and S2, regular rate and rhythm  Gastrointestinal: Bowel Sounds present, abdomen is soft, nontender.   Extremities: No edema.     LABS:                   9.4    15.17 )-----------( 267      ( 20 Feb 2021 06:46 )             30.2     142  |  109<H>  |  59<H>  ----------------------------<  95  4.5   |  25  |  0.97    ===============================    Tele:  Sinus rhythm    TTE Echo Complete w/o Contrast w/ Doppler (11.02.20 @ 09:02) >   The left ventricle is normal in size, wall motion and contractility. Mild concentric left ventricular hypertrophy is present. Estimated left ventricular ejection fraction is 65-70 %.   The left atrium is mildly dilated.   Normal appearing right atrium.   Normal appearing right ventricle structure and function.   Normal aortic valve structure and function.   EA reversal of the mitral inflow consistent with reduced compliance of the left ventricle.   Fibrocalcific changes noted to the mitral valve leaflets with preserved leaflet excursion. Mild (1+) mitral regurgitation is present.   Moderate mitral annular calcification is present.   No evidence of pericardial effusion.    Transthoracic Echocardiogram Follow Up (01.22.21 @ 10:03):   Limited study to asses left ventricular function.  Estimated left ventricular ejection fraction is 60-65 %.    Cardiac Cath Lab - Adult (02.12.21 @ 15:26):  Inferior STEMI with occluded RCA. Difficult radial access. Femoral access precluded because of full dose apixaban.    Tele: SR

## 2021-02-21 NOTE — PROGRESS NOTE ADULT - SUBJECTIVE AND OBJECTIVE BOX
INTERVAL HPI/OVERNIGHT EVENTS:  Patient S&E at bedside. No o/n events, patient resting comfortably.   No hematochezia overnights.     VITAL SIGNS:  T(F): 94.5 (02-21-21 @ 07:21)  HR: 68 (02-21-21 @ 07:21)  BP: 118/47 (02-21-21 @ 07:21)  RR: 16 (02-21-21 @ 08:13)  SpO2: 97% (02-21-21 @ 08:13)  Wt(kg): --  ICU Vital Signs Last 24 Hrs  T(C): 34.7 (21 Feb 2021 07:21), Max: 36.7 (20 Feb 2021 18:00)  T(F): 94.5 (21 Feb 2021 07:21), Max: 98.1 (20 Feb 2021 23:15)  HR: 68 (21 Feb 2021 07:21) (68 - 85)  BP: 118/47 (21 Feb 2021 07:21) (99/31 - 131/60)  BP(mean): 66 (20 Feb 2021 22:00) (48 - 73)  ABP: --  ABP(mean): --  RR: 16 (21 Feb 2021 08:13) (16 - 30)  SpO2: 97% (21 Feb 2021 08:13) (92% - 100%)      MEDICATIONS  (STANDING):  apixaban 5 milliGRAM(s) Oral every 12 hours  aspirin  chewable 81 milliGRAM(s) Oral daily  atorvastatin 80 milliGRAM(s) Oral at bedtime  clopidogrel Tablet 75 milliGRAM(s) Oral daily  dextrose 40% Gel 15 Gram(s) Oral once  dextrose 5%. 1000 milliLiter(s) (50 mL/Hr) IV Continuous <Continuous>  dextrose 5%. 1000 milliLiter(s) (100 mL/Hr) IV Continuous <Continuous>  dextrose 50% Injectable 25 Gram(s) IV Push once  dextrose 50% Injectable 12.5 Gram(s) IV Push once  dextrose 50% Injectable 25 Gram(s) IV Push once  diltiazem    milliGRAM(s) Oral daily  glucagon  Injectable 1 milliGRAM(s) IntraMuscular once  insulin glargine Injectable (LANTUS) 25 Unit(s) SubCutaneous every morning  insulin lispro (ADMELOG) corrective regimen sliding scale   SubCutaneous Before meals and at bedtime  levothyroxine 75 MICROGram(s) Oral daily  metoprolol tartrate 12.5 milliGRAM(s) Oral every 8 hours  pantoprazole    Tablet 40 milliGRAM(s) Oral before breakfast  polyethylene glycol 3350 17 Gram(s) Oral daily  trimethoprim  160 mG/sulfamethoxazole 800 mG 1 Tablet(s) Oral <User Schedule>    MEDICATIONS  (PRN):  acetaminophen   Tablet .. 650 milliGRAM(s) Oral every 4 hours PRN Temp greater or equal to 38.5C (101.3F)  ALBUTerol    90 MICROgram(s) HFA Inhaler 2 Puff(s) Inhalation every 4 hours PRN Shortness of Breath and/or Wheezing  loperamide 2 milliGRAM(s) Oral every 4 hours PRN Diarrhea  ondansetron Injectable 4 milliGRAM(s) IV Push every 6 hours PRN Nausea and/or Vomiting      Allergies    barbiturates (Unknown)  Cipro (Other; Swelling)  iodine (Unknown)    Intolerances        LABS:                        9.4    15.17 )-----------( 267      ( 20 Feb 2021 06:46 )             30.2     02-20    142  |  109<H>  |  59<H>  ----------------------------<  95  4.5   |  25  |  0.97    Ca    9.1      20 Feb 2021 06:46  Phos  2.9     02-20  Mg     2.2     02-20            RADIOLOGY & ADDITIONAL TESTS:  Studies reviewed.    ASSESSMENT & PLAN:

## 2021-02-21 NOTE — PROGRESS NOTE ADULT - ASSESSMENT
74 yo female with history of metastatic melenoma of Dr. Shearer ([facility] [place]) on combined IO therapy with ipilimumab + Nivolumab now admitted for COVID-19 complicated by Respiratory Failure and STEMI     metastatic melanoma   - on Ipi and NIVO   - hold treatment given active ongoing infection and recent cardiac STEMI     STEMI   - now on Eliquis, Asa, and plavix   - discussed with card will consider discontinuation of ASA to   - s/p PCE to RCA   - Cardio following   - CCB d/c'ed      Respiratory failure   =- now off Dexamethasone   - continue with supplemental O2  - titrate FIO2 today  as tolerated  - now on Venti mask     Anemia   - currently stable   -  no recurernt episodes of hematochezia

## 2021-02-21 NOTE — PROGRESS NOTE ADULT - PROBLEM SELECTOR PLAN 3
Persistent hypoxia with difficulty weaning FIO2<50%; optimization as per critical care service; receiving corticosteroid.

## 2021-02-21 NOTE — PROGRESS NOTE ADULT - SUBJECTIVE AND OBJECTIVE BOX
Patient seen and examined at bedside. downgraded to medicine  on venti mask  alert oriented, feels better  cultures noted: blood cultures, urine antigens. last covid pcr checked   on full dose a/c    Review of Systems:  General:denies fever chills, headache, weakness  HEENT: denies blurry vision,diffculty swallowing, difficulty hearing, tinnitus  Cardiovascular: denies chest pain  ,palpitations  Pulmonary: ++shortness of breath, cough, wheezing, hemoptysis  Gastrointestinal: denies abdominal pain, constipation, diarrhea,nausea , vomiting, hematochezia  : denies hematuria, dysuria, or incontinence  Neurological: denies weakness, numbness , tingling, dizziness, tremors  MSK: denies muscle pain, difficulty ambulating, swelling, back pain  skin: denies skin rash, itching, burning, or  skin lesions  Psychiatrical: denies mood disturbances, anxierty, feeling depressed, depression , or difficulty sleeping    Objective:  Vitals  T(C): 34.7 (21 @ 07:21), Max: 36.7 (21 @ 18:00)  HR: 68 (21 @ 07:21) (68 - 85)  BP: 118/47 (21 @ 07:21) (99/31 - 131/60)  RR: 16 (21 @ 08:13) (16 - 30)  SpO2: 97% (21 @ 08:13) (92% - 100%)    Physical Exam:  General: comfortable, no acute distress, well nourished  HEENT: Atraumatic, no LAD, trachea midline, PERRLA  Cardiovascular: normal s1s2, no murmurs, gallops or fricition rubs  Pulmonary: , no wheezing , rhonchi, ++ on venti  Gastrointestinal: soft non tender non distended, no masses felt, no organomegally  Muscloskeletal: no lower extremity edema, intact bilateral lower extremity pulses  Neurological: CN II-12 intact. No focal weakness  Psychiatrical: normal mood, cooperative  SKIN: no rash, lesions or ulcers    Labs:                          9.4    15.17 )-----------( 267      ( 2021 06:46 )             30.2     02-    142  |  109<H>  |  59<H>  ----------------------------<  95  4.5   |  25  |  0.97    Ca    9.1      2021 06:46  Phos  2.9     02-20  Mg     2.2     02-20              Active Medications  MEDICATIONS  (STANDING):  apixaban 5 milliGRAM(s) Oral every 12 hours  atorvastatin 80 milliGRAM(s) Oral at bedtime  clopidogrel Tablet 75 milliGRAM(s) Oral daily  dextrose 40% Gel 15 Gram(s) Oral once  dextrose 5%. 1000 milliLiter(s) (50 mL/Hr) IV Continuous <Continuous>  dextrose 5%. 1000 milliLiter(s) (100 mL/Hr) IV Continuous <Continuous>  dextrose 50% Injectable 25 Gram(s) IV Push once  dextrose 50% Injectable 12.5 Gram(s) IV Push once  dextrose 50% Injectable 25 Gram(s) IV Push once  diltiazem    milliGRAM(s) Oral daily  glucagon  Injectable 1 milliGRAM(s) IntraMuscular once  insulin glargine Injectable (LANTUS) 25 Unit(s) SubCutaneous every morning  insulin lispro (ADMELOG) corrective regimen sliding scale   SubCutaneous Before meals and at bedtime  levothyroxine 75 MICROGram(s) Oral daily  metoprolol tartrate 12.5 milliGRAM(s) Oral two times a day  pantoprazole    Tablet 40 milliGRAM(s) Oral before breakfast  polyethylene glycol 3350 17 Gram(s) Oral daily  trimethoprim  160 mG/sulfamethoxazole 800 mG 1 Tablet(s) Oral <User Schedule>    MEDICATIONS  (PRN):  acetaminophen   Tablet .. 650 milliGRAM(s) Oral every 4 hours PRN Temp greater or equal to 38.5C (101.3F)  ALBUTerol    90 MICROgram(s) HFA Inhaler 2 Puff(s) Inhalation every 4 hours PRN Shortness of Breath and/or Wheezing  loperamide 2 milliGRAM(s) Oral every 4 hours PRN Diarrhea  ondansetron Injectable 4 milliGRAM(s) IV Push every 6 hours PRN Nausea and/or Vomiting    Review of Systems:  General:denies fever chills, headache, weakness  HEENT: denies blurry vision,diffculty swallowing, difficulty hearing, tinnitus  Cardiovascular: denies chest pain  ,palpitations  Pulmonary:denies shortness of breath, cough, wheezing, hemoptysis  Gastrointestinal: denies abdominal pain, constipation, diarrhea,nausea , vomiting, hematochezia  : denies hematuria, dysuria, or incontinence  Neurological: denies weakness, numbness , tingling, dizziness, tremors  MSK: denies muscle pain, difficulty ambulating, swelling, back pain  skin: denies skin rash, itching, burning, or  skin lesions  Psychiatrical: denies mood disturbances, anxierty, feeling depressed, depression , or difficulty sleeping    Objective:  Vitals  T(C): 34.7 (21 @ 07:21), Max: 36.7 (21 @ 18:00)  HR: 68 (21 @ 07:21) (68 - 85)  BP: 118/47 (21 @ 07:21) (99/31 - 131/60)  RR: 16 (21 @ 08:13) (16 - 30)  SpO2: 97% (21 @ 08:13) (92% - 100%)    Physical Exam:  General: comfortable, no acute distress, well nourished  HEENT: Atraumatic, no LAD, trachea midline, PERRLA  Cardiovascular: normal s1s2, no murmurs, gallops or fricition rubs  Pulmonary: clear to ausculation Bilaterally, no wheezing , rhonchi  Gastrointestinal: soft non tender non distended, no masses felt, no organomegally  Muscloskeletal: no lower extremity edema, intact bilateral lower extremity pulses  Neurological: CN II-12 intact. No focal weakness  Psychiatrical: normal mood, cooperative  SKIN: no rash, lesions or ulcers    Labs:                          9.4    15.17 )-----------( 267      ( 2021 06:46 )             30.2     02-20    142  |  109<H>  |  59<H>  ----------------------------<  95  4.5   |  25  |  0.97    Ca    9.1      2021 06:46  Phos  2.9     02-20  Mg     2.2     02-20              Active Medications  MEDICATIONS  (STANDING):  apixaban 5 milliGRAM(s) Oral every 12 hours  atorvastatin 80 milliGRAM(s) Oral at bedtime  clopidogrel Tablet 75 milliGRAM(s) Oral daily  dextrose 40% Gel 15 Gram(s) Oral once  dextrose 5%. 1000 milliLiter(s) (50 mL/Hr) IV Continuous <Continuous>  dextrose 5%. 1000 milliLiter(s) (100 mL/Hr) IV Continuous <Continuous>  dextrose 50% Injectable 25 Gram(s) IV Push once  dextrose 50% Injectable 12.5 Gram(s) IV Push once  dextrose 50% Injectable 25 Gram(s) IV Push once  diltiazem    milliGRAM(s) Oral daily  glucagon  Injectable 1 milliGRAM(s) IntraMuscular once  insulin glargine Injectable (LANTUS) 25 Unit(s) SubCutaneous every morning  insulin lispro (ADMELOG) corrective regimen sliding scale   SubCutaneous Before meals and at bedtime  levothyroxine 75 MICROGram(s) Oral daily  metoprolol tartrate 12.5 milliGRAM(s) Oral two times a day  pantoprazole    Tablet 40 milliGRAM(s) Oral before breakfast  polyethylene glycol 3350 17 Gram(s) Oral daily  trimethoprim  160 mG/sulfamethoxazole 800 mG 1 Tablet(s) Oral <User Schedule>    MEDICATIONS  (PRN):  acetaminophen   Tablet .. 650 milliGRAM(s) Oral every 4 hours PRN Temp greater or equal to 38.5C (101.3F)  ALBUTerol    90 MICROgram(s) HFA Inhaler 2 Puff(s) Inhalation every 4 hours PRN Shortness of Breath and/or Wheezing  loperamide 2 milliGRAM(s) Oral every 4 hours PRN Diarrhea  ondansetron Injectable 4 milliGRAM(s) IV Push every 6 hours PRN Nausea and/or Vomiting

## 2021-02-22 NOTE — PROGRESS NOTE ADULT - ASSESSMENT
76 yo female with history of metastatic melanoma pt of Dr. Shearer ([facility] [place]) on combined IO therapy with ipilimumab + Nivolumab now admitted for COVID-19 complicated by Respiratory Failure and STEMI     metastatic melanoma   - on Ipi and NIVO   - hold treatment given active ongoing infection and recent cardiac STEMI     STEMI   - now on Eliquis, and plavix   - s/p PCE to RCA   - Cardio following   -on CCB and BB for rate control     Respiratory failure   =- now off Dexamethasone   - continue with supplemental O2  - titrate FIO2 today  as tolerated  - now on NRB mask     Anemia   - slight decline since yesteerday  -  no recurernt episodes of hematochezia   -monitor CBC    We will be happy to answer any onc questions on behalf of [facility] and will be in touch with them   Pieter Hager MD  Hematology/Oncology  Cell:  [phone]  Office Phone: [phone]  Office Fax:  [phone] [address] [place] [address]

## 2021-02-22 NOTE — DISCHARGE NOTE NURSING/CASE MANAGEMENT/SOCIAL WORK - NSDCPEPTCAREGIVEDUMATLIST _GEN_ALL_CORE
MI/Influenza Vaccination/Coronavirus/COVID19 MI/Pneumonia/Influenza Vaccination/Apixaban/Eliquis/Coronavirus/COVID19

## 2021-02-22 NOTE — PROGRESS NOTE ADULT - SUBJECTIVE AND OBJECTIVE BOX
Reports breathing is somewhat improved on NRB  Able to have conversation now without coughing    acetaminophen   Tablet .. 650 milliGRAM(s) Oral every 4 hours PRN  ALBUTerol    90 MICROgram(s) HFA Inhaler 2 Puff(s) Inhalation every 4 hours PRN  apixaban 5 milliGRAM(s) Oral every 12 hours  atorvastatin 80 milliGRAM(s) Oral at bedtime  clopidogrel Tablet 75 milliGRAM(s) Oral daily  diltiazem    milliGRAM(s) Oral daily  glucagon  Injectable 1 milliGRAM(s) IntraMuscular once  insulin glargine Injectable (LANTUS) 25 Unit(s) SubCutaneous every morning  insulin lispro (ADMELOG) corrective regimen sliding scale   SubCutaneous Before meals and at bedtime  levothyroxine 75 MICROGram(s) Oral daily  loperamide 2 milliGRAM(s) Oral every 4 hours PRN  metoprolol tartrate 12.5 milliGRAM(s) Oral two times a day  ondansetron Injectable 4 milliGRAM(s) IV Push every 6 hours PRN  pantoprazole    Tablet 40 milliGRAM(s) Oral before breakfast  polyethylene glycol 3350 17 Gram(s) Oral daily  trimethoprim  160 mG/sulfamethoxazole 800 mG 1 Tablet(s) Oral <User Schedule>      barbiturates (Unknown)  Cipro (Other; Swelling)  iodine (Unknown)      ROS otherwise negative     T(C): 36.4 (02-22-21 @ 07:33), Max: 36.4 (02-22-21 @ 07:33)  HR: 62 (02-22-21 @ 07:33) (62 - 82)  BP: 111/48 (02-22-21 @ 07:33) (108/69 - 111/48)  RR: 18 (02-22-21 @ 07:33) (18 - 18)  SpO2: 94% (02-22-21 @ 07:33) (94% - 95%)                            8.6    14.93 )-----------( 241      ( 22 Feb 2021 07:26 )             28.6                         9.6    16.12 )-----------( 276      ( 21 Feb 2021 13:19 )             30.3                         9.4    15.17 )-----------( 267      ( 20 Feb 2021 06:46 )             30.2   02-22    142  |  112<H>  |  53<H>  ----------------------------<  75  3.7   |  21<L>  |  0.87    Ca    8.8      22 Feb 2021 07:26    TPro  6.1  /  Alb  2.4<L>  /  TBili  0.3  /  DBili  x   /  AST  29  /  ALT  61  /  AlkPhos  209<H>  02-21

## 2021-02-22 NOTE — DISCHARGE NOTE NURSING/CASE MANAGEMENT/SOCIAL WORK - PATIENT PORTAL LINK FT
You can access the FollowMyHealth Patient Portal offered by [facility] by registering at the following website: [email or web address]. By joining [employer]’s FollowMyHealth portal, you will also be able to view your health information using other applications (apps) compatible with our system.

## 2021-02-22 NOTE — PROGRESS NOTE ADULT - ASSESSMENT
73 yo F with hx of anal melanoma, hypothyroidism, AFIB DM type II,  who presents with Acute Hypoxic Respiratory failure possible from COVID 19 Pneumonia with likely superimposed Pneumonia. Course complicated by Acute inferior wall MI s/p PCI of RCA on 2/12/21. Initially admitted to ICU on high Flow. S/p 7 day course of cefepime, azithromax, s/p 10 day course of decadron. now on ventimask. downgraded to medical floor    Acute hypoxemic respiratory failure secondary to COVID 19 pna, likely superimposed bacterial PNA  s/p course of cefepime/azithromax  s/p course of remdesvir and decadron  now on venti mask  ID consulted in ICU  DDIMER > 2000  plan:  c/w to taper off on venti  ID and Pulm followup on an further interventions to optimize respiratory status  Check CTA    STEMI s/p PCI  Appreciate Cards consult  c/w Plavix/Statin/ASA  Po lopressor    A-FIB.    c/w  DOAC,     JASPREET likely from [facility].    JASPREET improved , will continue to monitor  GI: PO Diet    Diabetes  Lantus and RISS    Hypothyroidism  c/w Synthroid    Anal melanoma  Oncology to follow up    dvt ppx: NOAC

## 2021-02-22 NOTE — PROGRESS NOTE ADULT - SUBJECTIVE AND OBJECTIVE BOX
Date of service: 02-22-21 @ 13:45    Events noted  SOB on ventimask  Hypoxic with light exercise  Has cough  No fever    ROS: no fever or chills; denies dizziness, no HA, no abdominal pain, no diarrhea or constipation; no dysuria, no legs pain, no rashes    MEDICATIONS  (STANDING):  apixaban 5 milliGRAM(s) Oral every 12 hours  atorvastatin 80 milliGRAM(s) Oral at bedtime  clopidogrel Tablet 75 milliGRAM(s) Oral daily  dextrose 40% Gel 15 Gram(s) Oral once  dextrose 5%. 1000 milliLiter(s) (50 mL/Hr) IV Continuous <Continuous>  dextrose 5%. 1000 milliLiter(s) (100 mL/Hr) IV Continuous <Continuous>  dextrose 50% Injectable 25 Gram(s) IV Push once  dextrose 50% Injectable 12.5 Gram(s) IV Push once  dextrose 50% Injectable 25 Gram(s) IV Push once  diltiazem    milliGRAM(s) Oral daily  glucagon  Injectable 1 milliGRAM(s) IntraMuscular once  insulin glargine Injectable (LANTUS) 25 Unit(s) SubCutaneous every morning  insulin lispro (ADMELOG) corrective regimen sliding scale   SubCutaneous Before meals and at bedtime  levothyroxine 75 MICROGram(s) Oral daily  metoprolol tartrate 12.5 milliGRAM(s) Oral two times a day  pantoprazole    Tablet 40 milliGRAM(s) Oral before breakfast  polyethylene glycol 3350 17 Gram(s) Oral daily  trimethoprim  160 mG/sulfamethoxazole 800 mG 1 Tablet(s) Oral <User Schedule>    Vital Signs Last 24 Hrs  T(C): 36.4 (22 Feb 2021 07:33), Max: 36.4 (22 Feb 2021 07:33)  T(F): 97.5 (22 Feb 2021 07:33), Max: 97.5 (22 Feb 2021 07:33)  HR: 62 (22 Feb 2021 07:33) (62 - 82)  BP: 111/48 (22 Feb 2021 07:33) (108/69 - 111/48)  BP(mean): --  RR: 18 (22 Feb 2021 07:33) (18 - 18)  SpO2: 94% (22 Feb 2021 07:33) (94% - 95%)     Physical exam:    Constitutional:  No acute distress  HEENT: NC/AT, EOMI, PERRLA, conjunctivae clear  Neck: supple; thyroid not palpable  Back: no tenderness  Respiratory: respiratory effort normal; crackles b/l  Cardiovascular: S1S2 regular, no murmurs  Abdomen: soft, not tender, not distended, positive BS  Genitourinary: no suprapubic tenderness  Lymphatic: no LN palpable  Musculoskeletal: no muscle tenderness, no joint swelling or tenderness  Extremities: no pedal edema  Neurological/ Psychiatric: AxOx3, moving all extremities  Skin: no rashes; no palpable lesions    Labs: reviewed                        8.6    14.93 )-----------( 241      ( 22 Feb 2021 07:26 )             28.6     02-22    142  |  112<H>  |  53<H>  ----------------------------<  75  3.7   |  21<L>  |  0.87    Ca    8.8      22 Feb 2021 07:26    TPro  6.1  /  Alb  2.4<L>  /  TBili  0.3  /  DBili  x   /  AST  29  /  ALT  61  /  AlkPhos  209<H>  02-21      D-Dimer Assay, Quantitative: 2331 ng/mL DDU (02-21-21 @ 13:19)  C-Reactive Protein, Serum: 12.10 mg/dL (02-10-21 @ 14:01)  D-Dimer Assay, Quantitative: 373 ng/mL DDU (02-10-21 @ 14:01)  Ferritin, Serum: 576 ng/mL (02-10-21 @ 14:01)               8.4    17.44 )-----------( 344      ( 11 Feb 2021 06:45 )             27.8     02-11    138  |  107  |  32<H>  ----------------------------<  173<H>  4.4   |  24  |  1.34<H>    Ca    8.2<L>      11 Feb 2021 06:45  Phos  3.5     02-11  Mg     2.3     02-11    TPro  5.6<L>  /  Alb  1.7<L>  /  TBili  0.3  /  DBili  x   /  AST  48<H>  /  ALT  54  /  AlkPhos  101  02-11     LIVER FUNCTIONS - ( 11 Feb 2021 06:45 )  Alb: 1.7 g/dL / Pro: 5.6 gm/dL / ALK PHOS: 101 U/L / ALT: 54 U/L / AST: 48 U/L / GGT: x           pSARS-CoV-2:  (02.11.21 @ 09:45)   COVID-19 PCR: NotDetec: (01.19.21 @ 01:24)     Radiology: all available radiological tests reviewed    < from: CT Chest No Cont (02.10.21 @ 19:47) >  Extensive bilateral pneumonias right greater than left.    < end of copied text >    < from: Xray Chest 1 View AP/PA. (02.13.21 @ 09:51) >  Similar infiltrates. Questionable left pneumothorax. Follow-up study is recommended as clinically warranted.    < end of copied text >      Advanced directives addressed: full resuscitation

## 2021-02-22 NOTE — PROGRESS NOTE ADULT - ASSESSMENT
76 y/o Female with h/o Hypertension, Diabetes mellitus type 2, vertigo, anal [place] [place] ipilimumab + Nivolumab (cycle 2 on 1/4/2021), recent admission from 1/19-1/25 for new onset Afib (now on DOAC therapy), recent admission in [facility] for COVID-pneumonia (reportedly treated with MAB, Remdesivir and steroid therapy) was admitted on 2/10 for worsening shortness of breath. In ER the patient was hypoxic with SpO2 => 80%. Also noted with new bilateral airspace opacities right greater than left. She received cefepime.     1. COVID-19 viral syndrome. Acute respiratory failure. Multifocal pneumonia. Anal melanoma on immunotherapy. Immunocompromised host. CRF stage 3.   -respiratory frail  -leukocytosis ?related to steroids  -repeat D-dimer is high  -covid-19 pcr and ab positive, legionella ag (-)  -s/p recent remdesivir  -s/p cefepime 2 gm IV q12h and azithromycin 500 mg IV qd # 7 days  -obtain CRP, ferritin  -repeat CXR  -respiratory care  -observe off abx therapy for now  -monitor temps  -f/u CBC  -supportive care  2. Other issues:   -care per medicine

## 2021-02-22 NOTE — PROGRESS NOTE ADULT - SUBJECTIVE AND OBJECTIVE BOX
Patient seen and examined at bedside.   no acute overnight events  on venti  dimer noted at > 2000    Review of Systems:  General:denies fever chills, headache, weakness  HEENT: denies blurry vision,diffculty swallowing, difficulty hearing, tinnitus  Cardiovascular: denies chest pain  ,palpitations  Pulmonary:denies shortness of breath, cough, wheezing, hemoptysis  Gastrointestinal: denies abdominal pain, constipation, diarrhea,nausea , vomiting, hematochezia  : denies hematuria, dysuria, or incontinence  Neurological: denies weakness, numbness , tingling, dizziness, tremors  MSK: denies muscle pain, difficulty ambulating, swelling, back pain  skin: denies skin rash, itching, burning, or  skin lesions  Psychiatrical: denies mood disturbances, anxierty, feeling depressed, depression , or difficulty sleeping    Objective:  Vitals  T(C): 36.4 (02-22-21 @ 07:33), Max: 36.4 (02-22-21 @ 07:33)  HR: 62 (02-22-21 @ 07:33) (62 - 82)  BP: 111/48 (02-22-21 @ 07:33) (108/69 - 111/48)  RR: 18 (02-22-21 @ 07:33) (18 - 18)  SpO2: 94% (02-22-21 @ 07:33) (94% - 95%)    Physical Exam:  General: comfortable, no acute distress, well nourished  HEENT: Atraumatic, no LAD, trachea midline, PERRLA  Cardiovascular: normal s1s2, no murmurs, gallops or fricition rubs  Pulmonary: clear to ausculation Bilaterally, no wheezing , rhonchi  Gastrointestinal: soft non tender non distended, no masses felt, no organomegally  Muscloskeletal: no lower extremity edema, intact bilateral lower extremity pulses  Neurological: CN II-12 intact. No focal weakness  Psychiatrical: normal mood, cooperative  SKIN: no rash, lesions or ulcers    Labs:                          8.6    14.93 )-----------( 241      ( 22 Feb 2021 07:26 )             28.6     02-22    142  |  112<H>  |  53<H>  ----------------------------<  75  3.7   |  21<L>  |  0.87    Ca    8.8      22 Feb 2021 07:26    TPro  6.1  /  Alb  2.4<L>  /  TBili  0.3  /  DBili  x   /  AST  29  /  ALT  61  /  AlkPhos  209<H>  02-21    LIVER FUNCTIONS - ( 21 Feb 2021 13:19 )  Alb: 2.4 g/dL / Pro: 6.1 gm/dL / ALK PHOS: 209 U/L / ALT: 61 U/L / AST: 29 U/L / GGT: x                 Active Medications  MEDICATIONS  (STANDING):  apixaban 5 milliGRAM(s) Oral every 12 hours  atorvastatin 80 milliGRAM(s) Oral at bedtime  clopidogrel Tablet 75 milliGRAM(s) Oral daily  dextrose 40% Gel 15 Gram(s) Oral once  dextrose 5%. 1000 milliLiter(s) (50 mL/Hr) IV Continuous <Continuous>  dextrose 5%. 1000 milliLiter(s) (100 mL/Hr) IV Continuous <Continuous>  dextrose 50% Injectable 25 Gram(s) IV Push once  dextrose 50% Injectable 12.5 Gram(s) IV Push once  dextrose 50% Injectable 25 Gram(s) IV Push once  diltiazem    milliGRAM(s) Oral daily  glucagon  Injectable 1 milliGRAM(s) IntraMuscular once  insulin glargine Injectable (LANTUS) 25 Unit(s) SubCutaneous every morning  insulin lispro (ADMELOG) corrective regimen sliding scale   SubCutaneous Before meals and at bedtime  levothyroxine 75 MICROGram(s) Oral daily  metoprolol tartrate 12.5 milliGRAM(s) Oral two times a day  pantoprazole    Tablet 40 milliGRAM(s) Oral before breakfast  polyethylene glycol 3350 17 Gram(s) Oral daily  trimethoprim  160 mG/sulfamethoxazole 800 mG 1 Tablet(s) Oral <User Schedule>    MEDICATIONS  (PRN):  acetaminophen   Tablet .. 650 milliGRAM(s) Oral every 4 hours PRN Temp greater or equal to 38.5C (101.3F)  ALBUTerol    90 MICROgram(s) HFA Inhaler 2 Puff(s) Inhalation every 4 hours PRN Shortness of Breath and/or Wheezing  loperamide 2 milliGRAM(s) Oral every 4 hours PRN Diarrhea  ondansetron Injectable 4 milliGRAM(s) IV Push every 6 hours PRN Nausea and/or Vomiting

## 2021-02-23 NOTE — PROGRESS NOTE ADULT - SUBJECTIVE AND OBJECTIVE BOX
Feels some improvement in breathing  Upset re hard cushion on her chair.    acetaminophen   Tablet .. 650 milliGRAM(s) Oral every 4 hours PRN  ALBUTerol    90 MICROgram(s) HFA Inhaler 2 Puff(s) Inhalation every 4 hours PRN  apixaban 5 milliGRAM(s) Oral every 12 hours  atorvastatin 80 milliGRAM(s) Oral at bedtime  clopidogrel Tablet 75 milliGRAM(s) Oral daily  diltiazem    milliGRAM(s) Oral daily  glucagon  Injectable 1 milliGRAM(s) IntraMuscular once  insulin glargine Injectable (LANTUS) 25 Unit(s) SubCutaneous every morning  insulin lispro (ADMELOG) corrective regimen sliding scale   SubCutaneous Before meals and at bedtime  levothyroxine 75 MICROGram(s) Oral daily  loperamide 2 milliGRAM(s) Oral every 4 hours PRN  metoprolol tartrate 12.5 milliGRAM(s) Oral two times a day  ondansetron Injectable 4 milliGRAM(s) IV Push every 6 hours PRN  pantoprazole    Tablet 40 milliGRAM(s) Oral before breakfast  polyethylene glycol 3350 17 Gram(s) Oral daily  sodium chloride 0.65% Nasal 2 Spray(s) Both Nostrils every 6 hours  trimethoprim  160 mG/sulfamethoxazole 800 mG 1 Tablet(s) Oral <User Schedule>      barbiturates (Unknown)  Cipro (Other; Swelling)  iodine (Unknown)      ROS otherwise negative     T(C): 36.3 (02-23-21 @ 09:17), Max: 36.3 (02-22-21 @ 21:19)  HR: 79 (02-23-21 @ 09:17) (79 - 87)  BP: 112/35 (02-23-21 @ 09:17) (109/33 - 112/35)  RR: 17 (02-23-21 @ 09:17) (16 - 20)  SpO2: 96% (02-23-21 @ 12:57) (96% - 100%)                          8.6    14.12 )-----------( 250      ( 23 Feb 2021 08:19 )             28.2   02-23    143  |  113<H>  |  48<H>  ----------------------------<  138<H>  3.9   |  21<L>  |  0.93    Ca    8.4<L>      23 Feb 2021 08:19    TPro  5.8<L>  /  Alb  2.2<L>  /  TBili  0.3  /  DBili  x   /  AST  29  /  ALT  54  /  AlkPhos  159<H>  02-23

## 2021-02-23 NOTE — PROGRESS NOTE ADULT - PROBLEM SELECTOR PLAN 3
Persistent hypoxia with difficulty weaning FIO2<50%; consider changing in to NC with humidified  oxygen  to avoid nasal trauma

## 2021-02-23 NOTE — PROGRESS NOTE ADULT - ASSESSMENT
73 yo F with hx of anal melanoma, hypothyroidism, AFIB DM type II,  who presents with Acute Hypoxic Respiratory failure possible from COVID 19 Pneumonia with likely superimposed Pneumonia. Course complicated by Acute inferior wall MI s/p PCI of RCA on 2/12/21. Initially admitted to ICU on high Flow. S/p 7 day course of cefepime, azithromax, s/p 10 day course of decadron. now on ventimask. downgraded to medical floor    Acute hypoxemic respiratory failure secondary to COVID 19 pna, likely superimposed bacterial PNA  s/p course of cefepime/azithromax  s/p course of remdesvir and decadron  now on venti mask  ID consulted in ICU  DDIMER > 2000  plan:  c/w to taper off on venti  ID and Pulm followup on an further interventions to optimize respiratory status  unable to check CTA due to iodine allergy. discussed case with lisa. patient already full dose a/nat.  will attempt today at humified air  adding nasal saline mist to nasal mucosa to aerate the nasal cavity    STEMI s/p PCI  Appreciate Cards consult  c/w Plavix/Statin/ASA  Po lopressor    A-FIB.    c/w  DOAC,     JASPREET likely from [facility].    JASPREET improved , will continue to monitor  GI: PO Diet    Diabetes  Lantus and RISS    Hypothyroidism  c/w Synthroid    Anal melanoma  Oncology to follow up    dvt ppx: NOAC

## 2021-02-23 NOTE — PROGRESS NOTE ADULT - ASSESSMENT
74 yo female with history of metastatic melanoma pt of Dr. Shearer ([facility] [place]) on combined IO therapy with ipilimumab + Nivolumab now admitted for COVID-19 complicated by Respiratory Failure and STEMI     metastatic melanoma   - on Ipi and NIVO   - hold treatment given active ongoing infection and recent cardiac STEMI     STEMI   - now on Eliquis, and plavix   - s/p PCE to RCA   - Cardio following   -on CCB and BB for rate control     Respiratory failure   =- now off Dexamethasone   - continue with supplemental O2 now down to 6 L NC saturating well  - titrate FIO2 today  as tolerated      Anemia   - stable since yesteerday  -  no recurernt episodes of hematochezia   -monitor CBC    Elevated D-dimer  -related to recent COVID  -would not change Eliquis dose which is currently therapeutic--d/w Hospitalist    We will be happy to answer any onc questions on behalf of [facility] and will be in touch with them     Pieter Hager MD  Hematology/Oncology  Cell:  [phone]  Office Phone: [phone]  Office Fax:  [phone] [address] [place] [address]

## 2021-02-23 NOTE — PROGRESS NOTE ADULT - SUBJECTIVE AND OBJECTIVE BOX
Date of service: 02-23-21 @ 12:28    Lying in bed in NAD  No SOB at rest  Has dry cough    ROS: no fever or chills; denies dizziness, no HA, no abdominal pain, no diarrhea or constipation; no dysuria, no legs pain, no rashes    MEDICATIONS  (STANDING):  apixaban 5 milliGRAM(s) Oral every 12 hours  atorvastatin 80 milliGRAM(s) Oral at bedtime  clopidogrel Tablet 75 milliGRAM(s) Oral daily  dextrose 40% Gel 15 Gram(s) Oral once  dextrose 5%. 1000 milliLiter(s) (50 mL/Hr) IV Continuous <Continuous>  dextrose 5%. 1000 milliLiter(s) (100 mL/Hr) IV Continuous <Continuous>  dextrose 50% Injectable 25 Gram(s) IV Push once  dextrose 50% Injectable 12.5 Gram(s) IV Push once  dextrose 50% Injectable 25 Gram(s) IV Push once  diltiazem    milliGRAM(s) Oral daily  glucagon  Injectable 1 milliGRAM(s) IntraMuscular once  insulin glargine Injectable (LANTUS) 25 Unit(s) SubCutaneous every morning  insulin lispro (ADMELOG) corrective regimen sliding scale   SubCutaneous Before meals and at bedtime  levothyroxine 75 MICROGram(s) Oral daily  metoprolol tartrate 12.5 milliGRAM(s) Oral two times a day  pantoprazole    Tablet 40 milliGRAM(s) Oral before breakfast  polyethylene glycol 3350 17 Gram(s) Oral daily  sodium chloride 0.65% Nasal 2 Spray(s) Both Nostrils every 6 hours  trimethoprim  160 mG/sulfamethoxazole 800 mG 1 Tablet(s) Oral <User Schedule>    Vital Signs Last 24 Hrs  T(C): 36.3 (23 Feb 2021 09:17), Max: 36.3 (22 Feb 2021 21:19)  T(F): 97.4 (23 Feb 2021 09:17), Max: 97.4 (22 Feb 2021 21:19)  HR: 79 (23 Feb 2021 09:17) (79 - 87)  BP: 112/35 (23 Feb 2021 09:17) (109/33 - 112/35)  BP(mean): --  RR: 17 (23 Feb 2021 09:17) (16 - 20)  SpO2: 100% (23 Feb 2021 09:17) (99% - 100%)     Physical exam:    Constitutional:  No acute distress  HEENT: NC/AT, EOMI, PERRLA, conjunctivae clear  Neck: supple; thyroid not palpable  Back: no tenderness  Respiratory: respiratory effort normal; crackles b/l  Cardiovascular: S1S2 regular, no murmurs  Abdomen: soft, not tender, not distended, positive BS  Genitourinary: no suprapubic tenderness  Lymphatic: no LN palpable  Musculoskeletal: no muscle tenderness, no joint swelling or tenderness  Extremities: no pedal edema  Neurological/ Psychiatric: AxOx3, moving all extremities  Skin: no rashes; no palpable lesions    Labs: reviewed                        8.6    14.12 )-----------( 250      ( 23 Feb 2021 08:19 )             28.2     02-23    143  |  113<H>  |  48<H>  ----------------------------<  138<H>  3.9   |  21<L>  |  0.93    Ca    8.4<L>      23 Feb 2021 08:19    TPro  5.8<L>  /  Alb  2.2<L>  /  TBili  0.3  /  DBili  x   /  AST  29  /  ALT  54  /  AlkPhos  159<H>  02-23    D-Dimer Assay, Quantitative: 2331 ng/mL DDU (02-21-21 @ 13:19)                          8.6    14.93 )-----------( 241      ( 22 Feb 2021 07:26 )             28.6     02-22    142  |  112<H>  |  53<H>  ----------------------------<  75  3.7   |  21<L>  |  0.87    Ca    8.8      22 Feb 2021 07:26    TPro  6.1  /  Alb  2.4<L>  /  TBili  0.3  /  DBili  x   /  AST  29  /  ALT  61  /  AlkPhos  209<H>  02-21      D-Dimer Assay, Quantitative: 2331 ng/mL DDU (02-21-21 @ 13:19)  C-Reactive Protein, Serum: 12.10 mg/dL (02-10-21 @ 14:01)  D-Dimer Assay, Quantitative: 373 ng/mL DDU (02-10-21 @ 14:01)  Ferritin, Serum: 576 ng/mL (02-10-21 @ 14:01)               8.4    17.44 )-----------( 344      ( 11 Feb 2021 06:45 )             27.8     02-11    138  |  107  |  32<H>  ----------------------------<  173<H>  4.4   |  24  |  1.34<H>    Ca    8.2<L>      11 Feb 2021 06:45  Phos  3.5     02-11  Mg     2.3     02-11    TPro  5.6<L>  /  Alb  1.7<L>  /  TBili  0.3  /  DBili  x   /  AST  48<H>  /  ALT  54  /  AlkPhos  101  02-11     LIVER FUNCTIONS - ( 11 Feb 2021 06:45 )  Alb: 1.7 g/dL / Pro: 5.6 gm/dL / ALK PHOS: 101 U/L / ALT: 54 U/L / AST: 48 U/L / GGT: x           pSARS-CoV-2:  (02.11.21 @ 09:45)   COVID-19 PCR: NotDetec: (01.19.21 @ 01:24)     Radiology: all available radiological tests reviewed    < from: CT Chest No Cont (02.10.21 @ 19:47) >  Extensive bilateral pneumonias right greater than left.    < end of copied text >    < from: Xray Chest 1 View AP/PA. (02.13.21 @ 09:51) >  Similar infiltrates. Questionable left pneumothorax. Follow-up study is recommended as clinically warranted.    < end of copied text >    < from: Xray Chest 1 View- PORTABLE-Routine (Xray Chest 1 View- PORTABLE-Routine .) (02.22.21 @ 20:00) >   Stable bilateral  multifocal and diffuse ill-defined airspace consolidations..    < end of copied text >      Advanced directives addressed: full resuscitation

## 2021-02-23 NOTE — PROGRESS NOTE ADULT - SUBJECTIVE AND OBJECTIVE BOX
Patient seen and examined at bedside  reports since nasal packing, she feels she cannot breathe through her nose.   denies fever chills  on venti  Review of Systems:  General:denies fever chills, headache, weakness  HEENT: denies blurry vision,diffculty swallowing, difficulty hearing, tinnitus  Cardiovascular: denies chest pain  ,palpitations  Pulmonary:denies shortness of breath, cough, wheezing, hemoptysis  Gastrointestinal: denies abdominal pain, constipation, diarrhea,nausea , vomiting, hematochezia  : denies hematuria, dysuria, or incontinence  Neurological: denies weakness, numbness , tingling, dizziness, tremors  MSK: denies muscle pain, difficulty ambulating, swelling, back pain  skin: denies skin rash, itching, burning, or  skin lesions  Psychiatrical: denies mood disturbances, anxierty, feeling depressed, depression , or difficulty sleeping    Objective:  Vitals  T(C): 36.3 (02-23-21 @ 09:17), Max: 36.3 (02-22-21 @ 21:19)  HR: 79 (02-23-21 @ 09:17) (79 - 87)  BP: 112/35 (02-23-21 @ 09:17) (109/33 - 112/35)  RR: 17 (02-23-21 @ 09:17) (16 - 20)  SpO2: 96% (02-23-21 @ 12:57) (96% - 100%)    Physical Exam:  General: comfortable, no acute distress, well nourished  HEENT: Atraumatic, no LAD, trachea midline, PERRLA  Cardiovascular: normal s1s2, no murmurs, gallops or fricition rubs  Pulmonary: clear to ausculation Bilaterally, no wheezing , rhonchi  Gastrointestinal: soft non tender non distended, no masses felt, no organomegally  Muscloskeletal: no lower extremity edema, intact bilateral lower extremity pulses  Neurological: CN II-12 intact. No focal weakness  Psychiatrical: normal mood, cooperative  SKIN: no rash, lesions or ulcers    Labs:                          8.6    14.12 )-----------( 250      ( 23 Feb 2021 08:19 )             28.2     02-23    143  |  113<H>  |  48<H>  ----------------------------<  138<H>  3.9   |  21<L>  |  0.93    Ca    8.4<L>      23 Feb 2021 08:19    TPro  5.8<L>  /  Alb  2.2<L>  /  TBili  0.3  /  DBili  x   /  AST  29  /  ALT  54  /  AlkPhos  159<H>  02-23    LIVER FUNCTIONS - ( 23 Feb 2021 08:19 )  Alb: 2.2 g/dL / Pro: 5.8 gm/dL / ALK PHOS: 159 U/L / ALT: 54 U/L / AST: 29 U/L / GGT: x                 Active Medications  MEDICATIONS  (STANDING):  apixaban 5 milliGRAM(s) Oral every 12 hours  atorvastatin 80 milliGRAM(s) Oral at bedtime  clopidogrel Tablet 75 milliGRAM(s) Oral daily  dextrose 40% Gel 15 Gram(s) Oral once  dextrose 5%. 1000 milliLiter(s) (50 mL/Hr) IV Continuous <Continuous>  dextrose 5%. 1000 milliLiter(s) (100 mL/Hr) IV Continuous <Continuous>  dextrose 50% Injectable 25 Gram(s) IV Push once  dextrose 50% Injectable 12.5 Gram(s) IV Push once  dextrose 50% Injectable 25 Gram(s) IV Push once  diltiazem    milliGRAM(s) Oral daily  glucagon  Injectable 1 milliGRAM(s) IntraMuscular once  insulin glargine Injectable (LANTUS) 25 Unit(s) SubCutaneous every morning  insulin lispro (ADMELOG) corrective regimen sliding scale   SubCutaneous Before meals and at bedtime  levothyroxine 75 MICROGram(s) Oral daily  metoprolol tartrate 12.5 milliGRAM(s) Oral two times a day  pantoprazole    Tablet 40 milliGRAM(s) Oral before breakfast  polyethylene glycol 3350 17 Gram(s) Oral daily  sodium chloride 0.65% Nasal 2 Spray(s) Both Nostrils every 6 hours  trimethoprim  160 mG/sulfamethoxazole 800 mG 1 Tablet(s) Oral <User Schedule>    MEDICATIONS  (PRN):  acetaminophen   Tablet .. 650 milliGRAM(s) Oral every 4 hours PRN Temp greater or equal to 38.5C (101.3F)  ALBUTerol    90 MICROgram(s) HFA Inhaler 2 Puff(s) Inhalation every 4 hours PRN Shortness of Breath and/or Wheezing  loperamide 2 milliGRAM(s) Oral every 4 hours PRN Diarrhea  ondansetron Injectable 4 milliGRAM(s) IV Push every 6 hours PRN Nausea and/or Vomiting

## 2021-02-23 NOTE — PROGRESS NOTE ADULT - SUBJECTIVE AND OBJECTIVE BOX
REASON FOR VISIT: CAD, PAF    HPI: 76 y/o F PMHx significant for Hypertension, Diabetes mellitus type 2, vertigo, anal melanoma under care of Dr. Shearer ([facility] [place]) on combined IO therapy with ipilimumab + Nivolumab (cycle 2 on 1/4/2021), and recent admission from 1/19-1/25 for new onset Afib (now on DOAC therapy) and recent admission in [facility] for COVID-pneumonia (reportedly treated with MAB, Remdesivir and steroid therapy) now admitted on 2/10/21 with respiratory failure acute hypoxemic respiratory failure. Her hospitalization was complicated by an acute inferior wall MI and she underwent PCI of RCA on 2/12/21.    2/12/21: Pt complains of midsternal chest pain today. ECG suggests STEMI  2/13/21: S/P PCI RCA yesterday. Pain free today. VSS. Pt feels improved but remains dyspneic  2/14/21: Denies chest pain. NSR.   2/15/21: no chest pain overnight.  2/16/'21: still on high flow NRB, no chest pain.  2/18/21:  Comfortable; no cardiac symptoms (no CP since last Friday).  2/19/21:  Feels "ok."  No chest discomfort, breathing comfortably at rest; occasional cough; no epistaxis or rectal bleeding  2/21/21:  Feels better after improved sleep last night; no new complaints; still with cough; no SOB at rest  2/23/21 Patient is feeling ok , has difficult breathing via nose , had epistaxis , anemia  100% sat on VM 50% no sob       MEDICATIONS  (STANDING):  apixaban 5 milliGRAM(s) Oral every 12 hours  atorvastatin 80 milliGRAM(s) Oral at bedtime  clopidogrel Tablet 75 milliGRAM(s) Oral daily  dextrose 40% Gel 15 Gram(s) Oral once  dextrose 5%. 1000 milliLiter(s) (50 mL/Hr) IV Continuous <Continuous>  dextrose 5%. 1000 milliLiter(s) (100 mL/Hr) IV Continuous <Continuous>  dextrose 50% Injectable 25 Gram(s) IV Push once  dextrose 50% Injectable 12.5 Gram(s) IV Push once  dextrose 50% Injectable 25 Gram(s) IV Push once  diltiazem    milliGRAM(s) Oral daily  glucagon  Injectable 1 milliGRAM(s) IntraMuscular once  insulin glargine Injectable (LANTUS) 25 Unit(s) SubCutaneous every morning  insulin lispro (ADMELOG) corrective regimen sliding scale   SubCutaneous Before meals and at bedtime  levothyroxine 75 MICROGram(s) Oral daily  metoprolol tartrate 12.5 milliGRAM(s) Oral two times a day  pantoprazole    Tablet 40 milliGRAM(s) Oral before breakfast  polyethylene glycol 3350 17 Gram(s) Oral daily  trimethoprim  160 mG/sulfamethoxazole 800 mG 1 Tablet(s) Oral <User Schedule>    MEDICATIONS  (PRN):  acetaminophen   Tablet .. 650 milliGRAM(s) Oral every 4 hours PRN Temp greater or equal to 38.5C (101.3F)  ALBUTerol    90 MICROgram(s) HFA Inhaler 2 Puff(s) Inhalation every 4 hours PRN Shortness of Breath and/or Wheezing  loperamide 2 milliGRAM(s) Oral every 4 hours PRN Diarrhea  ondansetron Injectable 4 milliGRAM(s) IV Push every 6 hours PRN Nausea and/or Vomiting      Vital Signs Last 24 Hrs  T(C): 36.3 (22 Feb 2021 21:19), Max: 36.3 (22 Feb 2021 21:19)  T(F): 97.4 (22 Feb 2021 21:19), Max: 97.4 (22 Feb 2021 21:19)  HR: 87 (22 Feb 2021 22:28) (87 - 87)  BP: 109/40 (22 Feb 2021 22:28) (109/33 - 109/40)  BP(mean): --  RR: 18 (22 Feb 2021 22:28) (18 - 20)  SpO2: 99% (22 Feb 2021 22:28) (99% - 99%)    I&O's Summary    22 Feb 2021 07:01  -  23 Feb 2021 07:00  --------------------------------------------------------  IN: 0 mL / OUT: 500 mL / NET: -500 mL        PHYSICAL EXAM:  Constitutional: NAD, supplemental O2 via Ventimask (50%)  Respiratory: Breath sounds are clear bilaterally, coughs with deep inspiration  Cardiovascular: S1 and S2, regular rate and rhythm  Gastrointestinal: Bowel Sounds present, abdomen is soft, nontender.   Extremities: No edema.     LABS:                               8.6    14.93 )-----------( 241      ( 22 Feb 2021 07:26 )             28.6     02-22    142  |  112<H>  |  53<H>  ----------------------------<  75  3.7   |  21<L>  |  0.87    Ca    8.8      22 Feb 2021 07:26    TPro  6.1  /  Alb  2.4<L>  /  TBili  0.3  /  DBili  x   /  AST  29  /  ALT  61  /  AlkPhos  209<H>  02-21        LIVER FUNCTIONS - ( 21 Feb 2021 13:19 )  Alb: 2.4 g/dL / Pro: 6.1 gm/dL / ALK PHOS: 209 U/L / ALT: 61 U/L / AST: 29 U/L / GGT: x                   Tele:  Sinus rhythm    TTE Echo Complete w/o Contrast w/ Doppler (11.02.20 @ 09:02) >   The left ventricle is normal in size, wall motion and contractility. Mild concentric left ventricular hypertrophy is present. Estimated left ventricular ejection fraction is 65-70 %.   The left atrium is mildly dilated.   Normal appearing right atrium.   Normal appearing right ventricle structure and function.   Normal aortic valve structure and function.   EA reversal of the mitral inflow consistent with reduced compliance of the left ventricle.   Fibrocalcific changes noted to the mitral valve leaflets with preserved leaflet excursion. Mild (1+) mitral regurgitation is present.   Moderate mitral annular calcification is present.   No evidence of pericardial effusion.    Transthoracic Echocardiogram Follow Up (01.22.21 @ 10:03):   Limited study to asses left ventricular function.  Estimated left ventricular ejection fraction is 60-65 %.    Cardiac Cath Lab - Adult (02.12.21 @ 15:26):  Inferior STEMI with occluded RCA. Difficult radial access. Femoral access precluded because of full dose apixaban.    Tele:  sinus rhythm

## 2021-02-23 NOTE — PROGRESS NOTE ADULT - ASSESSMENT
76 y/o Female with h/o Hypertension, Diabetes mellitus type 2, vertigo, anal melanoma under care of Dr. Shearer -[facility] [place] on combined IO therapy with ipilimumab + Nivolumab (cycle 2 on 1/4/2021), recent admission from 1/19-1/25 for new onset Afib (now on DOAC therapy), recent admission in [facility] for COVID-pneumonia (reportedly treated with MAB, Remdesivir and steroid therapy) was admitted on 2/10 for worsening shortness of breath. In ER the patient was hypoxic with SpO2 => 80%. Also noted with new bilateral airspace opacities right greater than left. She received cefepime.     1. COVID-19 viral syndrome. Acute respiratory failure. Multifocal pneumonia. Anal melanoma on immunotherapy. Immunocompromised host. CRF stage 3.   -respiratory frail  -leukocytosis ?related to steroids  -repeat D-dimer is high  -covid-19 pcr and ab positive, legionella ag (-)  -s/p recent remdesivir  -s/p cefepime 2 gm IV q12h and azithromycin 500 mg IV qd # 7 days  -repeat CXR shows extensive, stable b/l pulmonary infiltrates  -obtain CRP, ferritin  -respiratory care  -will continue to observe off abx therapy; doubt superimposed bacterial infection  -monitor temps  -f/u CBC  -supportive care  2. Other issues:   -care per medicine

## 2021-02-24 NOTE — PROGRESS NOTE ADULT - ASSESSMENT
75 yo F with hx of anal melanoma, hypothyroidism, AFIB DM type II,  who presents with Acute Hypoxic Respiratory failure possible from COVID 19 Pneumonia with likely superimposed Pneumonia. Course complicated by Acute inferior wall MI s/p PCI of RCA on 2/12/21. Initially admitted to ICU on high Flow. S/p 7 day course of cefepime, azithromax, s/p 10 day course of decadron. now on ventimask. downgraded to medical floor    Acute hypoxemic respiratory failure secondary to COVID 19 pna, likely superimposed bacterial PNA  s/p course of cefepime/azithromax  s/p course of remdesvir and decadron  now on nasal cannula  ID consulted in ICU  DDIMER > 2000    STEMI s/p PCI  c/w Plavix/Statin/ASA  Po lopressor    A-FIB.    c/w  DOAC,     JASPREET likely from ATN.   resoved       Diabetes  Lantus and RISS    Hypothyroidism  c/w Synthroid    Anal melanoma  Oncology to follow up    dvt ppx: eliquis    code: FULL

## 2021-02-24 NOTE — PROGRESS NOTE ADULT - SUBJECTIVE AND OBJECTIVE BOX
Patient seen and examined at bedside      2/24 : Patient would like her SCD removed so she can walk. Will get PT to evaluate her to see if she will need to go home. I have titrated her oxygen down to 4 L NC.       Review of Systems:  General:denies fever chills, headache, weakness  HEENT: denies blurry vision,diffculty swallowing, difficulty hearing, tinnitus  Cardiovascular: denies chest pain  ,palpitations  Pulmonary:denies shortness of breath, cough, wheezing, hemoptysis  Gastrointestinal: denies abdominal pain, constipation, diarrhea,nausea , vomiting, hematochezia  : denies hematuria, dysuria, or incontinence  Neurological: denies weakness, numbness , tingling, dizziness, tremors  MSK: denies muscle pain, difficulty ambulating, swelling, back pain  skin: denies skin rash, itching, burning, or  skin lesions  Psychiatrical: denies mood disturbances, anxierty, feeling depressed, depression , or difficulty sleeping    Objective:  Vitals  T(C): 36.3 (02-23-21 @ 09:17), Max: 36.3 (02-22-21 @ 21:19)  HR: 79 (02-23-21 @ 09:17) (79 - 87)  BP: 112/35 (02-23-21 @ 09:17) (109/33 - 112/35)  RR: 17 (02-23-21 @ 09:17) (16 - 20)  SpO2: 96% (02-23-21 @ 12:57) (96% - 100%)    Physical Exam:  General: comfortable, no acute distress, well nourished  HEENT: Atraumatic, no LAD, trachea midline, PERRLA  Cardiovascular: normal s1s2, no murmurs, gallops or fricition rubs  Pulmonary: clear to ausculation Bilaterally, no wheezing , rhonchi  Gastrointestinal: soft non tender non distended, no masses felt, no organomegally  Muscloskeletal: no lower extremity edema, intact bilateral lower extremity pulses  Neurological: CN II-12 intact. No focal weakness  Psychiatrical: normal mood, cooperative  SKIN: no rash, lesions or ulcers    Labs:                          8.6    14.12 )-----------( 250      ( 23 Feb 2021 08:19 )             28.2     02-23    143  |  113<H>  |  48<H>  ----------------------------<  138<H>  3.9   |  21<L>  |  0.93    Ca    8.4<L>      23 Feb 2021 08:19    TPro  5.8<L>  /  Alb  2.2<L>  /  TBili  0.3  /  DBili  x   /  AST  29  /  ALT  54  /  AlkPhos  159<H>  02-23    LIVER FUNCTIONS - ( 23 Feb 2021 08:19 )  Alb: 2.2 g/dL / Pro: 5.8 gm/dL / ALK PHOS: 159 U/L / ALT: 54 U/L / AST: 29 U/L / GGT: x                 Active Medications  MEDICATIONS  (STANDING):  apixaban 5 milliGRAM(s) Oral every 12 hours  atorvastatin 80 milliGRAM(s) Oral at bedtime  clopidogrel Tablet 75 milliGRAM(s) Oral daily  dextrose 40% Gel 15 Gram(s) Oral once  dextrose 5%. 1000 milliLiter(s) (50 mL/Hr) IV Continuous <Continuous>  dextrose 5%. 1000 milliLiter(s) (100 mL/Hr) IV Continuous <Continuous>  dextrose 50% Injectable 25 Gram(s) IV Push once  dextrose 50% Injectable 12.5 Gram(s) IV Push once  dextrose 50% Injectable 25 Gram(s) IV Push once  diltiazem    milliGRAM(s) Oral daily  glucagon  Injectable 1 milliGRAM(s) IntraMuscular once  insulin glargine Injectable (LANTUS) 25 Unit(s) SubCutaneous every morning  insulin lispro (ADMELOG) corrective regimen sliding scale   SubCutaneous Before meals and at bedtime  levothyroxine 75 MICROGram(s) Oral daily  metoprolol tartrate 12.5 milliGRAM(s) Oral two times a day  pantoprazole    Tablet 40 milliGRAM(s) Oral before breakfast  polyethylene glycol 3350 17 Gram(s) Oral daily  sodium chloride 0.65% Nasal 2 Spray(s) Both Nostrils every 6 hours  trimethoprim  160 mG/sulfamethoxazole 800 mG 1 Tablet(s) Oral <User Schedule>    MEDICATIONS  (PRN):  acetaminophen   Tablet .. 650 milliGRAM(s) Oral every 4 hours PRN Temp greater or equal to 38.5C (101.3F)  ALBUTerol    90 MICROgram(s) HFA Inhaler 2 Puff(s) Inhalation every 4 hours PRN Shortness of Breath and/or Wheezing  loperamide 2 milliGRAM(s) Oral every 4 hours PRN Diarrhea  ondansetron Injectable 4 milliGRAM(s) IV Push every 6 hours PRN Nausea and/or Vomiting

## 2021-02-24 NOTE — PROGRESS NOTE ADULT - SUBJECTIVE AND OBJECTIVE BOX
Date of service: 02-24-21 @ 12:30    SOB with light exercise  Has dry cough  Sitting in bed in NAD    ROS: no fever or chills; denies dizziness, no HA, no abdominal pain, no diarrhea or constipation; no dysuria, no legs pain, no rashes    MEDICATIONS  (STANDING):  apixaban 5 milliGRAM(s) Oral every 12 hours  atorvastatin 80 milliGRAM(s) Oral at bedtime  clopidogrel Tablet 75 milliGRAM(s) Oral daily  dextrose 40% Gel 15 Gram(s) Oral once  dextrose 5%. 1000 milliLiter(s) (50 mL/Hr) IV Continuous <Continuous>  dextrose 5%. 1000 milliLiter(s) (100 mL/Hr) IV Continuous <Continuous>  dextrose 50% Injectable 25 Gram(s) IV Push once  dextrose 50% Injectable 12.5 Gram(s) IV Push once  dextrose 50% Injectable 25 Gram(s) IV Push once  diltiazem    milliGRAM(s) Oral daily  glucagon  Injectable 1 milliGRAM(s) IntraMuscular once  insulin glargine Injectable (LANTUS) 25 Unit(s) SubCutaneous every morning  insulin lispro (ADMELOG) corrective regimen sliding scale   SubCutaneous Before meals and at bedtime  levothyroxine 75 MICROGram(s) Oral daily  metoprolol tartrate 12.5 milliGRAM(s) Oral two times a day  pantoprazole    Tablet 40 milliGRAM(s) Oral before breakfast  polyethylene glycol 3350 17 Gram(s) Oral daily  sodium chloride 0.65% Nasal 2 Spray(s) Both Nostrils every 6 hours  trimethoprim  160 mG/sulfamethoxazole 800 mG 1 Tablet(s) Oral <User Schedule>    Vital Signs Last 24 Hrs  T(C): 36.5 (24 Feb 2021 08:30), Max: 36.5 (24 Feb 2021 08:30)  T(F): 97.7 (24 Feb 2021 08:30), Max: 97.7 (24 Feb 2021 08:30)  HR: 70 (24 Feb 2021 08:30) (70 - 88)  BP: 105/32 (24 Feb 2021 08:30) (105/32 - 120/36)  BP(mean): --  RR: 20 (24 Feb 2021 08:30) (20 - 24)  SpO2: 95% (24 Feb 2021 10:09) (92% - 96%)     Physical exam:    Constitutional:  No acute distress  HEENT: NC/AT, EOMI, PERRLA, conjunctivae clear  Neck: supple; thyroid not palpable  Back: no tenderness  Respiratory: respiratory effort normal; crackles b/l  Cardiovascular: S1S2 regular, no murmurs  Abdomen: soft, not tender, not distended, positive BS  Genitourinary: no suprapubic tenderness  Lymphatic: no LN palpable  Musculoskeletal: no muscle tenderness, no joint swelling or tenderness  Extremities: no pedal edema  Neurological/ Psychiatric: AxOx3, moving all extremities  Skin: no rashes; no palpable lesions    Labs: reviewed                        8.6    14.12 )-----------( 250      ( 23 Feb 2021 08:19 )             28.2     02-23    143  |  113<H>  |  48<H>  ----------------------------<  138<H>  3.9   |  21<L>  |  0.93    Ca    8.4<L>      23 Feb 2021 08:19    TPro  5.8<L>  /  Alb  2.2<L>  /  TBili  0.3  /  DBili  x   /  AST  29  /  ALT  54  /  AlkPhos  159<H>  02-23        C-Reactive Protein, Serum: 3.72 mg/dL (02-23-21 @ 08:19)  Ferritin, Serum: 414 ng/mL (02-23-21 @ 08:19)  D-Dimer Assay, Quantitative: 2331 ng/mL DDU (02-21-21 @ 13:19)                        8.6    14.12 )-----------( 250      ( 23 Feb 2021 08:19 )             28.2     02-23    143  |  113<H>  |  48<H>  ----------------------------<  138<H>  3.9   |  21<L>  |  0.93    Ca    8.4<L>      23 Feb 2021 08:19    TPro  5.8<L>  /  Alb  2.2<L>  /  TBili  0.3  /  DBili  x   /  AST  29  /  ALT  54  /  AlkPhos  159<H>  02-23    D-Dimer Assay, Quantitative: 2331 ng/mL DDU (02-21-21 @ 13:19)                          8.6    14.93 )-----------( 241      ( 22 Feb 2021 07:26 )             28.6     02-22    142  |  112<H>  |  53<H>  ----------------------------<  75  3.7   |  21<L>  |  0.87    Ca    8.8      22 Feb 2021 07:26    TPro  6.1  /  Alb  2.4<L>  /  TBili  0.3  /  DBili  x   /  AST  29  /  ALT  61  /  AlkPhos  209<H>  02-21      D-Dimer Assay, Quantitative: 2331 ng/mL DDU (02-21-21 @ 13:19)  C-Reactive Protein, Serum: 12.10 mg/dL (02-10-21 @ 14:01)  D-Dimer Assay, Quantitative: 373 ng/mL DDU (02-10-21 @ 14:01)  Ferritin, Serum: 576 ng/mL (02-10-21 @ 14:01)               8.4    17.44 )-----------( 344      ( 11 Feb 2021 06:45 )             27.8     02-11    138  |  107  |  32<H>  ----------------------------<  173<H>  4.4   |  24  |  1.34<H>    Ca    8.2<L>      11 Feb 2021 06:45  Phos  3.5     02-11  Mg     2.3     02-11    TPro  5.6<L>  /  Alb  1.7<L>  /  TBili  0.3  /  DBili  x   /  AST  48<H>  /  ALT  54  /  AlkPhos  101  02-11     LIVER FUNCTIONS - ( 11 Feb 2021 06:45 )  Alb: 1.7 g/dL / Pro: 5.6 gm/dL / ALK PHOS: 101 U/L / ALT: 54 U/L / AST: 48 U/L / GGT: x           pSARS-CoV-2:  (02.11.21 @ 09:45)   COVID-19 PCR: NotDetec: (01.19.21 @ 01:24)     Radiology: all available radiological tests reviewed    < from: CT Chest No Cont (02.10.21 @ 19:47) >  Extensive bilateral pneumonias right greater than left.    < end of copied text >    < from: Xray Chest 1 View AP/PA. (02.13.21 @ 09:51) >  Similar infiltrates. Questionable left pneumothorax. Follow-up study is recommended as clinically warranted.    < end of copied text >    < from: Xray Chest 1 View- PORTABLE-Routine (Xray Chest 1 View- PORTABLE-Routine .) (02.22.21 @ 20:00) >   Stable bilateral  multifocal and diffuse ill-defined airspace consolidations..    < end of copied text >    < from: Xray Chest 1 View- PORTABLE-Routine (Xray Chest 1 View- PORTABLE-Routine .) (02.22.21 @ 20:00) >  The lungs show stable bilateral  multifocal and diffuse ill-defined airspace consolidations.. No pneumothorax.  The heart and mediastinum are within normal limits.  Visualized osseous structures are intact.    < end of copied text >      Advanced directives addressed: full resuscitation

## 2021-02-24 NOTE — PROGRESS NOTE ADULT - SUBJECTIVE AND OBJECTIVE BOX
PCP:    REQUESTING PHYSICIAN:    REASON FOR CONSULT:    CHIEF COMPLAINT:    HPI:  HPI: 74 y/o F PMHx significant for Hypertension, Diabetes mellitus type 2, vertigo, anal melanoma [place] of [place]. She[place] [place] therapy with ipilimumab + Nivolumab (cycle 2 on 1/4/2021), and recent admission from 1/19-1/25 for new onset Afib (now on DOAC therapy) and recent admission in [facility] for COVID-pneumonia (reportedly treated with MAB, Remdesivir and steroid therapy) now admitted on 2/10/21 with respiratory failure acute hypoxemic respiratory failure. Her hospitalization was complicated by an acute inferior wall MI and she underwent PCI of RCA on 2/12/21.    2/12/21: Pt complains of midsternal chest pain today. ECG suggests STEMI  2/13/21: S/P PCI RCA yesterday. Pain free today. VSS. Pt feels improved but remains dyspneic  2/14/21: Denies chest pain. NSR.   2/15/21: no chest pain overnight.  2/16/'21: still on high flow NRB, no chest pain.  2/18/21:  Comfortable; no cardiac symptoms (no CP since last Friday).  2/19/21:  Feels "ok."  No chest discomfort, breathing comfortably at rest; occasional cough; no epistaxis or rectal bleeding  2/21/21:  Feels better after improved sleep last night; no new complaints; still with cough; no SOB at rest  2/23/21 Patient is feeling ok , has difficult breathing via nose , had epistaxis , anemia  100% sat on VM 50% no sob   2/24/21 Pt feels improved. Remains short of breath on nc.    PAST MEDICAL & SURGICAL HISTORY:  Primary malignant melanoma of anal canal    Afib    DM (diabetes mellitus)  Type 2    HTN (hypertension)    Vertigo    No significant past surgical history        SOCIAL HISTORY:    FAMILY HISTORY:  No pertinent family history in first degree relatives        ALLERGIES:  Allergies    barbiturates (Unknown)  Cipro (Other; Swelling)  iodine (Unknown)    Intolerances        MEDICATIONS:    MEDICATIONS  (STANDING):  apixaban 5 milliGRAM(s) Oral every 12 hours  atorvastatin 80 milliGRAM(s) Oral at bedtime  clopidogrel Tablet 75 milliGRAM(s) Oral daily  dextrose 40% Gel 15 Gram(s) Oral once  dextrose 5%. 1000 milliLiter(s) (50 mL/Hr) IV Continuous <Continuous>  dextrose 5%. 1000 milliLiter(s) (100 mL/Hr) IV Continuous <Continuous>  dextrose 50% Injectable 25 Gram(s) IV Push once  dextrose 50% Injectable 12.5 Gram(s) IV Push once  dextrose 50% Injectable 25 Gram(s) IV Push once  diltiazem    milliGRAM(s) Oral daily  glucagon  Injectable 1 milliGRAM(s) IntraMuscular once  insulin glargine Injectable (LANTUS) 25 Unit(s) SubCutaneous every morning  insulin lispro (ADMELOG) corrective regimen sliding scale   SubCutaneous Before meals and at bedtime  levothyroxine 75 MICROGram(s) Oral daily  metoprolol tartrate 12.5 milliGRAM(s) Oral two times a day  pantoprazole    Tablet 40 milliGRAM(s) Oral before breakfast  polyethylene glycol 3350 17 Gram(s) Oral daily  sodium chloride 0.65% Nasal 2 Spray(s) Both Nostrils every 6 hours  trimethoprim  160 mG/sulfamethoxazole 800 mG 1 Tablet(s) Oral <User Schedule>    MEDICATIONS  (PRN):  acetaminophen   Tablet .. 650 milliGRAM(s) Oral every 4 hours PRN Temp greater or equal to 38.5C (101.3F)  ALBUTerol    90 MICROgram(s) HFA Inhaler 2 Puff(s) Inhalation every 4 hours PRN Shortness of Breath and/or Wheezing  loperamide 2 milliGRAM(s) Oral every 4 hours PRN Diarrhea  ondansetron Injectable 4 milliGRAM(s) IV Push every 6 hours PRN Nausea and/or Vomiting          Vital Signs Last 24 Hrs  T(C): 36.5 (24 Feb 2021 08:30), Max: 36.5 (24 Feb 2021 08:30)  T(F): 97.7 (24 Feb 2021 08:30), Max: 97.7 (24 Feb 2021 08:30)  HR: 70 (24 Feb 2021 08:30) (70 - 88)  BP: 105/32 (24 Feb 2021 08:30) (105/32 - 120/36)  BP(mean): --  RR: 20 (24 Feb 2021 08:30) (20 - 24)  SpO2: 95% (24 Feb 2021 10:09) (92% - 96%)Daily     Daily I&O's Summary      PHYSICAL EXAM:    Constitutional: NAD, awake and alert, well-developed  HEENT: PERR, EOMI,  No oral cyananosis.  Neck:  supple,  No JVD  Respiratory: Breath sounds are clear bilaterally, No wheezing, rales or rhonchi  Cardiovascular: S1 and S2, regular rate and rhythm, no Murmurs, gallops or rubs  Gastrointestinal: Bowel Sounds present, soft, nontender.   Extremities: No peripheral edema. No clubbing or cyanosis.  Vascular: 2+ peripheral pulses  Neurological: A/O x 3, no focal deficits  Musculoskeletal: no calf tenderness.  Skin: No rashes.      LABS: All Labs Reviewed:                        8.6    14.12 )-----------( 250      ( 23 Feb 2021 08:19 )             28.2                         8.6    14.93 )-----------( 241      ( 22 Feb 2021 07:26 )             28.6                         9.6    16.12 )-----------( 276      ( 21 Feb 2021 13:19 )             30.3     23 Feb 2021 08:19    143    |  113    |  48     ----------------------------<  138    3.9     |  21     |  0.93   22 Feb 2021 07:26    142    |  112    |  53     ----------------------------<  75     3.7     |  21     |  0.87   21 Feb 2021 13:19    140    |  109    |  57     ----------------------------<  139    3.5     |  20     |  1.10     Ca    8.4        23 Feb 2021 08:19  Ca    8.8        22 Feb 2021 07:26  Ca    8.6        21 Feb 2021 13:19    TPro  5.8    /  Alb  2.2    /  TBili  0.3    /  DBili  x      /  AST  29     /  ALT  54     /  AlkPhos  159    23 Feb 2021 08:19  TPro  6.1    /  Alb  2.4    /  TBili  0.3    /  DBili  x      /  AST  29     /  ALT  61     /  AlkPhos  209    21 Feb 2021 13:19          Blood Culture:         RADIOLOGY/EKG:< from: 12 Lead ECG (02.13.21 @ 07:34) >  Diagnosis Line Normal sinus rhythm  Normal ECG  When compared with ECG of 12-FEB-2021 16:41,  No significant change was found  Confirmed by ERMELINDA TEJADA MD ([id number]) on 2/19/2021 10:39:15 AM    < end of copied text >        ECHO/CARDIAC CATHTERIZATION/STRESS TEST:

## 2021-02-24 NOTE — PROGRESS NOTE ADULT - ASSESSMENT
76 y/o Female with h/o Hypertension, Diabetes mellitus type 2, vertigo, anal melanoma under care of Dr. Shearer -[facility] [place] on combined IO therapy with ipilimumab + Nivolumab (cycle 2 on 1/4/2021), recent admission from 1/19-1/25 for new onset Afib (now on DOAC therapy), recent admission in [facility] for COVID-pneumonia (reportedly treated with MAB, Remdesivir and steroid therapy) was admitted on 2/10 for worsening shortness of breath. In ER the patient was hypoxic with SpO2 => 80%. Also noted with new bilateral airspace opacities right greater than left. She received cefepime.     1. COVID-19 viral syndrome. Acute respiratory failure improving. Multifocal pneumonia. Anal melanoma on immunotherapy. Immunocompromised host. CRF stage 3.   -respiratory is improved but frail  -leukocytosis ?related to steroids  -repeat D-dimer is high  -covid-19 pcr and ab positive, legionella ag (-)  -s/p recent remdesivir  -s/p cefepime 2 gm IV q12h and azithromycin 500 mg IV qd # 7 days  -repeat CXR shows persistent stable b/l pulmonary infiltrates  -CRP, ferritin are mild elevated  -respiratory care  -will continue to observe off abx therapy; doubt superimposed bacterial infection  -monitor temps  -f/u CBC  -supportive care  2. Other issues:   -care per medicine

## 2021-02-24 NOTE — PROGRESS NOTE ADULT - ASSESSMENT
74 yo female with history of metastatic melanoma pt of Dr. Shearer ([facility] [place]) on combined IO therapy with ipilimumab + Nivolumab now admitted for COVID-19 complicated by Respiratory Failure and STEMI     metastatic melanoma   - on Ipi and NIVO   - hold treatment given active ongoing infection and recent cardiac STEMI     STEMI   - now on Eliquis, and plavix   - s/p PCE to RCA   - Cardio following   -on CCB and BB for rate control     Respiratory failure   =- now off Dexamethasone   - continue with supplemental O2 now down to 6 L NC saturating well  - titrate FIO2 today  as tolerated      Anemia   - scontinues to trend down   -  + recurernt episodes of hematochezia   - would recommend transfusion for Hb < 8  -monitor CBC    Elevated D-dimer  -related to recent COVID  -would not change Eliquis dose   We will be happy to answer any onc questions on behalf of [facility] and will be in touch with them

## 2021-02-24 NOTE — PROGRESS NOTE ADULT - SUBJECTIVE AND OBJECTIVE BOX
INTERVAL HPI/OVERNIGHT EVENTS:  Patient S&E at bedside. No o/n events, patient resting comfortably.  Patient has been doing better.   patient has been successful at transitioning to NC   Continues to have intermittent hematochezia.   VITAL SIGNS:  T(F): 97.5 (02-23-21 @ 20:53)  HR: 88 (02-23-21 @ 20:53)  BP: 120/36 (02-23-21 @ 20:53)  RR: 24 (02-23-21 @ 20:53)  SpO2: 96% (02-23-21 @ 20:53)  Wt(kg): --    PHYSICAL EXAM:  Neurological: AAOx3      MEDICATIONS  (STANDING):  apixaban 5 milliGRAM(s) Oral every 12 hours  atorvastatin 80 milliGRAM(s) Oral at bedtime  clopidogrel Tablet 75 milliGRAM(s) Oral daily  dextrose 40% Gel 15 Gram(s) Oral once  dextrose 5%. 1000 milliLiter(s) (50 mL/Hr) IV Continuous <Continuous>  dextrose 5%. 1000 milliLiter(s) (100 mL/Hr) IV Continuous <Continuous>  dextrose 50% Injectable 25 Gram(s) IV Push once  dextrose 50% Injectable 12.5 Gram(s) IV Push once  dextrose 50% Injectable 25 Gram(s) IV Push once  diltiazem    milliGRAM(s) Oral daily  glucagon  Injectable 1 milliGRAM(s) IntraMuscular once  insulin glargine Injectable (LANTUS) 25 Unit(s) SubCutaneous every morning  insulin lispro (ADMELOG) corrective regimen sliding scale   SubCutaneous Before meals and at bedtime  levothyroxine 75 MICROGram(s) Oral daily  metoprolol tartrate 12.5 milliGRAM(s) Oral two times a day  pantoprazole    Tablet 40 milliGRAM(s) Oral before breakfast  polyethylene glycol 3350 17 Gram(s) Oral daily  sodium chloride 0.65% Nasal 2 Spray(s) Both Nostrils every 6 hours  trimethoprim  160 mG/sulfamethoxazole 800 mG 1 Tablet(s) Oral <User Schedule>    MEDICATIONS  (PRN):  acetaminophen   Tablet .. 650 milliGRAM(s) Oral every 4 hours PRN Temp greater or equal to 38.5C (101.3F)  ALBUTerol    90 MICROgram(s) HFA Inhaler 2 Puff(s) Inhalation every 4 hours PRN Shortness of Breath and/or Wheezing  loperamide 2 milliGRAM(s) Oral every 4 hours PRN Diarrhea  ondansetron Injectable 4 milliGRAM(s) IV Push every 6 hours PRN Nausea and/or Vomiting      Allergies    barbiturates (Unknown)  Cipro (Other; Swelling)  iodine (Unknown)    Intolerances        LABS:                        8.6    14.12 )-----------( 250      ( 23 Feb 2021 08:19 )             28.2     02-23    143  |  113<H>  |  48<H>  ----------------------------<  138<H>  3.9   |  21<L>  |  0.93    Ca    8.4<L>      23 Feb 2021 08:19    TPro  5.8<L>  /  Alb  2.2<L>  /  TBili  0.3  /  DBili  x   /  AST  29  /  ALT  54  /  AlkPhos  159<H>  02-23          RADIOLOGY & ADDITIONAL TESTS:  Studies reviewed.      ASSESSMENT & PLAN:

## 2021-02-25 NOTE — PROGRESS NOTE ADULT - ASSESSMENT
73 yo F with hx of anal melanoma, hypothyroidism, AFIB DM type II,  who presents with Acute Hypoxic Respiratory failure possible from COVID 19 Pneumonia with likely superimposed Pneumonia. Course complicated by Acute inferior wall MI s/p PCI of RCA on 2/12/21. Initially admitted to ICU on high Flow. S/p 7 day course of cefepime, azithromax, s/p 10 day course of decadron. now on ventimask. downgraded to medical floor    Acute hypoxemic respiratory failure secondary to COVID 19 pna, likely superimposed bacterial PNA  s/p course of cefepime/azithromax  s/p course of remdesvir and decadron  now on nasal cannula  - ID following  - pulm following   - oxygen is stable and have decreased to 3 L NC     STEMI s/p PCI  c/w Plavix/Statin/ASA  Po lopressor    A-FIB.    c/w  DOAC,     JASPREET likely from ATN.   resoved       Diabetes  Lantus and RISS    Hypothyroidism  c/w Synthroid    Anal melanoma  Oncology to follow up    dvt ppx: eliquis    code: FULL

## 2021-02-25 NOTE — PROVIDER CONTACT NOTE (OTHER) - SITUATION
Dr. Stubbs walked onto our floor (3E) and I gave him info about this consult. Dr. Hu told me to give it to Dr. Stubbs
Left message with service re: consult.
DR SERVICE AWARE
Pt with anal melanoma, h/o Fe deficiency anemia, CAD, STEMI s/p GIFTY x1, drop in H/H 7.6
Pt with anal melanoma, h/o Fe deficiency anemia, CAD, STEMI s/p GIFTY x1, drop in H/H 7.6

## 2021-02-25 NOTE — CONSULT NOTE ADULT - SUBJECTIVE AND OBJECTIVE BOX
HPI:  76 y/o F PMHx significant for Hypertension, Diabetes mellitus type 2, vertigo, anal melanoma under care of Dr. Shearer ([facility] [place]) on combined IO therapy with ipilimumab + Nivolumab (cycle 2 on 1/4/2021), and recent admission from 1/19-1/25 for new onset Afib (now on DOAC therapy) and recent admission in [facility] for COVID-pneumonia (reportedly treated with MAB, Remdesivir and steroid therapy) presents to  for further evaluation and management of worsening shortness of breath. In triage the patient was notably hypoxic SpO2 => 80% with initial improvement of SaO2 to 100 via NRB (per ED documentation).  Labs => WBC 21.24, Hgb/Hct 9.1/29.1, , D-dimer 373, BUN/Cr 31/1.33, Glu 105, Alb 1.9, AST 64, TnI 0.070. CXR => New bilateral airspace opacities right greater than left. No significant pleural effusion. Heart size cannot be accurately assessed in this projection.    History as above. Admitted 2/10. Did not recieve remdesivir. Had multiple abx. Feeling better. On nasal canula.      PAST MEDICAL & SURGICAL HISTORY:  Primary malignant melanoma of anal canal    Afib    DM (diabetes mellitus)  Type 2    HTN (hypertension)    Vertigo    No significant past surgical history        MEDICATIONS  (STANDING):  apixaban 5 milliGRAM(s) Oral every 12 hours  atorvastatin 80 milliGRAM(s) Oral at bedtime  clopidogrel Tablet 75 milliGRAM(s) Oral daily  dextrose 40% Gel 15 Gram(s) Oral once  dextrose 5%. 1000 milliLiter(s) (50 mL/Hr) IV Continuous <Continuous>  dextrose 5%. 1000 milliLiter(s) (100 mL/Hr) IV Continuous <Continuous>  dextrose 50% Injectable 25 Gram(s) IV Push once  dextrose 50% Injectable 12.5 Gram(s) IV Push once  dextrose 50% Injectable 25 Gram(s) IV Push once  diltiazem    milliGRAM(s) Oral daily  glucagon  Injectable 1 milliGRAM(s) IntraMuscular once  insulin glargine Injectable (LANTUS) 25 Unit(s) SubCutaneous every morning  insulin lispro (ADMELOG) corrective regimen sliding scale   SubCutaneous Before meals and at bedtime  levothyroxine 75 MICROGram(s) Oral daily  metoprolol tartrate 12.5 milliGRAM(s) Oral two times a day  pantoprazole    Tablet 40 milliGRAM(s) Oral before breakfast  polyethylene glycol 3350 17 Gram(s) Oral daily  sodium chloride 0.65% Nasal 2 Spray(s) Both Nostrils every 6 hours  trimethoprim  160 mG/sulfamethoxazole 800 mG 1 Tablet(s) Oral <User Schedule>    MEDICATIONS  (PRN):  acetaminophen   Tablet .. 650 milliGRAM(s) Oral every 4 hours PRN Temp greater or equal to 38.5C (101.3F)  ALBUTerol    90 MICROgram(s) HFA Inhaler 2 Puff(s) Inhalation every 4 hours PRN Shortness of Breath and/or Wheezing  loperamide 2 milliGRAM(s) Oral every 4 hours PRN Diarrhea  ondansetron Injectable 4 milliGRAM(s) IV Push every 6 hours PRN Nausea and/or Vomiting      Allergies    barbiturates (Unknown)  Cipro (Other; Swelling)  iodine (Unknown)    Intolerances        SOCIAL HISTORY: Denies tobacco, etoh abuse or illicit drug use    FAMILY HISTORY:  No pertinent family history in first degree relatives        Vital Signs Last 24 Hrs  T(C): 36.6 (24 Feb 2021 20:54), Max: 36.6 (24 Feb 2021 20:54)  T(F): 97.9 (24 Feb 2021 20:54), Max: 97.9 (24 Feb 2021 20:54)  HR: 83 (24 Feb 2021 20:54) (83 - 83)  BP: 116/38 (24 Feb 2021 20:54) (116/38 - 116/38)  BP(mean): --  RR: 22 (24 Feb 2021 20:54) (22 - 22)  SpO2: 94% (24 Feb 2021 20:54) (94% - 95%)    REVIEW OF SYSTEMS:    CONSTITUTIONAL:  As per HPI.  SKIN: no rashes  HEENT:  Eyes:  No diplopia or blurred vision. ENT:  No earache, sore throat or runny nose.  CARDIOVASCULAR:  No pressure, squeezing, tightness, heaviness or aching about the chest, neck, axilla or epigastrium.  RESPIRATORY:  No cough, shortness of breath, PND or orthopnea.  GASTROINTESTINAL:  No nausea, vomiting or diarrhea.  GENITOURINARY:  No dysuria, frequency or urgency.  MUSCULOSKELETAL:  As per HPI.  SKIN:  No change in skin, hair or nails.  NEUROLOGIC:  No paresthesias, fasciculations, seizures or weakness.  PSYCHIATRIC:  No disorder of thought or mood.  ENDOCRINE:  No heat or cold intolerance, polyuria or polydipsia.  HEMATOLOGICAL:  No easy bruising or bleedings:  .     PHYSICAL EXAMINATION:    GENERAL APPEARANCE:  Pt. is not currently dyspneic, in no distress. Pt. is alert, oriented, and pleasant.  HEENT:  Pupils are normal and react normally. No icterus. Mucous membranes well colored.  NECK:  Supple. No lymphadenopathy. Jugular venous pressure not elevated. Carotids equal.   HEART:  S1S2 reg  CHEST:  crackles at bases R.L  ABDOMEN:  Soft and nontender.   EXTREMITIES:  There is no cyanosis, clubbing or edema.   SKIN:  No rash or significant lesions are noted.    LABS:                        RADIOLOGY & ADDITIONAL STUDIES:

## 2021-02-25 NOTE — PROGRESS NOTE ADULT - SUBJECTIVE AND OBJECTIVE BOX
REASON FOR VISIT: Acute MI, PAF    CHIEF COMPLAINT:    HPI:  HPI: 76 y/o F PMHx significant for Hypertension, Diabetes mellitus type 2, vertigo, anal melanoma under care of Dr. Shearer ([facility] [place]) on combined IO therapy with ipilimumab + Nivolumab (cycle 2 on 1/4/2021), and recent admission from 1/19-1/25 for new onset Afib (now on DOAC therapy) and recent admission in [facility] for COVID-pneumonia (reportedly treated with MAB, Remdesivir and steroid therapy) now admitted on 2/10/21 with respiratory failure acute hypoxemic respiratory failure. Her hospitalization was complicated by an acute inferior wall MI and she underwent PCI of RCA on 2/12/21.    2/12/21: Pt complains of midsternal chest pain today. ECG suggests STEMI  2/13/21: S/P PCI RCA yesterday. Pain free today. VSS. Pt feels improved but remains dyspneic  2/14/21: Denies chest pain. NSR.   2/15/21: no chest pain overnight.  2/16/'21: still on high flow NRB, no chest pain.  2/18/21:  Comfortable; no cardiac symptoms (no CP since last Friday).  2/19/21:  Feels "ok."  No chest discomfort, breathing comfortably at rest; occasional cough; no epistaxis or rectal bleeding  2/21/21:  Feels better after improved sleep last night; no new complaints; still with cough; no SOB at rest  2/23/21 Patient is feeling ok , has difficult breathing via nose , had epistaxis , anemia  100% sat on VM 50% no sob   2/24/21 Pt feels improved. Remains short of breath on nc.  2/25/21:  Comfortable less FIO2; deconditioned    MEDICATIONS  (STANDING):  apixaban 5 milliGRAM(s) Oral every 12 hours  atorvastatin 80 milliGRAM(s) Oral at bedtime  clopidogrel Tablet 75 milliGRAM(s) Oral daily  diltiazem    milliGRAM(s) Oral daily  glucagon  Injectable 1 milliGRAM(s) IntraMuscular once  insulin glargine Injectable (LANTUS) 25 Unit(s) SubCutaneous every morning  insulin lispro (ADMELOG) corrective regimen sliding scale   SubCutaneous Before meals and at bedtime  levothyroxine 75 MICROGram(s) Oral daily  metoprolol tartrate 12.5 milliGRAM(s) Oral two times a day  pantoprazole    Tablet 40 milliGRAM(s) Oral before breakfast  polyethylene glycol 3350 17 Gram(s) Oral daily  sodium chloride 0.65% Nasal 2 Spray(s) Both Nostrils every 6 hours  trimethoprim  160 mG/sulfamethoxazole 800 mG 1 Tablet(s) Oral <User Schedule>    Vital Signs Last 24 Hrs  T(C): 36.3 (25 Feb 2021 08:43), Max: 36.6 (24 Feb 2021 20:54)  T(F): 97.4 (25 Feb 2021 08:43), Max: 97.9 (24 Feb 2021 20:54)  HR: 75 (25 Feb 2021 08:43) (75 - 83)  BP: 108/44 (25 Feb 2021 08:43) (108/44 - 116/38)  RR: 17 (25 Feb 2021 08:43) (17 - 22)  SpO2: 95% (25 Feb 2021 08:43) (94% - 95%)    PHYSICAL EXAM:  Constitutional: NAD, awake and alert  Respiratory: Nonlabored, supplemental O2 via N/C  Cardiovascular: S1 and S2, regular rate and rhythm, no Murmurs, gallops or rubs  Gastrointestinal: Bowel Sounds present, soft, nontender.     TTE Echo Complete w/o Contrast w/ Doppler (11.02.20 @ 09:02) >   The left ventricle is normal in size, wall motion and contractility. Mild concentric left ventricular hypertrophy is present. Estimated left ventricular ejection fraction is 65-70 %.   The left atrium is mildly dilated.   Normal appearing right atrium.   Normal appearing right ventricle structure and function.   Normal aortic valve structure and function.   EA reversal of the mitral inflow consistent with reduced compliance of the left ventricle.   Fibrocalcific changes noted to the mitral valve leaflets with preserved leaflet excursion. Mild (1+) mitral regurgitation is present.   Moderate mitral annular calcification is present.   No evidence of pericardial effusion.    Transthoracic Echocardiogram Follow Up (01.22.21 @ 10:03):   Limited study to asses left ventricular function.  Estimated left ventricular ejection fraction is 60-65 %.    Cardiac Cath Lab - Adult (02.12.21 @ 15:26):  Inferior STEMI with occluded RCA. Difficult radial access. Femoral access precluded because of full dose apixaban.    Tele: SR

## 2021-02-25 NOTE — PROGRESS NOTE ADULT - ASSESSMENT
74 yo female with history of metastatic [place] [place] therapy with ipilimumab + Nivolumab now admitted for COVID-19 complicated by Respiratory Failure and STEMI     metastatic melanoma   - on Ipi and NIVO   - hold treatment given active ongoing infection and recent cardiac STEMI     STEMI   - now on Eliquis, and plavix   - s/p PCE to RCA   - Cardio following   -on CCB and BB for rate control     Respiratory failure   =- now off Dexamethasone   - continue with supplemental O2 now down to 6 L NC saturating well  - titrate FIO2 today  as tolerated

## 2021-02-25 NOTE — PROGRESS NOTE ADULT - SUBJECTIVE AND OBJECTIVE BOX
Patient seen and examined at bedside      2/24 : Patient would like her SCD removed so she can walk. Will get PT to evaluate her to see if she will need to go home. I have titrated her oxygen down to 4 L NC.     2/25 : Patient is much better she could barely stand with PT. she will need rehab. Will discuss with social work / case management. Will also discuss with patients children. I have lowered her oxygen to 3 L NC> She is being followed by ID / cardio / pulmonary.       Review of Systems:  General:denies fever chills, headache, weakness  HEENT: denies blurry vision,diffculty swallowing, difficulty hearing, tinnitus  Cardiovascular: denies chest pain  ,palpitations  Pulmonary:denies shortness of breath, cough, wheezing, hemoptysis  Gastrointestinal: denies abdominal pain, constipation, diarrhea,nausea , vomiting, hematochezia  : denies hematuria, dysuria, or incontinence  Neurological: denies weakness, numbness , tingling, dizziness, tremors  MSK: denies muscle pain, difficulty ambulating, swelling, back pain  skin: denies skin rash, itching, burning, or  skin lesions  Psychiatrical: denies mood disturbances, anxierty, feeling depressed, depression , or difficulty sleeping    Objective:  Vitals  T(C): 36.3 (02-23-21 @ 09:17), Max: 36.3 (02-22-21 @ 21:19)  HR: 79 (02-23-21 @ 09:17) (79 - 87)  BP: 112/35 (02-23-21 @ 09:17) (109/33 - 112/35)  RR: 17 (02-23-21 @ 09:17) (16 - 20)  SpO2: 96% (02-23-21 @ 12:57) (96% - 100%)    Physical Exam:  General: comfortable, no acute distress, well nourished  HEENT: Atraumatic, no LAD, trachea midline, PERRLA  Cardiovascular: normal s1s2, no murmurs, gallops or fricition rubs  Pulmonary: clear to ausculation Bilaterally, no wheezing , rhonchi  Gastrointestinal: soft non tender non distended, no masses felt, no organomegally  Muscloskeletal: no lower extremity edema, intact bilateral lower extremity pulses  Neurological: CN II-12 intact. No focal weakness  Psychiatrical: normal mood, cooperative  SKIN: no rash, lesions or ulcers    Labs:                          8.6    14.12 )-----------( 250      ( 23 Feb 2021 08:19 )             28.2     02-23    143  |  113<H>  |  48<H>  ----------------------------<  138<H>  3.9   |  21<L>  |  0.93    Ca    8.4<L>      23 Feb 2021 08:19    TPro  5.8<L>  /  Alb  2.2<L>  /  TBili  0.3  /  DBili  x   /  AST  29  /  ALT  54  /  AlkPhos  159<H>  02-23    LIVER FUNCTIONS - ( 23 Feb 2021 08:19 )  Alb: 2.2 g/dL / Pro: 5.8 gm/dL / ALK PHOS: 159 U/L / ALT: 54 U/L / AST: 29 U/L / GGT: x                 Active Medications  MEDICATIONS  (STANDING):  apixaban 5 milliGRAM(s) Oral every 12 hours  atorvastatin 80 milliGRAM(s) Oral at bedtime  clopidogrel Tablet 75 milliGRAM(s) Oral daily  dextrose 40% Gel 15 Gram(s) Oral once  dextrose 5%. 1000 milliLiter(s) (50 mL/Hr) IV Continuous <Continuous>  dextrose 5%. 1000 milliLiter(s) (100 mL/Hr) IV Continuous <Continuous>  dextrose 50% Injectable 25 Gram(s) IV Push once  dextrose 50% Injectable 12.5 Gram(s) IV Push once  dextrose 50% Injectable 25 Gram(s) IV Push once  diltiazem    milliGRAM(s) Oral daily  glucagon  Injectable 1 milliGRAM(s) IntraMuscular once  insulin glargine Injectable (LANTUS) 25 Unit(s) SubCutaneous every morning  insulin lispro (ADMELOG) corrective regimen sliding scale   SubCutaneous Before meals and at bedtime  levothyroxine 75 MICROGram(s) Oral daily  metoprolol tartrate 12.5 milliGRAM(s) Oral two times a day  pantoprazole    Tablet 40 milliGRAM(s) Oral before breakfast  polyethylene glycol 3350 17 Gram(s) Oral daily  sodium chloride 0.65% Nasal 2 Spray(s) Both Nostrils every 6 hours  trimethoprim  160 mG/sulfamethoxazole 800 mG 1 Tablet(s) Oral <User Schedule>    MEDICATIONS  (PRN):  acetaminophen   Tablet .. 650 milliGRAM(s) Oral every 4 hours PRN Temp greater or equal to 38.5C (101.3F)  ALBUTerol    90 MICROgram(s) HFA Inhaler 2 Puff(s) Inhalation every 4 hours PRN Shortness of Breath and/or Wheezing  loperamide 2 milliGRAM(s) Oral every 4 hours PRN Diarrhea  ondansetron Injectable 4 milliGRAM(s) IV Push every 6 hours PRN Nausea and/or Vomiting

## 2021-02-25 NOTE — PROGRESS NOTE ADULT - SUBJECTIVE AND OBJECTIVE BOX
INTERVAL HPI/OVERNIGHT EVENTS:  Patient S&E at bedside. No o/n events, patient resting comfortably. No complaints at this time. now on 4 L NC    VITAL SIGNS:  T(F): 97.4 (02-25-21 @ 08:43)  HR: 75 (02-25-21 @ 08:43)  BP: 108/44 (02-25-21 @ 08:43)  RR: 17 (02-25-21 @ 08:43)  SpO2: 95% (02-25-21 @ 08:43)  Wt(kg): --      MEDICATIONS  (STANDING):  apixaban 5 milliGRAM(s) Oral every 12 hours  atorvastatin 80 milliGRAM(s) Oral at bedtime  clopidogrel Tablet 75 milliGRAM(s) Oral daily  dextrose 40% Gel 15 Gram(s) Oral once  dextrose 5%. 1000 milliLiter(s) (50 mL/Hr) IV Continuous <Continuous>  dextrose 5%. 1000 milliLiter(s) (100 mL/Hr) IV Continuous <Continuous>  dextrose 50% Injectable 25 Gram(s) IV Push once  dextrose 50% Injectable 12.5 Gram(s) IV Push once  dextrose 50% Injectable 25 Gram(s) IV Push once  glucagon  Injectable 1 milliGRAM(s) IntraMuscular once  insulin glargine Injectable (LANTUS) 25 Unit(s) SubCutaneous every morning  insulin lispro (ADMELOG) corrective regimen sliding scale   SubCutaneous Before meals and at bedtime  levothyroxine 75 MICROGram(s) Oral daily  metoprolol tartrate 12.5 milliGRAM(s) Oral two times a day  pantoprazole    Tablet 40 milliGRAM(s) Oral before breakfast  polyethylene glycol 3350 17 Gram(s) Oral daily  sodium chloride 0.65% Nasal 2 Spray(s) Both Nostrils every 6 hours  trimethoprim  160 mG/sulfamethoxazole 800 mG 1 Tablet(s) Oral <User Schedule>    MEDICATIONS  (PRN):  acetaminophen   Tablet .. 650 milliGRAM(s) Oral every 4 hours PRN Temp greater or equal to 38.5C (101.3F)  ALBUTerol    90 MICROgram(s) HFA Inhaler 2 Puff(s) Inhalation every 4 hours PRN Shortness of Breath and/or Wheezing  loperamide 2 milliGRAM(s) Oral every 4 hours PRN Diarrhea  ondansetron Injectable 4 milliGRAM(s) IV Push every 6 hours PRN Nausea and/or Vomiting      Allergies    barbiturates (Unknown)  Cipro (Other; Swelling)  iodine (Unknown)    Intolerances        LABS:                RADIOLOGY & ADDITIONAL TESTS:  Studies reviewed.    ASSESSMENT & PLAN:

## 2021-02-25 NOTE — PROGRESS NOTE ADULT - PROBLEM SELECTOR PLAN 3
Persistent hypoxia with difficulty weaning FIO2<50%; consider changing in to NC with humidified  oxygen  to avoid nasal trauma Persistent hypoxia but now on less FIO2; CT chest images reviewed -- still with extensive airspace disease but some improvement.

## 2021-02-26 NOTE — PROGRESS NOTE ADULT - SUBJECTIVE AND OBJECTIVE BOX
INTERVAL HPI/OVERNIGHT EVENTS:  Patient S&E at bedside. No o/n events, patient resting comfortably. No complaints at this time.  Now on 2 L NC     VITAL SIGNS:  T(F): 96.7 (02-25-21 @ 21:12)  HR: 74 (02-25-21 @ 21:30)  BP: 112/39 (02-25-21 @ 21:30)  RR: 22 (02-25-21 @ 21:30)  SpO2: 95% (02-25-21 @ 21:30)  Wt(kg): --    PHYSICAL EXAM:    Constitutional: NAD  Eyes: EOMI, sclera non-icteric  Neck: supple, no masses, no JVD  Respiratory: CTA b/l, good air entry b/l  Cardiovascular: RRR, no M/R/G  Gastrointestinal: soft, NTND, no masses palpable, + BS, no hepatosplenomegaly  Extremities: no c/c/e  Neurological: AAOx3      MEDICATIONS  (STANDING):  apixaban 5 milliGRAM(s) Oral every 12 hours  atorvastatin 80 milliGRAM(s) Oral at bedtime  clopidogrel Tablet 75 milliGRAM(s) Oral daily  dextrose 40% Gel 15 Gram(s) Oral once  dextrose 5%. 1000 milliLiter(s) (50 mL/Hr) IV Continuous <Continuous>  dextrose 5%. 1000 milliLiter(s) (100 mL/Hr) IV Continuous <Continuous>  dextrose 50% Injectable 25 Gram(s) IV Push once  dextrose 50% Injectable 12.5 Gram(s) IV Push once  dextrose 50% Injectable 25 Gram(s) IV Push once  glucagon  Injectable 1 milliGRAM(s) IntraMuscular once  insulin glargine Injectable (LANTUS) 25 Unit(s) SubCutaneous every morning  insulin lispro (ADMELOG) corrective regimen sliding scale   SubCutaneous Before meals and at bedtime  levothyroxine 75 MICROGram(s) Oral daily  metoprolol tartrate 12.5 milliGRAM(s) Oral two times a day  pantoprazole    Tablet 40 milliGRAM(s) Oral before breakfast  polyethylene glycol 3350 17 Gram(s) Oral daily  sodium chloride 0.65% Nasal 2 Spray(s) Both Nostrils every 6 hours  trimethoprim  160 mG/sulfamethoxazole 800 mG 1 Tablet(s) Oral <User Schedule>    MEDICATIONS  (PRN):  acetaminophen   Tablet .. 650 milliGRAM(s) Oral every 4 hours PRN Temp greater or equal to 38.5C (101.3F)  ALBUTerol    90 MICROgram(s) HFA Inhaler 2 Puff(s) Inhalation every 4 hours PRN Shortness of Breath and/or Wheezing  loperamide 2 milliGRAM(s) Oral every 4 hours PRN Diarrhea  ondansetron Injectable 4 milliGRAM(s) IV Push every 6 hours PRN Nausea and/or Vomiting      Allergies    barbiturates (Unknown)  Cipro (Other; Swelling)  iodine (Unknown)    Intolerances        LABS:                RADIOLOGY & ADDITIONAL TESTS:  Studies reviewed.  < from: CT Chest No Cont (02.25.21 @ 10:03) >    EXAM:  CT CHEST                            PROCEDURE DATE:  02/25/2021          INTERPRETATION:  CLINICAL INFORMATION: Bilateral infiltrates. History of melanoma.    COMPARISON: Chest x-ray 2/22/2021, CT chest 2/10/2021, and PET/CT 11/16/2020    PROCEDURE:  CT of the Chest was performed without intravenous contrast.  Sagittal and coronal reformats were performed.    FINDINGS:    LUNGS AND AIRWAYS: Patent central airways.  Interval decrease in size of bilateral lung opacities since 2/10/2021; groundglass opacities have predominantly resolved with residual bilateral dense consolidations with a peripheral and peribronchial predominance, greater in the right lung. Scattered bilateral lung nodules including a representative 9 mm right upper lobenodule (series 2 image 28), not as well seen on 2/10/2021 secondary to groundglass opacities. The nodules are new since PET/CT 11/16/2021; previously seen nodules on the prior PET/CT are not as well seen on the current study secondary to the lung opacities.  PLEURA: No pleural effusion.  MEDIASTINUM AND NINOSKA: No pathologically enlarged lymph nodes.  VESSELS: Thoracic aorta normal in caliber with mild calcified plaque. Coronary artery calcifications.  HEART: Heart size is normal. No pericardial effusion.  CHEST WALL AND LOWER NECK: No enlarged axillary lymph nodes.  VISUALIZED UPPER ABDOMEN: Cholecystectomy. Adrenal gland thickening.  BONES: No aggressive osseous lesion.    IMPRESSION:  Extensive bilateral lung opacities, right greater than left, decreased since 2/10/2021 with residual dense opacities with a peribronchial and peripheral predominance; differential includes COVID pneumonia or drug reaction, possibly with superimposed fibrosis.    Scattered new bilateral indeterminate pulmonary nodules including a 9 mm right upper lobe nodule; given the history of melanoma, metastatic disease cannot be excluded.            ROSALEE MANCERA MD; Attending Radiologist  This document has been electronically signed. Feb 25 2021 11:17AM    < end of copied text >    ASSESSMENT & PLAN:

## 2021-02-26 NOTE — PROGRESS NOTE ADULT - SUBJECTIVE AND OBJECTIVE BOX
REASON FOR VISIT: Acute MI, PAF    CHIEF COMPLAINT:    HPI:  HPI: 76 y/o F PMHx significant for Hypertension, Diabetes mellitus type 2, vertigo, anal melanoma under care of Dr. Shearer ([facility]) on combined IO therapy with ipilimumab + Nivolumab (cycle 2 on 1/4/2021), and recent admission from 1/19-1/25 for new onset Afib (now on DOAC therapy) and recent admission in [facility] for COVID-pneumonia (reportedly treated with MAB, Remdesivir and steroid therapy) now admitted on 2/10/21 with respiratory failure acute hypoxemic respiratory failure. Her hospitalization was complicated by an acute inferior wall MI and she underwent PCI of RCA on 2/12/21.    2/12/21: Pt complains of midsternal chest pain today. ECG suggests STEMI  2/13/21: S/P PCI RCA yesterday. Pain free today. VSS. Pt feels improved but remains dyspneic  2/14/21: Denies chest pain. NSR.   2/15/21: no chest pain overnight.  2/16/'21: still on high flow NRB, no chest pain.  2/18/21:  Comfortable; no cardiac symptoms (no CP since last Friday).  2/19/21:  Feels "ok."  No chest discomfort, breathing comfortably at rest; occasional cough; no epistaxis or rectal bleeding  2/21/21:  Feels better after improved sleep last night; no new complaints; still with cough; no SOB at rest  2/23/21 Patient is feeling ok , has difficult breathing via nose , had epistaxis , anemia  100% sat on VM 50% no sob   2/24/21 Pt feels improved. Remains short of breath on nc.  2/25/21:  Comfortable less FIO2; deconditioned  2/26/21 Patient is feeling fine at rest , on 3 L NC  saturation 98%     MEDICATIONS  (STANDING):  apixaban 5 milliGRAM(s) Oral every 12 hours  atorvastatin 80 milliGRAM(s) Oral at bedtime  clopidogrel Tablet 75 milliGRAM(s) Oral daily  dextrose 40% Gel 15 Gram(s) Oral once  dextrose 5%. 1000 milliLiter(s) (50 mL/Hr) IV Continuous <Continuous>  dextrose 5%. 1000 milliLiter(s) (100 mL/Hr) IV Continuous <Continuous>  dextrose 50% Injectable 25 Gram(s) IV Push once  dextrose 50% Injectable 12.5 Gram(s) IV Push once  dextrose 50% Injectable 25 Gram(s) IV Push once  glucagon  Injectable 1 milliGRAM(s) IntraMuscular once  insulin glargine Injectable (LANTUS) 25 Unit(s) SubCutaneous every morning  insulin lispro (ADMELOG) corrective regimen sliding scale   SubCutaneous Before meals and at bedtime  levothyroxine 75 MICROGram(s) Oral daily  metoprolol tartrate 12.5 milliGRAM(s) Oral two times a day  pantoprazole    Tablet 40 milliGRAM(s) Oral before breakfast  polyethylene glycol 3350 17 Gram(s) Oral daily  sodium chloride 0.65% Nasal 2 Spray(s) Both Nostrils every 6 hours  trimethoprim  160 mG/sulfamethoxazole 800 mG 1 Tablet(s) Oral <User Schedule>    MEDICATIONS  (PRN):  acetaminophen   Tablet .. 650 milliGRAM(s) Oral every 4 hours PRN Temp greater or equal to 38.5C (101.3F)  ALBUTerol    90 MICROgram(s) HFA Inhaler 2 Puff(s) Inhalation every 4 hours PRN Shortness of Breath and/or Wheezing  loperamide 2 milliGRAM(s) Oral every 4 hours PRN Diarrhea  ondansetron Injectable 4 milliGRAM(s) IV Push every 6 hours PRN Nausea and/or Vomiting      Vital Signs Last 24 Hrs  T(C): 35.9 (25 Feb 2021 21:12), Max: 36.3 (25 Feb 2021 08:43)  T(F): 96.7 (25 Feb 2021 21:12), Max: 97.4 (25 Feb 2021 08:43)  HR: 74 (25 Feb 2021 21:30) (74 - 75)  BP: 112/39 (25 Feb 2021 21:30) (108/44 - 112/39)  BP(mean): --  RR: 22 (25 Feb 2021 21:30) (17 - 22)  SpO2: 95% (25 Feb 2021 21:30) (95% - 95%)    I&O's Summary    PHYSICAL EXAM:  Constitutional: NAD, awake and alert  Respiratory: Nonlabored, supplemental O2 via N/C  Cardiovascular: S1 and S2, regular rate and rhythm, no Murmurs, gallops or rubs  Gastrointestinal: Bowel Sounds present, soft, nontender.     TTE Echo Complete w/o Contrast w/ Doppler (11.02.20 @ 09:02) >   The left ventricle is normal in size, wall motion and contractility. Mild concentric left ventricular hypertrophy is present. Estimated left ventricular ejection fraction is 65-70 %.   The left atrium is mildly dilated.   Normal appearing right atrium.   Normal appearing right ventricle structure and function.   Normal aortic valve structure and function.   EA reversal of the mitral inflow consistent with reduced compliance of the left ventricle.   Fibrocalcific changes noted to the mitral valve leaflets with preserved leaflet excursion. Mild (1+) mitral regurgitation is present.   Moderate mitral annular calcification is present.   No evidence of pericardial effusion.    Transthoracic Echocardiogram Follow Up (01.22.21 @ 10:03):   Limited study to asses left ventricular function.  Estimated left ventricular ejection fraction is 60-65 %.    Cardiac Cath Lab - Adult (02.12.21 @ 15:26):  Inferior STEMI with occluded RCA. Difficult radial access. Femoral access precluded because of full dose apixaban.     pulse ox  monitor in >95 on oxygen

## 2021-02-26 NOTE — PROGRESS NOTE ADULT - ASSESSMENT
73 yo F with hx of anal melanoma, hypothyroidism, AFIB DM type II,  who presents with Acute Hypoxic Respiratory failure possible from COVID 19 Pneumonia with likely superimposed Pneumonia. Course complicated by Acute inferior wall MI s/p PCI of RCA on 2/12/21. Initially admitted to ICU on high Flow. S/p 7 day course of cefepime, azithromax, s/p 10 day course of decadron. now on ventimask. downgraded to medical floor    Acute hypoxemic respiratory failure secondary to COVID 19 pna, likely superimposed bacterial PNA  s/p course of cefepime/azithromax  s/p course of remdesvir and decadron  now on nasal cannula  - ID following  - pulm following   - oxygen is stable and have decreased to 2 L NC  - Ct chest : improved infiltrates    - patient covid is now negative     STEMI s/p PCI  c/w Plavix/Statin/ASA  Po lopressor    A-FIB.    c/w  DOAC,     JASPREET likely from [facility].   resoved       Diabetes  Lantus and RISS    Hypothyroidism  c/w Synthroid    Anal melanoma  Oncology to follow up    dvt ppx: eliquis    code: FULL

## 2021-02-26 NOTE — PROGRESS NOTE ADULT - ASSESSMENT
74 yo female with history of metastatic melanoma pt of Dr. Shearer ([facility]) on combined IO therapy with ipilimumab + Nivolumab now admitted for COVID-19 complicated by Respiratory Failure and STEMI     metastatic melanoma   - on Ipi and NIVO   - hold treatment given active ongoing infection and recent cardiac STEMI     STEMI   - now on Eliquis, and plavix   - s/p PCE to RCA   - Cardio following   -on CCB and BB for rate control     Respiratory failure   =- now off Dexamethasone   - continue with supplemental O2 now down to 2 L NC saturating well  - titrate FIO2 today  as tolerated  - yesterday had a non contrast CT of the chest suggestive of ongoing bilateral ? fibrotic / covid related pna   - also noted on CT were small sub cm 9mm nodules   - will need comparison  to  prior films   - now planned for transition to REHAB ? REHAB depending on repeat COVID test performed today

## 2021-02-26 NOTE — PROGRESS NOTE ADULT - SUBJECTIVE AND OBJECTIVE BOX
Patient seen and examined at bedside      2/24 : Patient would like her SCD removed so she can walk. Will get PT to evaluate her to see if she will need to go home. I have titrated her oxygen down to 4 L NC.     2/25 : Patient is much better she could barely stand with PT. she will need rehab. Will discuss with social work / case management. Will also discuss with patients children. I have lowered her oxygen to 3 L NC> She is being followed by ID / cardio / pulmonary.     2/26 : Patients pulm wants her to stay through weekend. her CT chest is improved. She is now down to 3 L NC. Her Covid is now negative. I have updated the daughter with the plan over the phone.       Review of Systems:  General:denies fever chills, headache, weakness  HEENT: denies blurry vision,diffculty swallowing, difficulty hearing, tinnitus  Cardiovascular: denies chest pain  ,palpitations  Pulmonary:denies shortness of breath, cough, wheezing, hemoptysis  Gastrointestinal: denies abdominal pain, constipation, diarrhea,nausea , vomiting, hematochezia  : denies hematuria, dysuria, or incontinence  Neurological: denies weakness, numbness , tingling, dizziness, tremors  MSK: denies muscle pain, difficulty ambulating, swelling, back pain  skin: denies skin rash, itching, burning, or  skin lesions  Psychiatrical: denies mood disturbances, anxierty, feeling depressed, depression , or difficulty sleeping    Objective:  Vitals  T(C): 36.3 (02-23-21 @ 09:17), Max: 36.3 (02-22-21 @ 21:19)  HR: 79 (02-23-21 @ 09:17) (79 - 87)  BP: 112/35 (02-23-21 @ 09:17) (109/33 - 112/35)  RR: 17 (02-23-21 @ 09:17) (16 - 20)  SpO2: 96% (02-23-21 @ 12:57) (96% - 100%)    Physical Exam:  General: comfortable, no acute distress, well nourished  HEENT: Atraumatic, no LAD, trachea midline, PERRLA  Cardiovascular: normal s1s2, no murmurs, gallops or fricition rubs  Pulmonary: clear to ausculation Bilaterally, no wheezing , rhonchi  Gastrointestinal: soft non tender non distended, no masses felt, no organomegally  Muscloskeletal: no lower extremity edema, intact bilateral lower extremity pulses  Neurological: CN II-12 intact. No focal weakness  Psychiatrical: normal mood, cooperative  SKIN: no rash, lesions or ulcers    Labs:                          8.6    14.12 )-----------( 250      ( 23 Feb 2021 08:19 )             28.2     02-23    143  |  113<H>  |  48<H>  ----------------------------<  138<H>  3.9   |  21<L>  |  0.93    Ca    8.4<L>      23 Feb 2021 08:19    TPro  5.8<L>  /  Alb  2.2<L>  /  TBili  0.3  /  DBili  x   /  AST  29  /  ALT  54  /  AlkPhos  159<H>  02-23    LIVER FUNCTIONS - ( 23 Feb 2021 08:19 )  Alb: 2.2 g/dL / Pro: 5.8 gm/dL / ALK PHOS: 159 U/L / ALT: 54 U/L / AST: 29 U/L / GGT: x                 Active Medications  MEDICATIONS  (STANDING):  apixaban 5 milliGRAM(s) Oral every 12 hours  atorvastatin 80 milliGRAM(s) Oral at bedtime  clopidogrel Tablet 75 milliGRAM(s) Oral daily  dextrose 40% Gel 15 Gram(s) Oral once  dextrose 5%. 1000 milliLiter(s) (50 mL/Hr) IV Continuous <Continuous>  dextrose 5%. 1000 milliLiter(s) (100 mL/Hr) IV Continuous <Continuous>  dextrose 50% Injectable 25 Gram(s) IV Push once  dextrose 50% Injectable 12.5 Gram(s) IV Push once  dextrose 50% Injectable 25 Gram(s) IV Push once  diltiazem    milliGRAM(s) Oral daily  glucagon  Injectable 1 milliGRAM(s) IntraMuscular once  insulin glargine Injectable (LANTUS) 25 Unit(s) SubCutaneous every morning  insulin lispro (ADMELOG) corrective regimen sliding scale   SubCutaneous Before meals and at bedtime  levothyroxine 75 MICROGram(s) Oral daily  metoprolol tartrate 12.5 milliGRAM(s) Oral two times a day  pantoprazole    Tablet 40 milliGRAM(s) Oral before breakfast  polyethylene glycol 3350 17 Gram(s) Oral daily  sodium chloride 0.65% Nasal 2 Spray(s) Both Nostrils every 6 hours  trimethoprim  160 mG/sulfamethoxazole 800 mG 1 Tablet(s) Oral <User Schedule>    MEDICATIONS  (PRN):  acetaminophen   Tablet .. 650 milliGRAM(s) Oral every 4 hours PRN Temp greater or equal to 38.5C (101.3F)  ALBUTerol    90 MICROgram(s) HFA Inhaler 2 Puff(s) Inhalation every 4 hours PRN Shortness of Breath and/or Wheezing  loperamide 2 milliGRAM(s) Oral every 4 hours PRN Diarrhea  ondansetron Injectable 4 milliGRAM(s) IV Push every 6 hours PRN Nausea and/or Vomiting

## 2021-02-26 NOTE — PROGRESS NOTE ADULT - PROBLEM SELECTOR PLAN 3
Persistent hypoxia but now on less FIO2   improving ; CT chest shows extensive airspace disease but some improvement.

## 2021-02-27 NOTE — PROGRESS NOTE ADULT - SUBJECTIVE AND OBJECTIVE BOX
REASON FOR VISIT: Acute MI, PAF    CHIEF COMPLAINT:    HPI:  HPI: 76 y/o F PMHx significant for Hypertension, Diabetes mellitus type 2, vertigo, anal melanoma under care of Dr. Shearer ([facility] [place]) on combined IO therapy with ipilimumab + Nivolumab (cycle 2 on 1/4/2021), and recent admission from 1/19-1/25 for new onset Afib (now on DOAC therapy) and recent admission in [facility] for COVID-pneumonia (reportedly treated with MAB, Remdesivir and steroid therapy) now admitted on 2/10/21 with respiratory failure acute hypoxemic respiratory failure. Her hospitalization was complicated by an acute inferior wall MI and she underwent PCI of RCA on 2/12/21.    2/12/21: Pt complains of midsternal chest pain today. ECG suggests STEMI  2/13/21: S/P PCI RCA yesterday. Pain free today. VSS. Pt feels improved but remains dyspneic  2/14/21: Denies chest pain. NSR.   2/15/21: no chest pain overnight.  2/16/'21: still on high flow NRB, no chest pain.  2/18/21:  Comfortable; no cardiac symptoms (no CP since last Friday).  2/19/21:  Feels "ok."  No chest discomfort, breathing comfortably at rest; occasional cough; no epistaxis or rectal bleeding  2/21/21:  Feels better after improved sleep last night; no new complaints; still with cough; no SOB at rest  2/23/21 Patient is feeling ok , has difficult breathing via nose , had epistaxis , anemia  100% sat on VM 50% no sob   2/24/21 Pt feels improved. Remains short of breath on nc.  2/25/21:  Comfortable less FIO2; deconditioned  2/26/21 Patient is feeling fine at rest , on 3 L NC  saturation 98%   2/27/21 Patient is feeling  much better , decreasing oxygen requirement , on 2 L NC  now       MEDICATIONS  (STANDING):  apixaban 5 milliGRAM(s) Oral every 12 hours  atorvastatin 80 milliGRAM(s) Oral at bedtime  clopidogrel Tablet 75 milliGRAM(s) Oral daily  dextrose 40% Gel 15 Gram(s) Oral once  dextrose 5%. 1000 milliLiter(s) (50 mL/Hr) IV Continuous <Continuous>  dextrose 5%. 1000 milliLiter(s) (100 mL/Hr) IV Continuous <Continuous>  dextrose 50% Injectable 25 Gram(s) IV Push once  dextrose 50% Injectable 12.5 Gram(s) IV Push once  dextrose 50% Injectable 25 Gram(s) IV Push once  glucagon  Injectable 1 milliGRAM(s) IntraMuscular once  insulin glargine Injectable (LANTUS) 25 Unit(s) SubCutaneous every morning  insulin lispro (ADMELOG) corrective regimen sliding scale   SubCutaneous Before meals and at bedtime  levothyroxine 75 MICROGram(s) Oral daily  metoprolol tartrate 12.5 milliGRAM(s) Oral two times a day  pantoprazole    Tablet 40 milliGRAM(s) Oral before breakfast  polyethylene glycol 3350 17 Gram(s) Oral daily  sodium chloride 0.65% Nasal 2 Spray(s) Both Nostrils every 6 hours  trimethoprim  160 mG/sulfamethoxazole 800 mG 1 Tablet(s) Oral <User Schedule>    MEDICATIONS  (PRN):  acetaminophen   Tablet .. 650 milliGRAM(s) Oral every 4 hours PRN Temp greater or equal to 38.5C (101.3F)  ALBUTerol    90 MICROgram(s) HFA Inhaler 2 Puff(s) Inhalation every 4 hours PRN Shortness of Breath and/or Wheezing  loperamide 2 milliGRAM(s) Oral every 4 hours PRN Diarrhea  ondansetron Injectable 4 milliGRAM(s) IV Push every 6 hours PRN Nausea and/or Vomiting      Vital Signs Last 24 Hrs  T(C): 36.6 (26 Feb 2021 20:30), Max: 36.6 (26 Feb 2021 20:30)  T(F): 97.9 (26 Feb 2021 20:30), Max: 97.9 (26 Feb 2021 20:30)  HR: 90 (26 Feb 2021 20:30) (90 - 90)  BP: 111/41 (26 Feb 2021 20:30) (111/41 - 111/41)  BP(mean): --  RR: 18 (26 Feb 2021 20:30) (18 - 18)  SpO2: 98% (26 Feb 2021 20:30) (98% - 98%)    I&O's Summary    PHYSICAL EXAM:  Constitutional: NAD, awake and alert  Respiratory: Nonlabored, supplemental O2 via N/C  Cardiovascular: S1 and S2, regular rate and rhythm, no Murmurs, gallops or rubs  Gastrointestinal: Bowel Sounds present, soft, nontender.     TTE Echo Complete w/o Contrast w/ Doppler (11.02.20 @ 09:02) >   The left ventricle is normal in size, wall motion and contractility. Mild concentric left ventricular hypertrophy is present. Estimated left ventricular ejection fraction is 65-70 %.   The left atrium is mildly dilated.   Normal appearing right atrium.   Normal appearing right ventricle structure and function.   Normal aortic valve structure and function.   EA reversal of the mitral inflow consistent with reduced compliance of the left ventricle.   Fibrocalcific changes noted to the mitral valve leaflets with preserved leaflet excursion. Mild (1+) mitral regurgitation is present.   Moderate mitral annular calcification is present.   No evidence of pericardial effusion.    Transthoracic Echocardiogram Follow Up (01.22.21 @ 10:03):   Limited study to asses left ventricular function.  Estimated left ventricular ejection fraction is 60-65 %.    Cardiac Cath Lab - Adult (02.12.21 @ 15:26):  Inferior STEMI with occluded RCA. Difficult radial access. Femoral access precluded because of full dose apixaban.     pulse ox  monitor in >95 on oxygen  on 2 l nC

## 2021-02-27 NOTE — PROGRESS NOTE ADULT - SUBJECTIVE AND OBJECTIVE BOX
INTERVAL HPI/OVERNIGHT EVENTS:  Patient S&E at bedside. No o/n events,  feeling better today; afebrile   Now on 2 L NC     Vital Signs Last 24 Hrs  T(C): 36.3 (27 Feb 2021 08:35), Max: 36.6 (26 Feb 2021 20:30)  T(F): 97.4 (27 Feb 2021 08:35), Max: 97.9 (26 Feb 2021 20:30)  HR: 82 (27 Feb 2021 08:35) (82 - 90)  BP: 113/41 (27 Feb 2021 08:35) (111/41 - 113/41)  BP(mean): --  RR: 18 (27 Feb 2021 08:35) (18 - 18)  SpO2: 96% (27 Feb 2021 08:35) (96% - 98%)      PHYSICAL EXAM:    Constitutional: NAD  Eyes: EOMI, sclera non-icteric  Neck: supple, no masses, no JVD  Respiratory: CTA b/l, good air entry b/l  Cardiovascular: RRR, no M/R/G  Gastrointestinal: soft, NTND, no masses palpable, + BS, no hepatosplenomegaly  Extremities: no c/c/e  Neurological: AAOx3      MEDICATIONS  (STANDING):  apixaban 5 milliGRAM(s) Oral every 12 hours  atorvastatin 80 milliGRAM(s) Oral at bedtime  clopidogrel Tablet 75 milliGRAM(s) Oral daily  dextrose 40% Gel 15 Gram(s) Oral once  dextrose 5%. 1000 milliLiter(s) (50 mL/Hr) IV Continuous <Continuous>  dextrose 5%. 1000 milliLiter(s) (100 mL/Hr) IV Continuous <Continuous>  dextrose 50% Injectable 25 Gram(s) IV Push once  dextrose 50% Injectable 12.5 Gram(s) IV Push once  dextrose 50% Injectable 25 Gram(s) IV Push once  glucagon  Injectable 1 milliGRAM(s) IntraMuscular once  insulin glargine Injectable (LANTUS) 25 Unit(s) SubCutaneous every morning  insulin lispro (ADMELOG) corrective regimen sliding scale   SubCutaneous Before meals and at bedtime  levothyroxine 75 MICROGram(s) Oral daily  metoprolol tartrate 12.5 milliGRAM(s) Oral two times a day  pantoprazole    Tablet 40 milliGRAM(s) Oral before breakfast  polyethylene glycol 3350 17 Gram(s) Oral daily  sodium chloride 0.65% Nasal 2 Spray(s) Both Nostrils every 6 hours  trimethoprim  160 mG/sulfamethoxazole 800 mG 1 Tablet(s) Oral <User Schedule>    MEDICATIONS  (PRN):  acetaminophen   Tablet .. 650 milliGRAM(s) Oral every 4 hours PRN Temp greater or equal to 38.5C (101.3F)  ALBUTerol    90 MICROgram(s) HFA Inhaler 2 Puff(s) Inhalation every 4 hours PRN Shortness of Breath and/or Wheezing  loperamide 2 milliGRAM(s) Oral every 4 hours PRN Diarrhea  ondansetron Injectable 4 milliGRAM(s) IV Push every 6 hours PRN Nausea and/or Vomiting      Allergies    barbiturates (Unknown)  Cipro (Other; Swelling)  iodine (Unknown)    Intolerances        LABS:                RADIOLOGY & ADDITIONAL TESTS:  Studies reviewed.  < from: CT Chest No Cont (02.25.21 @ 10:03) >    EXAM:  CT CHEST                            PROCEDURE DATE:  02/25/2021          INTERPRETATION:  CLINICAL INFORMATION: Bilateral infiltrates. History of melanoma.    COMPARISON: Chest x-ray 2/22/2021, CT chest 2/10/2021, and PET/CT 11/16/2020    PROCEDURE:  CT of the Chest was performed without intravenous contrast.  Sagittal and coronal reformats were performed.    FINDINGS:    LUNGS AND AIRWAYS: Patent central airways.  Interval decrease in size of bilateral lung opacities since 2/10/2021; groundglass opacities have predominantly resolved with residual bilateral dense consolidations with a peripheral and peribronchial predominance, greater in the right lung. Scattered bilateral lung nodules including a representative 9 mm right upper lobenodule (series 2 image 28), not as well seen on 2/10/2021 secondary to groundglass opacities. The nodules are new since PET/CT 11/16/2021; previously seen nodules on the prior PET/CT are not as well seen on the current study secondary to the lung opacities.  PLEURA: No pleural effusion.  MEDIASTINUM AND NINOSKA: No pathologically enlarged lymph nodes.  VESSELS: Thoracic aorta normal in caliber with mild calcified plaque. Coronary artery calcifications.  HEART: Heart size is normal. No pericardial effusion.  CHEST WALL AND LOWER NECK: No enlarged axillary lymph nodes.  VISUALIZED UPPER ABDOMEN: Cholecystectomy. Adrenal gland thickening.  BONES: No aggressive osseous lesion.    IMPRESSION:  Extensive bilateral lung opacities, right greater than left, decreased since 2/10/2021 with residual dense opacities with a peribronchial and peripheral predominance; differential includes COVID pneumonia or drug reaction, possibly with superimposed fibrosis.    Scattered new bilateral indeterminate pulmonary nodules including a 9 mm right upper lobe nodule; given the history of melanoma, metastatic disease cannot be excluded.            ROSALEE MANCERA MD; Attending Radiologist  This document has been electronically signed. Feb 25 2021 11:17AM    < end of copied text >    ASSESSMENT & PLAN:

## 2021-02-27 NOTE — PROGRESS NOTE ADULT - ASSESSMENT
74 yo female with history of metastatic [place] [place] with ipilimumab + Nivolumab now admitted for COVID-19 complicated by Respiratory Failure and STEMI     metastatic melanoma   - on Ipi and NIVO   - hold treatment given active ongoing infection and recent cardiac STEMI   -to resume treatment as outpt     STEMI   - now on Eliquis, and plavix   - s/p PCE to RCA   - Cardio following   -on CCB and BB for rate control     Respiratory failure   =- now off Dexamethasone   - continue with supplemental O2 now down to 2 L NC saturating well  - titrate FIO2 today  as tolerated  - yesterday had a non contrast CT of the chest suggestive of ongoing bilateral ? fibrotic / covid related pna   - also noted on CT were small sub cm 9mm nodules   - will need comparison  to  prior films     Covid now negative   d/c planning to rehab     Edgar Corona MD  [facility]  [phone]  cell: [phone]

## 2021-02-27 NOTE — PROGRESS NOTE ADULT - SUBJECTIVE AND OBJECTIVE BOX
Patient seen and examined at bedside      2/24 : Patient would like her SCD removed so she can walk. Will get PT to evaluate her to see if she will need to go home. I have titrated her oxygen down to 4 L NC.     2/25 : Patient is much better she could barely stand with PT. she will need rehab. Will discuss with social work / case management. Will also discuss with patients children. I have lowered her oxygen to 3 L NC> She is being followed by ID / cardio / pulmonary.     2/26 : Patients pulm wants her to stay through weekend. her CT chest is improved. She is now down to 3 L NC. Her Covid is now negative. I have updated the daughter with the plan over the phone.     2/27 : Patients oxygen is improved. Her covid is now negative. Discussed with patient / case management and working on discharge for monday. Will try her off oxygen       Review of Systems:  General:denies fever chills, headache, weakness  HEENT: denies blurry vision,diffculty swallowing, difficulty hearing, tinnitus  Cardiovascular: denies chest pain  ,palpitations  Pulmonary:denies shortness of breath, cough, wheezing, hemoptysis  Gastrointestinal: denies abdominal pain, constipation, diarrhea,nausea , vomiting, hematochezia  : denies hematuria, dysuria, or incontinence  Neurological: denies weakness, numbness , tingling, dizziness, tremors  MSK: denies muscle pain, difficulty ambulating, swelling, back pain  skin: denies skin rash, itching, burning, or  skin lesions  Psychiatrical: denies mood disturbances, anxierty, feeling depressed, depression , or difficulty sleeping    Objective:  Vitals  T(C): 36.3 (02-23-21 @ 09:17), Max: 36.3 (02-22-21 @ 21:19)  HR: 79 (02-23-21 @ 09:17) (79 - 87)  BP: 112/35 (02-23-21 @ 09:17) (109/33 - 112/35)  RR: 17 (02-23-21 @ 09:17) (16 - 20)  SpO2: 96% (02-23-21 @ 12:57) (96% - 100%)    Physical Exam:  General: comfortable, no acute distress, well nourished  HEENT: Atraumatic, no LAD, trachea midline, PERRLA  Cardiovascular: normal s1s2, no murmurs, gallops or fricition rubs  Pulmonary: clear to ausculation Bilaterally, no wheezing , rhonchi  Gastrointestinal: soft non tender non distended, no masses felt, no organomegally  Muscloskeletal: no lower extremity edema, intact bilateral lower extremity pulses  Neurological: CN II-12 intact. No focal weakness  Psychiatrical: normal mood, cooperative  SKIN: no rash, lesions or ulcers    Labs:                          8.6    14.12 )-----------( 250      ( 23 Feb 2021 08:19 )             28.2     02-23    143  |  113<H>  |  48<H>  ----------------------------<  138<H>  3.9   |  21<L>  |  0.93    Ca    8.4<L>      23 Feb 2021 08:19    TPro  5.8<L>  /  Alb  2.2<L>  /  TBili  0.3  /  DBili  x   /  AST  29  /  ALT  54  /  AlkPhos  159<H>  02-23    LIVER FUNCTIONS - ( 23 Feb 2021 08:19 )  Alb: 2.2 g/dL / Pro: 5.8 gm/dL / ALK PHOS: 159 U/L / ALT: 54 U/L / AST: 29 U/L / GGT: x                 Active Medications  MEDICATIONS  (STANDING):  apixaban 5 milliGRAM(s) Oral every 12 hours  atorvastatin 80 milliGRAM(s) Oral at bedtime  clopidogrel Tablet 75 milliGRAM(s) Oral daily  dextrose 40% Gel 15 Gram(s) Oral once  dextrose 5%. 1000 milliLiter(s) (50 mL/Hr) IV Continuous <Continuous>  dextrose 5%. 1000 milliLiter(s) (100 mL/Hr) IV Continuous <Continuous>  dextrose 50% Injectable 25 Gram(s) IV Push once  dextrose 50% Injectable 12.5 Gram(s) IV Push once  dextrose 50% Injectable 25 Gram(s) IV Push once  diltiazem    milliGRAM(s) Oral daily  glucagon  Injectable 1 milliGRAM(s) IntraMuscular once  insulin glargine Injectable (LANTUS) 25 Unit(s) SubCutaneous every morning  insulin lispro (ADMELOG) corrective regimen sliding scale   SubCutaneous Before meals and at bedtime  levothyroxine 75 MICROGram(s) Oral daily  metoprolol tartrate 12.5 milliGRAM(s) Oral two times a day  pantoprazole    Tablet 40 milliGRAM(s) Oral before breakfast  polyethylene glycol 3350 17 Gram(s) Oral daily  sodium chloride 0.65% Nasal 2 Spray(s) Both Nostrils every 6 hours  trimethoprim  160 mG/sulfamethoxazole 800 mG 1 Tablet(s) Oral <User Schedule>    MEDICATIONS  (PRN):  acetaminophen   Tablet .. 650 milliGRAM(s) Oral every 4 hours PRN Temp greater or equal to 38.5C (101.3F)  ALBUTerol    90 MICROgram(s) HFA Inhaler 2 Puff(s) Inhalation every 4 hours PRN Shortness of Breath and/or Wheezing  loperamide 2 milliGRAM(s) Oral every 4 hours PRN Diarrhea  ondansetron Injectable 4 milliGRAM(s) IV Push every 6 hours PRN Nausea and/or Vomiting

## 2021-02-28 NOTE — PROGRESS NOTE ADULT - ASSESSMENT
76 yo female with history of metastatic [place] [place] with ipilimumab + Nivolumab now admitted for COVID-19 complicated by Respiratory Failure and STEMI     metastatic melanoma   - on Ipi and NIVO   - hold treatment given active ongoing infection and recent cardiac STEMI   -to resume treatment as outpt     STEMI   - now on Eliquis, and plavix   - s/p PCE to RCA   - Cardio following   -on CCB and BB for rate control     Respiratory failure   =- now off Dexamethasone   - CT of the chest - improved infiltrates   - afebrile  Covid now negative   d/c planning to rehab     Edgar Corona MD  [facility]  [phone]  cell: [phone]  74 yo female with history of metastatic melanoma pt of Dr. Shearer ([facility] [place]) on combined IO therapy with ipilimumab + Nivolumab now admitted for COVID-19 complicated by Respiratory Failure and STEMI     metastatic melanoma   - on Ipi and NIVO   - hold treatment given active ongoing infection and recent cardiac STEMI   -to resume treatment as outpt     STEMI   - now on Eliquis, and plavix   - s/p PCE to RCA   - Cardio following   -on CCB and BB for rate control     Respiratory failure   =- now off Dexamethasone   - CT of the chest - improved infiltrates   - afebrile; off nasal O2   Covid now negative   d/c planning to rehab     Edgar Corona MD  [facility]  [phone]  cell: [phone]

## 2021-02-28 NOTE — PROGRESS NOTE ADULT - SUBJECTIVE AND OBJECTIVE BOX
Patient seen and examined at bedside      2/24 : Patient would like her SCD removed so she can walk. Will get PT to evaluate her to see if she will need to go home. I have titrated her oxygen down to 4 L NC.     2/25 : Patient is much better she could barely stand with PT. she will need rehab. Will discuss with social work / case management. Will also discuss with patients children. I have lowered her oxygen to 3 L NC> She is being followed by ID / cardio / pulmonary.     2/26 : Patients pulm wants her to stay through weekend. her CT chest is improved. She is now down to 3 L NC. Her Covid is now negative. I have updated the daughter with the plan over the phone.     2/27 : Patients oxygen is improved. Her covid is now negative. Discussed with patient / case management and working on discharge for monday. Will try her off oxygen     2/28 : Patient is off oxygen. Will hopefully be discharged tomorrow. I have updated the daughter     Review of Systems:  General:denies fever chills, headache, weakness  HEENT: denies blurry vision,diffculty swallowing, difficulty hearing, tinnitus  Cardiovascular: denies chest pain  ,palpitations  Pulmonary:denies shortness of breath, cough, wheezing, hemoptysis  Gastrointestinal: denies abdominal pain, constipation, diarrhea,nausea , vomiting, hematochezia  : denies hematuria, dysuria, or incontinence  Neurological: denies weakness, numbness , tingling, dizziness, tremors  MSK: denies muscle pain, difficulty ambulating, swelling, back pain  skin: denies skin rash, itching, burning, or  skin lesions  Psychiatrical: denies mood disturbances, anxierty, feeling depressed, depression , or difficulty sleeping    Objective:  Vitals  T(C): 36.3 (02-23-21 @ 09:17), Max: 36.3 (02-22-21 @ 21:19)  HR: 79 (02-23-21 @ 09:17) (79 - 87)  BP: 112/35 (02-23-21 @ 09:17) (109/33 - 112/35)  RR: 17 (02-23-21 @ 09:17) (16 - 20)  SpO2: 96% (02-23-21 @ 12:57) (96% - 100%)    Physical Exam:  General: comfortable, no acute distress, well nourished  HEENT: Atraumatic, no LAD, trachea midline, PERRLA  Cardiovascular: normal s1s2, no murmurs, gallops or fricition rubs  Pulmonary: clear to ausculation Bilaterally, no wheezing , rhonchi  Gastrointestinal: soft non tender non distended, no masses felt, no organomegally  Muscloskeletal: no lower extremity edema, intact bilateral lower extremity pulses  Neurological: CN II-12 intact. No focal weakness  Psychiatrical: normal mood, cooperative  SKIN: no rash, lesions or ulcers    Labs:                          8.6    14.12 )-----------( 250      ( 23 Feb 2021 08:19 )             28.2     02-23    143  |  113<H>  |  48<H>  ----------------------------<  138<H>  3.9   |  21<L>  |  0.93    Ca    8.4<L>      23 Feb 2021 08:19    TPro  5.8<L>  /  Alb  2.2<L>  /  TBili  0.3  /  DBili  x   /  AST  29  /  ALT  54  /  AlkPhos  159<H>  02-23    LIVER FUNCTIONS - ( 23 Feb 2021 08:19 )  Alb: 2.2 g/dL / Pro: 5.8 gm/dL / ALK PHOS: 159 U/L / ALT: 54 U/L / AST: 29 U/L / GGT: x                 Active Medications  MEDICATIONS  (STANDING):  apixaban 5 milliGRAM(s) Oral every 12 hours  atorvastatin 80 milliGRAM(s) Oral at bedtime  clopidogrel Tablet 75 milliGRAM(s) Oral daily  dextrose 40% Gel 15 Gram(s) Oral once  dextrose 5%. 1000 milliLiter(s) (50 mL/Hr) IV Continuous <Continuous>  dextrose 5%. 1000 milliLiter(s) (100 mL/Hr) IV Continuous <Continuous>  dextrose 50% Injectable 25 Gram(s) IV Push once  dextrose 50% Injectable 12.5 Gram(s) IV Push once  dextrose 50% Injectable 25 Gram(s) IV Push once  diltiazem    milliGRAM(s) Oral daily  glucagon  Injectable 1 milliGRAM(s) IntraMuscular once  insulin glargine Injectable (LANTUS) 25 Unit(s) SubCutaneous every morning  insulin lispro (ADMELOG) corrective regimen sliding scale   SubCutaneous Before meals and at bedtime  levothyroxine 75 MICROGram(s) Oral daily  metoprolol tartrate 12.5 milliGRAM(s) Oral two times a day  pantoprazole    Tablet 40 milliGRAM(s) Oral before breakfast  polyethylene glycol 3350 17 Gram(s) Oral daily  sodium chloride 0.65% Nasal 2 Spray(s) Both Nostrils every 6 hours  trimethoprim  160 mG/sulfamethoxazole 800 mG 1 Tablet(s) Oral <User Schedule>    MEDICATIONS  (PRN):  acetaminophen   Tablet .. 650 milliGRAM(s) Oral every 4 hours PRN Temp greater or equal to 38.5C (101.3F)  ALBUTerol    90 MICROgram(s) HFA Inhaler 2 Puff(s) Inhalation every 4 hours PRN Shortness of Breath and/or Wheezing  loperamide 2 milliGRAM(s) Oral every 4 hours PRN Diarrhea  ondansetron Injectable 4 milliGRAM(s) IV Push every 6 hours PRN Nausea and/or Vomiting

## 2021-02-28 NOTE — PROGRESS NOTE ADULT - SUBJECTIVE AND OBJECTIVE BOX
INTERVAL HPI/OVERNIGHT EVENTS:  Patient S&E at bedside. No o/n events,  feeling better today; afebrile   off oxygen     Vital Signs Last 24 Hrs  T(C): 36.4 (28 Feb 2021 08:49), Max: 36.7 (27 Feb 2021 20:05)  T(F): 97.6 (28 Feb 2021 08:49), Max: 98 (27 Feb 2021 20:05)  HR: 74 (28 Feb 2021 08:49) (74 - 98)  BP: 113/47 (28 Feb 2021 08:49) (113/47 - 122/52)  BP(mean): --  RR: 18 (28 Feb 2021 08:49) (18 - 18)  SpO2: 95% (28 Feb 2021 08:49) (95% - 95%)    PHYSICAL EXAM:    Constitutional: NAD  Eyes: EOMI, sclera non-icteric  Neck: supple, no masses, no JVD  Respiratory: CTA b/l, good air entry b/l  Cardiovascular: RRR, no M/R/G  Gastrointestinal: soft, NTND, no masses palpable, + BS, no hepatosplenomegaly  Extremities: no c/c/e  Neurological: AAOx3      MEDICATIONS  (STANDING):  apixaban 5 milliGRAM(s) Oral every 12 hours  atorvastatin 80 milliGRAM(s) Oral at bedtime  clopidogrel Tablet 75 milliGRAM(s) Oral daily  dextrose 40% Gel 15 Gram(s) Oral once  dextrose 5%. 1000 milliLiter(s) (50 mL/Hr) IV Continuous <Continuous>  dextrose 5%. 1000 milliLiter(s) (100 mL/Hr) IV Continuous <Continuous>  dextrose 50% Injectable 25 Gram(s) IV Push once  dextrose 50% Injectable 12.5 Gram(s) IV Push once  dextrose 50% Injectable 25 Gram(s) IV Push once  glucagon  Injectable 1 milliGRAM(s) IntraMuscular once  insulin glargine Injectable (LANTUS) 25 Unit(s) SubCutaneous every morning  insulin lispro (ADMELOG) corrective regimen sliding scale   SubCutaneous Before meals and at bedtime  levothyroxine 75 MICROGram(s) Oral daily  metoprolol tartrate 12.5 milliGRAM(s) Oral two times a day  pantoprazole    Tablet 40 milliGRAM(s) Oral before breakfast  polyethylene glycol 3350 17 Gram(s) Oral daily  sodium chloride 0.65% Nasal 2 Spray(s) Both Nostrils every 6 hours  trimethoprim  160 mG/sulfamethoxazole 800 mG 1 Tablet(s) Oral <User Schedule>    MEDICATIONS  (PRN):  acetaminophen   Tablet .. 650 milliGRAM(s) Oral every 4 hours PRN Temp greater or equal to 38.5C (101.3F)  ALBUTerol    90 MICROgram(s) HFA Inhaler 2 Puff(s) Inhalation every 4 hours PRN Shortness of Breath and/or Wheezing  loperamide 2 milliGRAM(s) Oral every 4 hours PRN Diarrhea  ondansetron Injectable 4 milliGRAM(s) IV Push every 6 hours PRN Nausea and/or Vomiting      Allergies    barbiturates (Unknown)  Cipro (Other; Swelling)  iodine (Unknown)    Intolerances        LABS:                RADIOLOGY & ADDITIONAL TESTS:  Studies reviewed.  < from: CT Chest No Cont (02.25.21 @ 10:03) >    EXAM:  CT CHEST                            PROCEDURE DATE:  02/25/2021          INTERPRETATION:  CLINICAL INFORMATION: Bilateral infiltrates. History of melanoma.    COMPARISON: Chest x-ray 2/22/2021, CT chest 2/10/2021, and PET/CT 11/16/2020    PROCEDURE:  CT of the Chest was performed without intravenous contrast.  Sagittal and coronal reformats were performed.    FINDINGS:    LUNGS AND AIRWAYS: Patent central airways.  Interval decrease in size of bilateral lung opacities since 2/10/2021; groundglass opacities have predominantly resolved with residual bilateral dense consolidations with a peripheral and peribronchial predominance, greater in the right lung. Scattered bilateral lung nodules including a representative 9 mm right upper lobenodule (series 2 image 28), not as well seen on 2/10/2021 secondary to groundglass opacities. The nodules are new since PET/CT 11/16/2021; previously seen nodules on the prior PET/CT are not as well seen on the current study secondary to the lung opacities.  PLEURA: No pleural effusion.  MEDIASTINUM AND NINOSKA: No pathologically enlarged lymph nodes.  VESSELS: Thoracic aorta normal in caliber with mild calcified plaque. Coronary artery calcifications.  HEART: Heart size is normal. No pericardial effusion.  CHEST WALL AND LOWER NECK: No enlarged axillary lymph nodes.  VISUALIZED UPPER ABDOMEN: Cholecystectomy. Adrenal gland thickening.  BONES: No aggressive osseous lesion.    IMPRESSION:  Extensive bilateral lung opacities, right greater than left, decreased since 2/10/2021 with residual dense opacities with a peribronchial and peripheral predominance; differential includes COVID pneumonia or drug reaction, possibly with superimposed fibrosis.    Scattered new bilateral indeterminate pulmonary nodules including a 9 mm right upper lobe nodule; given the history of melanoma, metastatic disease cannot be excluded.            ROSALEE MANCERA MD; Attending Radiologist  This document has been electronically signed. Feb 25 2021 11:17AM    < end of copied text >    ASSESSMENT & PLAN:

## 2021-02-28 NOTE — PROGRESS NOTE ADULT - SUBJECTIVE AND OBJECTIVE BOX
REASON FOR VISIT: Acute MI, PAF    CHIEF COMPLAINT:    HPI:  HPI: 76 y/o F PMHx significant for Hypertension, Diabetes mellitus type 2, vertigo, anal melanoma under care of Dr. Shearer ([facility]) on combined IO therapy with ipilimumab + Nivolumab (cycle 2 on 1/4/2021), and recent admission from 1/19-1/25 for new onset Afib (now on DOAC therapy) and recent admission in [facility] for COVID-pneumonia (reportedly treated with MAB, Remdesivir and steroid therapy) now admitted on 2/10/21 with respiratory failure acute hypoxemic respiratory failure. Her hospitalization was complicated by an acute inferior wall MI and she underwent PCI of RCA on 2/12/21.    2/12/21: Pt complains of midsternal chest pain today. ECG suggests STEMI  2/13/21: S/P PCI RCA yesterday. Pain free today. VSS. Pt feels improved but remains dyspneic  2/14/21: Denies chest pain. NSR.   2/15/21: no chest pain overnight.  2/16/'21: still on high flow NRB, no chest pain.  2/18/21:  Comfortable; no cardiac symptoms (no CP since last Friday).  2/19/21:  Feels "ok."  No chest discomfort, breathing comfortably at rest; occasional cough; no epistaxis or rectal bleeding  2/21/21:  Feels better after improved sleep last night; no new complaints; still with cough; no SOB at rest  2/23/21 Patient is feeling ok , has difficult breathing via nose , had epistaxis , anemia  100% sat on VM 50% no sob   2/24/21 Pt feels improved. Remains short of breath on nc.  2/25/21:  Comfortable less FIO2; deconditioned  2/26/21 Patient is feeling fine at rest , on 3 L NC  saturation 98%   2/27/21 Patient is feeling  much better , decreasing oxygen requirement , on 2 L NC  now   2/28/21 Patient is feeling fine , on 1 L Nc good saturation       MEDICATIONS  (STANDING):  apixaban 5 milliGRAM(s) Oral every 12 hours  atorvastatin 80 milliGRAM(s) Oral at bedtime  clopidogrel Tablet 75 milliGRAM(s) Oral daily  dextrose 40% Gel 15 Gram(s) Oral once  dextrose 5%. 1000 milliLiter(s) (50 mL/Hr) IV Continuous <Continuous>  dextrose 5%. 1000 milliLiter(s) (100 mL/Hr) IV Continuous <Continuous>  dextrose 50% Injectable 25 Gram(s) IV Push once  dextrose 50% Injectable 12.5 Gram(s) IV Push once  dextrose 50% Injectable 25 Gram(s) IV Push once  glucagon  Injectable 1 milliGRAM(s) IntraMuscular once  insulin glargine Injectable (LANTUS) 25 Unit(s) SubCutaneous every morning  insulin lispro (ADMELOG) corrective regimen sliding scale   SubCutaneous Before meals and at bedtime  levothyroxine 75 MICROGram(s) Oral daily  metoprolol tartrate 12.5 milliGRAM(s) Oral two times a day  pantoprazole    Tablet 40 milliGRAM(s) Oral before breakfast  polyethylene glycol 3350 17 Gram(s) Oral daily  sodium chloride 0.65% Nasal 2 Spray(s) Both Nostrils every 6 hours  trimethoprim  160 mG/sulfamethoxazole 800 mG 1 Tablet(s) Oral <User Schedule>    MEDICATIONS  (PRN):  acetaminophen   Tablet .. 650 milliGRAM(s) Oral every 4 hours PRN Temp greater or equal to 38.5C (101.3F)  ALBUTerol    90 MICROgram(s) HFA Inhaler 2 Puff(s) Inhalation every 4 hours PRN Shortness of Breath and/or Wheezing  loperamide 2 milliGRAM(s) Oral every 4 hours PRN Diarrhea  ondansetron Injectable 4 milliGRAM(s) IV Push every 6 hours PRN Nausea and/or Vomiting    Vital Signs Last 24 Hrs  T(C): 36.4 (28 Feb 2021 08:49), Max: 36.7 (27 Feb 2021 20:05)  T(F): 97.6 (28 Feb 2021 08:49), Max: 98 (27 Feb 2021 20:05)  HR: 74 (28 Feb 2021 08:49) (74 - 98)  BP: 113/47 (28 Feb 2021 08:49) (113/47 - 122/52)  BP(mean): --  RR: 18 (28 Feb 2021 08:49) (18 - 18)  SpO2: 95% (28 Feb 2021 08:49) (95% - 95%)    I&O's Summary    27 Feb 2021 07:01  -  28 Feb 2021 07:00  --------------------------------------------------------  IN: 0 mL / OUT: 450 mL / NET: -450 mL      PHYSICAL EXAM:  Constitutional: NAD, awake and alert  Respiratory: Nonlabored, supplemental O2 via N/C  Cardiovascular: S1 and S2, regular rate and rhythm, no Murmurs, gallops or rubs  Gastrointestinal: Bowel Sounds present, soft, nontender.       Labs                    8.6    9.24  )-----------( 358      ( 28 Feb 2021 08:21 )             27.7     02-28    145  |  114<H>  |  21  ----------------------------<  98  4.6   |  25  |  0.86    Ca    8.8      28 Feb 2021 08:21      TTE Echo Complete w/o Contrast w/ Doppler (11.02.20 @ 09:02) >   The left ventricle is normal in size, wall motion and contractility. Mild concentric left ventricular hypertrophy is present. Estimated left ventricular ejection fraction is 65-70 %.   The left atrium is mildly dilated.   Normal appearing right atrium.   Normal appearing right ventricle structure and function.   Normal aortic valve structure and function.   EA reversal of the mitral inflow consistent with reduced compliance of the left ventricle.   Fibrocalcific changes noted to the mitral valve leaflets with preserved leaflet excursion. Mild (1+) mitral regurgitation is present.   Moderate mitral annular calcification is present.   No evidence of pericardial effusion.    Transthoracic Echocardiogram Follow Up (01.22.21 @ 10:03):   Limited study to asses left ventricular function.  Estimated left ventricular ejection fraction is 60-65 %.    Cardiac Cath Lab - Adult (02.12.21 @ 15:26):  Inferior STEMI with occluded RCA. Difficult radial access. Femoral access precluded because of full dose apixaban.     pulse ox  monitor in >95 on oxygen  on 1 l nC

## 2021-02-28 NOTE — PROGRESS NOTE ADULT - NUTRITIONAL ASSESSMENT
This patient has been assessed with a concern for Malnutrition and has been determined to have a diagnosis/diagnoses of Severe protein-calorie malnutrition.    This patient is being managed with:   Diet Consistent Carbohydrate w/Evening Snack-  Entered: Feb 10 2021 10:42PM    

## 2021-03-01 NOTE — DISCHARGE NOTE PROVIDER - NSDCMRMEDTOKEN_GEN_ALL_CORE_FT
albuterol 90 mcg/inh inhalation aerosol: 2 puff(s) inhaled every 4 hours, As needed, Shortness of Breath and/or Wheezing  apixaban 5 mg oral tablet: 1 tab(s) orally every 12 hours  atorvastatin 80 mg oral tablet: 1 tab(s) orally once a day (at bedtime)  Basaglar KwikPen 100 units/mL subcutaneous solution: 22 unit(s) subcutaneous once a day (at bedtime)   clopidogrel 75 mg oral tablet: 1 tab(s) orally once a day  levothyroxine 75 mcg (0.075 mg) oral tablet: 1 tab(s) orally once a day  metoprolol tartrate 25 mg oral tablet: 1 tab(s) orally 2 times a day  NovoLOG FlexPen 100 units/mL injectable solution: injectable 3 times a day (before meals)  *****Sliding Scale***  pantoprazole 40 mg oral delayed release tablet: 1 tab(s) orally once a day  predniSONE 5 mg oral tablet: Taper  2/10-2/11 = 4 tabs two time a day  2/12-2/14 = 4 tabs once a day  2/15-2/17 = 2 tabs once a day  2/18-2/21 = 1 tab once a day  SMZ-TMP  mg-160 mg oral tablet: 1 tab(s) orally Monday, Wednesday, and Friday

## 2021-03-01 NOTE — PROGRESS NOTE ADULT - REASON FOR ADMISSION

## 2021-03-01 NOTE — DISCHARGE NOTE PROVIDER - HOSPITAL COURSE
2/24 : Patient would like her SCD removed so she can walk. Will get PT to evaluate her to see if she will need to go home. I have titrated her oxygen down to 4 L NC.     2/25 : Patient is much better she could barely stand with PT. she will need rehab. Will discuss with social work / case management. Will also discuss with patients children. I have lowered her oxygen to 3 L NC> She is being followed by ID / cardio / pulmonary.     2/26 : Patients pulm wants her to stay through weekend. her CT chest is improved. She is now down to 3 L NC. Her Covid is now negative. I have updated the daughter with the plan over the phone.     2/27 : Patients oxygen is improved. Her covid is now negative. Discussed with patient / case management and working on discharge for monday. Will try her off oxygen     2/28 : Patient is off oxygen. Will hopefully be discharged tomorrow. I have updated the daughter       73 yo F with hx of anal melanoma, hypothyroidism, AFIB DM type II,  who presents with Acute Hypoxic Respiratory failure possible from COVID 19 Pneumonia with likely superimposed Pneumonia. Course complicated by Acute inferior wall MI s/p PCI of RCA on 2/12/21. Initially admitted to ICU on high Flow. S/p 7 day course of cefepime, azithromax, s/p 10 day course of decadron. now on ventimask. downgraded to medical floor    Patient was treated for Acute hypoxemic respiratory failure secondary to COVID 19 pna, likely superimposed bacterial PNA    Patient had extended stay of 19 days in the hospital. She completed cefepime / azihtro. Paitnet also completed remdesivir / decadron.    Patient was seen by ID and pulmonary.     Patient had repeat CT chest which showed improved infiltrates. Patient is now titrated off oxygen. She is also now covid negative.     During her stay patient was treated for STEMI s/p PCI. S/p PCI of RCA on 2/12; stable with medical therapy. Continue Plavix.       She was seen by cardio. and she was continued on her eliquis for afib.    During her stay she was also treated for acute kidney injury which resolved with IV fluids.    Patient was continued on synthroid for hypothryoid.    Patient also has history  of melanoma which can be followed as outpatient.

## 2021-03-01 NOTE — PROGRESS NOTE ADULT - PROBLEM SELECTOR PLAN 2
CHADS2-VASc=5 (>75, DM, HTN, CAD).  Currently NSR.  Continue Eliquis
CHADS2-VASc=5 (>75, DM, HTN, CAD).  Currently NSR.  Continue anticoagulation.
CHADS2-VASc=5 (>75, DM, HTN, CAD).  Currently NSR. Continue dilt. , metoprolol 12.5 BID, DOAC.
NSR. Continue DOAC
CHADS2-VASc=5 (>75, DM, HTN, CAD).  Currently NSR.  Continue Eliquis
CHADS2-VASc=5 (>75, DM, HTN, CAD).  Currently NSR.  Continue anticoagulation.
CHADS2-VASc=5 (>75, DM, HTN, CAD).  Currently NSR.  Continue anticoagulation.
NSR. Continue DOAC
CHADS2-VASc=5 (>75, DM, HTN, CAD).  Currently NSR.  Continue anticoagulation.
CHADS2-VASc=5 (>75, DM, HTN, CAD).  Currently NSR. Continue dilt. , metoprolol 12.5 BID, DOAC.
CHADS2-VASc=5 (>75, DM, HTN, CAD).  Currently NSR.  Continue Eliquis  monitor hemoglobin ,  Cont. metoprolol.
CHADS2-VASc=5 (>75, DM, HTN, CAD).  Currently NSR.  Continue anticoagulation.
CHADS2-VASc=5 (>75, DM, HTN, CAD).  Currently NSR.  Continue Eliquis  monitor hemoglobin ,
CHADS2-VASc=5 (>75, DM, HTN, CAD).  Currently NSR.  Continue Eliquis
NSR. Continue DOAC

## 2021-03-01 NOTE — PROGRESS NOTE ADULT - PROBLEM SELECTOR PLAN 4
continue metoprolol. encourage po liquid intake      follow up as OP with cardiologist
Normotensive at this time. Will follow
Satisfactory control.
Normotensive at this time. Will follow
BP often low (especially diastolic); given low burden of AF I recommend stopping diltiazem; continue metoprolol. encourage po liquid intake
Normotensive at this time. Will follow
low normal range , encourage maintain fluid intake , continue low dose BB
low normal range , encourage maintain fluid intake , continue low dose BB
BP often low (especially diastolic); now improved ,  given low burden of AF I recommend stopping diltiazem; continue metoprolol. encourage po liquid intake      follow up as OP with cardiologist
Low diastolic pressure; change metoprolol to BID dosing (rather than q8hr)
Normotensive at this time. Will follow
BP often low (especially diastolic); given low burden of AF I recommend stopping diltiazem; continue metoprolol.
BP often low (especially diastolic); given low burden of AF I recommend stopping diltiazem; continue metoprolol. encourage po liquid intake
Normotensive at this time. Will follow
Low diastolic BP; systolic BP controlled.

## 2021-03-01 NOTE — DISCHARGE NOTE PROVIDER - DETAILS OF MALNUTRITION DIAGNOSIS/DIAGNOSES
This patient has been assessed with a concern for Malnutrition and was treated during this hospitalization for the following Nutrition diagnosis/diagnoses:     -  02/15/2021: Severe protein-calorie malnutrition

## 2021-03-01 NOTE — PROGRESS NOTE ADULT - PROBLEM SELECTOR PROBLEM 2
Paroxysmal atrial fibrillation

## 2021-03-01 NOTE — PROGRESS NOTE ADULT - ASSESSMENT
76 yo female with history of metastatic melanoma pt of Dr. Shearer ([facility]) on combined IO therapy with ipilimumab + Nivolumab now admitted for COVID-19 complicated by Respiratory Failure and STEMI     metastatic melanoma   - on Ipi and NIVO   - hold treatment given active ongoing infection and recent cardiac STEMI   -to resume treatment as outpt     STEMI   - now on Eliquis, and plavix   - s/p PCE to RCA   - Cardio following   -on CCB and BB for rate control     Respiratory failure   =- now off Dexamethasone   - CT of the chest - improved infiltrates   - afebrile; off nasal O2   Covid now negative   d/c planning to rehab  today    We will be happy to answer any onc questions on behalf of [facility] and will be in touch with them     Pieter Hager MD  Hematology/Oncology  Cell:  [phone]  Office Phone: [phone]  Office Fax:  [phone] [address] [place] [address]

## 2021-03-01 NOTE — PROGRESS NOTE ADULT - PROBLEM SELECTOR PLAN 1
Mild elevation on last measurement but change in symptoms and ECG. Plan urgent catheterization
Stable s/p PCI of RCA on 2/12.  Continue Plavix, atorvastatin.  Short-duration of "triple therapy" with aspirin, Plavix, Eliquis -- plan to d/c aspirin prior to discharge or sooner if ANY bleeding
s/p multiple stents GIFTY RCA.  Cont. DAPT.  Cont. atorva 80.  Keep Hb >8.
C/W Stemi. Will follow. Pt S/P GIFTY RCA. Continue ASA. monitor H/H. Will try to maintain >8 mg
S/p PCI of RCA on 2/12 -- bleeding stopped , anemic  and is being treated with steroids continue plavix eliquis as discussed before , Hgb stable
S/p PCI of RCA on 2/12 -- she has had minor bleeding (epistaxis and rectal) and is being treated with steroids so I will d/c aspirin (she completed 10 days of ASA+Plavix); continue Eliquis.  I discussed antiplt regimen with Dr Marie earlier.
S/p PCI of RCA on 2/12 -- she has had minor bleeding (epistaxis and rectal) stopped , anemic  and is being treated with steroids continue plavix eliquis as discussed before , monitor hemoglobin
S/p PCI of RCA on 2/12; stable with medical therapy. Continue Plavix
s/p multiple stents GIFTY RCA.  Cont. DAPT.  Cont. atorva 80.  Keep Hb >8.
S/p PCI of RCA on 2/12; stable with medical therapy. Continue Plavix
C/W Stemi. Will follow. Pt S/P GIFTY RCA.
S/p PCI of RCA on 2/12; stable with medical therapy. Continue Plavix
Stable s/p PCI of RCA on 2/12.  Continue Plavix, atorvastatin.  I will discuss with Dr Marie the duration of aspirin -- duration of Aspirin+Plavix+Eliquis should be short given risk of bleeding (with longer-term use of Plavix and Eliquis).
S/p PCI of RCA on 2/12; stable with medical therapy. Continue Plavix
S/p PCI of RCA on 2/12; stable with medical therapy. Continue Plavix

## 2021-03-01 NOTE — PROGRESS NOTE ADULT - PROBLEM SELECTOR PROBLEM 3
Pneumonia due to COVID-19 virus

## 2021-03-01 NOTE — DISCHARGE NOTE PROVIDER - NSDCCPCAREPLAN_GEN_ALL_CORE_FT
PRINCIPAL DISCHARGE DIAGNOSIS  Diagnosis: COVID-19  Assessment and Plan of Treatment:       SECONDARY DISCHARGE DIAGNOSES  Diagnosis: ST elevation MI (STEMI)  Assessment and Plan of Treatment:

## 2021-03-01 NOTE — PROGRESS NOTE ADULT - NS ED BHA TELEPSYCH PATIENT INTERVIEW PRIVATE SPACE
Patient interviewed in a private space.

## 2021-03-01 NOTE — PROGRESS NOTE ADULT - ATTENDING COMMENTS
Aleksandr Montes M.D.  Cardiology, [facility]  Cell: [phone]  Offices:    ([facility] Office)  [phone] ([facility] Office)
Aleksandr Montes M.D.  Cardiology, [facility]  Cell: [phone]  Offices:    ([facility] Office)  [phone] ([facility] Office)
Aleksandr Montes M.D.  Cardiology, [facility]  Cell: [phone]  Offices:    ([facility] Office)  [phone] ([facility] Office)

## 2021-03-01 NOTE — PROGRESS NOTE ADULT - PROBLEM/PLAN-1
DISPLAY PLAN FREE TEXT
n/a

## 2021-03-01 NOTE — PROGRESS NOTE ADULT - PROVIDER SPECIALTY LIST ADULT
Heme/Onc
Heme/Onc
Hospitalist
Hospitalist
MICU
MICU
Cardiology
Cardiology
Critical Care
Heme/Onc
Hospitalist
MICU
MICU
Critical Care
Heme/Onc
Hospitalist
Infectious Disease
MICU
Cardiology
Critical Care
Heme/Onc
MICU
MICU
Hospitalist
Hospitalist
Infectious Disease
Infectious Disease
MICU
Heme/Onc
Cardiology
Heme/Onc
Heme/Onc
Cardiology
Critical Care
Heme/Onc
Heme/Onc
Cardiology
Cardiology
Critical Care
Critical Care
Cardiology

## 2021-03-01 NOTE — PROGRESS NOTE ADULT - SUBJECTIVE AND OBJECTIVE BOX
she feels well   off O2  no dyspnea  no cough    acetaminophen   Tablet .. 650 milliGRAM(s) Oral every 4 hours PRN  ALBUTerol    90 MICROgram(s) HFA Inhaler 2 Puff(s) Inhalation every 4 hours PRN  apixaban 5 milliGRAM(s) Oral every 12 hours  atorvastatin 80 milliGRAM(s) Oral at bedtime  clopidogrel Tablet 75 milliGRAM(s) Oral daily  dextrose 40% Gel 15 Gram(s) Oral once  dextrose 5%. 1000 milliLiter(s) IV Continuous <Continuous>  dextrose 5%. 1000 milliLiter(s) IV Continuous <Continuous>  dextrose 50% Injectable 25 Gram(s) IV Push once  dextrose 50% Injectable 12.5 Gram(s) IV Push once  dextrose 50% Injectable 25 Gram(s) IV Push once  glucagon  Injectable 1 milliGRAM(s) IntraMuscular once  insulin glargine Injectable (LANTUS) 25 Unit(s) SubCutaneous every morning  insulin lispro (ADMELOG) corrective regimen sliding scale   SubCutaneous Before meals and at bedtime  levothyroxine 75 MICROGram(s) Oral daily  loperamide 2 milliGRAM(s) Oral every 4 hours PRN  metoprolol tartrate 12.5 milliGRAM(s) Oral two times a day  ondansetron Injectable 4 milliGRAM(s) IV Push every 6 hours PRN  pantoprazole    Tablet 40 milliGRAM(s) Oral before breakfast  polyethylene glycol 3350 17 Gram(s) Oral daily  sodium chloride 0.65% Nasal 2 Spray(s) Both Nostrils every 6 hours  trimethoprim  160 mG/sulfamethoxazole 800 mG 1 Tablet(s) Oral <User Schedule>      barbiturates (Unknown)  Cipro (Other; Swelling)  iodine (Unknown)      ROS otherwise negative     T(C): 36.6 (03-01-21 @ 09:00), Max: 36.8 (02-28-21 @ 20:08)  HR: 89 (03-01-21 @ 09:00) (89 - 95)  BP: 109/58 (03-01-21 @ 09:00) (109/58 - 113/45)  RR: 18 (03-01-21 @ 09:00) (18 - 18)  SpO2: 93% (03-01-21 @ 09:00) (93% - 95%)                            8.6    9.24  )-----------( 358      ( 28 Feb 2021 08:21 )             27.7   02-28    145  |  114<H>  |  21  ----------------------------<  98  4.6   |  25  |  0.86    Ca    8.8      28 Feb 2021 08:21

## 2021-03-01 NOTE — PROGRESS NOTE ADULT - SUBJECTIVE AND OBJECTIVE BOX
76 y/o F PMHx significant for Hypertension, Diabetes mellitus type 2, vertigo, anal melanoma under care of Dr. Shearer ([facility] [place]) on combined IO therapy with ipilimumab + Nivolumab (cycle 2 on 1/4/2021), and recent admission from 1/19-1/25 for new onset Afib (now on DOAC therapy) and [facility] (reportedly treated with MAB, Remdesivir and steroid therapy) now admitted on 2/10/21 with respiratory failure acute hypoxemic respiratory failure. Her hospitalization was complicated by an acute inferior wall MI and she underwent PCI of RCA on 2/12/21.    2/12/21: Pt complains of midsternal chest pain today. ECG suggests STEMI  2/13/21: S/P PCI RCA yesterday. Pain free today. VSS. Pt feels improved but remains dyspneic  2/14/21: Denies chest pain. NSR.   2/15/21: no chest pain overnight.  2/16/'21: still on high flow NRB, no chest pain.  2/18/21:  Comfortable; no cardiac symptoms (no CP since last Friday).  2/19/21:  Feels "ok."  No chest discomfort, breathing comfortably at rest; occasional cough; no epistaxis or rectal bleeding  2/21/21:  Feels better after improved sleep last night; no new complaints; still with cough; no SOB at rest  2/23/21 Patient is feeling ok , has difficult breathing via nose , had epistaxis , anemia  100% sat on VM 50% no sob   2/24/21 Pt feels improved. Remains short of breath on nc.  2/25/21:  Comfortable less FIO2; deconditioned  2/26/21 Patient is feeling fine at rest , on 3 L NC  saturation 98%   2/27/21 Patient is feeling  much better , decreasing oxygen requirement , on 2 L NC  now   2/28/21 Patient is feeling fine , on 1 L Nc good saturation   3/1/'21: feeling well on RA w/ normal sats.    MEDICATIONS:  OUTPATIENT  Home Medications:  albuterol 90 mcg/inh inhalation aerosol: 2 puff(s) inhaled every 4 hours, As needed, Shortness of Breath and/or Wheezing (01 Mar 2021 09:08)  atorvastatin 80 mg oral tablet: 1 tab(s) orally once a day (at bedtime) (01 Mar 2021 09:08)  Basaglar KwikPen 100 units/mL subcutaneous solution: 22 unit(s) subcutaneous once a day (at bedtime)  (10 Feb 2021 23:30)  clopidogrel 75 mg oral tablet: 1 tab(s) orally once a day (01 Mar 2021 09:08)  levothyroxine 75 mcg (0.075 mg) oral tablet: 1 tab(s) orally once a day (10 Feb 2021 23:30)  NovoLOG FlexPen 100 units/mL injectable solution: injectable 3 times a day (before meals)  *****Sliding Scale*** (10 Feb 2021 23:30)  pantoprazole 40 mg oral delayed release tablet: 1 tab(s) orally once a day (10 Feb 2021 23:30)  predniSONE 5 mg oral tablet: Taper  2/10-2/11 = 4 tabs two time a day  2/12-2/14 = 4 tabs once a day  2/15-2/17 = 2 tabs once a day  2/18-2/21 = 1 tab once a day (10 Feb 2021 23:30)  SMZ-TMP  mg-160 mg oral tablet: 1 tab(s) orally Monday, Wednesday, and Friday (10 Feb 2021 23:30)      INPATIENT  MEDICATIONS  (STANDING):  apixaban 5 milliGRAM(s) Oral every 12 hours  atorvastatin 80 milliGRAM(s) Oral at bedtime  clopidogrel Tablet 75 milliGRAM(s) Oral daily  dextrose 40% Gel 15 Gram(s) Oral once  dextrose 5%. 1000 milliLiter(s) (50 mL/Hr) IV Continuous <Continuous>  dextrose 5%. 1000 milliLiter(s) (100 mL/Hr) IV Continuous <Continuous>  dextrose 50% Injectable 25 Gram(s) IV Push once  dextrose 50% Injectable 12.5 Gram(s) IV Push once  dextrose 50% Injectable 25 Gram(s) IV Push once  glucagon  Injectable 1 milliGRAM(s) IntraMuscular once  insulin glargine Injectable (LANTUS) 25 Unit(s) SubCutaneous every morning  insulin lispro (ADMELOG) corrective regimen sliding scale   SubCutaneous Before meals and at bedtime  levothyroxine 75 MICROGram(s) Oral daily  metoprolol tartrate 12.5 milliGRAM(s) Oral two times a day  pantoprazole    Tablet 40 milliGRAM(s) Oral before breakfast  polyethylene glycol 3350 17 Gram(s) Oral daily  sodium chloride 0.65% Nasal 2 Spray(s) Both Nostrils every 6 hours  trimethoprim  160 mG/sulfamethoxazole 800 mG 1 Tablet(s) Oral <User Schedule>    MEDICATIONS  (PRN):  acetaminophen   Tablet .. 650 milliGRAM(s) Oral every 4 hours PRN Temp greater or equal to 38.5C (101.3F)  ALBUTerol    90 MICROgram(s) HFA Inhaler 2 Puff(s) Inhalation every 4 hours PRN Shortness of Breath and/or Wheezing  loperamide 2 milliGRAM(s) Oral every 4 hours PRN Diarrhea  ondansetron Injectable 4 milliGRAM(s) IV Push every 6 hours PRN Nausea and/or Vomiting      Vital Signs Last 24 Hrs  T(C): 36.6 (01 Mar 2021 09:00), Max: 36.8 (28 Feb 2021 20:08)  T(F): 97.8 (01 Mar 2021 09:00), Max: 98.2 (28 Feb 2021 20:08)  HR: 89 (01 Mar 2021 09:00) (89 - 95)  BP: 109/58 (01 Mar 2021 09:00) (109/58 - 113/45)  BP(mean): --  RR: 18 (01 Mar 2021 09:00) (18 - 18)  SpO2: 93% (01 Mar 2021 09:00) (93% - 95%)Daily     Daily I&O's Summary    28 Feb 2021 07:01  -  01 Mar 2021 07:00  --------------------------------------------------------  IN: 0 mL / OUT: 300 mL / NET: -300 mL        PHYSICAL EXAM:  Constitutional: NAD, awake and alert  Respiratory: Nonlabored, supplemental O2 via N/C  Cardiovascular: S1 and S2, regular rate and rhythm, no Murmurs, gallops or rubs  Gastrointestinal: Bowel Sounds present, soft, nontender.         LABS: All Labs Reviewed:                        8.6    9.24  )-----------( 358      ( 28 Feb 2021 08:21 )             27.7     28 Feb 2021 08:21    145    |  114    |  21     ----------------------------<  98     4.6     |  25     |  0.86     Ca    8.8        28 Feb 2021 08:21            TTE Echo Complete w/o Contrast w/ Doppler (11.02.20 @ 09:02) >   The left ventricle is normal in size, wall motion and contractility. Mild concentric left ventricular hypertrophy is present. Estimated left ventricular ejection fraction is 65-70 %.   The left atrium is mildly dilated.   Normal appearing right atrium.   Normal appearing right ventricle structure and function.   Normal aortic valve structure and function.   EA reversal of the mitral inflow consistent with reduced compliance of the left ventricle.   Fibrocalcific changes noted to the mitral valve leaflets with preserved leaflet excursion. Mild (1+) mitral regurgitation is present.   Moderate mitral annular calcification is present.   No evidence of pericardial effusion.    Transthoracic Echocardiogram Follow Up (01.22.21 @ 10:03):   Limited study to asses left ventricular function.  Estimated left ventricular ejection fraction is 60-65 %.    Cardiac Cath Lab - Adult (02.12.21 @ 15:26):  Inferior STEMI with occluded RCA. Difficult radial access. Femoral access precluded because of full dose apixaban.     pulse ox  monitor in >95 on oxygen  on 1 l nC

## 2021-04-14 PROBLEM — C43.51: Chronic | Status: ACTIVE | Noted: 2021-01-01

## 2021-04-14 PROBLEM — E11.9 TYPE 2 DIABETES MELLITUS WITHOUT COMPLICATIONS: Chronic | Status: ACTIVE | Noted: 2018-08-05

## 2021-04-14 PROBLEM — I48.91 UNSPECIFIED ATRIAL FIBRILLATION: Chronic | Status: ACTIVE | Noted: 2021-01-01

## 2021-04-23 PROBLEM — I25.2 HISTORY OF MYOCARDIAL INFARCTION: Status: ACTIVE | Noted: 2021-01-01

## 2021-04-23 PROBLEM — E11.9 DIABETES MELLITUS, TYPE 2: Status: ACTIVE | Noted: 2021-01-01

## 2021-04-23 PROBLEM — Z87.01 HISTORY OF PNEUMONIA: Status: RESOLVED | Noted: 2021-01-01 | Resolved: 2021-01-01

## 2021-04-23 PROBLEM — Z86.16 HISTORY OF COVID-19: Status: ACTIVE | Noted: 2021-01-01

## 2021-04-23 PROBLEM — Z87.09 HISTORY OF RESPIRATORY FAILURE: Status: RESOLVED | Noted: 2021-01-01 | Resolved: 2021-01-01

## 2021-04-23 PROBLEM — R42 VERTIGO: Status: ACTIVE | Noted: 2021-01-01

## 2021-04-23 NOTE — REASON FOR VISIT
[Follow-Up - From Hospitalization] : follow-up of a recent hospitalization for [Atrial Fibrillation] : atrial fibrillation [Medication Management] : Medication management [FreeTextEntry1] : CAD, MI

## 2021-04-23 NOTE — REVIEW OF SYSTEMS
[Feeling Fatigued] : feeling fatigued [Recent Weight Loss (___ Lbs)] : recent [unfilled] ~Ulb weight loss [Easy Bruising] : a tendency for easy bruising [Negative] : Heme/Lymph [Dyspnea on exertion] : not dyspnea during exertion [Chest Pain] : no chest pain [Lower Ext Edema] : no extremity edema [Palpitations] : no palpitations [Easy Bleeding] : no tendency for easy bleeding

## 2021-04-23 NOTE — PHYSICAL EXAM
[Well Groomed] : well groomed [General Appearance - In No Acute Distress] : no acute distress [Eyelids - No Xanthelasma] : the eyelids demonstrated no xanthelasmas [Auscultation Breath Sounds / Voice Sounds] : lungs were clear to auscultation bilaterally [Respiration, Rhythm And Depth] : normal respiratory rhythm and effort [Heart Rate And Rhythm] : heart rate and rhythm were normal [Heart Sounds] : normal S1 and S2 [Edema] : no peripheral edema present [Bowel Sounds] : normal bowel sounds [Abdomen Soft] : soft [Abnormal Walk] : normal gait [Nail Clubbing] : no clubbing of the fingernails [Cyanosis, Localized] : no localized cyanosis [Skin Color & Pigmentation] : normal skin color and pigmentation [Oriented To Time, Place, And Person] : oriented to person, place, and time [Impaired Insight] : insight and judgment were intact [Affect] : the affect was normal [Mood] : the mood was normal [FreeTextEntry1] : m

## 2021-04-23 NOTE — DISCUSSION/SUMMARY
[___ Month(s)] : [unfilled] month(s) [With Me] : with me [FreeTextEntry1] : \par Coronary artery disease: Status post recent acute inferior wall (in the setting of respiratory failure / pneumonia / COVID-19) and treated with primary RCA stent; doing well; continue clopidogrel.\par \par Hypertension: Well controlled\par \par Paroxysmal atrial fibrillation: Currently in sinus rhythm; I recommend continued anticoagulation with Eliquis\par \par History of COVID-19 infection:  Severe illness - now largely recovered; hypoxia has resolved.

## 2021-04-23 NOTE — HISTORY OF PRESENT ILLNESS
[FreeTextEntry1] : Willa Cottrell 75 year old woman with a history of hypertension, diabetes mellitus type 2, vertigo, and melanoma (under the care of Dr. Shearer at [facility] [place] and recently treated with ipilimumab + Nivolumab) who I met during several recent hospitalizations.  In mid-January she was hospitalized with new onset atrial fibrillation; she subsequently was admitted to [facility] with COVID-19 associated pneumonia resulting in acute hypoxemic respiratory failure; readmission to [facility] (2/10/21) with recurrent hypoxemic respiratory failure attributed to severe pneumonia with hospitalization complicated by an acute inferior wall myocardial infarction treated with primary PCI of the RCA on 2/12/21; prolonged hospitalization related to persistent hypoxia.  SHe spent ~ 7 weeks in rehab and returned home last week.  She currently feels well -- no angina; mild dyspnea with "rushing" or more strenuous activities but otherwise not short of breath.   She is tolerating anticoagulation.

## 2021-06-18 PROBLEM — I48.0 PAROXYSMAL ATRIAL FIBRILLATION: Status: ACTIVE | Noted: 2021-01-01

## 2021-06-18 PROBLEM — I10 HYPERTENSION: Status: ACTIVE | Noted: 2021-01-01

## 2021-06-18 PROBLEM — I25.10 CORONARY ARTERY DISEASE: Status: ACTIVE | Noted: 2021-01-01

## 2021-06-18 NOTE — HISTORY OF PRESENT ILLNESS
[FreeTextEntry1] : Willa Cottrell 75 year old woman with a history of hypertension, diabetes mellitus type 2, vertigo, melanoma ([place] [facility] [place] with ipilimumab + Nivolumab), severe COVID-19 associated pneumonia, paroxysmal atrial fibrillation, and acute inferior wall myocardial infarction (treated with primary PCI of the RCA on 2/12/21) who returns for cardiac examination.  She has been doing well from a cardiovascular standpoint since our last encounter - no angina, dyspnea, palpitations; tolerating prescribed pharmacotherapy, including anticoagulation.\par \par

## 2021-06-18 NOTE — REVIEW OF SYSTEMS
[SOB] : no shortness of breath [Dyspnea on exertion] : not dyspnea during exertion [Chest Discomfort] : no chest discomfort [Palpitations] : no palpitations [Syncope] : no syncope [Easy Bleeding] : no tendency for easy bleeding [Easy Bruising] : no tendency for easy bruising

## 2021-06-18 NOTE — REASON FOR VISIT
[Follow-Up - Clinic] : a clinic follow-up of [Atrial Fibrillation] : atrial fibrillation [Medication Management] : Medication management [FreeTextEntry1] : CAD, MI

## 2021-06-18 NOTE — PHYSICAL EXAM
[Well Groomed] : well groomed [General Appearance - In No Acute Distress] : no acute distress [Respiration, Rhythm And Depth] : normal respiratory rhythm and effort [Auscultation Breath Sounds / Voice Sounds] : lungs were clear to auscultation bilaterally [Heart Rate And Rhythm] : heart rate and rhythm were normal [Heart Sounds] : normal S1 and S2 [Edema] : no peripheral edema present [Bowel Sounds] : normal bowel sounds [Abdomen Soft] : soft [Nail Clubbing] : no clubbing of the fingernails [Cyanosis, Localized] : no localized cyanosis [Skin Color & Pigmentation] : normal skin color and pigmentation [Oriented To Time, Place, And Person] : oriented to person, place, and time [Impaired Insight] : insight and judgment were intact [Affect] : the affect was normal [Mood] : the mood was normal [FreeTextEntry1] : No JVD

## 2021-08-20 NOTE — PROCEDURE NOTE - ADDITIONAL PROCEDURE DETAILS
ETT placement placed in setting of AMS/respiratory distress 2/2 ICH  not included in cc time
OGT placed in setting of ams/respiratory failure 2/2 ICH  not included in cc time

## 2021-08-20 NOTE — ED PROVIDER NOTE - OBJECTIVE STATEMENT
76 y/o female with a PMHx of  HTN, Diabetes mellitus type 2, vertigo, anal melanoma under care of Dr. Shearer ([facility] [place]) on combined IO therapy with ipilimumab + Nivolumab (cycle 2 on 1/4/2021), and recent admission from 1/19-1/25 for new onset Afib (now on DOAC therapy) and [facility] (reportedly treated with MAB, Remdesivir and steroid therapy) presents to the ED c/o LOC. Family saw pt an hour before and then found patient unresponsive and vomiting. Nurse stated pt was later becoming cooperative in trauma room. Oncologist: Dr. Óscar Shearer - [phone]. Family member: Thalia- [phone]. 74 y/o female with a PMHx of HTN, Diabetes mellitus type 2, vertigo, anal melanoma under care of Dr. Shearer ([facility] [place]) on combined IO therapy with ipilimumab + Nivolumab (cycle 2 on 1/4/2021), and recent admission from 1/19-1/25 for new onset Afib (now on DOAC therapy) and recent admission in [facility] for COVID-pneumonia (reportedly treated with MAB, Remdesivir and steroid therapy) presents to the ED c/o LOC. Family saw pt an hour before and then found patient unresponsive and vomiting. Nurse stated pt was later becoming cooperative in trauma room. Oncologist: Dr. Óscar Shearer - [phone]. Family member: Thalia- [phone]. 74 y/o female with a PMHx of HTN, Diabetes mellitus type 2, vertigo, anal melanoma under care of Dr. Shearer ([facility] [place]) on combined IO therapy with ipilimumab + Nivolumab (cycle 2 on 1/4/2021), and recent admission from 1/19-1/25 for new onset Afib (now on DOAC therapy) and recent admission in [facility] for COVID-pneumonia (reportedly treated with MAB, Remdesivir and steroid therapy) presents to the ED c/o LOC and AMS. Family saw pt an hour before and then found patient unresponsive and vomiting. Nurse stated pt was later becoming cooperative in trauma room. Oncologist: Dr. Óscar Shearer - [phone]. Family member: Thalia- [phone].

## 2021-08-20 NOTE — ED ADULT TRIAGE NOTE - CHIEF COMPLAINT QUOTE
pt BIBEMS from home for eval of unresponsiveness. per EMS pt baseline awake and alert, family had seen patient within the hour prior then came downstairs and found patient unresponsive and vomiting. upon arrival to ED pt minimally responsive w/ non-bloody vomit on self. per EMS, unknown code status. no contact information for family. MD awad at bedside, rn dickson at bedside. pt sent to trauma room

## 2021-08-20 NOTE — ED ADULT NURSE NOTE - NSICDXPASTMEDICALHX_GEN_ALL_CORE_FT
PAST MEDICAL HISTORY:  Afib     DM (diabetes mellitus) Type 2    HTN (hypertension)     Primary malignant melanoma of anal canal     Vertigo

## 2021-08-20 NOTE — ED ADULT NURSE NOTE - OBJECTIVE STATEMENT
patient with PMHx of HTN, Diabetes mellitus type 2, vertigo, anal melanoma under care of Dr. Shearer ([facility] [place]) on combined IO therapy with ipilimumab + Nivolumab (cycle 2 on 1/4/2021), and recent admission from 1/19-1/25 for new onset Afib (now on DOAC therapy) and recent admission in [facility] for COVID-pneumonia (reportedly treated with MAB, Remdesivir and steroid therapy) presents to the ED c/o LOC. Family saw pt an hour before and then found patient unresponsive and vomiting. Nurse stated pt was later becoming cooperative in trauma room.

## 2021-08-20 NOTE — CONSULT NOTE ADULT - SUBJECTIVE AND OBJECTIVE BOX
Patient is a 75y old  Female who presents with a chief complaint of     BRIEF HOSPITAL COURSE: ***    Events last 24 hours: ***    PAST MEDICAL & SURGICAL HISTORY:  Vertigo    HTN (hypertension)    DM (diabetes mellitus)  Type 2    Afib    Primary malignant melanoma of anal canal    No significant past surgical history      Allergies    barbiturates (Unknown)  Cipro (Other; Swelling)  iodine (Unknown)    Intolerances      FAMILY HISTORY:  No pertinent family history in first degree relatives        Social History:     Review of Systems:  CONSTITUTIONAL: No fever, chills, or fatigue  EYES: No eye pain, visual disturbances, or discharge  ENMT:  No difficulty hearing, tinnitus, vertigo; No sinus or throat pain  NECK: No pain or stiffness  RESPIRATORY: No cough, wheezing, chills or hemoptysis; No shortness of breath  CARDIOVASCULAR: No chest pain, palpitations, dizziness, or leg swelling  GASTROINTESTINAL: No abdominal or epigastric pain. No nausea, vomiting, or hematemesis; No diarrhea or constipation. No melena or hematochezia.  GENITOURINARY: No dysuria, frequency, hematuria, or incontinence  NEUROLOGICAL: No headaches, memory loss, loss of strength, numbness, or tremors  SKIN: No itching, burning, rashes, or lesions   MUSCULOSKELETAL: No joint pain or swelling; No muscle, back, or extremity pain  PSYCHIATRIC: No depression, anxiety, mood swings, or difficulty sleeping      Vitals During Exam:   HR:   BP:   RR:  sPO2:     Physical Examination:    General: No acute distress.      HEENT: Pupils equal, reactive to light.  Symmetric.    PULM: Clear to auscultation bilaterally, no significant sputum production    CVS: Regular rate and rhythm, no murmurs, rubs, or gallops    ABD: Soft, nondistended, nontender, normoactive bowel sounds, no masses    EXT: No edema, nontender    SKIN: Warm and well perfused, no rashes noted.    NEURO: Alert, oriented, interactive, nonfocal      Medications:  propofol Infusion 20 MICROgram(s)/kG/Min IV Continuous <Continuous>  prothrombin complex concentrate IVPB (KCENTRA) 2000 International Unit(s) IV Intermittent once  chlorhexidine 0.12% Liquid 15 milliLiter(s) Oral Mucosa every 12 hours  chlorhexidine 4% Liquid 1 Application(s) Topical <User Schedule>      ICU Vital Signs Last 24 Hrs  T(C): 37.1 (20 Aug 2021 17:46), Max: 37.1 (20 Aug 2021 17:46)  T(F): 98.8 (20 Aug 2021 17:46), Max: 98.8 (20 Aug 2021 17:46)  HR: 115 (20 Aug 2021 23:22) (93 - 130)  BP: 168/78 (20 Aug 2021 23:22) (167/66 - 186/79)  BP(mean): 100 (20 Aug 2021 23:22) (95 - 108)  ABP: --  ABP(mean): --  RR: 18 (20 Aug 2021 23:22) (18 - 32)  SpO2: 100% (20 Aug 2021 23:22) (98% - 100%)    Vital Signs Last 24 Hrs  T(C): 37.1 (20 Aug 2021 17:46), Max: 37.1 (20 Aug 2021 17:46)  T(F): 98.8 (20 Aug 2021 17:46), Max: 98.8 (20 Aug 2021 17:46)  HR: 115 (20 Aug 2021 23:22) (93 - 130)  BP: 168/78 (20 Aug 2021 23:22) (167/66 - 186/79)  BP(mean): 100 (20 Aug 2021 23:22) (95 - 108)  RR: 18 (20 Aug 2021 23:22) (18 - 32)  SpO2: 100% (20 Aug 2021 23:22) (98% - 100%)    ABG - ( 20 Aug 2021 18:38 )  pH, Arterial: 7.59  pH, Blood: x     /  pCO2: 19    /  pO2: 144   / HCO3: 18    / Base Excess: -2.5  /  SaO2: 100         LABS:                        9.8    16.18 )-----------( 488      ( 20 Aug 2021 18:09 )             29.8     08-20    136  |  104  |  61<H>  ----------------------------<  197<H>  4.2   |  23  |  1.52<H>    Ca    9.5      20 Aug 2021 18:09    TPro  7.8  /  Alb  3.7  /  TBili  0.4  /  DBili  x   /  AST  43<H>  /  ALT  22  /  AlkPhos  78  08-20      CARDIAC MARKERS ( 20 Aug 2021 18:09 )  <0.015 ng/mL / x     / x     / x     / x          PT/INR - ( 20 Aug 2021 18:09 )   PT: 16.4 sec;   INR: 1.43 ratio    PTT - ( 20 Aug 2021 18:09 )  PTT:31.5 sec      Urinalysis Basic - ( 20 Aug 2021 22:02 )  Color: Yellow / Appearance: Slightly Turbid / S.010 / pH: x  Gluc: x / Ketone: Small  / Bili: Negative / Urobili: Negative mg/dL   Blood: x / Protein: 100 mg/dL / Nitrite: Negative   Leuk Esterase: Small / RBC: >50 /HPF / WBC 26-50   Sq Epi: x / Non Sq Epi: Few / Bacteria: Moderate      RADIOLOGY:   < from: CT Head No Cont (21 @ 21:02) >  EXAM:  CT BRAIN                          PROCEDURE DATE:  2021     INTERPRETATION:  PROCEDURE:  CT HEAD  [id number]    INDICATION:  ams.    COMPARISON: Multiple prior examinations most recent on 2020    TECHNIQUE:  Multiple consecutive axial images of the brain were obtained. Coronal and sagittal reformats were created from the axial data set.  Iterative Reconstruction and automatic exposure control was used for dose reduction.    CONTRAST:No intravenous contrast was administered.    FINDINGS:    BRAIN PARENCHYMA:  Large left frontal lobe intraparenchymal hemorrhage with surrounding cytotoxic edema, hemorrhage measuring 5.4 x 3.8 x 4.2 cm (AP x ML x CC). There is localized mass effect with anterior midline shift of 1.2 cm tothe right, and compression of the frontal horn of the left lateral ventricle. Additional smaller foci of intraparenchymal hemorrhage locally in the left frontal lobe, crossing the midline into the right frontal lobe, high left parietal lobe. Extensive areas of subarachnoid hemorrhage bilaterally, most prominent at the right temporal parietal lobes. There is complete effacement of the suprasellar and quadrigeminal plate cisterns.    VENTRICLES/EXTRA-AXIAL SPACES:  Intraventricular hemorrhage and mild hydrocephalus. Addition there is collapse of the fourth ventricle. No extra-axial fluid collections.    EXTRACRANIAL STRUCTURES: Normal bones and soft tissues. The visualized paranasal sinuses are clear. The mastoid air cells are well aerated.    IMPRESSION:    1.  Large Left Frontal Lobe Intraparenchymal Hemorrhage with surrounding cytotoxic edema and mass effect, with the acute hemorrhage component measuring 5.4 x 3.8 x 4.2 cm. Localized mass effect with anterior midline shift of 1.2 cm to the right and compression of the frontal horn of the left lateral ventricle. Other smaller foci of intraparenchymal hemorrhage locally in the left frontal lobe, crossing the midline into the right frontal lobe, and in the high left parietal lobe. Extensive areas of subarachnoid hemorrhage bilaterally, most prominent at the right temporoparietal lobes.    2. Mild Hydrocephalus and intraventricular hemorrhage is present. There is collapse of the fourth ventricle secondary to mass effect.    3. Complete Effacement of the suprasellar and quadrigeminal plate cisterns.    Findings discussed with ELE SELLERS [phone] via phone on 2021 9:29 PM by Dr. Cruz Hill with read back confirmation.    --- End of Report ---    CRUZ HILL DO; Attending Radiologist  This document has been electronically signed. Aug 20 2021  9:48PM    < end of copied text >      SUPPLEMENTAL O2: AC/VC  LINES: Peripheral   IVF: N  DAIVS: Y  PPx: PPI  CONTACT: N 74 yo f pmhx primary melanoma of anal canal, afib, DM2, HTN biba from after being found unresponsive at home covered in vomit. Per ed staff patient last known normal was this morning during a phone call this am with her daughter.  This evening family attempted to call patient without answer prompting visit to her house where they found her.  In ED patient sent for head ct, where she was found to have "Large Left Frontal Lobe Intraparenchymal Hemorrhage with surrounding cytotoxic edema and mass effect, with the acute hemorrhage component measuring 5.4 x 3.8 x 4.2 cm. Localized mass effect with anterior midline shift of 1.2 cm to the right and compression of the frontal horn of the left lateral ventricle. Other smaller foci of intraparenchymal hemorrhage locally in the left frontal lobe, crossing the midline into the right frontal lobe, and in the high left parietal lobe. Extensive areas of subarachnoid hemorrhage bilaterally, most prominent at the right temporoparietal lobes. Mild Hydrocephalus and intraventricular hemorrhage is present. There is collapse of the fourth ventricle secondary to mass effect. Complete Effacement of the suprasellar and quadrigeminal plate cisterns.  Patient appeared to be seizing in ED prompting administration of Keppra.  Patient given decadron as well.  GOC held with patient's children by neurosurg team, patient is full code.  Patient intubated in ED.  Admit to ICU under neurosurgery care.       PAST MEDICAL & SURGICAL HISTORY:  Vertigo  HTN (hypertension)  DM (diabetes mellitus)  Type 2  Afib  Primary malignant melanoma of anal canal  No significant past surgical history      Allergies  barbiturates (Unknown)  Cipro (Other; Swelling)  iodine (Unknown)      FAMILY HISTORY:  No pertinent family history in first degree relatives      Social History:   From Home       Review of Systems:  Unable to obtain       Physical Examination:    General: Elderly female, lying in bed, obtunded    HEENT: Pupils 3mm equal, round and reactive, ETT in place    PULM: Symmetrical thorax expansion upon respiration.  Coarse over right chest, left chest grossly clear. no significant sputum production    CVS: Sinus tach    ABD: Soft, nondistended, nontender, normoactive bowel sounds, no masses appreciated    EXT: No edema, nontender    SKIN: Warm and well perfused, no rashes noted.    NEURO: obtunded      Medications:  propofol Infusion 20 MICROgram(s)/kG/Min IV Continuous <Continuous>  prothrombin complex concentrate IVPB (KCENTRA) 2000 International Unit(s) IV Intermittent once  chlorhexidine 0.12% Liquid 15 milliLiter(s) Oral Mucosa every 12 hours  chlorhexidine 4% Liquid 1 Application(s) Topical <User Schedule>      ICU Vital Signs Last 24 Hrs  T(C): 37.1 (20 Aug 2021 17:46), Max: 37.1 (20 Aug 2021 17:46)  T(F): 98.8 (20 Aug 2021 17:46), Max: 98.8 (20 Aug 2021 17:46)  HR: 115 (20 Aug 2021 23:22) (93 - 130)  BP: 168/78 (20 Aug 2021 23:22) (167/66 - 186/79)  BP(mean): 100 (20 Aug 2021 23:22) (95 - 108)  ABP: --  ABP(mean): --  RR: 18 (20 Aug 2021 23:22) (18 - 32)  SpO2: 100% (20 Aug 2021 23:22) (98% - 100%)    Vital Signs Last 24 Hrs  T(C): 37.1 (20 Aug 2021 17:46), Max: 37.1 (20 Aug 2021 17:46)  T(F): 98.8 (20 Aug 2021 17:46), Max: 98.8 (20 Aug 2021 17:46)  HR: 115 (20 Aug 2021 23:22) (93 - 130)  BP: 168/78 (20 Aug 2021 23:22) (167/66 - 186/79)  BP(mean): 100 (20 Aug 2021 23:22) (95 - 108)  RR: 18 (20 Aug 2021 23:22) (18 - 32)  SpO2: 100% (20 Aug 2021 23:22) (98% - 100%)    ABG - ( 20 Aug 2021 18:38 )  pH, Arterial: 7.59  pH, Blood: x     /  pCO2: 19    /  pO2: 144   / HCO3: 18    / Base Excess: -2.5  /  SaO2: 100         LABS:                        9.8    16.18 )-----------( 488      ( 20 Aug 2021 18:09 )             29.8     08-20    136  |  104  |  61<H>  ----------------------------<  197<H>  4.2   |  23  |  1.52<H>    Ca    9.5      20 Aug 2021 18:09    TPro  7.8  /  Alb  3.7  /  TBili  0.4  /  DBili  x   /  AST  43<H>  /  ALT  22  /  AlkPhos  78  08-20      CARDIAC MARKERS ( 20 Aug 2021 18:09 )  <0.015 ng/mL / x     / x     / x     / x          PT/INR - ( 20 Aug 2021 18:09 )   PT: 16.4 sec;   INR: 1.43 ratio    PTT - ( 20 Aug 2021 18:09 )  PTT:31.5 sec      Urinalysis Basic - ( 20 Aug 2021 22:02 )  Color: Yellow / Appearance: Slightly Turbid / S.010 / pH: x  Gluc: x / Ketone: Small  / Bili: Negative / Urobili: Negative mg/dL   Blood: x / Protein: 100 mg/dL / Nitrite: Negative   Leuk Esterase: Small / RBC: >50 /HPF / WBC 26-50   Sq Epi: x / Non Sq Epi: Few / Bacteria: Moderate      RADIOLOGY:   < from: CT Head No Cont (21 @ 21:02) >  EXAM:  CT BRAIN                          PROCEDURE DATE:  2021     INTERPRETATION:  PROCEDURE:  CT HEAD  [id number]    INDICATION:  ams.    COMPARISON: Multiple prior examinations most recent on 2020    TECHNIQUE:  Multiple consecutive axial images of the brain were obtained. Coronal and sagittal reformats were created from the axial data set.  Iterative Reconstruction and automatic exposure control was used for dose reduction.    CONTRAST:No intravenous contrast was administered.    FINDINGS:    BRAIN PARENCHYMA:  Large left frontal lobe intraparenchymal hemorrhage with surrounding cytotoxic edema, hemorrhage measuring 5.4 x 3.8 x 4.2 cm (AP x ML x CC). There is localized mass effect with anterior midline shift of 1.2 cm tothe right, and compression of the frontal horn of the left lateral ventricle. Additional smaller foci of intraparenchymal hemorrhage locally in the left frontal lobe, crossing the midline into the right frontal lobe, high left parietal lobe. Extensive areas of subarachnoid hemorrhage bilaterally, most prominent at the right temporal parietal lobes. There is complete effacement of the suprasellar and quadrigeminal plate cisterns.    VENTRICLES/EXTRA-AXIAL SPACES:  Intraventricular hemorrhage and mild hydrocephalus. Addition there is collapse of the fourth ventricle. No extra-axial fluid collections.    EXTRACRANIAL STRUCTURES: Normal bones and soft tissues. The visualized paranasal sinuses are clear. The mastoid air cells are well aerated.    IMPRESSION:    1.  Large Left Frontal Lobe Intraparenchymal Hemorrhage with surrounding cytotoxic edema and mass effect, with the acute hemorrhage component measuring 5.4 x 3.8 x 4.2 cm. Localized mass effect with anterior midline shift of 1.2 cm to the right and compression of the frontal horn of the left lateral ventricle. Other smaller foci of intraparenchymal hemorrhage locally in the left frontal lobe, crossing the midline into the right frontal lobe, and in the high left parietal lobe. Extensive areas of subarachnoid hemorrhage bilaterally, most prominent at the right temporoparietal lobes.    2. Mild Hydrocephalus and intraventricular hemorrhage is present. There is collapse of the fourth ventricle secondary to mass effect.    3. Complete Effacement of the suprasellar and quadrigeminal plate cisterns.    Findings discussed with ELE SELLERS [phone] via phone on 2021 9:29 PM by Dr. Cruz Hill with read back confirmation.    --- End of Report ---    CRUZ HILL DO; Attending Radiologist  This document has been electronically signed. Aug 20 2021  9:48PM    < end of copied text >      SUPPLEMENTAL O2: AC/VC  LINES: Peripheral   IVF: N  DAVIS: Y  PPx: PPI  CONTACT: N

## 2021-08-20 NOTE — ED ADULT NURSE REASSESSMENT NOTE - NS ED NURSE REASSESS COMMENT FT1
Assumed care of pt. VS as charted. Pt unresponsive. ICU PA at bedside. Preparing patient for intubation.

## 2021-08-20 NOTE — ED ADULT NURSE REASSESSMENT NOTE - NS ED NURSE REASSESS COMMENT FT1
Two 20 g PIV's in place, suction set up, Ambu bag set up, BP recycling [id number], pulse ox opposite BP arm, ready for intubation.

## 2021-08-20 NOTE — CONSULT NOTE ADULT - ASSESSMENT
76 yo f pmhx primary melanoma of anal canal, afib, DM2, HTN admitted with ICH.     NEURO: Sedated on propofol, keppra for seizure activity.  PRN ativan for break through seizures.    CV: HTN urgency, goal sbp <140 in setting of ICH.  Nicardipine gtt ordered will titrate to maintain spo2 <140.    RESP: Hypoxic respiratory failure, ac/vc 6cc/kg tv lung protective strategies, actively titrating fio2 and peep for spo2 >92%, Post intubation abg ordered.   RENAL: Montior lytes, replace as needed.  burgos in place  GI: NPO, OGT in place  ENDO: POCT q6hr   ID: No active infectious process at this time.  Leukocytosis likely 2/2 home steroid therapy.  Will monitor off abx therapy.   HEME: patient to receive Kcentra in setting of extensive ICH.   DISPO: Full code.      Critical Care time: 65 mins assessing presenting problems of acute illness that poses high probability of life threatening deterioration or end organ damage/dysfunction.  Medical decision making including Initiating plan of care, reviewing data, reviewing radiology, discussing with multidisciplinary team, non inclusive of procedures, discussing goals of care with patient/family  74 yo f pmhx primary melanoma of anal canal, afib, DM2, HTN admitted with ICH.     NEURO: Sedated on propofol, keppra for seizure activity.  PRN ativan for break through seizures.  2% saline started as well.  CV: HTN urgency, goal sbp <140 in setting of ICH.  Nicardipine gtt ordered will titrate to maintain spo2 <140.    RESP: Hypoxic respiratory failure, ac/vc 6cc/kg tv lung protective strategies, actively titrating fio2 and peep for spo2 >92%, Post intubation abg ordered.   RENAL: Montior lytes, replace as needed.  burgos in place  GI: NPO, OGT in place  ENDO: POCT q6hr   ID: UTI started on zosyn.   HEME: patient to receive Kcentra in setting of extensive ICH.   DISPO: Full code.      Critical Care time: 65 mins assessing presenting problems of acute illness that poses high probability of life threatening deterioration or end organ damage/dysfunction.  Medical decision making including Initiating plan of care, reviewing data, reviewing radiology, discussing with multidisciplinary team, non inclusive of procedures, discussing goals of care with patient/family

## 2021-08-20 NOTE — ED PROVIDER NOTE - PROGRESS NOTE DETAILS
Spoke to fellow at [facility]. Pt has a prior Hx of anal rectal melanoma with a small lesion on the left parietal brain.

## 2021-08-20 NOTE — ED PROVIDER NOTE - CLINICAL SUMMARY MEDICAL DECISION MAKING FREE TEXT BOX
CT head, EKG, labs, CXR, blood culture, Zofran. CT head, EKG, labs, CXR, blood cultures, Zofran. CT head, EKG, labs, CXR, Cat scan, blood cultures, Zofran.

## 2021-08-21 NOTE — PROVIDER CONTACT NOTE (EICU) - RECOMMENDATIONS
Plan  1. Very poor prognosis, please have palliative care on board  2. Given increased in lactic from 3 to 6, likely in status epilepticus.  Please loaded with Keppra, not prophylactic dose, and start propofol as anti-epileptic  3. Agree with Zosyn or ceftriaxone to cover UTI  4. NPO  5. Q1 hr for neuro check, focus on pupil exam.  Please contact neurosurgery if pupils become sluggish or dilated.  More sensitive at this moment, since pt is sedated.  CT head if there is any neurological changes  6. Hold DOAC  Very poor prognosis

## 2021-08-21 NOTE — PROVIDER CONTACT NOTE (EICU) - ASSESSMENT
Problem list  1. Large brain lesion with midline shift, bilateral SAH, likely from metastatic brain lesion from melanoma  2. Seizure, given worsening lactate, likely in status epilepticus  3. Goals of care  4. Reversal of DOAC -- Andexxa, not K-centra   5. UTI

## 2021-08-21 NOTE — H&P ADULT - NSICDXPASTMEDICALHX_GEN_ALL_CORE_FT
PAST MEDICAL HISTORY:  Afib     Brain metastases     Cancer of anorectal junction     DM (diabetes mellitus) Type 2    HTN (hypertension)     Primary malignant melanoma of anal canal     Vertigo

## 2021-08-21 NOTE — H&P ADULT - ASSESSMENT
75 year old female with altered mental status, new 5.4 x 3.8 4.2 cm left frontal intraparenchymal hemorrhage with brain compression 2/2 cytotoxic edema and midline shift of 1.2cm. Other foci of IPH in high left parietal lobes and SAH in b/l hemispheres, respiratory failure, elevated lactic acid, afib on ac, DM, renal insufficiency and new seizure today.     admit to Dr. Robles  Admit to ICU  appreciate ICU consult- patient intubated in ER. Vent setting as per ICU team  lengthy discussion with Daughter Radha and all children (Shreyas, Tuan, Kaylah, Radha) at [phone] regarding goals of care- Dr. Robles also discussed with family  Kcentra given in ER for AC use and elevated INR  Keppra 1000mg q12h  Decadron 4q6h for cerebral edema  RISS- known DM, f/up A1c  BP control <150- on nicardipine gtt  repeat ABG in am  repeat CT head at 3am or sooner if patient deteriorates  sedated with propofol for intubation.   possible OR if patient further decompensates.   MRI brain with contrast when stable  prognosis very guarded as patient is elderly with multiple comorbidities and stage 4 cancer. Discussed with family.    75 year old female with altered mental status, new 5.4 x 3.8 4.2 cm left frontal intraparenchymal hemorrhage with brain compression 2/2 cytotoxic edema and midline shift of 1.2cm. Other foci of IPH in high left parietal lobes and SAH in b/l hemispheres, respiratory failure, elevated lactic acid, afib on ac, DM, renal insufficiency and new seizure today.     admit to Dr. Robles  Admit to ICU  appreciate ICU consult- patient intubated in ER. Vent setting as per ICU team  lengthy discussion with Daughter Radha and all children (Shreyas, Tuan, Kaylah, Radha) at [phone] regarding goals of care- Dr. Robles also discussed with family  Kcentra given in ER for AC use and elevated INR  Keppra 1000mg q12h  Decadron 4q6h for cerebral edema  RISS- known DM, f/up A1c  BP control <150- on nicardipine gtt  2%na IV @30cc/hr goal Na 140-150  repeat ABG in am  repeat CT head at 3am or sooner if patient deteriorates  sedated with propofol for intubation.   possible OR if patient further decompensates.   MRI brain with contrast when stable  prognosis very guarded as patient is elderly with multiple comorbidities and stage 4 cancer. Discussed with family.

## 2021-08-21 NOTE — PROGRESS NOTE ADULT - ASSESSMENT
75 year old female with multifocal intraparenchymal hemorrhage, respiratory failure, JASPREET, hyperglycemia- DM on dexamethasone, GI bleed. Stage 4 metastatic anorectal cancer s/p immunotherapy. Hx of Covid this year.    critically ill  discussed with Radha Garcia, Kaylah, Shreyas Dickerson and Alex who are her children- explained the gravity of her present condition and overall worsening/decompensation and multi-system organ failure.   Family understands that surgery would not guarantee an improved outcome or survival. Patient with stage  4 metastatic anorectal cancer.   Patient family requesting palliative care and comfort measures at this time.   likely leaning towards compassionate extubation today.   will not pursue further imaging or surgery at this time unless there is a change in medical management  appreciate critical care input  continue insulin gtt  continue protonix gtt  continue propofol while intubated  on keppra AED  continue decadron for now.   continue 2%Na @ 30 for now follow up BMP Na goal 140-150  discussed with Dr. Robles- all parties and teams aware and in agreement of plan.

## 2021-08-21 NOTE — H&P ADULT - REASON FOR ADMISSION
Gabapentin, Oxycodone, Protonix, Ambien, Lovenox
intraparenchymal hemorrhage, seizure, respiratory distress

## 2021-08-21 NOTE — H&P ADULT - NSHPLABSRESULTS_GEN_ALL_CORE
LABS:                        9.8    16.18 )-----------( 488      ( 20 Aug 2021 18:09 )             29.8     20 Aug 2021 18:09    136    |  104    |  61     ----------------------------<  197    4.2     |  23     |  1.52     Ca    9.5        20 Aug 2021 18:09    TPro  7.8    /  Alb  3.7    /  TBili  0.4    /  DBili  x      /  AST  43     /  ALT  22     /  AlkPhos  78     20 Aug 2021 18:09    PT/INR - ( 20 Aug 2021 18:09 )   PT: 16.4 sec;   INR: 1.43 ratio         PTT - ( 20 Aug 2021 18:09 )  PTT:31.5 sec      Lactate, Blood: 6.4: Test Name:LACT Called by:Buzz Called to:FERMÍN Hollis RN     COVID-19 PCR: NotDete: You can help in the fight against COVID-19. [facility] may contact     Urinalysis (08.20.21 @ 22:02)   Blood, Urine: Large   Glucose Qualitative, Urine: 250 mg/dL   pH Urine: 6.5   Color: Yellow   Urine Appearance: Slightly Turbid   Bilirubin: Negative   Ketone - Urine: Small   Specific Gravity: 1.010   Protein, Urine: 100 mg/dL   Urobilinogen: Negative mg/dL   Nitrite: Negative   Leukocyte Esterase Concentration: Small     toxicology screen noted negative.   RADIOLOGY    < from: CT Head No Cont (08.20.21 @ 21:02) >    IMPRESSION:    1.  Large Left Frontal Lobe Intraparenchymal Hemorrhage with surrounding cytotoxic edema and mass effect, with the acute hemorrhage component measuring 5.4 x 3.8 x 4.2 cm. Localized mass effect with anterior midline shift of 1.2 cm to the right and compression of the frontal horn of the left lateral ventricle. Other smaller foci of intraparenchymal hemorrhage locally in the left frontal lobe, crossing the midline into the right frontal lobe, and in the high left parietal lobe. Extensive areas of subarachnoid hemorrhage bilaterally, most prominent at the right temporoparietal lobes.    2. Mild Hydrocephalus and intraventricular hemorrhage is present. There is collapse of the fourth ventricle secondary to mass effect.    3. Complete Effacement of the suprasellar and quadrigeminal plate cisterns.    Findings discussed with ELE SELLERS [phone] via phone on 8/20/2021 9:29 PM by Dr. Cruz Hill with read back confirmation.    < end of copied text >

## 2021-08-21 NOTE — H&P ADULT - NSHPPHYSICALEXAM_GEN_ALL_CORE
Constitutional: awake, aphasic, shaking on RUE not following. Left gaze preference.   HEENT: PERRLA, EOMI  Neck: Supple.  Respiratory: Breath sounds are clear bilaterally, tachypneic   Cardiovascular: S1 and S2, sinus tachycardia   Gastrointestinal: soft, nontender, protruberant   Extremities:  no edema  Musculoskeletal: no joint swelling/tenderness, no abnormal movements  Skin: No rashes    Neurological exam:  Awake at time of exam, PERRL, left gaze preference able to come to midline when forced. Face symmetrical, RUE jerking and weak when jerking stopped.   not following commands, no verbal output, moves lue spontaneously antigravity, localized pain RLE, moves LLE spontaneously antigravity.   Right toe up, left toe down.

## 2021-08-21 NOTE — H&P ADULT - HISTORY OF PRESENT ILLNESS
· HPI Objective Statement: 76 y/o female with a PMHx of HTN, Diabetes mellitus type 2, vertigo, anal melanoma under care of Dr. Shearer ([facility]) on combined IO therapy with ipilimumab + Nivolumab (cycle 2 on 1/4/2021), and recent admission from 1/19-1/25 for new onset Afib (now on DOAC therapy) and recent admission in [facility] for COVID-pneumonia (reportedly treated with MAB, Remdesivir and steroid therapy) presents to the ED c/o LOC and AMS. Family states patient was at normal neurological baseline and then around 5pm saw pt an hour before and they found patient unresponsive and vomiting. Nurse stated pt was later becoming cooperative in trauma room upon my arrival patient shaking right arm and looking left, non verbal, not following commands. Patient was tachypneic and desaturating found to have large left frontal hemorrhagic likely metastatic lesion. CT head shows large 5.4 cm left frontal hemorrhagic lesion with cytotoxic edema and left parietal hemorrhagic lesion. Patient was due for MRI brain tomorrow with MSK with known hx of left parietal met.. Oncologist: Dr. Óscar Shearer - [phone]. Family member: Radha- [phone]. Discussed all options with children who state they want all measures taken at this time.

## 2021-08-21 NOTE — PROVIDER CONTACT NOTE (EICU) - BACKGROUND
75F with a history of metastatic melanoma spread to brain, A fib on Eliquis c/o LOS and acute change of mental status.  CT showed large 5.4cm left frontal hemorrhage with neurogenic edema with SAH.  Pt was intubated for possible seizure.  Pt was accepted to neurosurgery service.  INR 1.43, As per ICU PA K garya was given, HOWEVER, pt takes DOAC, not coumadin  Lactate 3.2 to 6.4  Cr 1.52  UA positive

## 2021-08-21 NOTE — ED ADULT NURSE REASSESSMENT NOTE - NS ED NURSE REASSESS COMMENT FT1
BARBARA Mendes called via telephone. Notified of pt VS. Per PA, OK to leave Cardene gtt infusing @ rate of 5 mg/hr.

## 2021-08-21 NOTE — PROGRESS NOTE ADULT - ASSESSMENT
Patient with metastatic Melanoma, was on Eliquis for Afib,   had large fronto-parietal bleed, likely from met  catastrophic with poor mental status.  Acute respiratory status secondary to poor mental status  poorly controlled diabetes.    Family coming in to discuss GOC  likely to withdraw  will treat hypertension with Labetalol  hydration  keppra.  insulin for poorly controlled hyperglycemia  Prognosis is poor Patient with metastatic Melanoma, was on Eliquis for Afib,   had large fronto-parietal bleed, likely from met  catastrophic with poor mental status.  Acute respiratory status secondary to poor mental status  poorly controlled diabetes.    Family coming in to discuss GOC  likely to withdraw  will treat hypertension with Labetalol  hydration  keppra.  insulin for poorly controlled hyperglycemia  Prognosis is poor    Addendum:  I had GOC meeting with family, they will likely withdraw but wish to wait till after storm    DNR at this time

## 2021-08-21 NOTE — PROVIDER CONTACT NOTE (CRITICAL VALUE NOTIFICATION) - SITUATION
BMP blood glucose value 484.  8:13am POCT glucose 492, 8:14am POCT glucose 535; Dr. Noonan notified; sliding scale admelog 12 units given as ordered.

## 2021-08-22 NOTE — PROGRESS NOTE ADULT - ASSESSMENT
Patient with metastatic Melanoma, was on Eliquis for Afib,   had large fronto-parietal bleed, likely from met  intraventricular blood and seizures  catastrophic with poor mental status.  Acute respiratory status secondary to poor mental status  poorly controlled diabetes.    After discussion with family , No neurosurgical intervention  likely to withdraw, after storm  will treat hypertension with Labetalol, prn  hydration  keppra. for seizures  insulin for poorly controlled hyperglycemia

## 2021-08-22 NOTE — PROGRESS NOTE ADULT - ASSESSMENT
75 year old female with multifocal intraparenchymal hemorrhage, respiratory failure, JASPREET, hyperglycemia- DM on dexamethasone, GI bleed. Stage 4 metastatic anorectal cancer s/p immunotherapy. Hx of Covid this year.    - No acute neurosurgical intervention   - Patient family requesting palliative care and comfort measures at this time  - Mgmt per ICU staff  - Palliative consult  - Cont Keppra / decadron for now    Discussed with Dr Robles

## 2021-08-23 NOTE — PROGRESS NOTE ADULT - ASSESSMENT
A/p: 75 female with metastatic melanoma who presented with a massive ICH    Plan:  ICU    PULM: no weaning    Cardio: Hemodynamics reasonable    GI: NPO    Family to come in to determine compassionate liberation from the vent

## 2021-08-23 NOTE — PROGRESS NOTE ADULT - ASSESSMENT
75 year old female with multifocal intraparenchymal hemorrhage, respiratory failure, JASPREET, hyperglycemia- DM on dexamethasone, GI bleed. Stage 4 metastatic anorectal cancer s/p immunotherapy. Hx of Covid this year.    - No acute neurosurgical intervention   - Patient family requesting palliative care and comfort measures at this time  - Mgmt per ICU staff  - Palliative consult  - Cont Keppra / decadron for now    Discussed with Dr Hutchinson

## 2021-08-24 NOTE — PROGRESS NOTE ADULT - ASSESSMENT
Assessment and Plan:   · Assessment	  75 year old female with multifocal intraparenchymal hemorrhage, respiratory failure, JASPREET, hyperglycemia- DM on dexamethasone, GI bleed. Stage 4 metastatic anorectal cancer s/p immunotherapy. Hx of Covid this year.    - No acute neurosurgical intervention   - Patient family requesting palliative care and comfort measures, on morphine drip  - patient to transferred to Dr Hutchinson service, downgrade to floor bed  - Palliative consult appreciated    Discussed with Dr Hutchinson

## 2021-08-24 NOTE — PROGRESS NOTE ADULT - RS GEN PE MLT RESP DETAILS PC
breath sounds equal/no rales/no rhonchi/no wheezes
breath sounds equal/no rales/no rhonchi/no wheezes

## 2021-08-24 NOTE — DISCHARGE NOTE NURSING/CASE MANAGEMENT/SOCIAL WORK - PATIENT PORTAL LINK FT
You can access the FollowMyHealth Patient Portal offered by [facility] by registering at the following website: [email or web address]. By joining [email or web address]’s FollowMyHealth portal, you will also be able to view your health information using other applications (apps) compatible with our system.

## 2021-08-24 NOTE — DISCHARGE NOTE NURSING/CASE MANAGEMENT/SOCIAL WORK - NSDCPEFALRISK_GEN_ALL_CORE
For information on Fall & injury Prevention, visit [email or web address]

## 2021-08-24 NOTE — PROGRESS NOTE ADULT - PROVIDER SPECIALTY LIST ADULT
Neurosurgery
Critical Care
Critical Care
Neurosurgery
Critical Care
Critical Care

## 2021-08-24 NOTE — PROGRESS NOTE ADULT - SUBJECTIVE AND OBJECTIVE BOX
HPI:  74 y/o female with a PMHx of HTN, Diabetes mellitus type 2, vertigo, anal melanoma who presents to the ED c/o LOC and AMS. Family states patient was at normal neurological baseline and then around 5pm saw pt an hour before and they found patient unresponsive and vomiting.  CT head shows large 5.4 cm left frontal hemorrhagic lesion with cytotoxic edema and left parietal hemorrhagic lesion.     8/23: Patient seen in bed vented and unresponsive off sedation  8/24: Patient remains on a Morphine drip    PAST MEDICAL & SURGICAL HISTORY:  Vertigo    HTN (hypertension)    DM (diabetes mellitus)  Type 2    Afib    Primary malignant melanoma of anal canal    Cancer of anorectal junction    Brain metastases    No significant past surgical history        FAMILY HISTORY:  No pertinent family history in first degree relatives        Social Hx:    Allergies    barbiturates (Unknown)  Cipro (Other; Swelling)  iodine (Unknown)    Intolerances            ICU Vital Signs Last 24 Hrs  T(C): 36.8 (24 Aug 2021 05:00), Max: 37.2 (24 Aug 2021 00:00)  T(F): 98.3 (24 Aug 2021 05:00), Max: 99 (24 Aug 2021 00:00)  HR: 108 (24 Aug 2021 06:33) (73 - 111)  BP: 144/54 (24 Aug 2021 05:00) (95/56 - 149/67)  BP(mean): 77 (24 Aug 2021 05:00) (63 - 84)  ABP: --  ABP(mean): --  RR: 8 (24 Aug 2021 06:33) (8 - 36)  SpO2: 93% (24 Aug 2021 06:33) (92% - 100%)      Mode: AC/ CMV (Assist Control/ Continuous Mandatory Ventilation)  RR (machine): 18  TV (machine): 400  FiO2: 40  PEEP: 5  PIP: 18      I&O's Summary    23 Aug 2021 07:01  -  24 Aug 2021 07:00  --------------------------------------------------------  IN: 773 mL / OUT: 1375 mL / NET: -602 mL                              8.8    29.77 )-----------( 342      ( 23 Aug 2021 04:41 )             28.1       08-23    147<H>  |  118<H>  |  48<H>  ----------------------------<  210<H>  4.3   |  25  |  1.25    Ca    8.4<L>      23 Aug 2021 04:41                      MEDICATIONS  (STANDING):  morphine  Infusion. 2 mG/Hr (2 mL/Hr) IV Continuous <Continuous>    MEDICATIONS  (PRN):  glycopyrrolate Injectable 0.2 milliGRAM(s) IV Push every 4 hours PRN secrestion      DVT Prophylaxis:    Advanced Directives:  Discussed with:    Visit Information:    ** Time is exclusive of billed procedures and/or teaching and/or routine family updates.        
Patient is a 75y old  Female who presents with a chief complaint of intraparenchymal hemorrhage, seizure, respiratory distress (22 Aug 2021 12:42)      HPI:  76 y/o female with a PMHx of HTN, Diabetes mellitus type 2, vertigo, anal [place] [place] with ipilimumab + Nivolumab (cycle 2 on 1/4/2021), and recent admission from 1/19-1/25 for new onset Afib (now on DOAC therapy) and recent admission in [facility] for COVID-pneumonia (reportedly treated with MAB, Remdesivir and steroid therapy) presents to the ED c/o LOC and AMS. Family states patient was at normal neurological baseline and then around 5pm saw pt an hour before and they found patient unresponsive and vomiting. Nurse stated pt was later becoming cooperative in trauma room upon my arrival patient shaking right arm and looking left, non verbal, not following commands. Patient was tachypneic and desaturating found to have large left frontal hemorrhagic likely metastatic lesion. CT head shows large 5.4 cm left frontal hemorrhagic lesion with cytotoxic edema and left parietal hemorrhagic lesion.     8/21 - S/p devastating multifocal intraparenchymal hemorrhage with acute respiratory distress now on an insulin gtt and has blood in NG tube and rectum. Patient intubated and sedated on propofol.    8/22 - Pt seen and examined earlier today, on CMV, sedated, appears comfortable     8/23 - Remains intubated / sedated, no aggressive surgical intervention as per family's wishes, possible extubation later today      chlorhexidine 0.12% Liquid 15 milliLiter(s) Oral Mucosa every 12 hours  chlorhexidine 4% Liquid 1 Application(s) Topical <User Schedule>  dexAMETHasone  Injectable 4 milliGRAM(s) IV Push every 6 hours  dextrose 40% Gel 15 Gram(s) Oral once  dextrose 5%. 1000 milliLiter(s) IV Continuous <Continuous>  dextrose 5%. 1000 milliLiter(s) IV Continuous <Continuous>  dextrose 50% Injectable 25 Gram(s) IV Push once  dextrose 50% Injectable 12.5 Gram(s) IV Push once  dextrose 50% Injectable 25 Gram(s) IV Push once  glucagon  Injectable 1 milliGRAM(s) IntraMuscular once  insulin glargine Injectable (LANTUS) 25 Unit(s) SubCutaneous every morning  insulin lispro (ADMELOG) corrective regimen sliding scale   SubCutaneous every 6 hours  labetalol Injectable 10 milliGRAM(s) IV Push three times a day PRN  levETIRAcetam  IVPB 500 milliGRAM(s) IV Intermittent every 12 hours  levothyroxine Injectable 25 MICROGram(s) IV Push at bedtime  pantoprazole  Injectable 40 milliGRAM(s) IV Push daily  propofol Infusion 20 MICROgram(s)/kG/Min IV Continuous <Continuous>  sodium chloride 0.9%. 1000 milliLiter(s) IV Continuous <Continuous>      Vital Signs Last 24 Hrs  T(C): 37.6 (23 Aug 2021 05:00), Max: 37.6 (23 Aug 2021 02:00)  T(F): 99.7 (23 Aug 2021 05:00), Max: 99.7 (23 Aug 2021 02:00)  HR: 73 (23 Aug 2021 08:00) (73 - 109)  BP: 153/55 (23 Aug 2021 08:00) (136/49 - 188/64)  BP(mean): 80 (23 Aug 2021 08:00) (71 - 101)  RR: 18 (23 Aug 2021 08:00) (16 - 30)  SpO2: 100% (23 Aug 2021 08:00) (100% - 100%)                          8.8    29.77 )-----------( 342      ( 23 Aug 2021 04:41 )             28.1    147<H>  |  118<H>  |  48<H>  ----------------------------<  210<H>  4.3   |  25  |  1.25    Ca    8.4<L>      23 Aug 2021 04:41      
HPI:  76 y/o female with a PMHx of HTN, Diabetes mellitus type 2, vertigo, anal melanoma [place] of [place]. Shearer [place] [place] combined IO therapy with ipilimumab + Nivolumab (cycle 2 on 1/4/2021), and recent admission from 1/19-1/25 for new onset Afib (now on DOAC therapy) and recent admission in [facility] for COVID-pneumonia (reportedly treated with MAB, Remdesivir and steroid therapy) presents to the ED c/o LOC and AMS. Family states patient was at normal neurological baseline and then around 5pm saw pt an hour before and they found patient unresponsive and vomiting. Nurse stated pt was later becoming cooperative in trauma room upon my arrival patient shaking right arm and looking left, non verbal, not following commands. Patient was tachypneic and desaturating found to have large left frontal hemorrhagic likely metastatic lesion. CT head shows large 5.4 cm left frontal hemorrhagic lesion with cytotoxic edema and left parietal hemorrhagic lesion.     8/21 - S/p devastating multifocal intraparenchymal hemorrhage with acute respiratory distress now on an insulin gtt and has blood in NG tube and rectum. Patient intubated and sedated on propofol.    8/22 - Pt seen and examined earlier today, on CMV, sedated, appears comfortable     8/23 - Remains intubated / sedated, no aggressive surgical intervention as per family's wishes,  extubated    8/24 patient seen and examined earlier today. appears comfortable off ventilator    ICU Vital Signs Last 24 Hrs  T(C): 37.1 (24 Aug 2021 08:00), Max: 37.2 (24 Aug 2021 00:00)  T(F): 98.8 (24 Aug 2021 08:00), Max: 99 (24 Aug 2021 00:00)  HR: 95 (24 Aug 2021 08:00) (73 - 111)  BP: 132/54 (24 Aug 2021 08:00) (95/56 - 149/67)  BP(mean): 73 (24 Aug 2021 08:00) (63 - 84)  ABP: --  ABP(mean): --  RR: 9 (24 Aug 2021 08:00) (8 - 36)  SpO2: 93% (24 Aug 2021 08:00) (92% - 100%)    MEDICATIONS  (STANDING):  morphine  Infusion. 2 mG/Hr (2 mL/Hr) IV Continuous <Continuous>    Neurological exam  Pt on morphine drip, no spontaneous movements, no response to verbal stimuli  minimal flexion to noxious stimuli      
HPI: 75 year old female s/p devastating multifocal intraparenchymal hemorrhage with acute respiratory distress now on an insulin gtt and has blood in NG tube and rectum. Patient intubated and sedated on propofol.    Vital Signs Last 24 Hrs  T(C): 36.7 (21 Aug 2021 06:00), Max: 37.1 (20 Aug 2021 17:46)  T(F): 98 (21 Aug 2021 06:00), Max: 98.8 (20 Aug 2021 17:46)  HR: 112 (21 Aug 2021 07:00) (93 - 130)  BP: 131/45 (21 Aug 2021 07:00) (124/46 - 186/79)  BP(mean): 63 (21 Aug 2021 07:00) (57 - 129)  RR: 23 (21 Aug 2021 07:00) (18 - 32)  SpO2: 100% (21 Aug 2021 07:00) (89% - 100%)    MEDICATIONS  (STANDING):  chlorhexidine 0.12% Liquid 15 milliLiter(s) Oral Mucosa every 12 hours  chlorhexidine 4% Liquid 1 Application(s) Topical <User Schedule>  dexAMETHasone  Injectable 4 milliGRAM(s) IV Push every 6 hours  dextrose 40% Gel 15 Gram(s) Oral once  dextrose 5%. 1000 milliLiter(s) (50 mL/Hr) IV Continuous <Continuous>  dextrose 5%. 1000 milliLiter(s) (100 mL/Hr) IV Continuous <Continuous>  dextrose 50% Injectable 25 Gram(s) IV Push once  dextrose 50% Injectable 12.5 Gram(s) IV Push once  dextrose 50% Injectable 25 Gram(s) IV Push once  glucagon  Injectable 1 milliGRAM(s) IntraMuscular once  insulin lispro (ADMELOG) corrective regimen sliding scale   SubCutaneous every 6 hours  levothyroxine Injectable 25 MICROGram(s) IV Push at bedtime  pantoprazole  Injectable 40 milliGRAM(s) IV Push daily  propofol Infusion 20 MICROgram(s)/kG/Min (9.6 mL/Hr) IV Continuous <Continuous>  sodium chloride 2% . 1000 milliLiter(s) (30 mL/Hr) IV Continuous <Continuous>    MEDICATIONS  (PRN):  labetalol Injectable 10 milliGRAM(s) IV Push three times a day PRN Systolic blood pressure >      PHYSICAL EXAM:    Constitutional: awake and alert.  HEENT: PERRLA, EOMI,   Neck: Supple.  Respiratory: Breath sounds with rhonchi b/l  Cardiovascular: S1 and S2, tachycardia   Gastrointestinal: soft, nontender + heme products noted in NG tube and rectum.   Extremities:  + edema  Musculoskeletal: no joint swelling/tenderness, no abnormal movements  Skin: No rashes    Neurological exam:  Intubated sedated on propofol. PRERL Off propofol has right arm shaking and left gaze preference. + gag. withdraws to pain in all extremities.       LABS:                         9.9    28.09 )-----------( 468      ( 21 Aug 2021 06:44 )             30.4     08-20    136  |  104  |  61<H>  ----------------------------<  197<H>  4.2   |  23  |  1.52<H>    Ca    9.5      20 Aug 2021 18:09    TPro  7.8  /  Alb  3.7  /  TBili  0.4  /  DBili  x   /  AST  43<H>  /  ALT  22  /  AlkPhos  78  08-20    LIVER FUNCTIONS - ( 20 Aug 2021 18:09 )  Alb: 3.7 g/dL / Pro: 7.8 gm/dL / ALK PHOS: 78 U/L / ALT: 22 U/L / AST: 43 U/L / GGT: x               RADIOLOGY:  < from: CT Head No Cont (08.21.21 @ 05:03) >  No interval change to size and configuration of large acute parenchymal hemorrhage centered in the left frontal lobe with moderate surrounding vasogenic edema. Additional focus of more mass like parenchymal hemorrhage in the parietal lobe with surrounding vasogenic edema isalso unchanged. Intraventricular and bilateral scattered subarachnoid hemorrhage is unchanged. Presumed subarachnoid along the medial right frontal convexity adjacent to the falx. There is no new intracranial hemorrhage. There is no evidence for acute large vascular territory infarct.    Stable mass effect on the frontal horns of the lateral ventricles with subfalcine left-to-right midline shift at the level of the septum pellucidum of about 1.3 cm. The ventricles are stable in size and configuration demonstrating dilatation of the right lateral ventricle and likely a component of mild ventriculomegaly. There is downward herniation of the cerebellar tonsils.    The calvarium is intact.  There are no osteoblastic or lytic calvarial or skull base lesions.  The paranasal sinuses and mastoid air cells are clear.    IMPRESSION:  No interval change. Findings raise the concern for hemorrhagic intracranial metastatic disease and follow-up with contrast-enhanced brain MRI can be performed for complete evaluation. Unchanged subfalcine and downward herniation.        LIZZETH HARRIS MD; Attending Radiologist    < end of copied text >  
Patient admitted overnight    HPI:        · HPI Objective Statement: 76 y/o female with a PMHx of HTN, Diabetes mellitus type 2,  anal melanoma under care of Dr. Shearer ([facility] [place] on combined IO therapy with ipilimumab + Nivolumab (cycle 2 on 2021), and recent admission from - for new onset Afib (was on DOAC therapy) and recent admission in [facility] for COVID-pneumonia (reportedly treated with MAB, Remdesivir and steroid therapy) presents to the ED c/o LOC and AMS. Family states patient was at normal neurological baseline and then around 5pm saw pt an hour before and they found patient unresponsive and vomiting. In trauma room upon my arrival patient shaking right arm and looking left, non verbal, not following commands. Patient was tachypneic and desaturating found to have large left frontal hemorrhagic likely metastatic lesion. CT head shows large 5.4 cm left frontal hemorrhagic lesion with   edema and left parietal hemorrhagic lesion. Patient was due for MRI brain tomorrow with MSK with known hx of left parietal met.. Oncologist: Dr. Óscar Shearer - [phone]. Family member: Radha- [phone].     This am Patient is intubated, minimlaly responsive    ROS cant obtain      PMH:   as above     MEDICATIONS  (STANDING):  chlorhexidine 0.12% Liquid 15 milliLiter(s) Oral Mucosa every 12 hours  chlorhexidine 4% Liquid 1 Application(s) Topical <User Schedule>  dexAMETHasone  Injectable 4 milliGRAM(s) IV Push every 6 hours  dextrose 40% Gel 15 Gram(s) Oral once  dextrose 5%. 1000 milliLiter(s) (50 mL/Hr) IV Continuous <Continuous>  dextrose 5%. 1000 milliLiter(s) (100 mL/Hr) IV Continuous <Continuous>  dextrose 50% Injectable 25 Gram(s) IV Push once  dextrose 50% Injectable 12.5 Gram(s) IV Push once  dextrose 50% Injectable 25 Gram(s) IV Push once  glucagon  Injectable 1 milliGRAM(s) IntraMuscular once  insulin lispro (ADMELOG) corrective regimen sliding scale   SubCutaneous every 6 hours  levothyroxine Injectable 25 MICROGram(s) IV Push at bedtime  pantoprazole  Injectable 40 milliGRAM(s) IV Push daily  propofol Infusion 20 MICROgram(s)/kG/Min (9.6 mL/Hr) IV Continuous <Continuous>  sodium chloride 2% . 1000 milliLiter(s) (30 mL/Hr) IV Continuous <Continuous>    MEDICATIONS  (PRN):  labetalol Injectable 10 milliGRAM(s) IV Push three times a day PRN Systolic blood pressure >      Height (cm): 160 ( @ 17:46)  Weight (kg): 80 ( @ 23:36)  BMI (kg/m2): 31.3 ( @ 23:36)    ICU Vital Signs Last 24 Hrs  T(C): 36.7 (21 Aug 2021 06:00), Max: 37.1 (20 Aug 2021 17:46)  T(F): 98 (21 Aug 2021 06:00), Max: 98.8 (20 Aug 2021 17:46)  HR: 112 (21 Aug 2021 07:00) (93 - 130)  BP: 131/45 (21 Aug 2021 07:00) (124/46 - 186/79)  BP(mean): 63 (21 Aug 2021 07:00) (57 - 129)  ABP: --  ABP(mean): --  RR: 23 (21 Aug 2021 07:00) (18 - 32)  SpO2: 100% (21 Aug 2021 07:00) (89% - 100%)    Physical Exam:    General :  Ill intubated  Neuro - pupils equal , grimaces to noxious stimuli  neck supple  lungs clear(cxr clear)  cv irreg, irreg  abdomen - soft, non tender  ext warm    Mode: AC/ CMV (Assist Control/ Continuous Mandatory Ventilation)  RR (machine): 25  TV (machine): 400  FiO2: 60  PEEP: 5  ITime: 1  MAP: 9.9  PIP: 17      I&O's Summary    20 Aug 2021 07:01  -  21 Aug 2021 07:00  --------------------------------------------------------  IN: 0 mL / OUT: 1300 mL / NET: -1300 mL                        9.9    28.09 )-----------( 468      ( 21 Aug 2021 06:44 )             30.4       08    136  |  104  |  61<H>  ----------------------------<  197<H>  4.2   |  23  |  1.52<H>    Ca    9.5      20 Aug 2021 18:09    TPro  7.8  /  Alb  3.7  /  TBili  0.4  /  DBili  x   /  AST  43<H>  /  ALT  22  /  AlkPhos  78  08-20    CARDIAC MARKERS ( 20 Aug 2021 18:09 )  <0.015 ng/mL / x     / x     / x     / x          ABG - ( 21 Aug 2021 02:12 )  pH, Arterial: 7.34  pH, Blood: x     /  pCO2: 36    /  pO2: 57    / HCO3: 19    / Base Excess: -5.5  /  SaO2: 86        Urinalysis Basic - ( 20 Aug 2021 22:02 )    Color: Yellow / Appearance: Slightly Turbid / S.010 / pH: x  Gluc: x / Ketone: Small  / Bili: Negative / Urobili: Negative mg/dL   Blood: x / Protein: 100 mg/dL / Nitrite: Negative   Leuk Esterase: Small / RBC: >50 /HPF / WBC 26-50   Sq Epi: x / Non Sq Epi: Few / Bacteria: Moderate    DVT Prophylaxis:   venodynes                                                              Advanced Directives: to be discussed with family                     
Patient is a 75y old  Female who presents with a chief complaint of intraparenchymal hemorrhage, seizure, respiratory distress (22 Aug 2021 10:06)      HPI:  76 y/o female with a PMHx of HTN, Diabetes mellitus type 2, vertigo, anal [place] [place] with ipilimumab + Nivolumab (cycle 2 on 1/4/2021), and recent admission from 1/19-1/25 for new onset Afib (now on DOAC therapy) and recent admission in [facility] for COVID-pneumonia (reportedly treated with MAB, Remdesivir and steroid therapy) presents to the ED c/o LOC and AMS. Family states patient was at normal neurological baseline and then around 5pm saw pt an hour before and they found patient unresponsive and vomiting. Nurse stated pt was later becoming cooperative in trauma room upon my arrival patient shaking right arm and looking left, non verbal, not following commands. Patient was tachypneic and desaturating found to have large left frontal hemorrhagic likely metastatic lesion. CT head shows large 5.4 cm left frontal hemorrhagic lesion with cytotoxic edema and left parietal hemorrhagic lesion. Patient was due for MRI brain tomorrow with MSK with known hx of left parietal met.. Oncologist: Dr. Óscar Shearer - [phone]. Family member: Radha- [phone]. Discussed all options with children who state they want all measures taken at this time.     8/21 - s/p devastating multifocal intraparenchymal hemorrhage with acute respiratory distress now on an insulin gtt and has blood in NG tube and rectum. Patient intubated and sedated on propofol.    8/22 - Pt seen and examined earlier today, on CMV, sedated, appears comfortable       chlorhexidine 0.12% Liquid 15 milliLiter(s) Oral Mucosa every 12 hours  chlorhexidine 4% Liquid 1 Application(s) Topical <User Schedule>  dexAMETHasone  Injectable 4 milliGRAM(s) IV Push every 6 hours  dextrose 40% Gel 15 Gram(s) Oral once  dextrose 5%. 1000 milliLiter(s) IV Continuous <Continuous>  dextrose 5%. 1000 milliLiter(s) IV Continuous <Continuous>  dextrose 50% Injectable 25 Gram(s) IV Push once  dextrose 50% Injectable 12.5 Gram(s) IV Push once  dextrose 50% Injectable 25 Gram(s) IV Push once  glucagon  Injectable 1 milliGRAM(s) IntraMuscular once  insulin glargine Injectable (LANTUS) 25 Unit(s) SubCutaneous every morning  insulin lispro (ADMELOG) corrective regimen sliding scale   SubCutaneous every 6 hours  labetalol Injectable 10 milliGRAM(s) IV Push three times a day PRN  levETIRAcetam  IVPB 500 milliGRAM(s) IV Intermittent every 12 hours  levothyroxine Injectable 25 MICROGram(s) IV Push at bedtime  pantoprazole  Injectable 40 milliGRAM(s) IV Push daily  propofol Infusion 20 MICROgram(s)/kG/Min IV Continuous <Continuous>  sodium chloride 0.9%. 1000 milliLiter(s) IV Continuous <Continuous>      Vital Signs Last 24 Hrs  T(C): 36.8 (22 Aug 2021 04:00), Max: 37.3 (21 Aug 2021 16:00)  T(F): 98.3 (22 Aug 2021 04:00), Max: 99.1 (21 Aug 2021 16:00)  HR: 107 (22 Aug 2021 12:21) (85 - 107)  BP: 158/58 (22 Aug 2021 10:00) (127/54 - 163/58)  BP(mean): 83 (22 Aug 2021 10:00) (67 - 86)  RR: 22 (22 Aug 2021 10:00) (18 - 23)  SpO2: 100% (22 Aug 2021 12:21) (100% - 100%)                 9.4    35.90 )-----------( 394      ( 22 Aug 2021 12:05 )             28.7     142  |  110<H>  |  49<H>  ----------------------------<  271<H>  3.1<L>   |  24  |  1.57<H>    Ca    9.0      22 Aug 2021 12:05    TPro  7.6  /  Alb  3.7  /  TBili  0.5  /  DBili  x   /  AST  18  /  ALT  18  /  AlkPhos  78  08-21    PT/INR - ( 21 Aug 2021 06:44 )   PT: 14.7 sec;   INR: 1.28 ratio       PTT - ( 21 Aug 2021 06:44 )  PTT:29.6 sec      Radiology:  CT Head No Cont (08.21.21 @ 05:03) >  No interval change. Findings raise the concern for hemorrhagic intracranial metastatic disease and follow-up with contrast-enhanced brain MRI can be performed for complete evaluation. Unchanged subfalcine and downward herniation.      
Events Overnight: Off sedation patient slight posturing lower extremities, now DNR    HPI:        · HPI Objective Statement:      74 y/o female with a PMHx of HTN, Diabetes mellitus type 2,  [place] [place]  and recent admission [place] - for new onset Afib (was on DOAC therapy) and recent admission in [facility] for COVID- presents to the ED c/o LOC and AMS. Family states patient was at normal neurological baseline and then around 5pm saw pt an hour before and they found patient unresponsive and vomiting.  .    There had previous been abnormal finding in left frontal area on PET scan and patient was planning to have outpt. MRI     Patient was intubated in ED, CT showed large frontal hemorrhage.with intraventricular extension.    Patient initially had seizures, now posturing off of propofol/.    ROS cant obtain  MEDICATIONS  (STANDING):  chlorhexidine 0.12% Liquid 15 milliLiter(s) Oral Mucosa every 12 hours  chlorhexidine 4% Liquid 1 Application(s) Topical <User Schedule>  dexAMETHasone  Injectable 4 milliGRAM(s) IV Push every 6 hours  dextrose 40% Gel 15 Gram(s) Oral once  dextrose 5%. 1000 milliLiter(s) (50 mL/Hr) IV Continuous <Continuous>  dextrose 5%. 1000 milliLiter(s) (100 mL/Hr) IV Continuous <Continuous>  dextrose 50% Injectable 25 Gram(s) IV Push once  dextrose 50% Injectable 12.5 Gram(s) IV Push once  dextrose 50% Injectable 25 Gram(s) IV Push once  glucagon  Injectable 1 milliGRAM(s) IntraMuscular once  insulin glargine Injectable (LANTUS) 25 Unit(s) SubCutaneous every morning  insulin lispro (ADMELOG) corrective regimen sliding scale   SubCutaneous every 6 hours  levETIRAcetam  IVPB 500 milliGRAM(s) IV Intermittent every 12 hours  levothyroxine Injectable 25 MICROGram(s) IV Push at bedtime  pantoprazole  Injectable 40 milliGRAM(s) IV Push daily  propofol Infusion 20 MICROgram(s)/kG/Min (9.6 mL/Hr) IV Continuous <Continuous>  sodium chloride 0.9%. 1000 milliLiter(s) (75 mL/Hr) IV Continuous <Continuous>    MEDICATIONS  (PRN):  labetalol Injectable 10 milliGRAM(s) IV Push three times a day PRN Systolic blood pressure >    ICU Vital Signs Last 24 Hrs  T(C): 36.8 (22 Aug 2021 04:00), Max: 37.8 (21 Aug 2021 12:00)  T(F): 98.3 (22 Aug 2021 04:00), Max: 100.1 (21 Aug 2021 12:00)  HR: 95 (22 Aug 2021 10:00) (85 - 105)  BP: 158/58 (22 Aug 2021 10:00) (127/54 - 163/58)  BP(mean): 83 (22 Aug 2021 10:00) (67 - 86)  ABP: --  ABP(mean): --  RR: 22 (22 Aug 2021 10:00) (18 - 25)  SpO2: 100% (22 Aug 2021 10:00) (100% - 100%)    Physical Exam:    General - ill  HEENT - orally intubated  Neuro - grimaces, pupils reactive, not following commands  lungs clear, intubated  cv rrr  abdomen - soft, non distended   burgos  ext warm    Mode: AC/ CMV (Assist Control/ Continuous Mandatory Ventilation)  RR (machine): 18  TV (machine): 400  FiO2: 40  PEEP: 5  ITime: 1  MAP: 9  PIP: 15    I&O's Summary    21 Aug 2021 07:01  -  22 Aug 2021 07:00  --------------------------------------------------------  IN: 2202.8 mL / OUT: 1450 mL / NET: 752.8 mL    22 Aug 2021 07:01  -  22 Aug 2021 10:06  --------------------------------------------------------  IN: 51 mL / OUT: 0 mL / NET: 51 mL                      9.9    28.09 )-----------( 468      ( 21 Aug 2021 06:44 )             30.4     08-21    136  |  102  |  58<H>  ----------------------------<  484<HH>  3.1<L>   |  23  |  1.79<H>    Ca    9.7      21 Aug 2021 06:44    TPro  7.6  /  Alb  3.7  /  TBili  0.5  /  DBili  x   /  AST  18  /  ALT  18  /  AlkPhos  78  08-21      CARDIAC MARKERS ( 20 Aug 2021 18:09 )  <0.015 ng/mL / x     / x     / x     / x        ABG - ( 21 Aug 2021 02:12 )  pH, Arterial: 7.34  pH, Blood: x     /  pCO2: 36    /  pO2: 57    / HCO3: 19    / Base Excess: -5.5  /  SaO2: 86        Urinalysis Basic - ( 20 Aug 2021 22:02 )    Color: Yellow / Appearance: Slightly Turbid / S.010 / pH: x  Gluc: x / Ketone: Small  / Bili: Negative / Urobili: Negative mg/dL   Blood: x / Protein: 100 mg/dL / Nitrite: Negative   Leuk Esterase: Small / RBC: >50 /HPF / WBC 26-50   Sq Epi: x / Non Sq Epi: Few / Bacteria: Moderate    DVT Prophylaxis:  venodynes                                                               Advanced Directives: DNR                     
    HPI:  76 y/o female with a PMHx of HTN, Diabetes mellitus type 2, vertigo, anal melanoma who presents to the ED c/o LOC and AMS. Family states patient was at normal neurological baseline and then around 5pm saw pt an hour before and they found patient unresponsive and vomiting.  CT head shows large 5.4 cm left frontal hemorrhagic lesion with cytotoxic edema and left parietal hemorrhagic lesion.     8/23: Patient seen in bed vented and unresponsive off sedation    PAST MEDICAL & SURGICAL HISTORY:  Vertigo    HTN (hypertension)    DM (diabetes mellitus)  Type 2    Afib    Primary malignant melanoma of anal canal    Cancer of anorectal junction    Brain metastases    No significant past surgical history        FAMILY HISTORY:  No pertinent family history in first degree relatives        Social Hx:    Allergies    barbiturates (Unknown)  Cipro (Other; Swelling)  iodine (Unknown)    Intolerances            ICU Vital Signs Last 24 Hrs  T(C): 37.6 (23 Aug 2021 05:00), Max: 37.6 (23 Aug 2021 02:00)  T(F): 99.7 (23 Aug 2021 05:00), Max: 99.7 (23 Aug 2021 02:00)  HR: 75 (23 Aug 2021 10:00) (73 - 109)  BP: 140/52 (23 Aug 2021 10:00) (136/49 - 188/64)  BP(mean): 75 (23 Aug 2021 10:00) (71 - 101)  ABP: --  ABP(mean): --  RR: 18 (23 Aug 2021 10:00) (16 - 30)  SpO2: 100% (23 Aug 2021 10:00) (100% - 100%)      Mode: AC/ CMV (Assist Control/ Continuous Mandatory Ventilation)  RR (machine): 18  TV (machine): 400  FiO2: 40  PEEP: 5  ITime: 1  MAP: 9  PIP: 19      I&O's Summary    22 Aug 2021 07:01  -  23 Aug 2021 07:00  --------------------------------------------------------  IN: 2103 mL / OUT: 1650 mL / NET: 453 mL                              8.8    29.77 )-----------( 342      ( 23 Aug 2021 04:41 )             28.1       08-23    147<H>  |  118<H>  |  48<H>  ----------------------------<  210<H>  4.3   |  25  |  1.25    Ca    8.4<L>      23 Aug 2021 04:41                      MEDICATIONS  (STANDING):  chlorhexidine 0.12% Liquid 15 milliLiter(s) Oral Mucosa every 12 hours  chlorhexidine 4% Liquid 1 Application(s) Topical <User Schedule>  dexAMETHasone  Injectable 4 milliGRAM(s) IV Push every 6 hours  dextrose 40% Gel 15 Gram(s) Oral once  dextrose 5%. 1000 milliLiter(s) (50 mL/Hr) IV Continuous <Continuous>  dextrose 5%. 1000 milliLiter(s) (100 mL/Hr) IV Continuous <Continuous>  dextrose 50% Injectable 25 Gram(s) IV Push once  dextrose 50% Injectable 12.5 Gram(s) IV Push once  dextrose 50% Injectable 25 Gram(s) IV Push once  glucagon  Injectable 1 milliGRAM(s) IntraMuscular once  insulin glargine Injectable (LANTUS) 25 Unit(s) SubCutaneous every morning  insulin lispro (ADMELOG) corrective regimen sliding scale   SubCutaneous every 6 hours  levETIRAcetam  IVPB 500 milliGRAM(s) IV Intermittent every 12 hours  levothyroxine Injectable 25 MICROGram(s) IV Push at bedtime  pantoprazole  Injectable 40 milliGRAM(s) IV Push daily  propofol Infusion 20 MICROgram(s)/kG/Min (9.6 mL/Hr) IV Continuous <Continuous>  sodium chloride 0.9%. 1000 milliLiter(s) (75 mL/Hr) IV Continuous <Continuous>    MEDICATIONS  (PRN):  labetalol Injectable 10 milliGRAM(s) IV Push three times a day PRN Systolic blood pressure >      DVT Prophylaxis:    Advanced Directives:  Discussed with:    Visit Information: 30 min    ** Time is exclusive of billed procedures and/or teaching and/or routine family updates.

## 2021-08-24 NOTE — DISCHARGE NOTE PROVIDER - HOSPITAL COURSE
Patient with a massive ICH after a metastatic lesion from her anal melanoma hemorrhaged.  Patient was intubated and never regained consciousness.  Family made the patient a DNR/DNI and moved forward with a compassionate liberation from the Vent.      Patient will go to hospice on a Morphine drip

## 2021-08-24 NOTE — PROGRESS NOTE ADULT - THIS PATIENT HAS THE FOLLOWING CONDITION(S)/DIAGNOSES ON THIS ADMISSION:
None
None
Cerebral Edema
Encephalopathy/Cerebral Edema/Brain Compression / Herniation/Acute Respiratory Failure/Acute Blood Loss Anemia

## 2021-08-24 NOTE — PROGRESS NOTE ADULT - REASON FOR ADMISSION
intraparenchymal hemorrhage, seizure, respiratory distress

## 2021-08-24 NOTE — DISCHARGE NOTE PROVIDER - NSDCMRMEDTOKEN_GEN_ALL_CORE_FT
morphine: 2 milligram(s) intravenous every 1 to 2 hours  scopolamine: 1 milligram(s) transdermal 3 to 4 times a week

## 2021-08-24 NOTE — PROGRESS NOTE ADULT - ASSESSMENT
A/p: 75 female with metastatic melanoma who presented with a massive ICH    Plan:  Continue Morphine drip cor comfort  Glycopyrrolate for excess secretions  Transfer to 1N

## 2021-09-03 DIAGNOSIS — J96.00 ACUTE RESPIRATORY FAILURE, UNSPECIFIED WHETHER WITH HYPOXIA OR HYPERCAPNIA: ICD-10-CM

## 2021-09-03 DIAGNOSIS — I62.9 NONTRAUMATIC INTRACRANIAL HEMORRHAGE, UNSPECIFIED: ICD-10-CM

## 2021-09-03 DIAGNOSIS — G93.6 CEREBRAL EDEMA: ICD-10-CM

## 2021-09-03 DIAGNOSIS — R56.9 UNSPECIFIED CONVULSIONS: ICD-10-CM

## 2021-09-03 DIAGNOSIS — E43 UNSPECIFIED SEVERE PROTEIN-CALORIE MALNUTRITION: ICD-10-CM

## 2021-09-03 DIAGNOSIS — N17.0 ACUTE KIDNEY FAILURE WITH TUBULAR NECROSIS: ICD-10-CM

## 2021-09-03 DIAGNOSIS — G93.5 COMPRESSION OF BRAIN: ICD-10-CM

## 2021-09-03 DIAGNOSIS — I48.91 UNSPECIFIED ATRIAL FIBRILLATION: ICD-10-CM

## 2021-09-03 DIAGNOSIS — Z88.8 ALLERGY STATUS TO OTHER DRUGS, MEDICAMENTS AND BIOLOGICAL SUBSTANCES STATUS: ICD-10-CM

## 2021-09-03 DIAGNOSIS — I10 ESSENTIAL (PRIMARY) HYPERTENSION: ICD-10-CM

## 2021-09-03 DIAGNOSIS — C79.31 SECONDARY MALIGNANT NEOPLASM OF BRAIN: ICD-10-CM

## 2021-09-03 DIAGNOSIS — E87.2 ACIDOSIS: ICD-10-CM

## 2021-09-03 DIAGNOSIS — N39.0 URINARY TRACT INFECTION, SITE NOT SPECIFIED: ICD-10-CM

## 2021-09-03 DIAGNOSIS — Z66 DO NOT RESUSCITATE: ICD-10-CM

## 2021-09-03 DIAGNOSIS — Z79.4 LONG TERM (CURRENT) USE OF INSULIN: ICD-10-CM

## 2021-09-03 DIAGNOSIS — N28.9 DISORDER OF KIDNEY AND URETER, UNSPECIFIED: ICD-10-CM

## 2021-09-03 DIAGNOSIS — Z92.21 PERSONAL HISTORY OF ANTINEOPLASTIC CHEMOTHERAPY: ICD-10-CM

## 2021-09-03 DIAGNOSIS — C21.1 MALIGNANT NEOPLASM OF ANAL CANAL: ICD-10-CM

## 2021-09-03 DIAGNOSIS — E11.65 TYPE 2 DIABETES MELLITUS WITH HYPERGLYCEMIA: ICD-10-CM

## 2021-09-03 DIAGNOSIS — I16.0 HYPERTENSIVE URGENCY: ICD-10-CM

## 2021-09-03 DIAGNOSIS — G91.9 HYDROCEPHALUS, UNSPECIFIED: ICD-10-CM

## 2021-09-03 DIAGNOSIS — Z86.16 PERSONAL HISTORY OF COVID-19: ICD-10-CM

## 2021-09-03 DIAGNOSIS — Z51.5 ENCOUNTER FOR PALLIATIVE CARE: ICD-10-CM

## 2021-10-15 ENCOUNTER — APPOINTMENT (OUTPATIENT)
Dept: CARDIOLOGY | Facility: CLINIC | Age: 76
End: 2021-10-15

## 2022-03-15 NOTE — DISCHARGE NOTE PROVIDER - NSDCCPCAREPLAN_GEN_ALL_CORE_FT
PRINCIPAL DISCHARGE DIAGNOSIS  Diagnosis: Spontaneous intraparenchymal intracranial hemorrhage, acute  Assessment and Plan of Treatment:       SECONDARY DISCHARGE DIAGNOSES  Diagnosis: ATN (acute tubular necrosis)  Assessment and Plan of Treatment:     Diagnosis: Severe protein-calorie malnutrition  Assessment and Plan of Treatment:      details… decreased ROM detailed exam

## 2022-05-03 NOTE — ED PROVIDER NOTE - NSICDXPASTMEDICALHX_GEN_ALL_CORE_FT
PAST MEDICAL HISTORY:  Afib     DM (diabetes mellitus) Type 2    HTN (hypertension)     Primary malignant melanoma of anal canal     Vertigo       
Opt out

## 2022-05-20 NOTE — H&P ADULT - NSICDXFAMHXPERTINENTNEGATIVE_GEN_A_CORE_FT
Admitted/transferred from: PACU   2 RN full   skin assessment completed by Terell Stone, RN and Jacklyn Reddy, RN.  Skin assessment finding: Lap site x3  Interventions/actions: No adjustment needed    Will continue to monitor.   cnacer

## 2023-03-09 NOTE — ED ADULT NURSE REASSESSMENT NOTE - NS ED NURSE REASSESS COMMENT FT1
Implemented All Universal Safety Interventions:  [place] to call system. Call bell, personal items and telephone within reach. Instruct patient to call for assistance. Room bathroom lighting operational. Non-slip footwear when patient is off stretcher. Physically safe environment: no spills, clutter or unnecessary equipment. Stretcher in lowest position, wheels locked, appropriate side rails in place. patient ambulating to BR without any distress.  Resting comfortably at this time

## 2024-06-13 NOTE — DIETITIAN INITIAL EVALUATION ADULT. - REASON FOR ADMISSION
[facility]  Radiology Department  [phone]    Suly Mcknight,JEWELS    Date:6/13/2024      Paracentesis Discharge Instructions    You may have an aching pain in your abdomen at the puncture site tonight as the numbing medicine wears off.   You may take Tylenol if allowed, as directed on the label.      Resume your previous diet and prescribed medications.      Rest the remainder of today.  If we have removed a large volume of fluid, you could be lightheaded or dizzy when making position changes.      You may shower in 24 hours.  Keep your dressing clean and dry.  You may replace the dressing or band-aid if it becomes moist or soiled.      Watch for signs of infection at the puncture site:  redness, swelling, pus, fever or chills.  Should any of of these occur contact your physician immediately.       If you experience severe sweating and new, severe abdominal pain seek emergency medical treatment.      If any lab samples were sent during your procedure today the resutls will be sent to your Physician.      Follow up with your physician as previously planned.      If you have any questions please call and ask to speak to a radiology nurse.      albumin (human)  Pronunciation:  al BUE min (HUE man)  Brand:  Albuked, Albuminar-25, Alburx, Albutein, Buminate, Flexbumin, Human Albumin Grifols, Kedbumin, Plasbumin-25, Plasbumin-5  What is the most important information I should know about albumin?  You should not receive albumin if you have severe anemia (lack of red blood cells), or severe heart failure.  What is albumin?  Albumin is a protein produced by the liver that circulates in plasma (the clear liquid portion of your blood). Medicinal albumin is made of plasma proteins from human blood. This medicine works by increasing plasma volume or levels of albumin in the blood.  Albumin is used to replace blood volume loss resulting from trauma such as a severe burns or an injury that 
Shortness of Breath

## 2024-09-18 NOTE — ED PROVIDER NOTE - CROS ED EYES ALL NEG
Detail Level: Detailed
Quality 226: Preventive Care And Screening: Tobacco Use: Screening And Cessation Intervention: Patient screened for tobacco use and is an ex/non-smoker
negative...

## 2024-10-14 NOTE — PROGRESS NOTE ADULT - PROBLEM SELECTOR PROBLEM 4
HTN (hypertension)
done

## 2024-12-20 NOTE — H&P ADULT - NSCORESITESY/N_GEN_A_CORE_RD
Problem: NEUROSENSORY - ADULT  Goal: Achieves stable or improved neurological status  Description: INTERVENTIONS  - Monitor and report changes in neurological status  - Monitor vital signs such as temperature, blood pressure, glucose, and any other labs ordered   - Initiate measures to prevent increased intracranial pressure  - Monitor for seizure activity and implement precautions if appropriate      12/20/2024 1431 by Marisol Maldonado RN  Outcome: Progressing  12/20/2024 1431 by Marisol Maldonado RN  Outcome: Progressing  Goal: Achieves maximal functionality and self care  Description: INTERVENTIONS  - Monitor swallowing and airway patency with patient fatigue and changes in neurological status  - Encourage and assist patient to increase activity and self care.   - Encourage visually impaired, hearing impaired and aphasic patients to use assistive/communication devices  12/20/2024 1431 by Marisol Maldonado RN  Outcome: Progressing  12/20/2024 1431 by Marisol Maldonado RN  Outcome: Progressing     Problem: GASTROINTESTINAL - ADULT  Goal: Minimal or absence of nausea and/or vomiting  Description: INTERVENTIONS:  - Administer IV fluids if ordered to ensure adequate hydration  - Maintain NPO status until nausea and vomiting are resolved  - Nasogastric tube if ordered  - Administer ordered antiemetic medications as needed  - Provide nonpharmacologic comfort measures as appropriate  - Advance diet as tolerated, if ordered  - Consider nutrition services referral to assist patient with adequate nutrition and appropriate food choices  12/20/2024 1431 by Marisol Maldonado RN  Outcome: Progressing  12/20/2024 1431 by Marisol Maldonado RN  Outcome: Progressing  Goal: Maintains or returns to baseline bowel function  Description: INTERVENTIONS:  - Assess bowel function  - Encourage oral fluids to ensure adequate hydration  - Administer IV fluids if ordered to ensure adequate hydration  - Administer ordered medications as needed  -  Encourage mobilization and activity  - Consider nutritional services referral to assist patient with adequate nutrition and appropriate food choices  12/20/2024 1431 by Marisol Maldonado RN  Outcome: Progressing  12/20/2024 1431 by Marisol Maldonado RN  Outcome: Progressing  Goal: Maintains adequate nutritional intake  Description: INTERVENTIONS:  - Monitor percentage of each meal consumed  - Identify factors contributing to decreased intake, treat as appropriate  - Assist with meals as needed  - Monitor I&O, weight, and lab values if indicated  - Obtain nutrition services referral as needed  12/20/2024 1431 by Marisol Maldonado RN  Outcome: Progressing  12/20/2024 1431 by Marisol Maldonado RN  Outcome: Progressing     Problem: METABOLIC, FLUID AND ELECTROLYTES - ADULT  Goal: Electrolytes maintained within normal limits  Description: INTERVENTIONS:  - Monitor labs and assess patient for signs and symptoms of electrolyte imbalances  - Administer electrolyte replacement as ordered  - Monitor response to electrolyte replacements, including repeat lab results as appropriate  - Instruct patient on fluid and nutrition as appropriate  12/20/2024 1431 by Marisol Maldonado RN  Outcome: Progressing  12/20/2024 1431 by Marisol Maldonado RN  Outcome: Progressing  Goal: Fluid balance maintained  Description: INTERVENTIONS:  - Monitor labs   - Monitor I/O and WT  - Instruct patient on fluid and nutrition as appropriate  - Assess for signs & symptoms of volume excess or deficit  12/20/2024 1431 by Marisol Maldonado RN  Outcome: Progressing  12/20/2024 1431 by Marisol Maldonado RN  Outcome: Progressing     Problem: PAIN - ADULT  Goal: Verbalizes/displays adequate comfort level or baseline comfort level  Description: Interventions:  - Encourage patient to monitor pain and request assistance  - Assess pain using appropriate pain scale  - Administer analgesics based on type and severity of pain and evaluate response  - Implement non-pharmacological  Yes measures as appropriate and evaluate response  - Consider cultural and social influences on pain and pain management  - Notify physician/advanced practitioner if interventions unsuccessful or patient reports new pain  12/20/2024 1431 by Marisol Maldonado RN  Outcome: Progressing  12/20/2024 1431 by Marisol Maldonado RN  Outcome: Progressing     Problem: INFECTION - ADULT  Goal: Absence or prevention of progression during hospitalization  Description: INTERVENTIONS:  - Assess and monitor for signs and symptoms of infection  - Monitor lab/diagnostic results  - Monitor all insertion sites, i.e. indwelling lines, tubes, and drains  - Monitor endotracheal if appropriate and nasal secretions for changes in amount and color  - [place] appropriate cooling/warming therapies per order  - Administer medications as ordered  - Instruct and encourage patient and family to use good hand hygiene technique  - Identify and instruct in appropriate isolation precautions for identified infection/condition  12/20/2024 1431 by Marisol Maldonado RN  Outcome: Progressing  12/20/2024 1431 by Marisol Maldonado RN  Outcome: Progressing  Goal: Absence of fever/infection during neutropenic period  Description: INTERVENTIONS:  - Monitor WBC    12/20/2024 1431 by Marisol Maldonado RN  Outcome: Progressing  12/20/2024 1431 by Marisol Maldonado RN  Outcome: Progressing     Problem: SAFETY ADULT  Goal: Patient will remain free of falls  Description: INTERVENTIONS:  - Educate patient/family on patient safety including physical limitations  - Instruct patient to call for assistance with activity   - Consult OT/PT to assist with strengthening/mobility   - Keep Call bell within reach  - Keep bed low and locked with side rails adjusted as appropriate  - Keep care items and personal belongings within reach  - Initiate and maintain comfort rounds  - Make Fall Risk Sign visible to staff  - Offer Toileting every 2 Hours, in advance of need  - Initiate/Maintain bed  alarm  - Obtain necessary fall risk management equipment: slipper sock  - Apply yellow socks and bracelet for high fall risk patients  - Consider moving patient to room near nurses station  12/20/2024 1431 by Marisol Maldonado RN  Outcome: Progressing  12/20/2024 1431 by Marisol Maldonado RN  Outcome: Progressing  Goal: Maintain or return to baseline ADL function  Description: INTERVENTIONS:  -  Assess patient's ability to carry out ADLs; assess patient's baseline for ADL function and identify physical deficits which impact ability to perform ADLs (bathing, care of mouth/teeth, toileting, grooming, dressing, etc.)  - Assess/evaluate cause of self-care deficits   - Assess range of motion  - Assess patient's mobility; develop plan if impaired  - Assess patient's need for assistive devices and provide as appropriate  - Encourage maximum independence but intervene and supervise when necessary  - Involve family in performance of ADLs  - Assess for home care needs following discharge   - Consider OT consult to assist with ADL evaluation and planning for discharge  - Provide patient education as appropriate  12/20/2024 1431 by Marisol Maldonado RN  Outcome: Progressing  12/20/2024 1431 by Marisol Maldonado RN  Outcome: Progressing  Goal: Maintains/Returns to pre admission functional level  Description: INTERVENTIONS:  - Perform AM-PAC 6 Click Basic Mobility/ Daily Activity assessment daily.  - Set and communicate daily mobility goal to care team and patient/family/caregiver.   - Collaborate with rehabilitation services on mobility goals if consulted  - Perform Range of Motion 3 times a day.  - Reposition patient every 2 hours.  - Dangle patient 3 times a day  - Stand patient 3 times a day  - Ambulate patient 3 times a day  - Out of bed to chair 3 times a day   - Out of bed for meals 3 times a day  - Out of bed for toileting  - Record patient progress and toleration of activity level   12/20/2024 1431 by Marisol Maldonado  RN  Outcome: Progressing  12/20/2024 1431 by Marisol Maldonado RN  Outcome: Progressing     Problem: DISCHARGE PLANNING  Goal: Discharge to home or other facility with appropriate resources  Description: INTERVENTIONS:  - Identify barriers to discharge w/patient and caregiver  - Arrange for needed discharge resources and transportation as appropriate  - Identify discharge learning needs (meds, wound care, etc.)  - Arrange for interpretive services to assist at discharge as needed  - Refer to Case Management Department for coordinating discharge planning if the patient needs post-hospital services based on physician/advanced practitioner order or complex needs related to functional status, cognitive ability, or social support system  12/20/2024 1431 by Marisol Maldonado RN  Outcome: Progressing  12/20/2024 1431 by Marisol Maldonado RN  Outcome: Progressing